# Patient Record
Sex: MALE | Race: WHITE | NOT HISPANIC OR LATINO | Employment: OTHER | URBAN - METROPOLITAN AREA
[De-identification: names, ages, dates, MRNs, and addresses within clinical notes are randomized per-mention and may not be internally consistent; named-entity substitution may affect disease eponyms.]

---

## 2017-02-03 ENCOUNTER — TELEPHONE (OUTPATIENT)
Dept: PULMONOLOGY | Facility: CLINIC | Age: 52
End: 2017-02-03

## 2017-02-03 NOTE — TELEPHONE ENCOUNTER
F/U with patient since graduating [employer] in 2015. Informed patient of our maintenance program and times if he would like to exercise.

## 2017-02-17 RX ORDER — BUDESONIDE AND FORMOTEROL FUMARATE DIHYDRATE 160; 4.5 UG/1; UG/1
2 AEROSOL RESPIRATORY (INHALATION) 2 TIMES DAILY
Qty: 1 INHALER | Refills: 11 | Status: SHIPPED | OUTPATIENT
Start: 2017-02-17 | End: 2018-01-24 | Stop reason: SDUPTHER

## 2017-07-24 ENCOUNTER — TELEPHONE (OUTPATIENT)
Dept: PULMONOLOGY | Facility: CLINIC | Age: 52
End: 2017-07-24

## 2017-07-24 RX ORDER — AZITHROMYCIN 250 MG/1
TABLET, FILM COATED ORAL
Qty: 6 TABLET | Refills: 0 | Status: SHIPPED | OUTPATIENT
Start: 2017-07-24 | End: 2017-09-18

## 2017-07-24 NOTE — TELEPHONE ENCOUNTER
Mr Willams reports a worsening productive cough with greenish gray sputum. He is also having increasing shortness of breath due to coughing. He denies fever or chills. He denies any recent antibiotic use. Azithromycin prescribed.

## 2017-09-18 ENCOUNTER — OFFICE VISIT (OUTPATIENT)
Dept: PULMONOLOGY | Facility: CLINIC | Age: 52
End: 2017-09-18

## 2017-09-18 VITALS
BODY MASS INDEX: 22.28 KG/M2 | DIASTOLIC BLOOD PRESSURE: 70 MMHG | TEMPERATURE: 98.2 F | RESPIRATION RATE: 16 BRPM | HEIGHT: 68 IN | SYSTOLIC BLOOD PRESSURE: 110 MMHG | HEART RATE: 87 BPM | OXYGEN SATURATION: 94 % | WEIGHT: 147 LBS

## 2017-09-18 DIAGNOSIS — E88.01 ALPHA-1-ANTITRYPSIN DEFICIENCY (HCC): Primary | ICD-10-CM

## 2017-09-18 DIAGNOSIS — J43.2 CENTRILOBULAR EMPHYSEMA (HCC): ICD-10-CM

## 2017-09-18 PROCEDURE — 94726 PLETHYSMOGRAPHY LUNG VOLUMES: CPT | Performed by: INTERNAL MEDICINE

## 2017-09-18 PROCEDURE — 71020 CHG CHEST X-RAY 2 VW: CPT | Performed by: INTERNAL MEDICINE

## 2017-09-18 PROCEDURE — 94729 DIFFUSING CAPACITY: CPT | Performed by: INTERNAL MEDICINE

## 2017-09-18 PROCEDURE — 99213 OFFICE O/P EST LOW 20 MIN: CPT | Performed by: INTERNAL MEDICINE

## 2017-09-18 PROCEDURE — 94060 EVALUATION OF WHEEZING: CPT | Performed by: INTERNAL MEDICINE

## 2017-09-18 RX ORDER — ALBUTEROL SULFATE 90 UG/1
2 AEROSOL, METERED RESPIRATORY (INHALATION) EVERY 4 HOURS PRN
COMMUNITY
End: 2017-09-18

## 2017-09-18 RX ORDER — IBUPROFEN 800 MG/1
800 TABLET ORAL EVERY 6 HOURS PRN
Status: ON HOLD | COMMUNITY
End: 2021-03-22

## 2017-09-18 RX ORDER — IPRATROPIUM BROMIDE AND ALBUTEROL SULFATE 2.5; .5 MG/3ML; MG/3ML
SOLUTION RESPIRATORY (INHALATION)
COMMUNITY
Start: 2015-01-07 | End: 2019-11-22 | Stop reason: SDUPTHER

## 2017-09-18 NOTE — PROGRESS NOTES
Pulmonary Medicine Follow-up    Chief Complaint     Hypoxemia secondary to severe emphysema secondary to alpha 1 antitrypsin deficiency.    History of Present Illness/History Review     Mr. Willams returns to the clinic today. He states that in general he is been doing about the same as usual. He did have one course of antibiotics in the past 6 months due to increased congestion and difficulty clearing his secretions however this has cleared up somewhat. He does state however over the past year and a half or so he has been noticing a gradual decline in his activity level as well as an increase in his shortness of breath. This does get better with rest. He states he has not had any weight loss and in fact has had some weight gain. He denies lower extremity edema, fevers, chills, night sweats, chest pain, headaches, or dizziness. He does state that about 2 months ago he had a severe coughing fit and felt like he was going to black out however did not lose consciousness. He does not feel like his inhaler regimen, which amounts to Combivent as well as Spiriva and Symbicort, helps very much. He also has used upwards of 4 L of oxygen with activity but states he only uses his supplemental oxygen at nighttime and does not feel the need to use it during activity.      Review of Systems   Constitutional: Negative for activity change, appetite change, chills, diaphoresis, fatigue, fever and unexpected weight change.   HENT: Negative for congestion, dental problem, drooling, facial swelling, nosebleeds, postnasal drip, rhinorrhea, sneezing, sore throat, trouble swallowing and voice change.    Respiratory: Positive for cough and shortness of breath. Negative for apnea, choking, chest tightness, wheezing and stridor.    Cardiovascular: Negative for chest pain and palpitations.   Genitourinary: Negative for dysuria, hematuria and urgency.   Musculoskeletal: Negative for arthralgias, back pain, joint swelling and myalgias.  "  Hematological: Negative for adenopathy.   All other systems reviewed and are negative.      Allergies   Allergen Reactions   • Other      Opioid Analgesics       Current Outpatient Prescriptions:   •  alpha1-proteinase inhibitor (ZEMAIRA) 1000 MG injection, Infuse  into a venous catheter., Disp: , Rfl:   •  budesonide-formoterol (SYMBICORT) 160-4.5 MCG/ACT inhaler, Inhale 2 puffs 2 (Two) Times a Day., Disp: 1 inhaler, Rfl: 11  •  ibuprofen (ADVIL,MOTRIN) 600 MG tablet, Take 200 mg by mouth Every 6 (Six) Hours As Needed for Mild Pain ., Disp: , Rfl:   •  ipratropium-albuterol (COMBIVENT RESPIMAT)  MCG/ACT inhaler, Inhale 1 puff 4 (Four) Times a Day As Needed for wheezing., Disp: 4 g, Rfl: 11  •  ipratropium-albuterol (DUO-NEB) 0.5-2.5 mg/mL nebulizer, Ipratropium-Albuterol 0.5-2.5 (3) MG/3ML Inhalation Solution; Patient Sig: Ipratropium-Albuterol 0.5-2.5 (3) MG/3ML Inhalation Solution 1 vial via nebulizer qid prn sob; 6; 3; 07-Jan-2015; Active, Disp: , Rfl:   •  O2 (OXYGEN), Inhale Every Night., Disp: , Rfl:   •  tiotropium (SPIRIVA) 18 MCG per inhalation capsule, Place 2 puffs into inhaler and inhale Daily., Disp: 30 capsule, Rfl: 11    Past Medical History:   Diagnosis Date   • Alpha-1-antitrypsin deficiency    • Cough      Family History   Problem Relation Age of Onset   • Heart disease Mother    • Diabetes Mother    • Alpha-1 antitrypsin deficiency Brother    • Emphysema Brother    • Lung cancer Other    • Emphysema Other      Social History   Substance Use Topics   • Smoking status: Former Smoker     Packs/day: 1.50     Years: 25.00     Types: Cigarettes     Quit date: 2006   • Smokeless tobacco: Not on file   • Alcohol use No       /70  Pulse 87  Temp 98.2 °F (36.8 °C)  Resp 16  Ht 68\" (172.7 cm)  Wt 147 lb (66.7 kg)  SpO2 94%  BMI 22.35 kg/m2  Physical Exam   Constitutional: He is oriented to person, place, and time. He appears well-developed and well-nourished. No distress.   HENT: "   Head: Normocephalic and atraumatic.   Nose: Nose normal.   Mouth/Throat: Oropharynx is clear and moist. No oropharyngeal exudate.   Eyes: Conjunctivae and EOM are normal. Pupils are equal, round, and reactive to light. Right eye exhibits no discharge. Left eye exhibits no discharge.   Neck: Normal range of motion. Neck supple. No JVD present. No tracheal deviation present. No thyromegaly present.   Cardiovascular: Normal rate, regular rhythm and normal heart sounds.  Exam reveals no gallop and no friction rub.    No murmur heard.  Pulmonary/Chest: Effort normal. No stridor. No respiratory distress. He has no wheezes. He has no rales. He exhibits no tenderness.   Abdominal: Soft. He exhibits no distension. There is no tenderness. There is no guarding.   Musculoskeletal: Normal range of motion. He exhibits no edema or deformity.   Lymphadenopathy:     He has no cervical adenopathy.   Neurological: He is alert and oriented to person, place, and time.   Skin: Skin is warm. No rash noted. He is not diaphoretic. No erythema. No pallor.   Psychiatric: He has a normal mood and affect. His behavior is normal. Thought content normal.   Vitals reviewed.      Results     Pulmonary function tests were reviewed. These demonstrate severe obstruction with an FEV1 of 1.09 L which is 31% of predicted. Lung volumes show borderline hyperinflation as well as air trapping. His diffusing capacity is reduced.    I was also able to review a chest x-ray performed today and compare this to the previous in 2015. Lungs demonstrate hyperinflation. There are small calcified nodules primarily on the right side consistent with prior granulomatous infection. Lungs are otherwise clear. There has been no change compared to 2015.    Problem List       ICD-10-CM ICD-9-CM   1. Alpha-1-antitrypsin deficiency E88.01 273.4   2. Centrilobular emphysema J43.2 492.8       Impression and Plan     I have encouraged the patient to continue on with his exercise  regimen.  Also encouraged to use supple oxygen as prescribed. I feel that this could help him increase his exercise endurance though he is somewhat reluctant to use this with activity.  We will continue the Spiriva respimat however I have given him samples of the rest been at formulation to see if this helps him a bit more. He will let us know if he needs a new prescription.   He will continue on with Symbicort.  He will also continue on with his supplementation of alpha-1 antitrypsin.  He is not interested in getting a flu vaccine at this time.  We will plan to follow-up with me in 3-6 months or sooner if he has any problems in the meantime.    Phan Castellano MD, [facility]    CC: Devaughn Kohli MD    Please note that portions of this note were completed with a voice recognition program. Efforts were made to edit the dictation, but occasionally words are mistranscribed.

## 2017-10-19 RX ORDER — IPRATROPIUM/ALBUTEROL SULFATE 20-100 MCG
MIST INHALER (GRAM) INHALATION
Qty: 4 G | Refills: 11 | Status: SHIPPED | OUTPATIENT
Start: 2017-10-19 | End: 2018-09-14 | Stop reason: SDUPTHER

## 2017-10-19 RX ORDER — TIOTROPIUM BROMIDE 18 UG/1
CAPSULE ORAL; RESPIRATORY (INHALATION)
Qty: 30 CAPSULE | Refills: 11 | Status: SHIPPED | OUTPATIENT
Start: 2017-10-19 | End: 2018-05-07 | Stop reason: SDUPTHER

## 2017-11-07 ENCOUNTER — TELEPHONE (OUTPATIENT)
Dept: PULMONOLOGY | Facility: CLINIC | Age: 52
End: 2017-11-07

## 2017-11-07 RX ORDER — GUAIFENESIN 600 MG/1
1200 TABLET, EXTENDED RELEASE ORAL 2 TIMES DAILY
Qty: 30 TABLET | Refills: 1 | Status: SHIPPED | OUTPATIENT
Start: 2017-11-07 | End: 2019-10-23

## 2017-11-07 RX ORDER — AZITHROMYCIN 250 MG/1
TABLET, FILM COATED ORAL
Qty: 6 TABLET | Refills: 0 | Status: SHIPPED | OUTPATIENT
Start: 2017-11-07 | End: 2018-03-15

## 2017-11-07 NOTE — TELEPHONE ENCOUNTER
Mr Willams complains of increased cough and congestion, although he is having trouble expectorating. He denies fever or chills but does report generalized body aches. No antibiotic use since July.    Azithromycin and Mucinex prescribed.

## 2018-01-24 RX ORDER — BUDESONIDE AND FORMOTEROL FUMARATE DIHYDRATE 160; 4.5 UG/1; UG/1
2 AEROSOL RESPIRATORY (INHALATION)
Qty: 1 INHALER | Refills: 1 | Status: SHIPPED | OUTPATIENT
Start: 2018-01-24 | End: 2018-04-11 | Stop reason: SDUPTHER

## 2018-02-01 ENCOUNTER — DOCUMENTATION (OUTPATIENT)
Dept: PULMONOLOGY | Facility: CLINIC | Age: 53
End: 2018-02-01

## 2018-02-01 NOTE — PROGRESS NOTES
Darleen from [facility] (Prolastin) called to get OK to add +20% to pt's order instead of the traditional +10% due to pt's weight being over the dose they had. [phone] ext [phone].

## 2018-03-15 ENCOUNTER — OFFICE VISIT (OUTPATIENT)
Dept: PULMONOLOGY | Facility: CLINIC | Age: 53
End: 2018-03-15

## 2018-03-15 VITALS
DIASTOLIC BLOOD PRESSURE: 74 MMHG | WEIGHT: 147.38 LBS | BODY MASS INDEX: 22.34 KG/M2 | SYSTOLIC BLOOD PRESSURE: 106 MMHG | OXYGEN SATURATION: 91 % | HEART RATE: 76 BPM | TEMPERATURE: 97.8 F | HEIGHT: 68 IN | RESPIRATION RATE: 18 BRPM

## 2018-03-15 DIAGNOSIS — J43.2 CENTRILOBULAR EMPHYSEMA (HCC): ICD-10-CM

## 2018-03-15 DIAGNOSIS — Z99.81 DEPENDENCE ON SUPPLEMENTAL OXYGEN: ICD-10-CM

## 2018-03-15 DIAGNOSIS — E88.01 ALPHA-1-ANTITRYPSIN DEFICIENCY (HCC): Primary | ICD-10-CM

## 2018-03-15 PROCEDURE — 99213 OFFICE O/P EST LOW 20 MIN: CPT | Performed by: INTERNAL MEDICINE

## 2018-03-15 RX ORDER — DOXYCYCLINE HYCLATE 100 MG
100 TABLET ORAL 2 TIMES DAILY
Qty: 28 TABLET | Refills: 0 | Status: SHIPPED | OUTPATIENT
Start: 2018-03-15 | End: 2018-03-29

## 2018-03-15 RX ORDER — SODIUM CHLORIDE FOR INHALATION 3 %
4 VIAL, NEBULIZER (ML) INHALATION AS NEEDED
Qty: 120 ML | Refills: 12 | Status: SHIPPED | OUTPATIENT
Start: 2018-03-15 | End: 2019-10-23

## 2018-03-15 RX ORDER — PREDNISONE 10 MG/1
TABLET ORAL
Qty: 31 TABLET | Refills: 0 | Status: SHIPPED | OUTPATIENT
Start: 2018-03-15 | End: 2018-05-07

## 2018-03-15 NOTE — PROGRESS NOTES
Pulmonary Medicine Follow-up    Chief Complaint     Alpha-1 antitrypsin deficiency and severe emphysema    History of Present Illness/History Review     Mr. Willams returns the clinic today. I last saw him in September. He states that over the past year or so he has been feeling a bit worse in terms of his energy levels and respiratory capacity. He states that he chronically feels like he has mucus and congestion in his chest but is unable to clear this with any success. He does state in the past that when he is had steroids or antibiotics this clears up remarkably and he feels much better. His weight has been stable and he is been taking his medications without any difficulties. He is not sure if the Spiriva Symbicort help him though he has not been off these. He is wondering about switching out the Advair as an experiment. He is also concerned about the possibility of potential food or environment allergies he could be contributing to his mucus retention. He continues to tolerate his alpha1-proteinase inhibitor supplementation.      Review of Systems   Constitutional: Positive for activity change and fatigue. Negative for appetite change, chills, diaphoresis, fever and unexpected weight change.   HENT: Positive for congestion. Negative for facial swelling, hearing loss, mouth sores, postnasal drip, rhinorrhea, sinus pain, sinus pressure, sneezing, sore throat, trouble swallowing and voice change.    Eyes: Negative.    Respiratory: Positive for cough, chest tightness and shortness of breath. Negative for apnea, choking, wheezing and stridor.    Cardiovascular: Negative for chest pain, palpitations and leg swelling.   Gastrointestinal: Negative for abdominal distention, abdominal pain, constipation, diarrhea, nausea and vomiting.   Musculoskeletal: Negative.    Skin: Negative.    Allergic/Immunologic: Negative.    Neurological: Negative.    Hematological: Negative.    Psychiatric/Behavioral: Negative.   "      Allergies   Allergen Reactions   • Other      Opioid Analgesics   • Roflumilast Nausea Only       Current Outpatient Prescriptions:   •  alpha1-proteinase inhibitor (ZEMAIRA) 1000 MG injection, Infuse  into a venous catheter., Disp: , Rfl:   •  budesonide-formoterol (SYMBICORT) 160-4.5 MCG/ACT inhaler, Inhale 2 puffs 2 (Two) Times a Day., Disp: 1 inhaler, Rfl: 1  •  COMBIVENT RESPIMAT  MCG/ACT inhaler, inhale 1 puff by mouth four times a day if needed for wheezing, Disp: 4 g, Rfl: 11  •  guaiFENesin (MUCINEX) 600 MG 12 hr tablet, Take 2 tablets by mouth 2 (Two) Times a Day., Disp: 30 tablet, Rfl: 1  •  ibuprofen (ADVIL,MOTRIN) 600 MG tablet, Take 200 mg by mouth Every 6 (Six) Hours As Needed for Mild Pain ., Disp: , Rfl:   •  ipratropium-albuterol (DUO-NEB) 0.5-2.5 mg/mL nebulizer, Ipratropium-Albuterol 0.5-2.5 (3) MG/3ML Inhalation Solution; Patient Sig: Ipratropium-Albuterol 0.5-2.5 (3) MG/3ML Inhalation Solution 1 vial via nebulizer qid prn sob; 6; 3; 07-Jan-2015; Active, Disp: , Rfl:   •  O2 (OXYGEN), Inhale Every Night., Disp: , Rfl:   •  SPIRIVA HANDIHALER 18 MCG per inhalation capsule, PLACE 2 PUFFS INTO INHALER AND INHALE DAILY, Disp: 30 capsule, Rfl: 11    Past Medical History:   Diagnosis Date   • Alpha-1-antitrypsin deficiency    • Cough      Family History   Problem Relation Age of Onset   • Heart disease Mother    • Diabetes Mother    • Alpha-1 antitrypsin deficiency Brother    • Emphysema Brother    • Lung cancer Other    • Emphysema Other      Social History   Substance Use Topics   • Smoking status: Former Smoker     Packs/day: 1.50     Years: 25.00     Types: Cigarettes     Quit date: 2006   • Smokeless tobacco: Not on file   • Alcohol use No       /74 (BP Location: Right arm, Patient Position: Sitting, Cuff Size: Adult)   Pulse 76   Temp 97.8 °F (36.6 °C)   Resp 18   Ht 172.7 cm (67.99\")   Wt 66.8 kg (147 lb 6 oz)   SpO2 91%   BMI 22.41 kg/m²   Physical Exam "   Constitutional: He is oriented to person, place, and time. He appears well-developed and well-nourished. No distress.   HENT:   Head: Normocephalic and atraumatic.   Right Ear: External ear normal.   Left Ear: External ear normal.   Nose: Nose normal.   Mouth/Throat: Oropharynx is clear and moist. No oropharyngeal exudate.   Eyes: Conjunctivae and EOM are normal. Pupils are equal, round, and reactive to light. Right eye exhibits no discharge. Left eye exhibits no discharge. No scleral icterus.   Neck: Normal range of motion. Neck supple. No JVD present. No tracheal deviation present. No thyromegaly present.   Cardiovascular: Normal rate, regular rhythm and normal heart sounds.  Exam reveals no friction rub.    No murmur heard.  Pulmonary/Chest: Effort normal and breath sounds normal. No stridor. No respiratory distress. He has no wheezes. He has no rales. He exhibits no tenderness.   Abdominal: Soft. Bowel sounds are normal. He exhibits no distension and no mass. There is no tenderness. There is no rebound and no guarding.   Musculoskeletal: Normal range of motion. He exhibits no edema, tenderness or deformity.   Lymphadenopathy:     He has no cervical adenopathy.   Neurological: He is alert and oriented to person, place, and time.   Skin: Skin is warm. He is not diaphoretic. No erythema. No pallor.   Psychiatric: He has a normal mood and affect. His behavior is normal. Judgment and thought content normal.   Vitals reviewed.          Problem List       ICD-10-CM ICD-9-CM   1. Alpha-1-antitrypsin deficiency E88.01 273.4   2. Centrilobular emphysema J43.2 492.8   3. Dependence on supplemental oxygen Z99.81 V46.2       Impression and Plan     Mr. Willams and I had a long conversation regarding his mucus retention and an overall strategy of both trying to limit the amount of sputum produced as well as managing the sputum that does exist with good clearance techniques. I have given him information on postural drainage  and explained the purpose behind this.  I feel that he would benefit greatly from a flutter or Acapella valve and I will order him one of these though unfortunately I do not have one to give him in the office.  I will also order 3% nebulized saline to be used as needed and his nebulizer.  I will arrange for a visit with an allergist at his convenience.  I will also like to start him off with a short course of prednisone taper as well as doxycycline.  He will continue on with his current medications however he will swap out his Symbicort with Advair to see if this makes any difference.    I will plan to see him back in 4-6 weeks and we will further modify his regimen as necessary depending upon the efficacy of these changes.    Phan Castellano MD, [facility]    CC: Devaughn Kohli MD

## 2018-04-11 RX ORDER — BUDESONIDE AND FORMOTEROL FUMARATE DIHYDRATE 160; 4.5 UG/1; UG/1
2 AEROSOL RESPIRATORY (INHALATION)
Qty: 1 INHALER | Refills: 1 | Status: SHIPPED | OUTPATIENT
Start: 2018-04-11 | End: 2018-05-07 | Stop reason: SDUPTHER

## 2018-04-11 RX ORDER — BUDESONIDE AND FORMOTEROL FUMARATE DIHYDRATE 160; 4.5 UG/1; UG/1
AEROSOL RESPIRATORY (INHALATION)
Qty: 10.2 G | Refills: 1 | OUTPATIENT
Start: 2018-04-11

## 2018-05-07 ENCOUNTER — OFFICE VISIT (OUTPATIENT)
Dept: PULMONOLOGY | Facility: CLINIC | Age: 53
End: 2018-05-07

## 2018-05-07 VITALS
BODY MASS INDEX: 21.56 KG/M2 | HEIGHT: 68 IN | WEIGHT: 142.25 LBS | SYSTOLIC BLOOD PRESSURE: 130 MMHG | HEART RATE: 74 BPM | RESPIRATION RATE: 18 BRPM | OXYGEN SATURATION: 94 % | DIASTOLIC BLOOD PRESSURE: 88 MMHG | TEMPERATURE: 97.4 F

## 2018-05-07 DIAGNOSIS — J43.2 CENTRILOBULAR EMPHYSEMA (HCC): ICD-10-CM

## 2018-05-07 DIAGNOSIS — R05.3 CHRONIC COUGH: ICD-10-CM

## 2018-05-07 DIAGNOSIS — E88.01 ALPHA-1-ANTITRYPSIN DEFICIENCY (HCC): Primary | ICD-10-CM

## 2018-05-07 PROCEDURE — 99212 OFFICE O/P EST SF 10 MIN: CPT | Performed by: INTERNAL MEDICINE

## 2018-05-07 RX ORDER — BUDESONIDE AND FORMOTEROL FUMARATE DIHYDRATE 160; 4.5 UG/1; UG/1
2 AEROSOL RESPIRATORY (INHALATION)
Qty: 1 INHALER | Refills: 1 | Status: SHIPPED | OUTPATIENT
Start: 2018-05-07 | End: 2018-06-15 | Stop reason: SDUPTHER

## 2018-05-07 NOTE — PROGRESS NOTES
Pulmonary Medicine Follow-up    Chief Complaint     Follow-up of alpha-1 antitrypsin deficiency and emphysema    History of Present Illness/History Review     Mr. Willams returns to the clinic today. I last saw him in March during which time he was having some problems clearing secretions. He states that these difficulties continue to come and go mainly depending upon the weather. He is also still interested in seeing an allergist which I believe I had ordered during his last visit but the referral was never made. He states that he is been intermittently using nebulized saline does not seem to have much effect from it. He has not had any fevers or chills. He still occasionally is short of breath through the day.      Review of Systems  Full review of systems was completed and it is negative except as mentioned above in the HPI and his scanned review sheet.    Allergies   Allergen Reactions   • Other      Opioid Analgesics   • Roflumilast Nausea Only       Current Outpatient Prescriptions:   •  alpha1-proteinase inhibitor (ZEMAIRA) 1000 MG injection, Infuse  into a venous catheter., Disp: , Rfl:   •  budesonide-formoterol (SYMBICORT) 160-4.5 MCG/ACT inhaler, Inhale 2 puffs 2 (Two) Times a Day., Disp: 1 inhaler, Rfl: 1  •  COMBIVENT RESPIMAT  MCG/ACT inhaler, inhale 1 puff by mouth four times a day if needed for wheezing, Disp: 4 g, Rfl: 11  •  guaiFENesin (MUCINEX) 600 MG 12 hr tablet, Take 2 tablets by mouth 2 (Two) Times a Day., Disp: 30 tablet, Rfl: 1  •  ibuprofen (ADVIL,MOTRIN) 600 MG tablet, Take 200 mg by mouth Every 6 (Six) Hours As Needed for Mild Pain ., Disp: , Rfl:   •  ipratropium-albuterol (DUO-NEB) 0.5-2.5 mg/mL nebulizer, Ipratropium-Albuterol 0.5-2.5 (3) MG/3ML Inhalation Solution; Patient Sig: Ipratropium-Albuterol 0.5-2.5 (3) MG/3ML Inhalation Solution 1 vial via nebulizer qid prn sob; 6; 3; 07-Jan-2015; Active, Disp: , Rfl:   •  O2 (OXYGEN), Inhale Every Night., Disp: , Rfl:   •  sodium  "chloride 3 % nebulizer solution, Take 4 mL by nebulization As Needed for Cough., Disp: 120 mL, Rfl: 12  •  SPIRIVA HANDIHALER 18 MCG per inhalation capsule, PLACE 2 PUFFS INTO INHALER AND INHALE DAILY, Disp: 30 capsule, Rfl: 11    Past Medical History:   Diagnosis Date   • Alpha-1-antitrypsin deficiency    • Cough      Family History   Problem Relation Age of Onset   • Heart disease Mother    • Diabetes Mother    • Alpha-1 antitrypsin deficiency Brother    • Emphysema Brother    • Lung cancer Other    • Emphysema Other      Social History   Substance Use Topics   • Smoking status: Former Smoker     Packs/day: 1.50     Years: 25.00     Types: Cigarettes     Quit date: 2006   • Smokeless tobacco: Not on file   • Alcohol use No       /88 (BP Location: Left arm, Patient Position: Sitting, Cuff Size: Adult)   Pulse 74   Temp 97.4 °F (36.3 °C)   Resp 18   Ht 172.7 cm (67.99\")   Wt 64.5 kg (142 lb 4 oz)   SpO2 94%   BMI 21.63 kg/m²   Physical Exam   Constitutional: He is oriented to person, place, and time. He appears well-developed and well-nourished. No distress.   HENT:   Head: Normocephalic and atraumatic.   Right Ear: External ear normal.   Left Ear: External ear normal.   Nose: Nose normal.   Mouth/Throat: Oropharynx is clear and moist. No oropharyngeal exudate.   Eyes: Conjunctivae and EOM are normal. Pupils are equal, round, and reactive to light. Right eye exhibits no discharge. Left eye exhibits no discharge. No scleral icterus.   Neck: Normal range of motion. Neck supple. No JVD present. No tracheal deviation present. No thyromegaly present.   Cardiovascular: Normal rate, regular rhythm and normal heart sounds.  Exam reveals no friction rub.    No murmur heard.  Pulmonary/Chest: Effort normal and breath sounds normal. No stridor. No respiratory distress. He has no wheezes. He has no rales. He exhibits no tenderness.   Abdominal: Soft. Bowel sounds are normal. He exhibits no distension and no mass. " There is no tenderness. There is no rebound and no guarding.   Musculoskeletal: Normal range of motion. He exhibits no edema, tenderness or deformity.   Lymphadenopathy:     He has no cervical adenopathy.   Neurological: He is alert and oriented to person, place, and time.   Skin: Skin is warm. Capillary refill takes less than 2 seconds. He is not diaphoretic. No erythema. No pallor.   Psychiatric: He has a normal mood and affect. His behavior is normal. Judgment and thought content normal.   Vitals reviewed.          Problem List       ICD-10-CM ICD-9-CM   1. Alpha-1-antitrypsin deficiency E88.01 273.4   2. Centrilobular emphysema J43.2 492.8   3. Chronic cough R05 786.2       Impression and Plan     I will make sure that the patient is referred to an allergist as this seems to been missed during his last visit.  Continue on with current inhaler therapy.  I have encouraged him to continue on with his mucus clearance strategies.  I will plan to renew his prescriptions today.  I will see him in about 6 months for his regular follow-up. I have asked him to call me if he has questions or concerns in the meantime.    Phan Castellano MD, [facility]    CC: Devaughn Kohli MD

## 2018-06-06 ENCOUNTER — TRANSCRIBE ORDERS (OUTPATIENT)
Dept: PULMONOLOGY | Facility: CLINIC | Age: 53
End: 2018-06-06

## 2018-06-06 DIAGNOSIS — Z91.09 ENVIRONMENTAL ALLERGIES: Primary | ICD-10-CM

## 2018-06-15 RX ORDER — BUDESONIDE AND FORMOTEROL FUMARATE DIHYDRATE 160; 4.5 UG/1; UG/1
AEROSOL RESPIRATORY (INHALATION)
Qty: 10.2 G | Refills: 1 | Status: SHIPPED | OUTPATIENT
Start: 2018-06-15 | End: 2018-08-16 | Stop reason: SDUPTHER

## 2018-06-21 ENCOUNTER — TELEPHONE (OUTPATIENT)
Dept: PULMONOLOGY | Facility: CLINIC | Age: 53
End: 2018-06-21

## 2018-06-21 NOTE — TELEPHONE ENCOUNTER
Coughing up pasty white sputum,started out greenish gray about a week and a half ago  This has been a re-occurring issue    Per Jeanine patient notified to use Mucinex to see if it helps, if  It doesn't he is to call us back, patient voiced understanding

## 2018-08-16 RX ORDER — BUDESONIDE AND FORMOTEROL FUMARATE DIHYDRATE 160; 4.5 UG/1; UG/1
AEROSOL RESPIRATORY (INHALATION)
Qty: 10.2 G | Refills: 1 | Status: SHIPPED | OUTPATIENT
Start: 2018-08-16 | End: 2018-10-09 | Stop reason: SDUPTHER

## 2018-09-14 RX ORDER — IPRATROPIUM/ALBUTEROL SULFATE 20-100 MCG
MIST INHALER (GRAM) INHALATION
Qty: 4 G | Refills: 6 | Status: SHIPPED | OUTPATIENT
Start: 2018-09-14 | End: 2018-12-17 | Stop reason: SDUPTHER

## 2018-10-09 RX ORDER — BUDESONIDE AND FORMOTEROL FUMARATE DIHYDRATE 160; 4.5 UG/1; UG/1
AEROSOL RESPIRATORY (INHALATION)
Qty: 10.2 G | Refills: 1 | Status: SHIPPED | OUTPATIENT
Start: 2018-10-09 | End: 2018-12-02 | Stop reason: SDUPTHER

## 2018-12-03 RX ORDER — BUDESONIDE AND FORMOTEROL FUMARATE DIHYDRATE 160; 4.5 UG/1; UG/1
AEROSOL RESPIRATORY (INHALATION)
Qty: 10.2 G | Refills: 1 | Status: SHIPPED | OUTPATIENT
Start: 2018-12-03 | End: 2018-12-17 | Stop reason: SDUPTHER

## 2018-12-17 ENCOUNTER — OFFICE VISIT (OUTPATIENT)
Dept: PULMONOLOGY | Facility: CLINIC | Age: 53
End: 2018-12-17

## 2018-12-17 VITALS
TEMPERATURE: 98.2 F | WEIGHT: 146.13 LBS | BODY MASS INDEX: 22.15 KG/M2 | HEIGHT: 68 IN | SYSTOLIC BLOOD PRESSURE: 134 MMHG | HEART RATE: 83 BPM | DIASTOLIC BLOOD PRESSURE: 90 MMHG | OXYGEN SATURATION: 95 % | RESPIRATION RATE: 18 BRPM

## 2018-12-17 DIAGNOSIS — Z99.81 DEPENDENCE ON SUPPLEMENTAL OXYGEN: ICD-10-CM

## 2018-12-17 DIAGNOSIS — E88.01 ALPHA-1-ANTITRYPSIN DEFICIENCY (HCC): Primary | ICD-10-CM

## 2018-12-17 DIAGNOSIS — J44.1 COPD EXACERBATION (HCC): ICD-10-CM

## 2018-12-17 DIAGNOSIS — J43.2 CENTRILOBULAR EMPHYSEMA (HCC): ICD-10-CM

## 2018-12-17 DIAGNOSIS — R05.3 CHRONIC COUGH: ICD-10-CM

## 2018-12-17 PROCEDURE — 99214 OFFICE O/P EST MOD 30 MIN: CPT | Performed by: NURSE PRACTITIONER

## 2018-12-17 RX ORDER — DOXYCYCLINE HYCLATE 100 MG
100 TABLET ORAL 2 TIMES DAILY
Qty: 20 TABLET | Refills: 0 | Status: SHIPPED | OUTPATIENT
Start: 2018-12-17 | End: 2019-10-23

## 2018-12-17 RX ORDER — BUDESONIDE AND FORMOTEROL FUMARATE DIHYDRATE 160; 4.5 UG/1; UG/1
2 AEROSOL RESPIRATORY (INHALATION) 2 TIMES DAILY
Qty: 10.2 G | Refills: 1 | Status: SHIPPED | OUTPATIENT
Start: 2018-12-17 | End: 2019-02-14 | Stop reason: SDUPTHER

## 2018-12-17 RX ORDER — PREDNISONE 10 MG/1
TABLET ORAL
Qty: 31 TABLET | Refills: 0 | Status: SHIPPED | OUTPATIENT
Start: 2018-12-17 | End: 2019-10-23

## 2018-12-17 NOTE — PROGRESS NOTES
[facility] Pulmonary Follow up    CHIEF COMPLAINT    Alpha 1 antitrypsin deficiency and emphysema    HISTORY OF PRESENT ILLNESS    Balwinder Willams is a 53 y.o.male here today for follow-up of his out for 1 and trypsin deficiency and emphysema.  He was last seen in May 2018 by Dr. Castellano.  He states his breathing continues to decline, he does not notice any improvement in his breathing since starting on Prolastin.      He states he coughs up white sputum daily.  He continues to use Symbicort and Combivent for his emphysema.  He states he sometimes wears oxygen at night from 2-4 L.  He does not wear this every night, and he does not use this with activity either.  He will occasionally uses duo nebs when he is sick.    He states that for the last 3-4 days he has felt more congested and having increased sputum production.  He denies fevers and chills.  He denies hemoptysis, chest pain or palpitations.  He states his shortness of breath has worsened over the last 3-4 days as well.  He feels like he may be getting sick.  Patient Active Problem List   Diagnosis   • Alpha-1-antitrypsin deficiency (CMS/HCC)   • Chronic cough   • Dependence on supplemental oxygen   • Emphysema/COPD (CMS/HCC)       Allergies   Allergen Reactions   • Other      Opioid Analgesics   • Roflumilast Nausea Only       Current Outpatient Medications:   •  alpha1-proteinase inhibitor (ZEMAIRA) 1000 MG injection, Infuse  into a venous catheter., Disp: , Rfl:   •  budesonide-formoterol (SYMBICORT) 160-4.5 MCG/ACT inhaler, Inhale 2 puffs 2 (Two) Times a Day., Disp: 10.2 g, Rfl: 1  •  guaiFENesin (MUCINEX) 600 MG 12 hr tablet, Take 2 tablets by mouth 2 (Two) Times a Day., Disp: 30 tablet, Rfl: 1  •  ibuprofen (ADVIL,MOTRIN) 600 MG tablet, Take 200 mg by mouth Every 6 (Six) Hours As Needed for Mild Pain ., Disp: , Rfl:   •  ipratropium-albuterol (COMBIVENT RESPIMAT)  MCG/ACT inhaler, Inhale 1 puff 4 (Four) Times a Day., Disp: 4 g, Rfl: 6  •   ipratropium-albuterol (DUO-NEB) 0.5-2.5 mg/mL nebulizer, Ipratropium-Albuterol 0.5-2.5 (3) MG/3ML Inhalation Solution; Patient Sig: Ipratropium-Albuterol 0.5-2.5 (3) MG/3ML Inhalation Solution 1 vial via nebulizer qid prn sob; 6; 3; -2015; Active, Disp: , Rfl:   •  O2 (OXYGEN), Inhale Every Night., Disp: , Rfl:   •  sodium chloride 3 % nebulizer solution, Take 4 mL by nebulization As Needed for Cough., Disp: 120 mL, Rfl: 12  •  tiotropium (SPIRIVA HANDIHALER) 18 MCG per inhalation capsule, Place 1 capsule into inhaler and inhale Daily., Disp: 30 capsule, Rfl: 11  •  doxycycline (VIBRAMYICN) 100 MG tablet, Take 1 tablet by mouth 2 (Two) Times a Day., Disp: 20 tablet, Rfl: 0  •  predniSONE (DELTASONE) 10 MG tablet, Take 4 tabs daily x 3 days, then take 3 tabs daily x 3 days, then take 2 tabs daily x 3 days, then take 1 tab daily x 3 days, Disp: 31 tablet, Rfl: 0  MEDICATION LIST AND ALLERGIES REVIEWED.    Social History     Tobacco Use   • Smoking status: Former Smoker     Packs/day: 1.50     Years: 25.00     Pack years: 37.50     Types: Cigarettes     Last attempt to quit: 2006     Years since quittin.9   Substance Use Topics   • Alcohol use: No   • Drug use: No       FAMILY AND SOCIAL HISTORY REVIEWED.    Review of Systems   Constitutional: Negative for activity change, appetite change, fatigue, fever and unexpected weight change.   HENT: Positive for congestion and sinus pressure. Negative for postnasal drip, rhinorrhea, sore throat and voice change.    Eyes: Negative for visual disturbance.   Respiratory: Positive for shortness of breath. Negative for cough, chest tightness and wheezing.    Cardiovascular: Negative for chest pain, palpitations and leg swelling.   Gastrointestinal: Negative for abdominal distention, abdominal pain, nausea and vomiting.   Endocrine: Negative for cold intolerance and heat intolerance.   Genitourinary: Negative for difficulty urinating and urgency.   Musculoskeletal:  "Negative for arthralgias, back pain and neck pain.   Skin: Negative for color change and pallor.   Allergic/Immunologic: Negative for environmental allergies and food allergies.   Neurological: Negative for dizziness, syncope, weakness and light-headedness.   Hematological: Negative for adenopathy. Does not bruise/bleed easily.   Psychiatric/Behavioral: Negative for agitation and behavioral problems.   .    /90 (BP Location: Left arm, Patient Position: Sitting, Cuff Size: Adult)   Pulse 83   Temp 98.2 °F (36.8 °C)   Resp 18   Ht 172.7 cm (67.99\")   Wt 66.3 kg (146 lb 2 oz)   SpO2 95% Comment: room air at rest  BMI 22.22 kg/m²       There is no immunization history on file for this patient.    Physical Exam   Constitutional: He is oriented to person, place, and time. He appears well-developed and well-nourished.   HENT:   Head: Normocephalic and atraumatic.   Eyes: Pupils are equal, round, and reactive to light.   Neck: Normal range of motion. Neck supple. No thyromegaly present.   Cardiovascular: Normal rate, regular rhythm, normal heart sounds and intact distal pulses. Exam reveals no gallop and no friction rub.   No murmur heard.  Pulmonary/Chest: Effort normal and breath sounds normal. No respiratory distress. He has no wheezes. He has no rales. He exhibits no tenderness.   Abdominal: Soft. Bowel sounds are normal. There is no tenderness.   Musculoskeletal: Normal range of motion.   Lymphadenopathy:     He has no cervical adenopathy.   Neurological: He is alert and oriented to person, place, and time.   Skin: Skin is warm and dry. Capillary refill takes less than 2 seconds. He is not diaphoretic.   Psychiatric: He has a normal mood and affect. His behavior is normal.   Nursing note and vitals reviewed.        RESULTS    PROBLEM LIST    Problem List Items Addressed This Visit        Respiratory    Alpha-1-antitrypsin deficiency (CMS/HCC) - Primary    Overview     A.  Labs of 11/20/2014 reports an " alpha 1 antitrypsin deficiency of 4.7 with a phenotype      with Z bands detected and genotyping revealing the presence of most common      deficiency allele type Z and an inferred non-expressing null allele.         Relevant Medications    ipratropium-albuterol (COMBIVENT RESPIMAT)  MCG/ACT inhaler    budesonide-formoterol (SYMBICORT) 160-4.5 MCG/ACT inhaler    Chronic cough    Dependence on supplemental oxygen    Emphysema/COPD (CMS/HCC)    Relevant Medications    predniSONE (DELTASONE) 10 MG tablet    ipratropium-albuterol (COMBIVENT RESPIMAT)  MCG/ACT inhaler    budesonide-formoterol (SYMBICORT) 160-4.5 MCG/ACT inhaler      Other Visit Diagnoses     COPD exacerbation (CMS/[facility])        Relevant Medications    predniSONE (DELTASONE) 10 MG tablet    doxycycline (VIBRAMYICN) 100 MG tablet    ipratropium-albuterol (COMBIVENT RESPIMAT)  MCG/ACT inhaler    budesonide-formoterol (SYMBICORT) 160-4.5 MCG/ACT inhaler            DISCUSSION    Mr. Willams was here for follow-up today of his alpha-1 antitrypsin deficiency and emphysema.  He will continue to use Symbicort and Combivent for his emphysema.  We discussed using his oxygen every night and taking his inhalers as prescribed would help prolong his lung function.  He states he is stubborn and does not take them all the time.    He will continue to wear his oxygen at night.    He appears to be having a COPD exacerbation so I will call and steroids and antibiotic for him today.  I instructed him on how to use these medications.    He will follow-up in 6 months or sooner if his symptoms worsen.  I spent 25 minutes with the patient. I spent > 50% percent of this time counseling and discussing diagnosis, prognosis, diagnostic testing, evaluation, current status, treatment options and management.    Neris Cárdenas, JESSICA  12/17/20182:55 PM  Electronically signed     Please note that portions of this note were completed with a voice recognition program.  Efforts were made to edit the dictations, but occasionally words are mistranscribed.      CC: Devaughn Kohli MD

## 2019-01-31 ENCOUNTER — DOCUMENTATION (OUTPATIENT)
Dept: PULMONOLOGY | Facility: CLINIC | Age: 54
End: 2019-01-31

## 2019-01-31 NOTE — PROGRESS NOTES
Spoke with Olive from [facility]  [phone]   Approved Prolastin C  Auth # [id number]  Good thru Dec 2019     Notified patient and faxed new RX to [phone]

## 2019-02-14 DIAGNOSIS — J43.2 CENTRILOBULAR EMPHYSEMA (HCC): ICD-10-CM

## 2019-02-14 RX ORDER — BUDESONIDE AND FORMOTEROL FUMARATE DIHYDRATE 160; 4.5 UG/1; UG/1
AEROSOL RESPIRATORY (INHALATION)
Qty: 10.2 G | Refills: 1 | Status: SHIPPED | OUTPATIENT
Start: 2019-02-14 | End: 2019-04-07 | Stop reason: SDUPTHER

## 2019-02-14 NOTE — TELEPHONE ENCOUNTER
Patient requesting refill on Symbicort 160/ 4.5  2 puff 2x luz will be due followup May 2019. [facility] Patient will need to schedule follow up appointment

## 2019-04-07 DIAGNOSIS — J43.2 CENTRILOBULAR EMPHYSEMA (HCC): ICD-10-CM

## 2019-04-08 RX ORDER — BUDESONIDE AND FORMOTEROL FUMARATE DIHYDRATE 160; 4.5 UG/1; UG/1
AEROSOL RESPIRATORY (INHALATION)
Qty: 10.2 G | Refills: 3 | Status: SHIPPED | OUTPATIENT
Start: 2019-04-08 | End: 2019-07-16 | Stop reason: SDUPTHER

## 2019-04-14 RX ORDER — PREDNISONE 20 MG/1
40 TABLET ORAL DAILY
Qty: 10 TABLET | Refills: 0 | Status: SHIPPED | OUTPATIENT
Start: 2019-04-14 | End: 2019-04-18 | Stop reason: SDUPTHER

## 2019-04-14 RX ORDER — LEVOFLOXACIN 500 MG/1
500 TABLET, FILM COATED ORAL DAILY
Qty: 5 TABLET | Refills: 0 | Status: SHIPPED | OUTPATIENT
Start: 2019-04-14 | End: 2019-04-19

## 2019-04-17 ENCOUNTER — TELEPHONE (OUTPATIENT)
Dept: PULMONOLOGY | Facility: CLINIC | Age: 54
End: 2019-04-17

## 2019-04-17 NOTE — TELEPHONE ENCOUNTER
Pt called today c/o SOB/Cough/Sore throat. Pt denies any fever/chest pain. Pt states he was prescribed Levaquin and Prednisone and will be finished tomorrow. Pt states there has been no improvements. Please advise.

## 2019-04-18 RX ORDER — PREDNISONE 20 MG/1
40 TABLET ORAL DAILY
Qty: 10 TABLET | Refills: 0 | Status: SHIPPED | OUTPATIENT
Start: 2019-04-18 | End: 2019-04-23

## 2019-04-18 NOTE — TELEPHONE ENCOUNTER
Patient called back stating he was under the impression that prednisone was going to be extended after conversation with you yesterday. Requesting that be called in.

## 2019-04-18 NOTE — TELEPHONE ENCOUNTER
Rx Prednisone 20 mg reordered for an additional 10 days as requested. Pt notified and verbalized understanding.

## 2019-05-24 RX ORDER — TIOTROPIUM BROMIDE 18 UG/1
CAPSULE ORAL; RESPIRATORY (INHALATION)
Qty: 30 CAPSULE | Refills: 2 | Status: SHIPPED | OUTPATIENT
Start: 2019-05-24 | End: 2019-08-13 | Stop reason: SDUPTHER

## 2019-06-17 ENCOUNTER — OFFICE VISIT (OUTPATIENT)
Dept: PULMONOLOGY | Facility: CLINIC | Age: 54
End: 2019-06-17

## 2019-06-17 VITALS
DIASTOLIC BLOOD PRESSURE: 90 MMHG | WEIGHT: 143.6 LBS | SYSTOLIC BLOOD PRESSURE: 140 MMHG | TEMPERATURE: 98 F | HEIGHT: 68 IN | HEART RATE: 72 BPM | BODY MASS INDEX: 21.76 KG/M2 | OXYGEN SATURATION: 90 %

## 2019-06-17 DIAGNOSIS — R06.02 SHORTNESS OF BREATH: ICD-10-CM

## 2019-06-17 DIAGNOSIS — Z99.81 DEPENDENCE ON SUPPLEMENTAL OXYGEN: ICD-10-CM

## 2019-06-17 DIAGNOSIS — Z91.09 ENVIRONMENTAL ALLERGIES: ICD-10-CM

## 2019-06-17 DIAGNOSIS — R05.3 CHRONIC COUGH: ICD-10-CM

## 2019-06-17 DIAGNOSIS — J43.2 CENTRILOBULAR EMPHYSEMA (HCC): Primary | ICD-10-CM

## 2019-06-17 DIAGNOSIS — E88.01 ALPHA-1-ANTITRYPSIN DEFICIENCY (HCC): ICD-10-CM

## 2019-06-17 DIAGNOSIS — R53.83 FATIGUE, UNSPECIFIED TYPE: ICD-10-CM

## 2019-06-17 DIAGNOSIS — I42.9 CARDIOMYOPATHY, UNSPECIFIED TYPE (HCC): ICD-10-CM

## 2019-06-17 DIAGNOSIS — J44.1 COPD EXACERBATION (HCC): ICD-10-CM

## 2019-06-17 PROCEDURE — 80053 COMPREHEN METABOLIC PANEL: CPT | Performed by: NURSE PRACTITIONER

## 2019-06-17 PROCEDURE — 99214 OFFICE O/P EST MOD 30 MIN: CPT | Performed by: NURSE PRACTITIONER

## 2019-06-17 PROCEDURE — 36415 COLL VENOUS BLD VENIPUNCTURE: CPT | Performed by: NURSE PRACTITIONER

## 2019-06-17 PROCEDURE — 94618 PULMONARY STRESS TESTING: CPT | Performed by: NURSE PRACTITIONER

## 2019-06-17 PROCEDURE — 84443 ASSAY THYROID STIM HORMONE: CPT | Performed by: NURSE PRACTITIONER

## 2019-06-17 PROCEDURE — 85025 COMPLETE CBC W/AUTO DIFF WBC: CPT | Performed by: NURSE PRACTITIONER

## 2019-06-17 PROCEDURE — 82785 ASSAY OF IGE: CPT | Performed by: NURSE PRACTITIONER

## 2019-06-17 PROCEDURE — 83880 ASSAY OF NATRIURETIC PEPTIDE: CPT | Performed by: NURSE PRACTITIONER

## 2019-06-17 RX ORDER — ALPHA1-PROTEINASE INHIBITOR (HUMAN) 1000 MG/20ML
60 INJECTION, SOLUTION INTRAVENOUS WEEKLY
COMMUNITY
Start: 2019-05-30 | End: 2021-07-16 | Stop reason: SDUPTHER

## 2019-06-18 LAB
ALBUMIN SERPL-MCNC: 4.9 G/DL (ref 3.5–5.2)
ALBUMIN/GLOB SERPL: 1.7 G/DL
ALP SERPL-CCNC: 66 U/L (ref 39–117)
ALT SERPL W P-5'-P-CCNC: 19 U/L (ref 1–41)
ANION GAP SERPL CALCULATED.3IONS-SCNC: 13.9 MMOL/L
AST SERPL-CCNC: 17 U/L (ref 1–40)
BASOPHILS # BLD AUTO: 0.06 10*3/MM3 (ref 0–0.2)
BASOPHILS NFR BLD AUTO: 0.8 % (ref 0–1.5)
BILIRUB SERPL-MCNC: 0.4 MG/DL (ref 0.2–1.2)
BUN BLD-MCNC: 10 MG/DL (ref 6–20)
BUN/CREAT SERPL: 10.1 (ref 7–25)
CALCIUM SPEC-SCNC: 9.8 MG/DL (ref 8.6–10.5)
CHLORIDE SERPL-SCNC: 100 MMOL/L (ref 98–107)
CO2 SERPL-SCNC: 25.1 MMOL/L (ref 22–29)
CREAT BLD-MCNC: 0.99 MG/DL (ref 0.76–1.27)
DEPRECATED RDW RBC AUTO: 46.5 FL (ref 37–54)
EOSINOPHIL # BLD AUTO: 0.16 10*3/MM3 (ref 0–0.4)
EOSINOPHIL NFR BLD AUTO: 2.2 % (ref 0.3–6.2)
ERYTHROCYTE [DISTWIDTH] IN BLOOD BY AUTOMATED COUNT: 13.3 % (ref 12.3–15.4)
GFR SERPL CREATININE-BSD FRML MDRD: 79 ML/MIN/1.73
GLOBULIN UR ELPH-MCNC: 2.9 GM/DL
GLUCOSE BLD-MCNC: 86 MG/DL (ref 65–99)
HCT VFR BLD AUTO: 51.9 % (ref 37.5–51)
HGB BLD-MCNC: 16.9 G/DL (ref 13–17.7)
IMM GRANULOCYTES # BLD AUTO: 0.03 10*3/MM3 (ref 0–0.05)
IMM GRANULOCYTES NFR BLD AUTO: 0.4 % (ref 0–0.5)
LYMPHOCYTES # BLD AUTO: 2.08 10*3/MM3 (ref 0.7–3.1)
LYMPHOCYTES NFR BLD AUTO: 28 % (ref 19.6–45.3)
MCH RBC QN AUTO: 30.6 PG (ref 26.6–33)
MCHC RBC AUTO-ENTMCNC: 32.6 G/DL (ref 31.5–35.7)
MCV RBC AUTO: 94 FL (ref 79–97)
MONOCYTES # BLD AUTO: 1.03 10*3/MM3 (ref 0.1–0.9)
MONOCYTES NFR BLD AUTO: 13.9 % (ref 5–12)
NEUTROPHILS # BLD AUTO: 4.07 10*3/MM3 (ref 1.7–7)
NEUTROPHILS NFR BLD AUTO: 54.7 % (ref 42.7–76)
NRBC BLD AUTO-RTO: 0 /100 WBC (ref 0–0.2)
NT-PROBNP SERPL-MCNC: 54.8 PG/ML (ref 5–900)
PLATELET # BLD AUTO: 285 10*3/MM3 (ref 140–450)
PMV BLD AUTO: 10.2 FL (ref 6–12)
POTASSIUM BLD-SCNC: 3.8 MMOL/L (ref 3.5–5.2)
PROT SERPL-MCNC: 7.8 G/DL (ref 6–8.5)
RBC # BLD AUTO: 5.52 10*6/MM3 (ref 4.14–5.8)
SODIUM BLD-SCNC: 139 MMOL/L (ref 136–145)
TSH SERPL DL<=0.05 MIU/L-ACNC: 1.33 MIU/ML (ref 0.27–4.2)
WBC NRBC COR # BLD: 7.43 10*3/MM3 (ref 3.4–10.8)

## 2019-06-18 NOTE — PROGRESS NOTES
[facility] Pulmonary Follow up    CHIEF COMPLAINT    Shortness of breath/alpha-1 antitrypsin deficiency    HISTORY OF PRESENT ILLNESS    Balwinder Willams is a 53 y.o.male here today for follow-up of his alpha-1 antitrypsin deficiency.  He was last seen in DecemberBy me.  He states that he has been doing about the same.  He has been receiving Prolastin infusions weekly for a couple of years for his alpha-1 antitrypsin deficiency.  He has not noticed much improvement since starting this.    He remains on Symbicort and Combivent Respimat daily for his COPD.  He does not use the Combivent that often unless he needs it.  He has been wearing 5 L nasal cannula at nighttime because he felt like he needed it.  He has been prescribed 2 to 3 L at night.  He had a POC in the past for exercise-induced hypoxemia however he could not tolerate the pulse dose because his breathing was not regular.  He turned the machine back again.  He would be able to use a POC if it was a continuous flow that he could use.  He uses continuous oxygen at home on occasion.    He states that he coughs so hard sometimes that he almost passes out.  He rarely produces any sputum.  When it does come out it is tan in color.  He states that it is thick in consistency as well.  He has tried Mucinex in the past but has not noticed 6 significant difference with that either.    He denies fever, chills, sputum production, hemoptysis, night sweats, weight loss, chest pain or palpitations.  He has had some chest tightness from time to time.  He is always felt that is related to his breathing however.  He has not had a full cardiac work-up.  He denies any sinus or allergy symptoms.  He has had one episode of reflux in the last 6 months.    He quit smoking in 2006 and has a 37.5 pack history.  He does not recall having any CT scans of his chest performed in the past.    Patient Active Problem List   Diagnosis   • Alpha-1-antitrypsin deficiency (CMS/HCC)   • Chronic  cough   • Dependence on supplemental oxygen   • Emphysema/COPD (CMS/HCC)       Allergies   Allergen Reactions   • Other      Opioid Analgesics   • Roflumilast Nausea Only       Current Outpatient Medications:   •  alpha1-proteinase inhibitor (ZEMAIRA) 1000 MG injection, Infuse  into a venous catheter., Disp: , Rfl:   •  doxycycline (VIBRAMYICN) 100 MG tablet, Take 1 tablet by mouth 2 (Two) Times a Day., Disp: 20 tablet, Rfl: 0  •  guaiFENesin (MUCINEX) 600 MG 12 hr tablet, Take 2 tablets by mouth 2 (Two) Times a Day., Disp: 30 tablet, Rfl: 1  •  ibuprofen (ADVIL,MOTRIN) 600 MG tablet, Take 200 mg by mouth Every 6 (Six) Hours As Needed for Mild Pain ., Disp: , Rfl:   •  ipratropium-albuterol (COMBIVENT RESPIMAT)  MCG/ACT inhaler, Inhale 1 puff 4 (Four) Times a Day., Disp: 4 g, Rfl: 6  •  ipratropium-albuterol (DUO-NEB) 0.5-2.5 mg/mL nebulizer, Ipratropium-Albuterol 0.5-2.5 (3) MG/3ML Inhalation Solution; Patient Sig: Ipratropium-Albuterol 0.5-2.5 (3) MG/3ML Inhalation Solution 1 vial via nebulizer qid prn sob; 6; 3; 07-Jan-2015; Active, Disp: , Rfl:   •  O2 (OXYGEN), Inhale Every Night., Disp: , Rfl:   •  predniSONE (DELTASONE) 10 MG tablet, Take 4 tabs daily x 3 days, then take 3 tabs daily x 3 days, then take 2 tabs daily x 3 days, then take 1 tab daily x 3 days, Disp: 31 tablet, Rfl: 0  •  PROLASTIN-C 1000 MG/20ML solution, , Disp: , Rfl:   •  sodium chloride 3 % nebulizer solution, Take 4 mL by nebulization As Needed for Cough., Disp: 120 mL, Rfl: 12  •  SPIRIVA HANDIHALER 18 MCG per inhalation capsule, inhale the contents of one capsule in the handihaler once daily, Disp: 30 capsule, Rfl: 2  •  SYMBICORT 160-4.5 MCG/ACT inhaler, inhale 2 puffs by mouth twice a day, Disp: 10.2 g, Rfl: 3  MEDICATION LIST AND ALLERGIES REVIEWED.    Social History     Tobacco Use   • Smoking status: Former Smoker     Packs/day: 1.50     Years: 25.00     Pack years: 37.50     Types: Cigarettes     Last attempt to quit: 2006      "Years since quittin.4   Substance Use Topics   • Alcohol use: No   • Drug use: No       FAMILY AND SOCIAL HISTORY REVIEWED.    Review of Systems   Constitutional: Positive for activity change and fatigue. Negative for appetite change, fever and unexpected weight change.   HENT: Positive for congestion. Negative for postnasal drip, rhinorrhea, sinus pressure, sore throat and voice change.    Eyes: Negative for visual disturbance.   Respiratory: Positive for cough, chest tightness and shortness of breath. Negative for wheezing.    Cardiovascular: Negative for chest pain, palpitations and leg swelling.   Gastrointestinal: Negative for abdominal distention, abdominal pain, nausea and vomiting.   Endocrine: Negative for cold intolerance and heat intolerance.   Genitourinary: Negative for difficulty urinating and urgency.   Musculoskeletal: Negative for arthralgias, back pain and neck pain.   Skin: Negative for color change and pallor.   Allergic/Immunologic: Negative for environmental allergies and food allergies.   Neurological: Positive for dizziness and light-headedness. Negative for syncope and weakness.   Hematological: Negative for adenopathy. Does not bruise/bleed easily.   Psychiatric/Behavioral: Negative for agitation and behavioral problems.   .    /90 (BP Location: Left arm, Patient Position: Sitting)   Pulse 72   Temp 98 °F (36.7 °C)   Ht 172.5 cm (67.9\")   Wt 65.1 kg (143 lb 9.6 oz)   SpO2 90% Comment: RESTING AT ROOM AIR  BMI 21.90 kg/m²       There is no immunization history on file for this patient.    Physical Exam   Constitutional: He is oriented to person, place, and time. He appears well-developed and well-nourished.   HENT:   Head: Normocephalic and atraumatic.   Eyes: Pupils are equal, round, and reactive to light.   Neck: Normal range of motion. Neck supple. No thyromegaly present.   Cardiovascular: Normal rate, regular rhythm, normal heart sounds and intact distal pulses. Exam " reveals no gallop and no friction rub.   No murmur heard.  Pulmonary/Chest: Effort normal and breath sounds normal. No respiratory distress. He has no wheezes. He has no rales. He exhibits no tenderness.   Abdominal: Soft. Bowel sounds are normal. There is no tenderness.   Musculoskeletal: Normal range of motion.   Lymphadenopathy:     He has no cervical adenopathy.   Neurological: He is alert and oriented to person, place, and time.   Skin: Skin is warm and dry. Capillary refill takes less than 2 seconds. He is not diaphoretic.   Psychiatric: He has a normal mood and affect. His behavior is normal.   Nursing note and vitals reviewed.        RESULTS  6-minute walk test: Performed in the office today, patient ambulated 800 feet patient desaturated to 87% at minute 2 he was placed on 2 L pulse dose, he recovered to 92% however at minute 4 he desaturated to 87% was placed on 3 L pulse dose he desaturated to 86% and was placed on 2 L continuous and oxygen remained above 98%.  He does qualify for home O2 at 2 L continuous.    PROBLEM LIST    Problem List Items Addressed This Visit        Respiratory    Alpha-1-antitrypsin deficiency (CMS/HCC)    Overview     A.  Labs of 11/20/2014 reports an alpha 1 antitrypsin deficiency of 4.7 with a phenotype      with Z bands detected and genotyping revealing the presence of most common      deficiency allele type Z and an inferred non-expressing null allele.         Relevant Medications    PROLASTIN-C 1000 MG/20ML solution    Other Relevant Orders    Comprehensive Metabolic Panel (Completed)    BNP (Completed)    Walking Oximetry (Completed)    Chronic cough    Dependence on supplemental oxygen    Emphysema/COPD (CMS/[facility]) - Primary    Relevant Medications    PROLASTIN-C 1000 MG/20ML solution    Other Relevant Orders    CBC & Differential (Completed)    CBC Auto Differential (Completed)    Walking Oximetry (Completed)      Other Visit Diagnoses     Fatigue, unspecified type         Relevant Orders    TSH (Completed)    Walking Oximetry (Completed)    Environmental allergies        Relevant Orders    IgE Level    Walking Oximetry (Completed)    COPD exacerbation (CMS/HCC)        Relevant Medications    PROLASTIN-C 1000 MG/20ML solution    Other Relevant Orders    Walking Oximetry (Completed)    Cardiomyopathy, unspecified type (CMS/HCC)         Relevant Orders    BNP (Completed)    Walking Oximetry (Completed)    Shortness of breath        Relevant Orders    CT Chest Hi Resolution    Adult Transthoracic Echo Complete W/ Cont if Necessary Per Protocol            DISCUSSION    Mr. Willams was here for follow-up of his alpha-1 antitrypsin deficiency and dyspnea.  He seems to not be doing well today in the office.  He does not appear to be eel but states that his shortness of breath is severe and nothing helps.  He will continue Prolastin for his alpha-1 antitrypsin deficiency.    We reviewed his 6-minute walk test that he does qualify for 2 L continuous with activity.  I would like to get him set up with a POC because he is fairly active outside of the home.  I will send his order to a [employer] company that can provide him with a 2 L continuous POC.    I will obtain some labs today to rule out any other causes of shortness of breath.  He has not had any lab work completed in quite some time.  I will call him when I receive the results of these testings.    I would like to order a high-resolution CT scan to rule out any interstitial lung disease.    I will also order an echo to be performed to rule out any cardiac abnormalities for his shortness of breath.     I would like to perform an overnight oximetry on 2 L nasal cannula.  He states that he will turn his oxygen back down to 2 L nasal cannula.  He will continue wearing his oxygen at night for his nocturnal hypoxemia.    I will call him after receive the results of his lab work and CT scan.  If he needs to be sooner than 6 months I will call him and  let him know.  He will return in 6 months or sooner if his symptoms worsen.  I spent 25 minutes with the patient. I spent > 50% percent of this time counseling and discussing diagnosis, prognosis, diagnostic testing, evaluation, current status, treatment options and management.    Neris Cárdenas, JESSICA  06/17/20198:17 AM  Electronically signed     Please note that portions of this note were completed with a voice recognition program. Efforts were made to edit the dictations, but occasionally words are mistranscribed.      CC: Devaughn Kohli MD

## 2019-06-23 LAB — TOTAL IGE SMQN RAST: 13 IU/ML (ref 6–495)

## 2019-07-12 ENCOUNTER — TELEPHONE (OUTPATIENT)
Dept: PULMONOLOGY | Facility: CLINIC | Age: 54
End: 2019-07-12

## 2019-07-12 NOTE — TELEPHONE ENCOUNTER
The nurse that does his Prolastin infusion will be out for surgery for the next couple of weeks.  Damari would like to have orders to double dose the prolastin , she will be seeing him over the weekend please call her @  [phone] if any questions  and write the order if this is ok

## 2019-07-16 DIAGNOSIS — J43.2 CENTRILOBULAR EMPHYSEMA (HCC): ICD-10-CM

## 2019-07-16 RX ORDER — BUDESONIDE AND FORMOTEROL FUMARATE DIHYDRATE 160; 4.5 UG/1; UG/1
AEROSOL RESPIRATORY (INHALATION)
Qty: 40.8 G | Refills: 0 | Status: ON HOLD | OUTPATIENT
Start: 2019-07-16 | End: 2019-11-04 | Stop reason: SDUPTHER

## 2019-07-17 ENCOUNTER — HOSPITAL ENCOUNTER (OUTPATIENT)
Dept: CARDIOLOGY | Facility: HOSPITAL | Age: 54
Discharge: HOME | End: 2019-07-17
Admitting: NURSE PRACTITIONER

## 2019-07-17 ENCOUNTER — HOSPITAL ENCOUNTER (OUTPATIENT)
Dept: CT IMAGING | Facility: HOSPITAL | Age: 54
Discharge: HOME | End: 2019-07-17

## 2019-07-17 VITALS — BODY MASS INDEX: 21.98 KG/M2 | WEIGHT: 145 LBS | HEIGHT: 68 IN

## 2019-07-17 DIAGNOSIS — R06.02 SHORTNESS OF BREATH: ICD-10-CM

## 2019-07-17 LAB
BH CV ECHO MEAS - AO ROOT AREA (BSA CORRECTED): 1.5
BH CV ECHO MEAS - AO ROOT AREA: 5.3 CM^2
BH CV ECHO MEAS - AO ROOT DIAM: 2.6 CM
BH CV ECHO MEAS - BSA(HAYCOCK): 1.8 M^2
BH CV ECHO MEAS - BSA: 1.8 M^2
BH CV ECHO MEAS - BZI_BMI: 22 KILOGRAMS/M^2
BH CV ECHO MEAS - BZI_METRIC_HEIGHT: 172.7 CM
BH CV ECHO MEAS - BZI_METRIC_WEIGHT: 65.8 KG
BH CV ECHO MEAS - EDV(CUBED): 66.4 ML
BH CV ECHO MEAS - EDV(MOD-SP2): 49 ML
BH CV ECHO MEAS - EDV(MOD-SP4): 28 ML
BH CV ECHO MEAS - EDV(TEICH): 72.1 ML
BH CV ECHO MEAS - EF(CUBED): 67.3 %
BH CV ECHO MEAS - EF(MOD-BP): 58 %
BH CV ECHO MEAS - EF(MOD-SP2): 59.2 %
BH CV ECHO MEAS - EF(MOD-SP4): 53.6 %
BH CV ECHO MEAS - EF(TEICH): 59.4 %
BH CV ECHO MEAS - ESV(CUBED): 21.7 ML
BH CV ECHO MEAS - ESV(MOD-SP2): 20 ML
BH CV ECHO MEAS - ESV(MOD-SP4): 13 ML
BH CV ECHO MEAS - ESV(TEICH): 29.3 ML
BH CV ECHO MEAS - FS: 31.1 %
BH CV ECHO MEAS - IVS/LVPW: 1.2
BH CV ECHO MEAS - IVSD: 1 CM
BH CV ECHO MEAS - LA DIMENSION: 2.4 CM
BH CV ECHO MEAS - LA/AO: 0.92
BH CV ECHO MEAS - LAD MAJOR: 4 CM
BH CV ECHO MEAS - LAT PEAK E' VEL: 12.2 CM/SEC
BH CV ECHO MEAS - LATERAL E/E' RATIO: 5.1
BH CV ECHO MEAS - LV DIASTOLIC VOL/BSA (35-75): 15.7 ML/M^2
BH CV ECHO MEAS - LV MASS(C)D: 115.9 GRAMS
BH CV ECHO MEAS - LV MASS(C)DI: 65 GRAMS/M^2
BH CV ECHO MEAS - LV SYSTOLIC VOL/BSA (12-30): 7.3 ML/M^2
BH CV ECHO MEAS - LVIDD: 4.1 CM
BH CV ECHO MEAS - LVIDS: 2.8 CM
BH CV ECHO MEAS - LVLD AP2: 7.6 CM
BH CV ECHO MEAS - LVLD AP4: 7.2 CM
BH CV ECHO MEAS - LVLS AP2: 7.2 CM
BH CV ECHO MEAS - LVLS AP4: 7.3 CM
BH CV ECHO MEAS - LVPWD: 0.85 CM
BH CV ECHO MEAS - MED PEAK E' VEL: 10.4 CM/SEC
BH CV ECHO MEAS - MEDIAL E/E' RATIO: 6
BH CV ECHO MEAS - MV A MAX VEL: 47.4 CM/SEC
BH CV ECHO MEAS - MV DEC SLOPE: 445 CM/SEC^2
BH CV ECHO MEAS - MV DEC TIME: 0.17 SEC
BH CV ECHO MEAS - MV E MAX VEL: 62.7 CM/SEC
BH CV ECHO MEAS - MV E/A: 1.3
BH CV ECHO MEAS - PA ACC SLOPE: 711 CM/SEC^2
BH CV ECHO MEAS - PA ACC TIME: 0.1 SEC
BH CV ECHO MEAS - PA PR(ACCEL): 33.1 MMHG
BH CV ECHO MEAS - RVDD: 2.7 CM
BH CV ECHO MEAS - SI(CUBED): 25.1 ML/M^2
BH CV ECHO MEAS - SI(MOD-SP2): 16.3 ML/M^2
BH CV ECHO MEAS - SI(MOD-SP4): 8.4 ML/M^2
BH CV ECHO MEAS - SI(TEICH): 24 ML/M^2
BH CV ECHO MEAS - SV(CUBED): 44.7 ML
BH CV ECHO MEAS - SV(MOD-SP2): 29 ML
BH CV ECHO MEAS - SV(MOD-SP4): 15 ML
BH CV ECHO MEAS - SV(TEICH): 42.8 ML
BH CV ECHO MEAS - TAPSE (>1.6): 2.1 CM2
BH CV ECHO MEASUREMENTS AVERAGE E/E' RATIO: 5.55
BH CV VAS BP RIGHT ARM: NORMAL MMHG
BH CV XLRA - RV BASE: 2.2 CM
BH CV XLRA - RV LENGTH: 5.5 CM
BH CV XLRA - RV MID: 1.7 CM
BH CV XLRA - TDI S': 10.7 CM/SEC
LEFT ATRIUM VOLUME INDEX: 19.1 ML/M^2
LEFT ATRIUM VOLUME: 34 ML
LV EF 2D ECHO EST: 65 %
MAXIMAL PREDICTED HEART RATE: 167 BPM
STRESS TARGET HR: 142 BPM

## 2019-07-17 PROCEDURE — 93306 TTE W/DOPPLER COMPLETE: CPT | Performed by: INTERNAL MEDICINE

## 2019-07-17 PROCEDURE — 71250 CT THORAX DX C-: CPT

## 2019-07-17 PROCEDURE — 93306 TTE W/DOPPLER COMPLETE: CPT

## 2019-08-13 RX ORDER — TIOTROPIUM BROMIDE 18 UG/1
CAPSULE ORAL; RESPIRATORY (INHALATION)
Qty: 30 CAPSULE | Refills: 2 | Status: SHIPPED | OUTPATIENT
Start: 2019-08-13 | End: 2019-10-23

## 2019-08-26 ENCOUNTER — DOCUMENTATION (OUTPATIENT)
Dept: PULMONOLOGY | Facility: CLINIC | Age: 54
End: 2019-08-26

## 2019-08-26 ENCOUNTER — OFFICE VISIT (OUTPATIENT)
Dept: PULMONOLOGY | Facility: CLINIC | Age: 54
End: 2019-08-26

## 2019-08-26 VITALS
BODY MASS INDEX: 21.22 KG/M2 | SYSTOLIC BLOOD PRESSURE: 108 MMHG | OXYGEN SATURATION: 91 % | HEART RATE: 87 BPM | HEIGHT: 68 IN | DIASTOLIC BLOOD PRESSURE: 70 MMHG | WEIGHT: 140 LBS | TEMPERATURE: 98.7 F

## 2019-08-26 DIAGNOSIS — Z99.81 DEPENDENCE ON SUPPLEMENTAL OXYGEN: ICD-10-CM

## 2019-08-26 DIAGNOSIS — J43.2 CENTRILOBULAR EMPHYSEMA (HCC): ICD-10-CM

## 2019-08-26 DIAGNOSIS — E88.01 ALPHA-1-ANTITRYPSIN DEFICIENCY (HCC): Primary | ICD-10-CM

## 2019-08-26 PROCEDURE — 99213 OFFICE O/P EST LOW 20 MIN: CPT | Performed by: NURSE PRACTITIONER

## 2019-08-26 NOTE — PROGRESS NOTES
[facility] Follow up    CHIEF COMPLAINT    Alpha 1 antitrypsin deficiency    HISTORY OF PRESENT ILLNESS    Balwinder Willams is a 53 y.o.male here today for follow-up of his alpha-1 antitrypsin deficiency.  He was last seen in the office in June by me.  At that time I had ordered a high-resolution CT scan and echo to rule out any other causes of his severe dyspnea.  He has had both of these completed and is here today for the results.    He remains on Symbicort 2 puffs twice a day and Combivent every 6-8 hours.  He states that he has severe shortness of breath with any exertion and when carrying any items.  He states that it takes about 10 minutes for him to recover.    At his last appointment we did a 6-minute walk test he did qualify for oxygen at 2 L continuous with activity.  He had turned in his pulse dose POC at his last appointment because he was getting a continuous flow POC.  He was unable to carry the continuous flow POC and wants to switch back to pulse dose today.    He denies fever, chills, sputum production, hemoptysis, night sweats, weight loss, chest pain or palpitations.  He denies any lower extremity edema.  He denies any sinus or allergy symptoms.  He denies reflux symptoms.    Patient Active Problem List   Diagnosis   • Alpha-1-antitrypsin deficiency (CMS/HCC)   • Chronic cough   • Dependence on supplemental oxygen   • Emphysema/COPD (CMS/HCC)       Allergies   Allergen Reactions   • Other      Opioid Analgesics   • Roflumilast Nausea Only       Current Outpatient Medications:   •  alpha1-proteinase inhibitor (ZEMAIRA) 1000 MG injection, Infuse  into a venous catheter., Disp: , Rfl:   •  doxycycline (VIBRAMYICN) 100 MG tablet, Take 1 tablet by mouth 2 (Two) Times a Day., Disp: 20 tablet, Rfl: 0  •  guaiFENesin (MUCINEX) 600 MG 12 hr tablet, Take 2 tablets by mouth 2 (Two) Times a Day., Disp: 30 tablet, Rfl: 1  •  ibuprofen (ADVIL,MOTRIN) 600 MG tablet, Take 200 mg by mouth Every 6 (Six)  Hours As Needed for Mild Pain ., Disp: , Rfl:   •  ipratropium-albuterol (COMBIVENT RESPIMAT)  MCG/ACT inhaler, Inhale 1 puff 4 (Four) Times a Day., Disp: 4 g, Rfl: 6  •  ipratropium-albuterol (DUO-NEB) 0.5-2.5 mg/mL nebulizer, Ipratropium-Albuterol 0.5-2.5 (3) MG/3ML Inhalation Solution; Patient Sig: Ipratropium-Albuterol 0.5-2.5 (3) MG/3ML Inhalation Solution 1 vial via nebulizer qid prn sob; 6; 3; -2015; Active, Disp: , Rfl:   •  O2 (OXYGEN), Inhale Every Night., Disp: , Rfl:   •  predniSONE (DELTASONE) 10 MG tablet, Take 4 tabs daily x 3 days, then take 3 tabs daily x 3 days, then take 2 tabs daily x 3 days, then take 1 tab daily x 3 days, Disp: 31 tablet, Rfl: 0  •  PROLASTIN-C 1000 MG/20ML solution, , Disp: , Rfl:   •  sodium chloride 3 % nebulizer solution, Take 4 mL by nebulization As Needed for Cough., Disp: 120 mL, Rfl: 12  •  SPIRIVA HANDIHALER 18 MCG per inhalation capsule, inhale the contents of one capsule in the handihaler once daily, Disp: 30 capsule, Rfl: 2  •  SYMBICORT 160-4.5 MCG/ACT inhaler, inhale 2 puffs by mouth twice a day, Disp: 40.8 g, Rfl: 0  MEDICATION LIST AND ALLERGIES REVIEWED.    Social History     Tobacco Use   • Smoking status: Former Smoker     Packs/day: 1.50     Years: 25.00     Pack years: 37.50     Types: Cigarettes     Last attempt to quit: 2006     Years since quittin.6   • Smokeless tobacco: Never Used   Substance Use Topics   • Alcohol use: No   • Drug use: No       FAMILY AND SOCIAL HISTORY REVIEWED.    Review of Systems   Constitutional: Negative for activity change, appetite change, fatigue, fever and unexpected weight change.   HENT: Negative for congestion, postnasal drip, rhinorrhea, sinus pressure, sore throat and voice change.    Eyes: Negative for visual disturbance.   Respiratory: Positive for cough and shortness of breath. Negative for chest tightness and wheezing.    Cardiovascular: Negative for chest pain, palpitations and leg swelling.  "  Gastrointestinal: Negative for abdominal distention, abdominal pain, nausea and vomiting.   Endocrine: Negative for cold intolerance and heat intolerance.   Genitourinary: Negative for difficulty urinating and urgency.   Musculoskeletal: Negative for arthralgias, back pain and neck pain.   Skin: Negative for color change and pallor.   Allergic/Immunologic: Negative for environmental allergies and food allergies.   Neurological: Negative for dizziness, syncope, weakness and light-headedness.   Hematological: Negative for adenopathy. Does not bruise/bleed easily.   Psychiatric/Behavioral: Negative for agitation and behavioral problems.   .    /70 (BP Location: Left arm, Patient Position: Sitting)   Pulse 87   Temp 98.7 °F (37.1 °C)   Ht 172 cm (67.72\")   Wt 63.5 kg (140 lb)   SpO2 91% Comment: resting at room air  BMI 21.46 kg/m²       There is no immunization history on file for this patient.    Physical Exam   Constitutional: He is oriented to person, place, and time. He appears well-developed and well-nourished.   HENT:   Head: Normocephalic and atraumatic.   Eyes: Pupils are equal, round, and reactive to light.   Neck: Normal range of motion. Neck supple. No thyromegaly present.   Cardiovascular: Normal rate, regular rhythm, normal heart sounds and intact distal pulses. Exam reveals no gallop and no friction rub.   No murmur heard.  Pulmonary/Chest: Effort normal and breath sounds normal. No respiratory distress. He has no wheezes. He has no rales. He exhibits no tenderness.   Abdominal: Soft. Bowel sounds are normal. There is no tenderness.   Musculoskeletal: Normal range of motion.   Lymphadenopathy:     He has no cervical adenopathy.   Neurological: He is alert and oriented to person, place, and time.   Skin: Skin is warm and dry. Capillary refill takes less than 2 seconds. He is not diaphoretic.   Psychiatric: He has a normal mood and affect. His behavior is normal.   Nursing note and vitals " reviewed.        RESULTS    Ct Chest Hi Resolution    Result Date: 7/19/2019  Severe bullous cystic change throughout all portions of both lungs, consistent with the patient's history of COPD and alpha-1 antitrypsin deficiency.  D:  07/17/2019 E:  07/17/2019    This report was finalized on 7/19/2019 11:19 PM by DR. Manoj Ring MD.      ECHO 8/26/19:  Interpretation Summary     · Left ventricular systolic function is normal. Estimated EF = 65%.  · No significant valvular abnormalities.       PROBLEM LIST    Problem List Items Addressed This Visit        Respiratory    Alpha-1-antitrypsin deficiency (CMS/HCC) - Primary    Overview     A.  [employer] of 11/20/2014 reports an alpha 1 antitrypsin deficiency of 4.7 with a phenotype      with Z bands detected and genotyping revealing the presence of most common      deficiency allele type Z and an inferred non-expressing null allele.         Dependence on supplemental oxygen    Emphysema/COPD (CMS/HCC)    Relevant Orders    CT Chest Without Contrast            DISCUSSION    Mr. Willams was here for follow-up of his alpha-1 antitrypsin deficiency.  We reviewed his high-resolution CT scan which showed severe bullous cystic change throughout all portions of both lungs.  His echo did not reveal any other cardiac causes of his shortness of breath.  I do suspect that his alpha-1 deficiency and severe COPD are the main cause of his severe shortness of breath with activity.    I will order a POC that goes up to 5 L pulse dose that he could use with activity.  I did encourage him to wear continuous oxygen at home.    We discussed his severe COPD in the office today and he could possibly be a candidate for an endobronchial valve.  He will need a CT scan of the chest without contrast with I logic protocol before we can proceed with this possible procedure.  He is agreeable to have a CT scan of the chest without contrast in mid October and then follow-up with Dr. Castellano shortly after his  CT scan is performed.    He will follow-up after he has a CT scan in mid October.  I will call him when I have a date for his CT scan to be performed and he will follow-up with Dr. Castellano shortly after that time.  He will call with any worsening symptoms prior to his next appointment.  I spent 15 minutes with the patient. I spent > 50% percent of this time counseling and discussing diagnosis, prognosis, diagnostic testing, evaluation, current status, treatment options and management.    Neris Cárdenas, JESSICA  08/26/20193:56 PM  Electronically signed     Please note that portions of this note were completed with a voice recognition program. Efforts were made to edit the dictations, but occasionally words are mistranscribed.      CC: Obinna Arellano MD

## 2019-08-26 NOTE — PROGRESS NOTES
Order sent for patient to have 5 l/pd POC.  [facility] does not carry those and will not get one for him. Patient will cap out in December and is unable to change companies for 2 years.

## 2019-09-05 ENCOUNTER — TELEPHONE (OUTPATIENT)
Dept: PULMONOLOGY | Facility: CLINIC | Age: 54
End: 2019-09-05

## 2019-09-05 NOTE — TELEPHONE ENCOUNTER
Patient is unable to get a POC through his DME b/c they only have one that goes up to 4LPD. He wants us to change the order to 4LPD [id number]. Can we ? [phone] is his number.

## 2019-10-02 ENCOUNTER — HOSPITAL ENCOUNTER (OUTPATIENT)
Dept: CT IMAGING | Facility: HOSPITAL | Age: 54
Discharge: HOME | End: 2019-10-02
Admitting: NURSE PRACTITIONER

## 2019-10-02 DIAGNOSIS — J43.2 CENTRILOBULAR EMPHYSEMA (HCC): ICD-10-CM

## 2019-10-02 PROCEDURE — 71250 CT THORAX DX C-: CPT

## 2019-10-08 DIAGNOSIS — J43.2 CENTRILOBULAR EMPHYSEMA (HCC): ICD-10-CM

## 2019-10-08 RX ORDER — IPRATROPIUM/ALBUTEROL SULFATE 20-100 MCG
MIST INHALER (GRAM) INHALATION
Qty: 4 G | Refills: 6 | Status: SHIPPED | OUTPATIENT
Start: 2019-10-08 | End: 2020-01-31

## 2019-10-11 DIAGNOSIS — J43.8 OTHER EMPHYSEMA (HCC): Primary | ICD-10-CM

## 2019-10-11 NOTE — PROGRESS NOTES
I spoke with Mr. Willams today on the phone about possibility of placing an endobronchial valve.  He does need to have an ABG performed prior to being a candidate for this procedure.  He is going to go to [facility] on Monday or Tuesday to have this performed.  I will place the order today.  He is going to follow-up with Dr. Pressley in the next couple of weeks with a full PFT and 6-minute walk test.  He has to walk 120 m for his 6-minute walk test to qualify for this procedure.  He is agreeable to proceed.  I advised him that someone from our office would call to get him an appointment scheduled.

## 2019-10-15 ENCOUNTER — HOSPITAL ENCOUNTER (OUTPATIENT)
Dept: PULMONOLOGY | Facility: HOSPITAL | Age: 54
Discharge: HOME | End: 2019-10-15
Admitting: NURSE PRACTITIONER

## 2019-10-15 PROCEDURE — 36600 WITHDRAWAL OF ARTERIAL BLOOD: CPT

## 2019-10-15 PROCEDURE — 82805 BLOOD GASES W/O2 SATURATION: CPT

## 2019-10-16 ENCOUNTER — RESULTS ENCOUNTER (OUTPATIENT)
Dept: PULMONOLOGY | Facility: CLINIC | Age: 54
End: 2019-10-16

## 2019-10-16 DIAGNOSIS — J43.8 OTHER EMPHYSEMA (HCC): ICD-10-CM

## 2019-10-17 ENCOUNTER — TELEPHONE (OUTPATIENT)
Dept: PULMONOLOGY | Facility: CLINIC | Age: 54
End: 2019-10-17

## 2019-10-18 ENCOUNTER — OFFICE VISIT (OUTPATIENT)
Dept: PULMONOLOGY | Facility: CLINIC | Age: 54
End: 2019-10-18

## 2019-10-18 VITALS
SYSTOLIC BLOOD PRESSURE: 120 MMHG | HEART RATE: 68 BPM | DIASTOLIC BLOOD PRESSURE: 80 MMHG | WEIGHT: 141 LBS | TEMPERATURE: 97.7 F | OXYGEN SATURATION: 94 % | HEIGHT: 68 IN | BODY MASS INDEX: 21.37 KG/M2

## 2019-10-18 DIAGNOSIS — Z87.891 FORMER SMOKER: ICD-10-CM

## 2019-10-18 DIAGNOSIS — J43.2 CENTRILOBULAR EMPHYSEMA (HCC): Primary | ICD-10-CM

## 2019-10-18 DIAGNOSIS — E88.01 ALPHA-1-ANTITRYPSIN DEFICIENCY (HCC): ICD-10-CM

## 2019-10-18 DIAGNOSIS — Z99.81 DEPENDENCE ON SUPPLEMENTAL OXYGEN: ICD-10-CM

## 2019-10-18 PROBLEM — J44.9 COPD MIXED TYPE (HCC): Status: ACTIVE | Noted: 2019-10-18

## 2019-10-18 PROCEDURE — 94618 PULMONARY STRESS TESTING: CPT | Performed by: INTERNAL MEDICINE

## 2019-10-18 PROCEDURE — 94060 EVALUATION OF WHEEZING: CPT | Performed by: INTERNAL MEDICINE

## 2019-10-18 PROCEDURE — 94726 PLETHYSMOGRAPHY LUNG VOLUMES: CPT | Performed by: INTERNAL MEDICINE

## 2019-10-18 PROCEDURE — 94729 DIFFUSING CAPACITY: CPT | Performed by: INTERNAL MEDICINE

## 2019-10-18 PROCEDURE — 99215 OFFICE O/P EST HI 40 MIN: CPT | Performed by: INTERNAL MEDICINE

## 2019-10-18 RX ORDER — ALBUTEROL SULFATE 90 UG/1
2 AEROSOL, METERED RESPIRATORY (INHALATION) ONCE
Status: COMPLETED | OUTPATIENT
Start: 2019-10-18 | End: 2019-10-18

## 2019-10-18 RX ADMIN — ALBUTEROL SULFATE 4 PUFF: 90 AEROSOL, METERED RESPIRATORY (INHALATION) at 14:00

## 2019-10-18 NOTE — PROGRESS NOTES
PULMONARY  NOTE    Chief Complaint     Stage IV's emphysema, chronic respiratory failure, alpha-1 antitrypsin deficiency, former smoker    History of Present Illness     53-year-old white male returns today for follow-up.  He was last seen in the office by JESSICA Boateng on 8/26/2019.    He has alpha-1 antitrypsin deficiency.  He is on alpha-1 replacement with Zemaira.    He has a past history of tobacco abuse but stopped smoking in 2006.    He has stage IV, very severe emphysema.  He has marked dyspnea on exertion.  He indicates that his exercise capacity and shortness of breath is gotten significantly and slowly worse over the last year.  This has adversely affected his ability to do any kind of regular activity.    He remains on Symbicort with Combivent.  He feels that these medications help some but do not alleviate his symptoms.    He has exercise-induced hypoxic respiratory failure.  He has had trouble carrying oxygen tanks due to their extra weight and has like to be on POC even though pulse dose has not kept up with his oxygen requirements.    Patient Active Problem List   Diagnosis   • Alpha-1-antitrypsin deficiency (on replacement)   • Chronic cough   • Dependence on supplemental oxygen   • Stage IV, very severe, emphysema   • Former smoker (Resolved 2006)     Allergies   Allergen Reactions   • Other      Opioid Analgesics   • Roflumilast Nausea Only       Current Outpatient Medications:   •  alpha1-proteinase inhibitor (ZEMAIRA) 1000 MG injection, Infuse  into a venous catheter., Disp: , Rfl:   •  COMBIVENT RESPIMAT  MCG/ACT inhaler, inhale 1 puff four times a day, Disp: 4 g, Rfl: 6  •  doxycycline (VIBRAMYICN) 100 MG tablet, Take 1 tablet by mouth 2 (Two) Times a Day., Disp: 20 tablet, Rfl: 0  •  guaiFENesin (MUCINEX) 600 MG 12 hr tablet, Take 2 tablets by mouth 2 (Two) Times a Day., Disp: 30 tablet, Rfl: 1  •  ibuprofen (ADVIL,MOTRIN) 600 MG tablet, Take 200 mg by mouth Every 6 (Six) Hours  "As Needed for Mild Pain ., Disp: , Rfl:   •  ipratropium-albuterol (DUO-NEB) 0.5-2.5 mg/mL nebulizer, Ipratropium-Albuterol 0.5-2.5 (3) MG/3ML Inhalation Solution; Patient Sig: Ipratropium-Albuterol 0.5-2.5 (3) MG/3ML Inhalation Solution 1 vial via nebulizer qid prn sob; 6; 3; -2015; Active, Disp: , Rfl:   •  O2 (OXYGEN), Inhale Every Night., Disp: , Rfl:   •  predniSONE (DELTASONE) 10 MG tablet, Take 4 tabs daily x 3 days, then take 3 tabs daily x 3 days, then take 2 tabs daily x 3 days, then take 1 tab daily x 3 days, Disp: 31 tablet, Rfl: 0  •  PROLASTIN-C 1000 MG/20ML solution, , Disp: , Rfl:   •  sodium chloride 3 % nebulizer solution, Take 4 mL by nebulization As Needed for Cough., Disp: 120 mL, Rfl: 12  •  SPIRIVA HANDIHALER 18 MCG per inhalation capsule, inhale the contents of one capsule in the handihaler once daily, Disp: 30 capsule, Rfl: 2  •  SYMBICORT 160-4.5 MCG/ACT inhaler, inhale 2 puffs by mouth twice a day, Disp: 40.8 g, Rfl: 0  No current facility-administered medications for this visit.   MEDICATION LIST AND ALLERGIES REVIEWED.    Family History   Problem Relation Age of Onset   • Heart disease Mother    • Diabetes Mother    • Alpha-1 antitrypsin deficiency Brother    • Emphysema Brother    • Lung cancer Other    • Emphysema Other      Social History     Tobacco Use   • Smoking status: Former Smoker     Packs/day: 1.50     Years: 25.00     Pack years: 37.50     Types: Cigarettes     Last attempt to quit:      Years since quittin.8   • Smokeless tobacco: Never Used   Substance Use Topics   • Alcohol use: No   • Drug use: No     Social History     Social History Narrative   • Not on file     FAMILY AND SOCIAL HISTORY REVIEWED.    Review of Systems  ALSO REFER TO SCANNED ROS SHEET FROM SAME DATE.    /80 (BP Location: Left arm, Patient Position: Sitting)   Pulse 68   Temp 97.7 °F (36.5 °C)   Ht 172 cm (67.72\")   Wt 64 kg (141 lb)   SpO2 94% Comment: resting at room air  BMI " 21.62 kg/m²   Physical Exam   Constitutional: He is oriented to person, place, and time. He appears well-developed. No distress.   HENT:   Head: Normocephalic and atraumatic.   Neck: No thyromegaly present.   Cardiovascular: Normal rate, regular rhythm and normal heart sounds.   No murmur heard.  Pulmonary/Chest: Effort normal. No stridor.   Marked decreased breath sounds bilaterally without wheezes   Abdominal: Soft. Bowel sounds are normal.   Musculoskeletal: Normal range of motion. He exhibits no edema.   Lymphadenopathy:     He has no cervical adenopathy.        Right: No supraclavicular and no epitrochlear adenopathy present.        Left: No supraclavicular and no epitrochlear adenopathy present.   Neurological: He is alert and oriented to person, place, and time.   Skin: Skin is warm and dry. He is not diaphoretic.   Psychiatric: He has a normal mood and affect. His behavior is normal.   Nursing note and vitals reviewed.      Results     PFTs reveal very severe airway obstruction and no significant change in FEV1 after bronchodilator.  No restriction but marked air trapping and reduced diffusion capacity.    CT scan of the chest reviewed on PACS and reveals no suspicious parenchymal or mediastinal lesions.    Exercise pulse oximetry revealed exercise-induced desaturation that responded well to 2 L/min continuous nasal cannula.  He was able to ambulate 304 m    Problem List       ICD-10-CM ICD-9-CM   1. Stage IV, very severe, emphysema J43.2 492.8   2. Alpha-1-antitrypsin deficiency (on replacement) E88.01 273.4   3. Dependence on supplemental oxygen Z99.81 V46.2   4. Former smoker (Resolved 2006) Z87.891 V15.82       Discussion     We had an extended office visit today.  We discussed his test results.  He has stage IV, very severe, emphysema.  This is due to his alpha-1 antitrypsin deficiency and past history of tobacco abuse.    We discussed nonmedical therapy for advanced age emphysema.  We discussed lung  transplantation.  His FEV1 is nearing the point where lung transplantation would be a consideration.  I offered to make a referral for lung transplant evaluation.    We discussed endobronchial valve therapy.  We discussed the risks and benefits of endobronchial valve therapy including the significant risk of pneumothorax.  We discussed the potential risk of death, as well.    At this point he would like to pursue consideration of endobronchial valve therapy using the Spiration valve.  I still need to get his arterial blood gas results as they are not in the system but in the past he does not appear to have retain CO2.  Not withstanding the arterial blood gas, he appears to meet criteria for EBV therapy.    We will submit the appropriate request and I gave him a patient information folder.    Total time face to face with patient 40min with greater than 50% counseling on management of emphysema, oxygen supplementation, EBV therapy    Devaughn Pressley MD  Note electronically signed    CC: Obinna Arellano MD

## 2019-10-21 LAB
ARTERIAL PATENCY WRIST A: ABNORMAL
ATMOSPHERIC PRESS: ABNORMAL MM[HG]
BASE EXCESS BLDA CALC-SCNC: -0.2 MMOL/L (ref 0–2)
BDY SITE: ABNORMAL
BODY TEMPERATURE: 37 C
COHGB MFR BLD: 1 % (ref 0–2)
HCO3 BLDA-SCNC: 22.7 MMOL/L (ref 20–26)
HCT VFR BLD CALC: 52.1 %
HGB BLDA-MCNC: 17 G/DL (ref 13.5–17.5)
HOROWITZ INDEX BLD+IHG-RTO: 21 %
METHGB BLD QL: 0.9 % (ref 0–1.5)
MODALITY: ABNORMAL
NOTE: ABNORMAL
OXYHGB MFR BLDV: 90.6 % (ref 94–99)
PCO2 BLDA: 32.2 MM HG
PCO2 TEMP ADJ BLD: 32.2 MM HG (ref 35–48)
PH BLDA: 7.46 PH UNITS (ref 7.35–7.45)
PH, TEMP CORRECTED: 7.46 PH UNITS
PO2 BLDA: 59.4 MM HG (ref 83–108)
PO2 TEMP ADJ BLD: 59.4 MM HG (ref 83–108)
VENTILATOR MODE: ABNORMAL

## 2019-10-22 ENCOUNTER — TRANSCRIBE ORDERS (OUTPATIENT)
Dept: PULMONOLOGY | Facility: CLINIC | Age: 54
End: 2019-10-22

## 2019-10-23 ENCOUNTER — APPOINTMENT (OUTPATIENT)
Dept: PREADMISSION TESTING | Facility: HOSPITAL | Age: 54
End: 2019-10-23

## 2019-10-23 VITALS — HEIGHT: 68 IN | WEIGHT: 138.8 LBS | BODY MASS INDEX: 21.04 KG/M2

## 2019-10-23 LAB
ALBUMIN SERPL-MCNC: 4.9 G/DL (ref 3.5–5.2)
ALBUMIN/GLOB SERPL: 2 G/DL
ALP SERPL-CCNC: 75 U/L (ref 39–117)
ALT SERPL W P-5'-P-CCNC: 17 U/L (ref 1–41)
ANION GAP SERPL CALCULATED.3IONS-SCNC: 13 MMOL/L (ref 5–15)
APTT PPP: 29.1 SECONDS (ref 24–37)
AST SERPL-CCNC: 16 U/L (ref 1–40)
BILIRUB SERPL-MCNC: 0.4 MG/DL (ref 0.2–1.2)
BUN BLD-MCNC: 11 MG/DL (ref 6–20)
BUN/CREAT SERPL: 10.6 (ref 7–25)
CALCIUM SPEC-SCNC: 9.3 MG/DL (ref 8.6–10.5)
CHLORIDE SERPL-SCNC: 104 MMOL/L (ref 98–107)
CO2 SERPL-SCNC: 26 MMOL/L (ref 22–29)
CREAT BLD-MCNC: 1.04 MG/DL (ref 0.76–1.27)
DEPRECATED RDW RBC AUTO: 44.5 FL (ref 37–54)
ERYTHROCYTE [DISTWIDTH] IN BLOOD BY AUTOMATED COUNT: 13.2 % (ref 12.3–15.4)
GFR SERPL CREATININE-BSD FRML MDRD: 75 ML/MIN/1.73
GLOBULIN UR ELPH-MCNC: 2.4 GM/DL
GLUCOSE BLD-MCNC: 104 MG/DL (ref 65–99)
HCT VFR BLD AUTO: 49.6 % (ref 37.5–51)
HGB BLD-MCNC: 16.1 G/DL (ref 13–17.7)
INR PPP: 1.01 (ref 0.85–1.16)
MCH RBC QN AUTO: 29.9 PG (ref 26.6–33)
MCHC RBC AUTO-ENTMCNC: 32.5 G/DL (ref 31.5–35.7)
MCV RBC AUTO: 92.2 FL (ref 79–97)
PLATELET # BLD AUTO: 284 10*3/MM3 (ref 140–450)
PMV BLD AUTO: 9 FL (ref 6–12)
POTASSIUM BLD-SCNC: 3.8 MMOL/L (ref 3.5–5.2)
PROT SERPL-MCNC: 7.3 G/DL (ref 6–8.5)
PROTHROMBIN TIME: 12.8 SECONDS (ref 11.2–14.3)
RBC # BLD AUTO: 5.38 10*6/MM3 (ref 4.14–5.8)
SODIUM BLD-SCNC: 143 MMOL/L (ref 136–145)
WBC NRBC COR # BLD: 8.88 10*3/MM3 (ref 3.4–10.8)

## 2019-10-23 PROCEDURE — 93010 ELECTROCARDIOGRAM REPORT: CPT | Performed by: INTERNAL MEDICINE

## 2019-10-23 PROCEDURE — 80053 COMPREHEN METABOLIC PANEL: CPT | Performed by: INTERNAL MEDICINE

## 2019-10-23 PROCEDURE — 36415 COLL VENOUS BLD VENIPUNCTURE: CPT

## 2019-10-23 PROCEDURE — 85027 COMPLETE CBC AUTOMATED: CPT | Performed by: INTERNAL MEDICINE

## 2019-10-23 PROCEDURE — 93005 ELECTROCARDIOGRAM TRACING: CPT

## 2019-10-23 PROCEDURE — 85730 THROMBOPLASTIN TIME PARTIAL: CPT | Performed by: INTERNAL MEDICINE

## 2019-10-23 PROCEDURE — 85610 PROTHROMBIN TIME: CPT | Performed by: INTERNAL MEDICINE

## 2019-10-23 RX ORDER — GUAIFENESIN 1200 MG/1
1200 TABLET, EXTENDED RELEASE ORAL DAILY PRN
COMMUNITY
End: 2021-04-15 | Stop reason: SDUPTHER

## 2019-10-24 ENCOUNTER — APPOINTMENT (OUTPATIENT)
Dept: GENERAL RADIOLOGY | Facility: HOSPITAL | Age: 54
End: 2019-10-24

## 2019-10-24 ENCOUNTER — ANESTHESIA EVENT (OUTPATIENT)
Dept: GASTROENTEROLOGY | Facility: HOSPITAL | Age: 54
End: 2019-10-24

## 2019-10-24 ENCOUNTER — HOSPITAL ENCOUNTER (INPATIENT)
Facility: HOSPITAL | Age: 54
LOS: 12 days | Discharge: HOME | End: 2019-11-06
Attending: INTERNAL MEDICINE | Admitting: INTERNAL MEDICINE

## 2019-10-24 ENCOUNTER — ANESTHESIA (OUTPATIENT)
Dept: GASTROENTEROLOGY | Facility: HOSPITAL | Age: 54
End: 2019-10-24

## 2019-10-24 DIAGNOSIS — R05.9 COUGH: ICD-10-CM

## 2019-10-24 DIAGNOSIS — J43.9 EMPHYSEMA LUNG (HCC): ICD-10-CM

## 2019-10-24 DIAGNOSIS — J93.9 PNEUMOTHORAX ON LEFT: Primary | ICD-10-CM

## 2019-10-24 PROCEDURE — C1726 CATH, BAL DIL, NON-VASCULAR: HCPCS | Performed by: INTERNAL MEDICINE

## 2019-10-24 PROCEDURE — 25010000002 ONDANSETRON PER 1 MG: Performed by: NURSE ANESTHETIST, CERTIFIED REGISTERED

## 2019-10-24 PROCEDURE — 87102 FUNGUS ISOLATION CULTURE: CPT | Performed by: INTERNAL MEDICINE

## 2019-10-24 PROCEDURE — 25010000002 LIDOCAINE PER 10 MG: Performed by: NURSE ANESTHETIST, CERTIFIED REGISTERED

## 2019-10-24 PROCEDURE — 87070 CULTURE OTHR SPECIMN AEROBIC: CPT | Performed by: INTERNAL MEDICINE

## 2019-10-24 PROCEDURE — 87206 SMEAR FLUORESCENT/ACID STAI: CPT | Performed by: INTERNAL MEDICINE

## 2019-10-24 PROCEDURE — 71045 X-RAY EXAM CHEST 1 VIEW: CPT

## 2019-10-24 PROCEDURE — 25010000002 DEXAMETHASONE PER 1 MG: Performed by: NURSE ANESTHETIST, CERTIFIED REGISTERED

## 2019-10-24 PROCEDURE — 25010000002 PHENYLEPHRINE PER 1 ML: Performed by: NURSE ANESTHETIST, CERTIFIED REGISTERED

## 2019-10-24 PROCEDURE — 25010000002 SUCCINYLCHOLINE PER 20 MG: Performed by: NURSE ANESTHETIST, CERTIFIED REGISTERED

## 2019-10-24 PROCEDURE — 25010000002 PROPOFOL 10 MG/ML EMULSION: Performed by: NURSE ANESTHETIST, CERTIFIED REGISTERED

## 2019-10-24 PROCEDURE — 0BHB8GZ INSERTION OF ENDOBRONCHIAL VALVE INTO LEFT LOWER LOBE BRONCHUS, VIA NATURAL OR ARTIFICIAL OPENING ENDOSCOPIC: ICD-10-PCS | Performed by: INTERNAL MEDICINE

## 2019-10-24 PROCEDURE — 31647 BRONCHIAL VALVE INIT INSERT: CPT | Performed by: INTERNAL MEDICINE

## 2019-10-24 PROCEDURE — 94640 AIRWAY INHALATION TREATMENT: CPT

## 2019-10-24 PROCEDURE — 99221 1ST HOSP IP/OBS SF/LOW 40: CPT | Performed by: INTERNAL MEDICINE

## 2019-10-24 PROCEDURE — G0378 HOSPITAL OBSERVATION PER HR: HCPCS

## 2019-10-24 PROCEDURE — 87116 MYCOBACTERIA CULTURE: CPT | Performed by: INTERNAL MEDICINE

## 2019-10-24 PROCEDURE — 87205 SMEAR GRAM STAIN: CPT | Performed by: INTERNAL MEDICINE

## 2019-10-24 PROCEDURE — 87147 CULTURE TYPE IMMUNOLOGIC: CPT | Performed by: INTERNAL MEDICINE

## 2019-10-24 PROCEDURE — 0BPL8YZ REMOVAL OF OTHER DEVICE FROM LEFT LUNG, VIA NATURAL OR ARTIFICIAL OPENING ENDOSCOPIC: ICD-10-PCS | Performed by: INTERNAL MEDICINE

## 2019-10-24 PROCEDURE — 94799 UNLISTED PULMONARY SVC/PX: CPT

## 2019-10-24 DEVICE — VLV ENDOBRONC SPIRATION IN CARTRDG 7MM: Type: IMPLANTABLE DEVICE | Site: LUNG | Status: FUNCTIONAL

## 2019-10-24 DEVICE — VLV ENDOBRONC SPIRATION IN CARTRDG 9MM: Type: IMPLANTABLE DEVICE | Site: LUNG | Status: FUNCTIONAL

## 2019-10-24 DEVICE — VLV ENDOBRONC SPIRATION IN CARTRDG 5MM: Type: IMPLANTABLE DEVICE | Site: LUNG | Status: FUNCTIONAL

## 2019-10-24 RX ORDER — BUDESONIDE AND FORMOTEROL FUMARATE DIHYDRATE 160; 4.5 UG/1; UG/1
2 AEROSOL RESPIRATORY (INHALATION) 2 TIMES DAILY
Status: DISCONTINUED | OUTPATIENT
Start: 2019-10-24 | End: 2019-11-06 | Stop reason: HOSPADM

## 2019-10-24 RX ORDER — LIDOCAINE HYDROCHLORIDE ANHYDROUS AND DEXTROSE MONOHYDRATE 5; 400 G/100ML; MG/100ML
INJECTION, SOLUTION INTRAVENOUS CONTINUOUS PRN
Status: DISCONTINUED | OUTPATIENT
Start: 2019-10-24 | End: 2019-10-24 | Stop reason: SURG

## 2019-10-24 RX ORDER — SODIUM CHLORIDE 0.9 % (FLUSH) 0.9 %
3-10 SYRINGE (ML) INJECTION AS NEEDED
Status: DISCONTINUED | OUTPATIENT
Start: 2019-10-24 | End: 2019-10-24 | Stop reason: HOSPADM

## 2019-10-24 RX ORDER — HYDRALAZINE HYDROCHLORIDE 20 MG/ML
5 INJECTION INTRAMUSCULAR; INTRAVENOUS
Status: DISCONTINUED | OUTPATIENT
Start: 2019-10-24 | End: 2019-10-24 | Stop reason: HOSPADM

## 2019-10-24 RX ORDER — GUAIFENESIN 600 MG/1
1200 TABLET, EXTENDED RELEASE ORAL AS NEEDED
Status: DISCONTINUED | OUTPATIENT
Start: 2019-10-24 | End: 2019-11-06 | Stop reason: HOSPADM

## 2019-10-24 RX ORDER — PROMETHAZINE HYDROCHLORIDE 25 MG/1
25 SUPPOSITORY RECTAL ONCE AS NEEDED
Status: DISCONTINUED | OUTPATIENT
Start: 2019-10-24 | End: 2019-10-24 | Stop reason: HOSPADM

## 2019-10-24 RX ORDER — FAMOTIDINE 20 MG/1
20 TABLET, FILM COATED ORAL ONCE
Status: DISCONTINUED | OUTPATIENT
Start: 2019-10-24 | End: 2019-10-24 | Stop reason: HOSPADM

## 2019-10-24 RX ORDER — LABETALOL HYDROCHLORIDE 5 MG/ML
5 INJECTION, SOLUTION INTRAVENOUS
Status: DISCONTINUED | OUTPATIENT
Start: 2019-10-24 | End: 2019-10-24 | Stop reason: HOSPADM

## 2019-10-24 RX ORDER — ONDANSETRON 2 MG/ML
4 INJECTION INTRAMUSCULAR; INTRAVENOUS ONCE AS NEEDED
Status: COMPLETED | OUTPATIENT
Start: 2019-10-24 | End: 2019-10-24

## 2019-10-24 RX ORDER — IPRATROPIUM BROMIDE AND ALBUTEROL SULFATE 2.5; .5 MG/3ML; MG/3ML
3 SOLUTION RESPIRATORY (INHALATION) ONCE AS NEEDED
Status: DISCONTINUED | OUTPATIENT
Start: 2019-10-24 | End: 2019-10-24 | Stop reason: HOSPADM

## 2019-10-24 RX ORDER — SODIUM CHLORIDE 0.9 % (FLUSH) 0.9 %
3 SYRINGE (ML) INJECTION EVERY 12 HOURS SCHEDULED
Status: DISCONTINUED | OUTPATIENT
Start: 2019-10-24 | End: 2019-10-24 | Stop reason: HOSPADM

## 2019-10-24 RX ORDER — LIDOCAINE HYDROCHLORIDE 10 MG/ML
INJECTION, SOLUTION EPIDURAL; INFILTRATION; INTRACAUDAL; PERINEURAL AS NEEDED
Status: DISCONTINUED | OUTPATIENT
Start: 2019-10-24 | End: 2019-10-24 | Stop reason: SURG

## 2019-10-24 RX ORDER — FENTANYL CITRATE 50 UG/ML
50 INJECTION, SOLUTION INTRAMUSCULAR; INTRAVENOUS
Status: DISCONTINUED | OUTPATIENT
Start: 2019-10-24 | End: 2019-10-24

## 2019-10-24 RX ORDER — PROMETHAZINE HYDROCHLORIDE 25 MG/ML
6.25 INJECTION, SOLUTION INTRAMUSCULAR; INTRAVENOUS ONCE AS NEEDED
Status: DISCONTINUED | OUTPATIENT
Start: 2019-10-24 | End: 2019-10-24 | Stop reason: HOSPADM

## 2019-10-24 RX ORDER — PROMETHAZINE HYDROCHLORIDE 25 MG/1
25 TABLET ORAL ONCE AS NEEDED
Status: DISCONTINUED | OUTPATIENT
Start: 2019-10-24 | End: 2019-10-24 | Stop reason: HOSPADM

## 2019-10-24 RX ORDER — IBUPROFEN 800 MG/1
800 TABLET ORAL EVERY 6 HOURS PRN
Status: DISCONTINUED | OUTPATIENT
Start: 2019-10-24 | End: 2019-11-06 | Stop reason: HOSPADM

## 2019-10-24 RX ORDER — IPRATROPIUM BROMIDE AND ALBUTEROL SULFATE 2.5; .5 MG/3ML; MG/3ML
3 SOLUTION RESPIRATORY (INHALATION)
Status: DISCONTINUED | OUTPATIENT
Start: 2019-10-24 | End: 2019-11-06 | Stop reason: HOSPADM

## 2019-10-24 RX ORDER — FAMOTIDINE 10 MG/ML
20 INJECTION, SOLUTION INTRAVENOUS ONCE
Status: COMPLETED | OUTPATIENT
Start: 2019-10-24 | End: 2019-10-24

## 2019-10-24 RX ORDER — ACETAMINOPHEN 325 MG/1
650 TABLET ORAL EVERY 6 HOURS PRN
Status: DISCONTINUED | OUTPATIENT
Start: 2019-10-24 | End: 2019-11-06 | Stop reason: HOSPADM

## 2019-10-24 RX ORDER — ROCURONIUM BROMIDE 10 MG/ML
INJECTION, SOLUTION INTRAVENOUS AS NEEDED
Status: DISCONTINUED | OUTPATIENT
Start: 2019-10-24 | End: 2019-10-24 | Stop reason: SURG

## 2019-10-24 RX ORDER — NALOXONE HCL 0.4 MG/ML
0.4 VIAL (ML) INJECTION AS NEEDED
Status: DISCONTINUED | OUTPATIENT
Start: 2019-10-24 | End: 2019-10-24

## 2019-10-24 RX ORDER — PROPOFOL 10 MG/ML
VIAL (ML) INTRAVENOUS AS NEEDED
Status: DISCONTINUED | OUTPATIENT
Start: 2019-10-24 | End: 2019-10-24 | Stop reason: SURG

## 2019-10-24 RX ORDER — LIDOCAINE HYDROCHLORIDE 40 MG/ML
SOLUTION TOPICAL AS NEEDED
Status: DISCONTINUED | OUTPATIENT
Start: 2019-10-24 | End: 2019-10-24 | Stop reason: SURG

## 2019-10-24 RX ORDER — HYDROCODONE BITARTRATE AND ACETAMINOPHEN 5; 325 MG/1; MG/1
1 TABLET ORAL ONCE AS NEEDED
Status: DISCONTINUED | OUTPATIENT
Start: 2019-10-24 | End: 2019-10-24

## 2019-10-24 RX ORDER — MEPERIDINE HYDROCHLORIDE 25 MG/ML
12.5 INJECTION INTRAMUSCULAR; INTRAVENOUS; SUBCUTANEOUS
Status: DISCONTINUED | OUTPATIENT
Start: 2019-10-24 | End: 2019-10-24

## 2019-10-24 RX ORDER — LIDOCAINE HYDROCHLORIDE 10 MG/ML
0.5 INJECTION, SOLUTION EPIDURAL; INFILTRATION; INTRACAUDAL; PERINEURAL ONCE AS NEEDED
Status: DISCONTINUED | OUTPATIENT
Start: 2019-10-24 | End: 2019-10-24 | Stop reason: HOSPADM

## 2019-10-24 RX ORDER — GLYCOPYRROLATE 0.2 MG/ML
INJECTION INTRAMUSCULAR; INTRAVENOUS AS NEEDED
Status: DISCONTINUED | OUTPATIENT
Start: 2019-10-24 | End: 2019-10-24 | Stop reason: SURG

## 2019-10-24 RX ORDER — SUCCINYLCHOLINE CHLORIDE 20 MG/ML
INJECTION INTRAMUSCULAR; INTRAVENOUS AS NEEDED
Status: DISCONTINUED | OUTPATIENT
Start: 2019-10-24 | End: 2019-10-24 | Stop reason: SURG

## 2019-10-24 RX ORDER — SODIUM CHLORIDE, SODIUM LACTATE, POTASSIUM CHLORIDE, CALCIUM CHLORIDE 600; 310; 30; 20 MG/100ML; MG/100ML; MG/100ML; MG/100ML
9 INJECTION, SOLUTION INTRAVENOUS CONTINUOUS
Status: DISCONTINUED | OUTPATIENT
Start: 2019-10-24 | End: 2019-10-31

## 2019-10-24 RX ORDER — DEXAMETHASONE SODIUM PHOSPHATE 4 MG/ML
INJECTION, SOLUTION INTRA-ARTICULAR; INTRALESIONAL; INTRAMUSCULAR; INTRAVENOUS; SOFT TISSUE AS NEEDED
Status: DISCONTINUED | OUTPATIENT
Start: 2019-10-24 | End: 2019-10-24 | Stop reason: SURG

## 2019-10-24 RX ADMIN — PHENYLEPHRINE HYDROCHLORIDE 100 MCG: 10 INJECTION INTRAVENOUS at 08:53

## 2019-10-24 RX ADMIN — ONDANSETRON 4 MG: 2 INJECTION INTRAMUSCULAR; INTRAVENOUS at 08:05

## 2019-10-24 RX ADMIN — IPRATROPIUM BROMIDE AND ALBUTEROL SULFATE 3 ML: 2.5; .5 SOLUTION RESPIRATORY (INHALATION) at 19:28

## 2019-10-24 RX ADMIN — DEXAMETHASONE SODIUM PHOSPHATE 8 MG: 4 INJECTION, SOLUTION INTRAMUSCULAR; INTRAVENOUS at 08:05

## 2019-10-24 RX ADMIN — SODIUM CHLORIDE, POTASSIUM CHLORIDE, SODIUM LACTATE AND CALCIUM CHLORIDE 9 ML/HR: 600; 310; 30; 20 INJECTION, SOLUTION INTRAVENOUS at 07:33

## 2019-10-24 RX ADMIN — IPRATROPIUM BROMIDE AND ALBUTEROL SULFATE 3 ML: 2.5; .5 SOLUTION RESPIRATORY (INHALATION) at 16:52

## 2019-10-24 RX ADMIN — PHENYLEPHRINE HYDROCHLORIDE 100 MCG: 10 INJECTION INTRAVENOUS at 08:38

## 2019-10-24 RX ADMIN — GLYCOPYRROLATE 0.2 MG: 0.2 INJECTION, SOLUTION INTRAMUSCULAR; INTRAVENOUS at 08:59

## 2019-10-24 RX ADMIN — PROPOFOL 25 MCG/KG/MIN: 10 INJECTION, EMULSION INTRAVENOUS at 07:57

## 2019-10-24 RX ADMIN — LIDOCAINE HYDROCHLORIDE 50 MG: 10 INJECTION, SOLUTION EPIDURAL; INFILTRATION; INTRACAUDAL; PERINEURAL at 07:53

## 2019-10-24 RX ADMIN — IPRATROPIUM BROMIDE AND ALBUTEROL SULFATE 3 ML: 2.5; .5 SOLUTION RESPIRATORY (INHALATION) at 23:42

## 2019-10-24 RX ADMIN — SUCCINYLCHOLINE CHLORIDE 120 MG: 20 INJECTION, SOLUTION INTRAMUSCULAR; INTRAVENOUS; PARENTERAL at 07:53

## 2019-10-24 RX ADMIN — LIDOCAINE HYDROCHLORIDE 2 ML: 20 JELLY TOPICAL at 07:54

## 2019-10-24 RX ADMIN — LIDOCAINE HYDROCHLORIDE 1 EACH: 40 SOLUTION TOPICAL at 07:55

## 2019-10-24 RX ADMIN — PROPOFOL 200 MG: 10 INJECTION, EMULSION INTRAVENOUS at 07:53

## 2019-10-24 RX ADMIN — ROCURONIUM BROMIDE 5 MG: 10 INJECTION INTRAVENOUS at 07:53

## 2019-10-24 RX ADMIN — FAMOTIDINE 20 MG: 10 INJECTION, SOLUTION INTRAVENOUS at 07:33

## 2019-10-24 RX ADMIN — BUDESONIDE AND FORMOTEROL FUMARATE DIHYDRATE 2 PUFF: 160; 4.5 AEROSOL RESPIRATORY (INHALATION) at 19:28

## 2019-10-24 RX ADMIN — LIDOCAINE HYDROCHLORIDE ANHYDROUS AND DEXTROSE MONOHYDRATE 1.5 MG/KG/HR: .4; 5 INJECTION, SOLUTION INTRAVENOUS at 08:11

## 2019-10-24 NOTE — ANESTHESIA POSTPROCEDURE EVALUATION
Patient: Balwinder Willams    Procedure Summary     Date:  10/24/19 Room / Location:   [facility]    Anesthesia Start:  0749 Anesthesia Stop:  0924    Procedure:  BRONCHOSCOPY WITH ENDOBRONCHIAL VALV PLACEMENT (N/A Bronchus) Diagnosis:      Surgeon:  Devaughn Pressley MD Provider:  Marito Worley MD    Anesthesia Type:  general ASA Status:  3          Anesthesia Type: general  Last vitals  /78   Temp 97   Pulse 97   Resp 14   SpO2 100     Post Anesthesia Care and Evaluation    Patient location during evaluation: PACU  Patient participation: complete - patient participated  Level of consciousness: awake and alert  Pain score: 0  Pain management: adequate  Airway patency: patent  Anesthetic complications: No anesthetic complications  PONV Status: none  Cardiovascular status: hemodynamically stable and acceptable  Respiratory status: nonlabored ventilation, acceptable and nasal cannula  Hydration status: acceptable

## 2019-10-24 NOTE — ANESTHESIA PROCEDURE NOTES
Airway  Urgency: elective    Date/Time: 10/24/2019 7:55 AM  Airway not difficult    General Information and Staff    Patient location during procedure: OR  CRNA: Andrew Daniels III, CRNA    Indications and Patient Condition  Indications for airway management: airway protection    Preoxygenated: yes  MILS not maintained throughout  Mask difficulty assessment: 1 - vent by mask    Final Airway Details  Final airway type: endotracheal airway      Successful airway: ETT  Cuffed: yes   Successful intubation technique: direct laryngoscopy  Endotracheal tube insertion site: oral  Blade: Cynthia  Blade size: 3  ETT size (mm): 8.5  Cormack-Lehane Classification: grade I - full view of glottis  Placement verified by: chest auscultation and capnometry   Measured from: lips  ETT/EBT  to lips (cm): 23  Number of attempts at approach: 1  Assessment: lips, teeth, and gum same as pre-op and atraumatic intubation    Additional Comments  Negative epigastric sounds, Breath sound equal bilaterally with symmetric chest rise and fall

## 2019-10-24 NOTE — ANESTHESIA PREPROCEDURE EVALUATION
Anesthesia Evaluation     NPO Solid Status: > 8 hours  NPO Liquid Status: > 8 hours           Airway   Mallampati: II  TM distance: >3 FB  Neck ROM: full  No difficulty expected  Dental      Pulmonary    (+) a smoker (quit 2008) Former, decreased breath sounds,   Cardiovascular     ECG reviewed  Rhythm: regular  Rate: normal    (-) pacemaker, hypertension, angina, murmur, cardiac stents, CABG      Neuro/Psych  (-) seizures, TIA, CVA  GI/Hepatic/Renal/Endo    (-) no renal disease, diabetes    Musculoskeletal     Abdominal    Substance History      OB/GYN          Other        ROS/Med Hx Other: COPD, emphysema  For lung reduction surgery                  Anesthesia Plan    ASA 3     general   (GA  No narcotics)  intravenous induction     Plan discussed with CRNA.

## 2019-10-25 ENCOUNTER — APPOINTMENT (OUTPATIENT)
Dept: GENERAL RADIOLOGY | Facility: HOSPITAL | Age: 54
End: 2019-10-25

## 2019-10-25 PROBLEM — J43.9 EMPHYSEMA LUNG (HCC): Status: ACTIVE | Noted: 2019-10-25

## 2019-10-25 LAB
ALBUMIN SERPL-MCNC: 3.7 G/DL (ref 3.5–5.2)
ALBUMIN/GLOB SERPL: 1.5 G/DL
ALP SERPL-CCNC: 55 U/L (ref 39–117)
ALT SERPL W P-5'-P-CCNC: 11 U/L (ref 1–41)
ANION GAP SERPL CALCULATED.3IONS-SCNC: 9 MMOL/L (ref 5–15)
AST SERPL-CCNC: 11 U/L (ref 1–40)
BILIRUB SERPL-MCNC: 0.3 MG/DL (ref 0.2–1.2)
BUN BLD-MCNC: 15 MG/DL (ref 6–20)
BUN/CREAT SERPL: 16 (ref 7–25)
CA-I SERPL ISE-MCNC: 1.3 MMOL/L (ref 1.12–1.32)
CALCIUM SPEC-SCNC: 8.4 MG/DL (ref 8.6–10.5)
CHLORIDE SERPL-SCNC: 106 MMOL/L (ref 98–107)
CO2 SERPL-SCNC: 27 MMOL/L (ref 22–29)
CREAT BLD-MCNC: 0.94 MG/DL (ref 0.76–1.27)
DEPRECATED RDW RBC AUTO: 44.3 FL (ref 37–54)
ERYTHROCYTE [DISTWIDTH] IN BLOOD BY AUTOMATED COUNT: 13.1 % (ref 12.3–15.4)
GFR SERPL CREATININE-BSD FRML MDRD: 84 ML/MIN/1.73
GLOBULIN UR ELPH-MCNC: 2.4 GM/DL
GLUCOSE BLD-MCNC: 148 MG/DL (ref 65–99)
HCT VFR BLD AUTO: 42.7 % (ref 37.5–51)
HGB BLD-MCNC: 13.9 G/DL (ref 13–17.7)
MAGNESIUM SERPL-MCNC: 2.1 MG/DL (ref 1.6–2.6)
MCH RBC QN AUTO: 29.8 PG (ref 26.6–33)
MCHC RBC AUTO-ENTMCNC: 32.6 G/DL (ref 31.5–35.7)
MCV RBC AUTO: 91.6 FL (ref 79–97)
PHOSPHATE SERPL-MCNC: 3.7 MG/DL (ref 2.5–4.5)
PLATELET # BLD AUTO: 239 10*3/MM3 (ref 140–450)
PMV BLD AUTO: 9.2 FL (ref 6–12)
POTASSIUM BLD-SCNC: 3.9 MMOL/L (ref 3.5–5.2)
PROT SERPL-MCNC: 6.1 G/DL (ref 6–8.5)
RBC # BLD AUTO: 4.66 10*6/MM3 (ref 4.14–5.8)
SODIUM BLD-SCNC: 142 MMOL/L (ref 136–145)
WBC NRBC COR # BLD: 12.64 10*3/MM3 (ref 3.4–10.8)

## 2019-10-25 PROCEDURE — 84100 ASSAY OF PHOSPHORUS: CPT | Performed by: INTERNAL MEDICINE

## 2019-10-25 PROCEDURE — 94799 UNLISTED PULMONARY SVC/PX: CPT

## 2019-10-25 PROCEDURE — 99232 SBSQ HOSP IP/OBS MODERATE 35: CPT | Performed by: INTERNAL MEDICINE

## 2019-10-25 PROCEDURE — 71045 X-RAY EXAM CHEST 1 VIEW: CPT

## 2019-10-25 PROCEDURE — 83735 ASSAY OF MAGNESIUM: CPT | Performed by: INTERNAL MEDICINE

## 2019-10-25 PROCEDURE — 80053 COMPREHEN METABOLIC PANEL: CPT | Performed by: INTERNAL MEDICINE

## 2019-10-25 PROCEDURE — 0W9B30Z DRAINAGE OF LEFT PLEURAL CAVITY WITH DRAINAGE DEVICE, PERCUTANEOUS APPROACH: ICD-10-PCS | Performed by: INTERNAL MEDICINE

## 2019-10-25 PROCEDURE — 32551 INSERTION OF CHEST TUBE: CPT | Performed by: INTERNAL MEDICINE

## 2019-10-25 PROCEDURE — 85027 COMPLETE CBC AUTOMATED: CPT | Performed by: INTERNAL MEDICINE

## 2019-10-25 PROCEDURE — 82330 ASSAY OF CALCIUM: CPT | Performed by: INTERNAL MEDICINE

## 2019-10-25 RX ORDER — LIDOCAINE HYDROCHLORIDE 20 MG/ML
10 INJECTION, SOLUTION INFILTRATION; PERINEURAL ONCE
Status: COMPLETED | OUTPATIENT
Start: 2019-10-25 | End: 2019-10-25

## 2019-10-25 RX ADMIN — IPRATROPIUM BROMIDE AND ALBUTEROL SULFATE 3 ML: 2.5; .5 SOLUTION RESPIRATORY (INHALATION) at 22:54

## 2019-10-25 RX ADMIN — IPRATROPIUM BROMIDE AND ALBUTEROL SULFATE 3 ML: 2.5; .5 SOLUTION RESPIRATORY (INHALATION) at 11:50

## 2019-10-25 RX ADMIN — BUDESONIDE AND FORMOTEROL FUMARATE DIHYDRATE 2 PUFF: 160; 4.5 AEROSOL RESPIRATORY (INHALATION) at 18:57

## 2019-10-25 RX ADMIN — IPRATROPIUM BROMIDE AND ALBUTEROL SULFATE 3 ML: 2.5; .5 SOLUTION RESPIRATORY (INHALATION) at 06:53

## 2019-10-25 RX ADMIN — LIDOCAINE HYDROCHLORIDE 10 ML: 20 INJECTION, SOLUTION INFILTRATION; PERINEURAL at 13:39

## 2019-10-25 RX ADMIN — IPRATROPIUM BROMIDE AND ALBUTEROL SULFATE 3 ML: 2.5; .5 SOLUTION RESPIRATORY (INHALATION) at 18:56

## 2019-10-25 RX ADMIN — IBUPROFEN 800 MG: 800 TABLET ORAL at 12:14

## 2019-10-25 RX ADMIN — IBUPROFEN 800 MG: 800 TABLET ORAL at 20:25

## 2019-10-25 RX ADMIN — IPRATROPIUM BROMIDE AND ALBUTEROL SULFATE 3 ML: 2.5; .5 SOLUTION RESPIRATORY (INHALATION) at 15:26

## 2019-10-25 RX ADMIN — BUDESONIDE AND FORMOTEROL FUMARATE DIHYDRATE 2 PUFF: 160; 4.5 AEROSOL RESPIRATORY (INHALATION) at 06:53

## 2019-10-25 RX ADMIN — ACETAMINOPHEN 650 MG: 325 TABLET ORAL at 17:20

## 2019-10-25 RX ADMIN — IPRATROPIUM BROMIDE AND ALBUTEROL SULFATE 3 ML: 2.5; .5 SOLUTION RESPIRATORY (INHALATION) at 03:00

## 2019-10-26 ENCOUNTER — APPOINTMENT (OUTPATIENT)
Dept: GENERAL RADIOLOGY | Facility: HOSPITAL | Age: 54
End: 2019-10-26

## 2019-10-26 PROBLEM — J95.811 POSTPROCEDURAL PNEUMOTHORAX: Status: ACTIVE | Noted: 2019-10-26

## 2019-10-26 LAB
BACTERIA SPEC RESP CULT: ABNORMAL
BACTERIA SPEC RESP CULT: ABNORMAL
GIE STN SPEC: NORMAL
GIE STN SPEC: NORMAL
GRAM STN SPEC: ABNORMAL
STREP GROUPING: ABNORMAL

## 2019-10-26 PROCEDURE — 94799 UNLISTED PULMONARY SVC/PX: CPT

## 2019-10-26 PROCEDURE — 71045 X-RAY EXAM CHEST 1 VIEW: CPT

## 2019-10-26 PROCEDURE — 25010000002 CEFTRIAXONE PER 250 MG: Performed by: INTERNAL MEDICINE

## 2019-10-26 PROCEDURE — 99232 SBSQ HOSP IP/OBS MODERATE 35: CPT | Performed by: INTERNAL MEDICINE

## 2019-10-26 RX ORDER — DOCUSATE SODIUM 100 MG/1
100 CAPSULE, LIQUID FILLED ORAL 2 TIMES DAILY
Status: DISCONTINUED | OUTPATIENT
Start: 2019-10-26 | End: 2019-10-28

## 2019-10-26 RX ADMIN — IPRATROPIUM BROMIDE AND ALBUTEROL SULFATE 3 ML: 2.5; .5 SOLUTION RESPIRATORY (INHALATION) at 11:46

## 2019-10-26 RX ADMIN — DOCUSATE SODIUM 100 MG: 100 CAPSULE, LIQUID FILLED ORAL at 10:12

## 2019-10-26 RX ADMIN — DOCUSATE SODIUM 100 MG: 100 CAPSULE, LIQUID FILLED ORAL at 20:05

## 2019-10-26 RX ADMIN — IPRATROPIUM BROMIDE AND ALBUTEROL SULFATE 3 ML: 2.5; .5 SOLUTION RESPIRATORY (INHALATION) at 07:39

## 2019-10-26 RX ADMIN — IBUPROFEN 800 MG: 800 TABLET ORAL at 03:35

## 2019-10-26 RX ADMIN — CEFTRIAXONE SODIUM 1 G: 1 INJECTION, POWDER, FOR SOLUTION INTRAMUSCULAR; INTRAVENOUS at 15:50

## 2019-10-26 RX ADMIN — IPRATROPIUM BROMIDE AND ALBUTEROL SULFATE 3 ML: 2.5; .5 SOLUTION RESPIRATORY (INHALATION) at 20:15

## 2019-10-26 RX ADMIN — IPRATROPIUM BROMIDE AND ALBUTEROL SULFATE 3 ML: 2.5; .5 SOLUTION RESPIRATORY (INHALATION) at 23:09

## 2019-10-26 RX ADMIN — IPRATROPIUM BROMIDE AND ALBUTEROL SULFATE 3 ML: 2.5; .5 SOLUTION RESPIRATORY (INHALATION) at 16:13

## 2019-10-26 RX ADMIN — IPRATROPIUM BROMIDE AND ALBUTEROL SULFATE 3 ML: 2.5; .5 SOLUTION RESPIRATORY (INHALATION) at 02:59

## 2019-10-26 RX ADMIN — ACETAMINOPHEN 650 MG: 325 TABLET ORAL at 18:08

## 2019-10-26 RX ADMIN — IBUPROFEN 800 MG: 800 TABLET ORAL at 12:16

## 2019-10-26 RX ADMIN — BUDESONIDE AND FORMOTEROL FUMARATE DIHYDRATE 2 PUFF: 160; 4.5 AEROSOL RESPIRATORY (INHALATION) at 20:14

## 2019-10-26 RX ADMIN — BUDESONIDE AND FORMOTEROL FUMARATE DIHYDRATE 2 PUFF: 160; 4.5 AEROSOL RESPIRATORY (INHALATION) at 07:39

## 2019-10-26 RX ADMIN — ACETAMINOPHEN 650 MG: 325 TABLET ORAL at 08:30

## 2019-10-27 ENCOUNTER — APPOINTMENT (OUTPATIENT)
Dept: GENERAL RADIOLOGY | Facility: HOSPITAL | Age: 54
End: 2019-10-27

## 2019-10-27 PROCEDURE — 94799 UNLISTED PULMONARY SVC/PX: CPT

## 2019-10-27 PROCEDURE — 99232 SBSQ HOSP IP/OBS MODERATE 35: CPT | Performed by: INTERNAL MEDICINE

## 2019-10-27 PROCEDURE — 25010000002 CEFTRIAXONE PER 250 MG: Performed by: INTERNAL MEDICINE

## 2019-10-27 PROCEDURE — 71046 X-RAY EXAM CHEST 2 VIEWS: CPT

## 2019-10-27 PROCEDURE — 71045 X-RAY EXAM CHEST 1 VIEW: CPT

## 2019-10-27 RX ADMIN — BUDESONIDE AND FORMOTEROL FUMARATE DIHYDRATE 2 PUFF: 160; 4.5 AEROSOL RESPIRATORY (INHALATION) at 07:16

## 2019-10-27 RX ADMIN — BUDESONIDE AND FORMOTEROL FUMARATE DIHYDRATE 2 PUFF: 160; 4.5 AEROSOL RESPIRATORY (INHALATION) at 18:49

## 2019-10-27 RX ADMIN — IPRATROPIUM BROMIDE AND ALBUTEROL SULFATE 3 ML: 2.5; .5 SOLUTION RESPIRATORY (INHALATION) at 02:51

## 2019-10-27 RX ADMIN — CEFTRIAXONE SODIUM 1 G: 1 INJECTION, POWDER, FOR SOLUTION INTRAMUSCULAR; INTRAVENOUS at 16:57

## 2019-10-27 RX ADMIN — ACETAMINOPHEN 650 MG: 325 TABLET ORAL at 12:42

## 2019-10-27 RX ADMIN — IPRATROPIUM BROMIDE AND ALBUTEROL SULFATE 3 ML: 2.5; .5 SOLUTION RESPIRATORY (INHALATION) at 07:16

## 2019-10-27 RX ADMIN — IBUPROFEN 800 MG: 800 TABLET ORAL at 09:15

## 2019-10-27 RX ADMIN — ACETAMINOPHEN 650 MG: 325 TABLET ORAL at 00:10

## 2019-10-27 RX ADMIN — IBUPROFEN 800 MG: 800 TABLET ORAL at 03:24

## 2019-10-27 RX ADMIN — IBUPROFEN 800 MG: 800 TABLET ORAL at 16:57

## 2019-10-27 RX ADMIN — IPRATROPIUM BROMIDE AND ALBUTEROL SULFATE 3 ML: 2.5; .5 SOLUTION RESPIRATORY (INHALATION) at 11:59

## 2019-10-27 RX ADMIN — IPRATROPIUM BROMIDE AND ALBUTEROL SULFATE 3 ML: 2.5; .5 SOLUTION RESPIRATORY (INHALATION) at 15:28

## 2019-10-27 RX ADMIN — DOCUSATE SODIUM 100 MG: 100 CAPSULE, LIQUID FILLED ORAL at 21:23

## 2019-10-27 RX ADMIN — ACETAMINOPHEN 650 MG: 325 TABLET ORAL at 06:13

## 2019-10-27 RX ADMIN — IPRATROPIUM BROMIDE AND ALBUTEROL SULFATE 3 ML: 2.5; .5 SOLUTION RESPIRATORY (INHALATION) at 23:18

## 2019-10-27 RX ADMIN — IPRATROPIUM BROMIDE AND ALBUTEROL SULFATE 3 ML: 2.5; .5 SOLUTION RESPIRATORY (INHALATION) at 18:49

## 2019-10-28 ENCOUNTER — APPOINTMENT (OUTPATIENT)
Dept: GENERAL RADIOLOGY | Facility: HOSPITAL | Age: 54
End: 2019-10-28

## 2019-10-28 PROCEDURE — 71045 X-RAY EXAM CHEST 1 VIEW: CPT

## 2019-10-28 PROCEDURE — 94799 UNLISTED PULMONARY SVC/PX: CPT

## 2019-10-28 PROCEDURE — 99233 SBSQ HOSP IP/OBS HIGH 50: CPT | Performed by: INTERNAL MEDICINE

## 2019-10-28 PROCEDURE — 25010000002 CEFTRIAXONE PER 250 MG: Performed by: INTERNAL MEDICINE

## 2019-10-28 PROCEDURE — 25010000002 ENOXAPARIN PER 10 MG: Performed by: INTERNAL MEDICINE

## 2019-10-28 RX ORDER — SENNA AND DOCUSATE SODIUM 50; 8.6 MG/1; MG/1
2 TABLET, FILM COATED ORAL 2 TIMES DAILY
Status: DISCONTINUED | OUTPATIENT
Start: 2019-10-28 | End: 2019-10-31

## 2019-10-28 RX ORDER — BISACODYL 5 MG/1
10 TABLET, DELAYED RELEASE ORAL DAILY PRN
Status: DISCONTINUED | OUTPATIENT
Start: 2019-10-28 | End: 2019-11-06 | Stop reason: HOSPADM

## 2019-10-28 RX ADMIN — IPRATROPIUM BROMIDE AND ALBUTEROL SULFATE 3 ML: 2.5; .5 SOLUTION RESPIRATORY (INHALATION) at 23:41

## 2019-10-28 RX ADMIN — IPRATROPIUM BROMIDE AND ALBUTEROL SULFATE 3 ML: 2.5; .5 SOLUTION RESPIRATORY (INHALATION) at 07:14

## 2019-10-28 RX ADMIN — BUDESONIDE AND FORMOTEROL FUMARATE DIHYDRATE 2 PUFF: 160; 4.5 AEROSOL RESPIRATORY (INHALATION) at 07:14

## 2019-10-28 RX ADMIN — IPRATROPIUM BROMIDE AND ALBUTEROL SULFATE 3 ML: 2.5; .5 SOLUTION RESPIRATORY (INHALATION) at 19:05

## 2019-10-28 RX ADMIN — SENNOSIDES AND DOCUSATE SODIUM 2 TABLET: 8.6; 5 TABLET ORAL at 09:30

## 2019-10-28 RX ADMIN — CEFTRIAXONE SODIUM 1 G: 1 INJECTION, POWDER, FOR SOLUTION INTRAMUSCULAR; INTRAVENOUS at 17:45

## 2019-10-28 RX ADMIN — BUDESONIDE AND FORMOTEROL FUMARATE DIHYDRATE 2 PUFF: 160; 4.5 AEROSOL RESPIRATORY (INHALATION) at 19:05

## 2019-10-28 RX ADMIN — ACETAMINOPHEN 650 MG: 325 TABLET ORAL at 17:45

## 2019-10-28 RX ADMIN — ENOXAPARIN SODIUM 30 MG: 30 INJECTION SUBCUTANEOUS at 09:30

## 2019-10-29 ENCOUNTER — APPOINTMENT (OUTPATIENT)
Dept: GENERAL RADIOLOGY | Facility: HOSPITAL | Age: 54
End: 2019-10-29

## 2019-10-29 PROCEDURE — 25010000002 ENOXAPARIN PER 10 MG: Performed by: INTERNAL MEDICINE

## 2019-10-29 PROCEDURE — 94799 UNLISTED PULMONARY SVC/PX: CPT

## 2019-10-29 PROCEDURE — 25010000002 CEFTRIAXONE PER 250 MG: Performed by: INTERNAL MEDICINE

## 2019-10-29 PROCEDURE — 71045 X-RAY EXAM CHEST 1 VIEW: CPT

## 2019-10-29 PROCEDURE — 99232 SBSQ HOSP IP/OBS MODERATE 35: CPT | Performed by: INTERNAL MEDICINE

## 2019-10-29 RX ADMIN — IPRATROPIUM BROMIDE AND ALBUTEROL SULFATE 3 ML: 2.5; .5 SOLUTION RESPIRATORY (INHALATION) at 07:37

## 2019-10-29 RX ADMIN — ACETAMINOPHEN 650 MG: 325 TABLET ORAL at 20:36

## 2019-10-29 RX ADMIN — IBUPROFEN 800 MG: 800 TABLET ORAL at 18:26

## 2019-10-29 RX ADMIN — IPRATROPIUM BROMIDE AND ALBUTEROL SULFATE 3 ML: 2.5; .5 SOLUTION RESPIRATORY (INHALATION) at 15:38

## 2019-10-29 RX ADMIN — SENNOSIDES AND DOCUSATE SODIUM 2 TABLET: 8.6; 5 TABLET ORAL at 08:06

## 2019-10-29 RX ADMIN — IPRATROPIUM BROMIDE AND ALBUTEROL SULFATE 3 ML: 2.5; .5 SOLUTION RESPIRATORY (INHALATION) at 03:06

## 2019-10-29 RX ADMIN — IPRATROPIUM BROMIDE AND ALBUTEROL SULFATE 3 ML: 2.5; .5 SOLUTION RESPIRATORY (INHALATION) at 23:05

## 2019-10-29 RX ADMIN — ENOXAPARIN SODIUM 30 MG: 30 INJECTION SUBCUTANEOUS at 10:20

## 2019-10-29 RX ADMIN — ACETAMINOPHEN 650 MG: 325 TABLET ORAL at 14:01

## 2019-10-29 RX ADMIN — BUDESONIDE AND FORMOTEROL FUMARATE DIHYDRATE 2 PUFF: 160; 4.5 AEROSOL RESPIRATORY (INHALATION) at 19:04

## 2019-10-29 RX ADMIN — IBUPROFEN 800 MG: 800 TABLET ORAL at 12:38

## 2019-10-29 RX ADMIN — CEFTRIAXONE SODIUM 1 G: 1 INJECTION, POWDER, FOR SOLUTION INTRAMUSCULAR; INTRAVENOUS at 14:01

## 2019-10-29 RX ADMIN — BUDESONIDE AND FORMOTEROL FUMARATE DIHYDRATE 2 PUFF: 160; 4.5 AEROSOL RESPIRATORY (INHALATION) at 07:38

## 2019-10-29 RX ADMIN — IPRATROPIUM BROMIDE AND ALBUTEROL SULFATE 3 ML: 2.5; .5 SOLUTION RESPIRATORY (INHALATION) at 11:43

## 2019-10-29 RX ADMIN — ACETAMINOPHEN 650 MG: 325 TABLET ORAL at 08:06

## 2019-10-29 RX ADMIN — IPRATROPIUM BROMIDE AND ALBUTEROL SULFATE 3 ML: 2.5; .5 SOLUTION RESPIRATORY (INHALATION) at 19:04

## 2019-10-30 ENCOUNTER — APPOINTMENT (OUTPATIENT)
Dept: GENERAL RADIOLOGY | Facility: HOSPITAL | Age: 54
End: 2019-10-30

## 2019-10-30 ENCOUNTER — ANESTHESIA (OUTPATIENT)
Dept: GASTROENTEROLOGY | Facility: HOSPITAL | Age: 54
End: 2019-10-30

## 2019-10-30 ENCOUNTER — ANESTHESIA EVENT (OUTPATIENT)
Dept: GASTROENTEROLOGY | Facility: HOSPITAL | Age: 54
End: 2019-10-30

## 2019-10-30 PROBLEM — IMO0002 PERSISTENT AIR LEAK: Status: ACTIVE | Noted: 2019-10-30

## 2019-10-30 PROCEDURE — 31647 BRONCHIAL VALVE INIT INSERT: CPT | Performed by: INTERNAL MEDICINE

## 2019-10-30 PROCEDURE — 25010000002 NEOSTIGMINE 10 MG/10ML SOLUTION: Performed by: NURSE ANESTHETIST, CERTIFIED REGISTERED

## 2019-10-30 PROCEDURE — 87205 SMEAR GRAM STAIN: CPT | Performed by: INTERNAL MEDICINE

## 2019-10-30 PROCEDURE — 87147 CULTURE TYPE IMMUNOLOGIC: CPT | Performed by: INTERNAL MEDICINE

## 2019-10-30 PROCEDURE — 87206 SMEAR FLUORESCENT/ACID STAI: CPT | Performed by: INTERNAL MEDICINE

## 2019-10-30 PROCEDURE — 71045 X-RAY EXAM CHEST 1 VIEW: CPT

## 2019-10-30 PROCEDURE — 87116 MYCOBACTERIA CULTURE: CPT | Performed by: INTERNAL MEDICINE

## 2019-10-30 PROCEDURE — 0BD38ZX EXTRACTION OF RIGHT MAIN BRONCHUS, VIA NATURAL OR ARTIFICIAL OPENING ENDOSCOPIC, DIAGNOSTIC: ICD-10-PCS | Performed by: INTERNAL MEDICINE

## 2019-10-30 PROCEDURE — 25010000002 ONDANSETRON PER 1 MG: Performed by: NURSE ANESTHETIST, CERTIFIED REGISTERED

## 2019-10-30 PROCEDURE — 87102 FUNGUS ISOLATION CULTURE: CPT | Performed by: INTERNAL MEDICINE

## 2019-10-30 PROCEDURE — 25010000002 PROPOFOL 10 MG/ML EMULSION: Performed by: NURSE ANESTHETIST, CERTIFIED REGISTERED

## 2019-10-30 PROCEDURE — 25010000002 KETOROLAC TROMETHAMINE PER 15 MG: Performed by: INTERNAL MEDICINE

## 2019-10-30 PROCEDURE — 99233 SBSQ HOSP IP/OBS HIGH 50: CPT | Performed by: INTERNAL MEDICINE

## 2019-10-30 PROCEDURE — 0BH98GZ INSERTION OF ENDOBRONCHIAL VALVE INTO LINGULA BRONCHUS, VIA NATURAL OR ARTIFICIAL OPENING ENDOSCOPIC: ICD-10-PCS | Performed by: INTERNAL MEDICINE

## 2019-10-30 PROCEDURE — 25010000002 CEFTRIAXONE PER 250 MG: Performed by: INTERNAL MEDICINE

## 2019-10-30 PROCEDURE — 87070 CULTURE OTHR SPECIMN AEROBIC: CPT | Performed by: INTERNAL MEDICINE

## 2019-10-30 PROCEDURE — 0BD78ZX EXTRACTION OF LEFT MAIN BRONCHUS, VIA NATURAL OR ARTIFICIAL OPENING ENDOSCOPIC, DIAGNOSTIC: ICD-10-PCS | Performed by: INTERNAL MEDICINE

## 2019-10-30 PROCEDURE — 94799 UNLISTED PULMONARY SVC/PX: CPT

## 2019-10-30 PROCEDURE — C1726 CATH, BAL DIL, NON-VASCULAR: HCPCS | Performed by: INTERNAL MEDICINE

## 2019-10-30 PROCEDURE — 25010000002 SUCCINYLCHOLINE PER 20 MG: Performed by: NURSE ANESTHETIST, CERTIFIED REGISTERED

## 2019-10-30 PROCEDURE — 87186 SC STD MICRODIL/AGAR DIL: CPT | Performed by: INTERNAL MEDICINE

## 2019-10-30 DEVICE — CATH DEPLOY SPIRATION W/LOADER 2.6MMPLS 1050MM: Type: IMPLANTABLE DEVICE | Site: BRONCHUS | Status: FUNCTIONAL

## 2019-10-30 RX ORDER — ONDANSETRON 2 MG/ML
INJECTION INTRAMUSCULAR; INTRAVENOUS AS NEEDED
Status: DISCONTINUED | OUTPATIENT
Start: 2019-10-30 | End: 2019-10-30 | Stop reason: SURG

## 2019-10-30 RX ORDER — EPHEDRINE SULFATE 50 MG/ML
5 INJECTION, SOLUTION INTRAVENOUS ONCE AS NEEDED
Status: DISCONTINUED | OUTPATIENT
Start: 2019-10-30 | End: 2019-10-30 | Stop reason: HOSPADM

## 2019-10-30 RX ORDER — LABETALOL HYDROCHLORIDE 5 MG/ML
5 INJECTION, SOLUTION INTRAVENOUS
Status: DISCONTINUED | OUTPATIENT
Start: 2019-10-30 | End: 2019-10-30 | Stop reason: HOSPADM

## 2019-10-30 RX ORDER — LIDOCAINE HYDROCHLORIDE 10 MG/ML
INJECTION, SOLUTION EPIDURAL; INFILTRATION; INTRACAUDAL; PERINEURAL AS NEEDED
Status: DISCONTINUED | OUTPATIENT
Start: 2019-10-30 | End: 2019-10-30 | Stop reason: SURG

## 2019-10-30 RX ORDER — FENTANYL CITRATE 50 UG/ML
50 INJECTION, SOLUTION INTRAMUSCULAR; INTRAVENOUS
Status: DISCONTINUED | OUTPATIENT
Start: 2019-10-30 | End: 2019-10-30 | Stop reason: HOSPADM

## 2019-10-30 RX ORDER — HYDRALAZINE HYDROCHLORIDE 20 MG/ML
5 INJECTION INTRAMUSCULAR; INTRAVENOUS
Status: DISCONTINUED | OUTPATIENT
Start: 2019-10-30 | End: 2019-10-30 | Stop reason: HOSPADM

## 2019-10-30 RX ORDER — GLYCOPYRROLATE 0.2 MG/ML
INJECTION INTRAMUSCULAR; INTRAVENOUS AS NEEDED
Status: DISCONTINUED | OUTPATIENT
Start: 2019-10-30 | End: 2019-10-30 | Stop reason: SURG

## 2019-10-30 RX ORDER — ONDANSETRON 2 MG/ML
4 INJECTION INTRAMUSCULAR; INTRAVENOUS ONCE AS NEEDED
Status: DISCONTINUED | OUTPATIENT
Start: 2019-10-30 | End: 2019-10-30 | Stop reason: HOSPADM

## 2019-10-30 RX ORDER — ALBUTEROL SULFATE 2.5 MG/3ML
2.5 SOLUTION RESPIRATORY (INHALATION) ONCE AS NEEDED
Status: DISCONTINUED | OUTPATIENT
Start: 2019-10-30 | End: 2019-10-30 | Stop reason: HOSPADM

## 2019-10-30 RX ORDER — PROPOFOL 10 MG/ML
VIAL (ML) INTRAVENOUS AS NEEDED
Status: DISCONTINUED | OUTPATIENT
Start: 2019-10-30 | End: 2019-10-30 | Stop reason: SURG

## 2019-10-30 RX ORDER — KETOROLAC TROMETHAMINE 30 MG/ML
15 INJECTION, SOLUTION INTRAMUSCULAR; INTRAVENOUS EVERY 6 HOURS PRN
Status: DISPENSED | OUTPATIENT
Start: 2019-10-30 | End: 2019-11-04

## 2019-10-30 RX ORDER — SUCCINYLCHOLINE CHLORIDE 20 MG/ML
INJECTION INTRAMUSCULAR; INTRAVENOUS AS NEEDED
Status: DISCONTINUED | OUTPATIENT
Start: 2019-10-30 | End: 2019-10-30 | Stop reason: SURG

## 2019-10-30 RX ORDER — ESMOLOL HYDROCHLORIDE 10 MG/ML
INJECTION INTRAVENOUS AS NEEDED
Status: DISCONTINUED | OUTPATIENT
Start: 2019-10-30 | End: 2019-10-30 | Stop reason: SURG

## 2019-10-30 RX ORDER — NEOSTIGMINE METHYLSULFATE 1 MG/ML
INJECTION, SOLUTION INTRAVENOUS AS NEEDED
Status: DISCONTINUED | OUTPATIENT
Start: 2019-10-30 | End: 2019-10-30 | Stop reason: SURG

## 2019-10-30 RX ORDER — ATRACURIUM BESYLATE 10 MG/ML
INJECTION, SOLUTION INTRAVENOUS AS NEEDED
Status: DISCONTINUED | OUTPATIENT
Start: 2019-10-30 | End: 2019-10-30 | Stop reason: SURG

## 2019-10-30 RX ADMIN — SODIUM CHLORIDE, POTASSIUM CHLORIDE, SODIUM LACTATE AND CALCIUM CHLORIDE: 600; 310; 30; 20 INJECTION, SOLUTION INTRAVENOUS at 15:06

## 2019-10-30 RX ADMIN — BUDESONIDE AND FORMOTEROL FUMARATE DIHYDRATE 2 PUFF: 160; 4.5 AEROSOL RESPIRATORY (INHALATION) at 19:27

## 2019-10-30 RX ADMIN — PROPOFOL 150 MG: 10 INJECTION, EMULSION INTRAVENOUS at 15:06

## 2019-10-30 RX ADMIN — IPRATROPIUM BROMIDE AND ALBUTEROL SULFATE 3 ML: 2.5; .5 SOLUTION RESPIRATORY (INHALATION) at 11:20

## 2019-10-30 RX ADMIN — SUCCINYLCHOLINE CHLORIDE 100 MG: 20 INJECTION, SOLUTION INTRAMUSCULAR; INTRAVENOUS; PARENTERAL at 15:06

## 2019-10-30 RX ADMIN — NEOSTIGMINE METHYLSULFATE 1 MG: 1 INJECTION, SOLUTION INTRAVENOUS at 15:54

## 2019-10-30 RX ADMIN — SODIUM CHLORIDE, POTASSIUM CHLORIDE, SODIUM LACTATE AND CALCIUM CHLORIDE: 600; 310; 30; 20 INJECTION, SOLUTION INTRAVENOUS at 15:35

## 2019-10-30 RX ADMIN — BUDESONIDE AND FORMOTEROL FUMARATE DIHYDRATE 2 PUFF: 160; 4.5 AEROSOL RESPIRATORY (INHALATION) at 07:09

## 2019-10-30 RX ADMIN — SENNOSIDES AND DOCUSATE SODIUM 2 TABLET: 8.6; 5 TABLET ORAL at 20:12

## 2019-10-30 RX ADMIN — KETOROLAC TROMETHAMINE 15 MG: 30 INJECTION, SOLUTION INTRAMUSCULAR at 16:44

## 2019-10-30 RX ADMIN — CEFTRIAXONE SODIUM 1 G: 1 INJECTION, POWDER, FOR SOLUTION INTRAMUSCULAR; INTRAVENOUS at 17:11

## 2019-10-30 RX ADMIN — GLYCOPYRROLATE 0.2 MG: 0.2 INJECTION, SOLUTION INTRAMUSCULAR; INTRAVENOUS at 15:54

## 2019-10-30 RX ADMIN — LIDOCAINE HYDROCHLORIDE 50 MG: 10 INJECTION, SOLUTION EPIDURAL; INFILTRATION; INTRACAUDAL; PERINEURAL at 15:06

## 2019-10-30 RX ADMIN — ATRACURIUM BESYLATE 10 MG: 10 INJECTION, SOLUTION INTRAVENOUS at 15:25

## 2019-10-30 RX ADMIN — IPRATROPIUM BROMIDE AND ALBUTEROL SULFATE 3 ML: 2.5; .5 SOLUTION RESPIRATORY (INHALATION) at 23:16

## 2019-10-30 RX ADMIN — IPRATROPIUM BROMIDE AND ALBUTEROL SULFATE 3 ML: 2.5; .5 SOLUTION RESPIRATORY (INHALATION) at 07:08

## 2019-10-30 RX ADMIN — ESMOLOL HYDROCHLORIDE 30 MG: 10 INJECTION, SOLUTION INTRAVENOUS at 15:06

## 2019-10-30 RX ADMIN — IPRATROPIUM BROMIDE AND ALBUTEROL SULFATE 3 ML: 2.5; .5 SOLUTION RESPIRATORY (INHALATION) at 19:27

## 2019-10-30 RX ADMIN — ONDANSETRON 4 MG: 2 INJECTION INTRAMUSCULAR; INTRAVENOUS at 15:33

## 2019-10-30 NOTE — ANESTHESIA POSTPROCEDURE EVALUATION
Patient: Balwinder Willams    Procedure Summary     Date:  10/30/19 Room / Location:   [facility]    Anesthesia Start:  1501 Anesthesia Stop:  1610    Procedure:  BRONCHOSCOPY with Endobronchial Valve Placement (N/A Bronchus) Diagnosis:      Surgeon:  Phan Castellano MD Provider:  Pola Tidwell MD    Anesthesia Type:  general ASA Status:  3          Anesthesia Type: general  Last vitals  BP   132/92 (10/30/19 1609)   Temp   97.5 °F (36.4 °C) (10/30/19 1609)   Pulse   86 (10/30/19 1609)   Resp   18 (10/30/19 1609)     SpO2   94 % (10/30/19 1609)     Post Anesthesia Care and Evaluation    Patient location during evaluation: PACU  Patient participation: complete - patient participated  Level of consciousness: awake and alert  Pain management: adequate  Airway patency: patent  Anesthetic complications: No anesthetic complications  PONV Status: none  Cardiovascular status: acceptable  Respiratory status: acceptable  Hydration status: acceptable

## 2019-10-30 NOTE — ANESTHESIA PREPROCEDURE EVALUATION
Anesthesia Evaluation     Patient summary reviewed and Nursing notes reviewed   NPO Solid Status: > 8 hours  NPO Liquid Status: > 8 hours           Airway   Mallampati: II  TM distance: >3 FB  Neck ROM: limited  Possible difficult intubation  Dental      Pulmonary    (+) a smoker (10 yrs ) Former, COPD (Q Day MDI ) moderate, home oxygen (nocte ),   (-) shortness of breath, recent URI    ROS comment: Alpha1 Antitrypsin   pulse Ox 90 on average during day   higher on rest   Cardiovascular     ECG reviewed    (-) hypertension, past MI, dysrhythmias, angina, hyperlipidemia    ROS comment: EKG NSR RAD Pulm Disease pattern     ECHO EF = 65%. No significant valvular abnormalities.    Neuro/Psych  (-) seizures, CVA  GI/Hepatic/Renal/Endo    (+)   liver disease (denies),   (-) hepatitis, no renal disease, diabetes    Musculoskeletal     Abdominal    Substance History      OB/GYN          Other            Phys Exam Other: Grade 1 view last week mac 3              Anesthesia Plan    ASA 3     general   (Glidescope standby )  intravenous induction   Anesthetic plan, all risks, benefits, and alternatives have been provided, discussed and informed consent has been obtained with: patient.    Plan discussed with CRNA.

## 2019-10-30 NOTE — ANESTHESIA PROCEDURE NOTES
Airway  Urgency: elective    Date/Time: 10/30/2019 3:06 PM  Airway not difficult    General Information and Staff    Patient location during procedure: OR  CRNA: Delmis Soriano CRNA    Indications and Patient Condition  Indications for airway management: airway protection    Preoxygenated: yes  MILS not maintained throughout  Mask difficulty assessment: 0 - not attempted    Final Airway Details  Final airway type: endotracheal airway      Successful airway: ETT  Cuffed: yes   Successful intubation technique: direct laryngoscopy  Facilitating devices/methods: intubating stylet  Endotracheal tube insertion site: oral  Blade: Cynthia  Blade size: 3  ETT size (mm): 8.5  Cormack-Lehane Classification: grade IIa - partial view of glottis  Placement verified by: chest auscultation and capnometry   Cuff volume (mL): 10  Measured from: lips  ETT/EBT  to lips (cm): 24  Number of attempts at approach: 1    Additional Comments  Negative epigastric sounds, Breath sound equal bilaterally with symmetric chest rise and fall

## 2019-10-31 ENCOUNTER — APPOINTMENT (OUTPATIENT)
Dept: GENERAL RADIOLOGY | Facility: HOSPITAL | Age: 54
End: 2019-10-31

## 2019-10-31 PROCEDURE — 94799 UNLISTED PULMONARY SVC/PX: CPT

## 2019-10-31 PROCEDURE — 71045 X-RAY EXAM CHEST 1 VIEW: CPT

## 2019-10-31 PROCEDURE — 25010000002 KETOROLAC TROMETHAMINE PER 15 MG: Performed by: INTERNAL MEDICINE

## 2019-10-31 PROCEDURE — 99232 SBSQ HOSP IP/OBS MODERATE 35: CPT | Performed by: INTERNAL MEDICINE

## 2019-10-31 PROCEDURE — 25010000002 ENOXAPARIN PER 10 MG: Performed by: INTERNAL MEDICINE

## 2019-10-31 RX ORDER — AMOXICILLIN 250 MG
2 CAPSULE ORAL 2 TIMES DAILY
Status: DISCONTINUED | OUTPATIENT
Start: 2019-10-31 | End: 2019-11-06 | Stop reason: HOSPADM

## 2019-10-31 RX ADMIN — IPRATROPIUM BROMIDE AND ALBUTEROL SULFATE 3 ML: 2.5; .5 SOLUTION RESPIRATORY (INHALATION) at 07:14

## 2019-10-31 RX ADMIN — SENNOSIDES AND DOCUSATE SODIUM 2 TABLET: 8.6; 5 TABLET ORAL at 20:09

## 2019-10-31 RX ADMIN — BUDESONIDE AND FORMOTEROL FUMARATE DIHYDRATE 2 PUFF: 160; 4.5 AEROSOL RESPIRATORY (INHALATION) at 19:31

## 2019-10-31 RX ADMIN — IPRATROPIUM BROMIDE AND ALBUTEROL SULFATE 3 ML: 2.5; .5 SOLUTION RESPIRATORY (INHALATION) at 19:31

## 2019-10-31 RX ADMIN — KETOROLAC TROMETHAMINE 15 MG: 30 INJECTION, SOLUTION INTRAMUSCULAR at 13:09

## 2019-10-31 RX ADMIN — IPRATROPIUM BROMIDE AND ALBUTEROL SULFATE 3 ML: 2.5; .5 SOLUTION RESPIRATORY (INHALATION) at 11:25

## 2019-10-31 RX ADMIN — KETOROLAC TROMETHAMINE 15 MG: 30 INJECTION, SOLUTION INTRAMUSCULAR at 07:00

## 2019-10-31 RX ADMIN — SENNOSIDES AND DOCUSATE SODIUM 2 TABLET: 8.6; 5 TABLET ORAL at 10:24

## 2019-10-31 RX ADMIN — KETOROLAC TROMETHAMINE 15 MG: 30 INJECTION, SOLUTION INTRAMUSCULAR at 20:09

## 2019-10-31 RX ADMIN — BUDESONIDE AND FORMOTEROL FUMARATE DIHYDRATE 2 PUFF: 160; 4.5 AEROSOL RESPIRATORY (INHALATION) at 07:15

## 2019-10-31 RX ADMIN — KETOROLAC TROMETHAMINE 15 MG: 30 INJECTION, SOLUTION INTRAMUSCULAR at 00:28

## 2019-10-31 RX ADMIN — IPRATROPIUM BROMIDE AND ALBUTEROL SULFATE 3 ML: 2.5; .5 SOLUTION RESPIRATORY (INHALATION) at 22:52

## 2019-10-31 RX ADMIN — ENOXAPARIN SODIUM 30 MG: 30 INJECTION SUBCUTANEOUS at 10:23

## 2019-10-31 RX ADMIN — IPRATROPIUM BROMIDE AND ALBUTEROL SULFATE 3 ML: 2.5; .5 SOLUTION RESPIRATORY (INHALATION) at 15:58

## 2019-11-01 ENCOUNTER — APPOINTMENT (OUTPATIENT)
Dept: GENERAL RADIOLOGY | Facility: HOSPITAL | Age: 54
End: 2019-11-01

## 2019-11-01 LAB
BACTERIA SPEC RESP CULT: ABNORMAL
BACTERIA SPEC RESP CULT: ABNORMAL
DEPRECATED RDW RBC AUTO: 43.9 FL (ref 37–54)
ERYTHROCYTE [DISTWIDTH] IN BLOOD BY AUTOMATED COUNT: 13.1 % (ref 12.3–15.4)
GRAM STN SPEC: ABNORMAL
HCT VFR BLD AUTO: 46.1 % (ref 37.5–51)
HGB BLD-MCNC: 15 G/DL (ref 13–17.7)
MCH RBC QN AUTO: 29.6 PG (ref 26.6–33)
MCHC RBC AUTO-ENTMCNC: 32.5 G/DL (ref 31.5–35.7)
MCV RBC AUTO: 90.9 FL (ref 79–97)
PLATELET # BLD AUTO: 314 10*3/MM3 (ref 140–450)
PMV BLD AUTO: 9.2 FL (ref 6–12)
RBC # BLD AUTO: 5.07 10*6/MM3 (ref 4.14–5.8)
WBC NRBC COR # BLD: 8.36 10*3/MM3 (ref 3.4–10.8)

## 2019-11-01 PROCEDURE — 71045 X-RAY EXAM CHEST 1 VIEW: CPT

## 2019-11-01 PROCEDURE — 25010000002 KETOROLAC TROMETHAMINE PER 15 MG: Performed by: INTERNAL MEDICINE

## 2019-11-01 PROCEDURE — 94799 UNLISTED PULMONARY SVC/PX: CPT

## 2019-11-01 PROCEDURE — 25010000002 ENOXAPARIN PER 10 MG: Performed by: INTERNAL MEDICINE

## 2019-11-01 PROCEDURE — 99232 SBSQ HOSP IP/OBS MODERATE 35: CPT | Performed by: INTERNAL MEDICINE

## 2019-11-01 PROCEDURE — 85027 COMPLETE CBC AUTOMATED: CPT

## 2019-11-01 RX ADMIN — KETOROLAC TROMETHAMINE 15 MG: 30 INJECTION, SOLUTION INTRAMUSCULAR at 09:18

## 2019-11-01 RX ADMIN — ENOXAPARIN SODIUM 30 MG: 30 INJECTION SUBCUTANEOUS at 09:10

## 2019-11-01 RX ADMIN — SENNOSIDES AND DOCUSATE SODIUM 2 TABLET: 8.6; 5 TABLET ORAL at 09:10

## 2019-11-01 RX ADMIN — ACETAMINOPHEN 650 MG: 325 TABLET ORAL at 04:10

## 2019-11-01 RX ADMIN — SENNOSIDES AND DOCUSATE SODIUM 2 TABLET: 8.6; 5 TABLET ORAL at 21:17

## 2019-11-01 RX ADMIN — BUDESONIDE AND FORMOTEROL FUMARATE DIHYDRATE 2 PUFF: 160; 4.5 AEROSOL RESPIRATORY (INHALATION) at 08:02

## 2019-11-01 RX ADMIN — IPRATROPIUM BROMIDE AND ALBUTEROL SULFATE 3 ML: 2.5; .5 SOLUTION RESPIRATORY (INHALATION) at 12:19

## 2019-11-01 RX ADMIN — IPRATROPIUM BROMIDE AND ALBUTEROL SULFATE 3 ML: 2.5; .5 SOLUTION RESPIRATORY (INHALATION) at 19:14

## 2019-11-01 RX ADMIN — ACETAMINOPHEN 650 MG: 325 TABLET ORAL at 12:18

## 2019-11-01 RX ADMIN — ACETAMINOPHEN 650 MG: 325 TABLET ORAL at 19:22

## 2019-11-01 RX ADMIN — KETOROLAC TROMETHAMINE 15 MG: 30 INJECTION, SOLUTION INTRAMUSCULAR at 02:31

## 2019-11-01 RX ADMIN — KETOROLAC TROMETHAMINE 15 MG: 30 INJECTION, SOLUTION INTRAMUSCULAR at 23:30

## 2019-11-01 RX ADMIN — IPRATROPIUM BROMIDE AND ALBUTEROL SULFATE 3 ML: 2.5; .5 SOLUTION RESPIRATORY (INHALATION) at 08:02

## 2019-11-01 RX ADMIN — BUDESONIDE AND FORMOTEROL FUMARATE DIHYDRATE 2 PUFF: 160; 4.5 AEROSOL RESPIRATORY (INHALATION) at 19:14

## 2019-11-01 RX ADMIN — IPRATROPIUM BROMIDE AND ALBUTEROL SULFATE 3 ML: 2.5; .5 SOLUTION RESPIRATORY (INHALATION) at 23:32

## 2019-11-01 RX ADMIN — KETOROLAC TROMETHAMINE 15 MG: 30 INJECTION, SOLUTION INTRAMUSCULAR at 16:55

## 2019-11-01 RX ADMIN — IPRATROPIUM BROMIDE AND ALBUTEROL SULFATE 3 ML: 2.5; .5 SOLUTION RESPIRATORY (INHALATION) at 16:33

## 2019-11-01 NOTE — PROGRESS NOTES
Continued Stay Note  [facility]     Patient Name: Balwinder Willams  MRN: [id number]  Today's Date: 11/1/2019    Admit Date: 10/24/2019    Discharge Plan     Row Name 11/01/19 1512       Plan    Plan  Home    Plan Comments  Pt continues with chest tube to suction. He currently has oxygen at 3L and wears at HS with [facility]. Pt plans to return home. Not medically ready at this time. CM will cont to follow for d/c needs. Casie Su RN,  ext [phone]        Discharge Codes    No documentation.       Expected Discharge Date and Time     Expected Discharge Date Expected Discharge Time    Nov 4, 2019             Casie Su RN

## 2019-11-01 NOTE — PLAN OF CARE
Problem: Patient Care Overview  Goal: Plan of Care Review  Outcome: Ongoing (interventions implemented as appropriate)   11/01/19 0612   Coping/Psychosocial   Plan of Care Reviewed With patient   Plan of Care Review   Progress no change   OTHER   Outcome Summary VSS. Patient on 3L NC. Patient complained of muscle pain in his chest and pain throughout the night. Treated with toradol and tylenol which seemed to help. Minimal output from chest tube. Otherwise no complaints. will continue to monitor.

## 2019-11-01 NOTE — PROGRESS NOTES
" PULMONARY NOTE     Hospital Day: 7    Mr. Balwinder Willams, 53 y.o. male is followed for:   Post procedure pneumothorax and emphysema        SUBJECTIVE     53-year-old male with a past medical history of smoking and alpha-1 antitrypsin deficiency with resulting very severe emphysema who underwent elective endobronchial valve placement on 10/24.  He did well the initial 24 hours but then developed a spontaneous pneumothorax requiring chest tube placement on the afternoon of 10/25/2019.  He had continued air leak and had an additional endobronchial valve placed in the lingula in an attempt to decrease the air leak on 10/30.    Interval history:    Decreased suction to 20 cm H2O yesterday.  Chest x-ray reveals a bit more pneumothorax.  Air leak may be more intermittent.  The patient says that at times it completely stops for period of time.    The patient's relevant past medical, surgical and social history were reviewed and updated in Epic as appropriate.        OBJECTIVE     Vital Sign Min/Max for last 24 hours  Temp  Min: 97.6 °F (36.4 °C)  Max: 98.4 °F (36.9 °C)   BP  Min: 106/74  Max: 119/73   Pulse  Min: 76  Max: 106   Resp  Min: 16  Max: 20   SpO2  Min: 88 %  Max: 96 %   No Data Recorded   Weight  Min: 61.3 kg (135 lb 1.6 oz)  Max: 61.3 kg (135 lb 1.6 oz)      Intake/Output Summary (Last 24 hours) at 11/1/2019 1027  Last data filed at 11/1/2019 0900  Gross per 24 hour   Intake 840 ml   Output 670 ml   Net 170 ml      Flowsheet Rows      First Filed Value   Admission Height  172.7 cm (68\") Documented at 10/24/2019 0658   Admission Weight  62.6 kg (138 lb) Documented at 10/24/2019 0658        Body mass index is 20.54 kg/m².     10/30/19  1423 10/31/19  0549 11/01/19  0601   Weight: 63.5 kg (140 lb) 60.5 kg (133 lb 4.8 oz) 61.3 kg (135 lb 1.6 oz)             Objective:  General Appearance:  In no acute distress.    Vital signs: (most recent): Blood pressure 119/73, pulse 92, temperature 98 °F (36.7 °C), " "temperature source Oral, resp. rate 16, height 172.7 cm (68\"), weight 61.3 kg (135 lb 1.6 oz), SpO2 90 %.    HEENT: Normal HEENT exam.    Lungs:  Normal effort and normal respiratory rate.  He is not in respiratory distress.  There are decreased breath sounds.  No rales, wheezes or rhonchi.    Heart: Normal rate.  Regular rhythm.  S1 normal and S2 normal.  No murmur, gallop or friction rub.   Chest: Symmetric chest wall expansion. (Left chest tube in place)  Abdomen: Abdomen is soft and non-distended.  Bowel sounds are normal.   There is no abdominal tenderness.   There is no mass. There is no splenomegaly. There is no hepatomegaly.   Extremities: There is no deformity or dependent edema.    Neurological: Patient is alert and oriented to person, place and time.    Pupils:  Pupils are equal, round, and reactive to light.    Skin:  Warm and dry.                      I reviewed the patient's results and images, including reviewing images and reports.    Medications reviewed.      Scheduled Meds:    budesonide-formoterol 2 puff Inhalation BID   enoxaparin 30 mg Subcutaneous Q24H   ipratropium-albuterol 3 mL Nebulization Q4H - RT   O2 3 L/min Inhalation Nightly   sennosides-docusate 2 tablet Oral BID         Assessment/Plan   ASSESSMENT      Active Hospital Problems    Diagnosis   • **Stage IV, very severe, emphysema   • Postprocedural pneumothorax   • Persistent air leak   • Former smoker (Resolved 2006)   • Alpha-1-antitrypsin deficiency (on replacement)     A.  Labs of 11/20/2014 reports an alpha 1 antitrypsin deficiency of 4.7 with a phenotype      with Z bands detected and genotyping revealing the presence of most common      deficiency allele type Z and an inferred non-expressing null allele.     • Chronic cough   • Dependence on supplemental oxygen         53-year-old male status post endobronchial valve placement on 10/24 with development of a pneumothorax on 10/25.      He underwent additional endobronchial " valve placement on 10/30 in an attempt to decrease the air leak.  This was placed in the lingula.    Ongoing  air leak this morning.  I decreased the suction pressure to 20 cm H2O yesterday morning.  The pneumothorax is a bit more prominent but the air leak I believe is more intermittent.  I would prefer less suction even if the pneumothorax is slightly larger.  He is having no problems with dyspnea.        RECOMMENDATIONS     1. Continue suction at 20 cm H2O.  2. I rechecked all of the connections and re-taped it.  Everything is secure.  The air leak is clearly intrathoracic.  3. Continue Symbicort and DuoNeb's  4. Supplemental oxygen  5. DVT prophylaxis  6. Pain control: Tylenol or NSAIDs.  He prefers to not take narcotics.  7. Daily chest x-ray         I discussed the patient's findings and my recommendations with patient and nursing staff     High level of risk due to:  illness with threat to life or bodily function.    Prosper Del Rosario MD  Pulmonary and Critical Care Medicine

## 2019-11-01 NOTE — PLAN OF CARE
Problem: Patient Care Overview  Goal: Plan of Care Review  Outcome: Ongoing (interventions implemented as appropriate)   11/01/19 4151   Coping/Psychosocial   Plan of Care Reviewed With patient   Plan of Care Review   Progress no change   OTHER   Outcome Summary VSS. 3L NC. Pt complained of non-cardiac pain in the chest relieved with Toradol. Pt with fluctuation in chest tube occasionally. 50ml output from chest tube. Continue to monitor.        Problem: Chest Tube Drainage Device (Adult)  Goal: Signs and Symptoms of Listed Potential Problems Will be Absent, Minimized or Managed (Chest Tube Drainage Device)  Outcome: Ongoing (interventions implemented as appropriate)   11/01/19 0550   Goal/Outcome Evaluation   Problems Assessed (Chest Tube Drainage Device) all   Problems Present (Chest Tube Drain Dev) persistent air leak/drainage

## 2019-11-02 ENCOUNTER — APPOINTMENT (OUTPATIENT)
Dept: GENERAL RADIOLOGY | Facility: HOSPITAL | Age: 54
End: 2019-11-02

## 2019-11-02 PROCEDURE — 94799 UNLISTED PULMONARY SVC/PX: CPT

## 2019-11-02 PROCEDURE — 25010000002 ENOXAPARIN PER 10 MG: Performed by: INTERNAL MEDICINE

## 2019-11-02 PROCEDURE — 71045 X-RAY EXAM CHEST 1 VIEW: CPT

## 2019-11-02 PROCEDURE — 25010000002 KETOROLAC TROMETHAMINE PER 15 MG: Performed by: INTERNAL MEDICINE

## 2019-11-02 PROCEDURE — 99232 SBSQ HOSP IP/OBS MODERATE 35: CPT | Performed by: INTERNAL MEDICINE

## 2019-11-02 RX ADMIN — IPRATROPIUM BROMIDE AND ALBUTEROL SULFATE 3 ML: 2.5; .5 SOLUTION RESPIRATORY (INHALATION) at 06:53

## 2019-11-02 RX ADMIN — IPRATROPIUM BROMIDE AND ALBUTEROL SULFATE 3 ML: 2.5; .5 SOLUTION RESPIRATORY (INHALATION) at 15:14

## 2019-11-02 RX ADMIN — ACETAMINOPHEN 650 MG: 325 TABLET ORAL at 08:38

## 2019-11-02 RX ADMIN — KETOROLAC TROMETHAMINE 15 MG: 30 INJECTION, SOLUTION INTRAMUSCULAR at 14:34

## 2019-11-02 RX ADMIN — IPRATROPIUM BROMIDE AND ALBUTEROL SULFATE 3 ML: 2.5; .5 SOLUTION RESPIRATORY (INHALATION) at 11:13

## 2019-11-02 RX ADMIN — BUDESONIDE AND FORMOTEROL FUMARATE DIHYDRATE 2 PUFF: 160; 4.5 AEROSOL RESPIRATORY (INHALATION) at 19:41

## 2019-11-02 RX ADMIN — KETOROLAC TROMETHAMINE 15 MG: 30 INJECTION, SOLUTION INTRAMUSCULAR at 20:55

## 2019-11-02 RX ADMIN — ACETAMINOPHEN 650 MG: 325 TABLET ORAL at 18:14

## 2019-11-02 RX ADMIN — SENNOSIDES AND DOCUSATE SODIUM 2 TABLET: 8.6; 5 TABLET ORAL at 08:32

## 2019-11-02 RX ADMIN — IPRATROPIUM BROMIDE AND ALBUTEROL SULFATE 3 ML: 2.5; .5 SOLUTION RESPIRATORY (INHALATION) at 03:06

## 2019-11-02 RX ADMIN — BUDESONIDE AND FORMOTEROL FUMARATE DIHYDRATE 2 PUFF: 160; 4.5 AEROSOL RESPIRATORY (INHALATION) at 06:54

## 2019-11-02 RX ADMIN — KETOROLAC TROMETHAMINE 15 MG: 30 INJECTION, SOLUTION INTRAMUSCULAR at 06:12

## 2019-11-02 RX ADMIN — ENOXAPARIN SODIUM 30 MG: 30 INJECTION SUBCUTANEOUS at 08:32

## 2019-11-02 RX ADMIN — IPRATROPIUM BROMIDE AND ALBUTEROL SULFATE 3 ML: 2.5; .5 SOLUTION RESPIRATORY (INHALATION) at 19:41

## 2019-11-02 RX ADMIN — IPRATROPIUM BROMIDE AND ALBUTEROL SULFATE 3 ML: 2.5; .5 SOLUTION RESPIRATORY (INHALATION) at 23:11

## 2019-11-02 RX ADMIN — SENNOSIDES AND DOCUSATE SODIUM 2 TABLET: 8.6; 5 TABLET ORAL at 20:55

## 2019-11-02 NOTE — PLAN OF CARE
Problem: Patient Care Overview  Goal: Plan of Care Review  Outcome: Ongoing (interventions implemented as appropriate)   11/02/19 0339   Coping/Psychosocial   Plan of Care Reviewed With patient   Plan of Care Review   Progress no change   OTHER   Outcome Summary VSS. NSR on the monitor. 3L NC. Pt has had some incisional pain at the chest tube site and received tylenol and toradol PRN. Chest tube still to wall suction at -20. Will continue to monitor.

## 2019-11-02 NOTE — PROGRESS NOTES
Pulmonary Consult Follow-up     Hospital:  LOS: 8 days   Mr. Balwinder Willams, 53 y.o. male is followed for:   COPD mixed type (CMS/HCC)            History of present illness:   53-year-old male with a past medical history of smoking and alpha-1 antitrypsin deficiency with resulting very severe emphysema who underwent elective endobronchial valve placement on 10/24.  He did well the initial 24 hours but then developed a spontaneous pneumothorax requiring chest tube placement on the afternoon of 10/25/2019.  He had continued air leak and had an additional endobronchial valve placed in the lingula in an attempt to decrease the air leak on 10/30.      Subjective   Interval History:  No complaints this morning, continues to have an air leak with coughing. Denies any chest pain, SOA, N/V, fever or chills. Overall feels his breathing is improved.            Review of Systems   Constitutional: Negative for chills and fever.   Respiratory: Negative for cough, chest tightness and wheezing.    Cardiovascular: Negative for chest pain and leg swelling.   Gastrointestinal: Negative for abdominal pain and vomiting.       The patient's past medical, surgical and social history were reviewed and updated in Epic as appropriate.       Objective     Infusions:     Medications:    budesonide-formoterol 2 puff Inhalation BID   enoxaparin 30 mg Subcutaneous Q24H   ipratropium-albuterol 3 mL Nebulization Q4H - RT   O2 3 L/min Inhalation Nightly   sennosides-docusate 2 tablet Oral BID     I reviewed the patient's medications.    Vital Sign Min/Max for last 24 hours  Temp  Min: 97 °F (36.1 °C)  Max: 98.6 °F (37 °C)   BP  Min: 107/72  Max: 117/77   Pulse  Min: 70  Max: 89   Resp  Min: 16  Max: 20   SpO2  Min: 90 %  Max: 96 %   Flow (L/min)  Min: 3  Max: 4       Input/Output for last 24 hour shift  11/01 0701 - 11/02 0700  In: 1080 [P.O.:1080]  Out: 725 [Urine:600]   Physical Exam   Constitutional: He is oriented to person, place, and time.  He appears well-developed and well-nourished.   HENT:   Head: Normocephalic and atraumatic.   Mouth/Throat: Oropharynx is clear and moist.   Eyes: Conjunctivae and EOM are normal.   Neck: Normal range of motion. Neck supple.   Cardiovascular: Normal rate, regular rhythm and normal heart sounds.   Pulmonary/Chest: Effort normal and breath sounds normal.   Abdominal: Soft. Bowel sounds are normal.   Musculoskeletal: Normal range of motion.   Neurological: He is alert and oriented to person, place, and time.   Skin: Skin is warm and dry. Capillary refill takes less than 2 seconds.   Psychiatric: He has a normal mood and affect.   Nursing note and vitals reviewed.      Results from last 7 days   Lab Units 11/01/19  0929   WBC 10*3/mm3 8.36   HEMOGLOBIN g/dL 15.0   PLATELETS 10*3/mm3 314           Invalid input(s): CHLORIDE  Estimated Creatinine Clearance: 79.1 mL/min (by C-G formula based on SCr of 0.94 mg/dL).          I reviewed the patient's new clinical results.  I reviewed the patient's new imaging results/reports including actual images and agree with reports.              Imaging Results (Last 24 Hours)     Procedure Component Value Units Date/Time    XR Chest 1 View [[id number]] Collected:  11/02/19 0826     Updated:  11/02/19 0827    Narrative:          EXAMINATION: XR CHEST 1 VW-      INDICATION: follow up pneumothorax; J93.9-Pneumothorax, unspecified;  J43.9-Emphysema, unspecified; R05-Cough      COMPARISON: 11/01/2019     FINDINGS: Left pigtail catheter remains in place. There is only trace  residual left pneumothorax. Lungs are hyperexpanded and grossly clear.  Heart is normal in size.           Impression:       Minimal remaining left pneumothorax.          XR Chest 1 View [[id number]] Collected:  10/29/19 0824     Updated:  11/01/19 1716    Narrative:       EXAMINATION: XR CHEST 1 VW- 10/29/2019     INDICATION: Respiratory Distress; J93.9-Pneumothorax, unspecified;  J43.9-Emphysema, unspecified       COMPARISON: 10/28/2018     FINDINGS: Left chest tube remains in place with an increasing small  volume left apical predominant pneumothorax. Midlung scarring and  atelectasis again noted. Probable trace bilateral effusions.       Impression:       Interval increase in now small volume left apical  pneumothorax.     D:  10/29/2019  E:  10/29/2019     This report was finalized on 11/1/2019 5:13 PM by Dr. William Rae.             Assessment/Plan   Impression        Stage IV, very severe, emphysema    Alpha-1-antitrypsin deficiency (on replacement)    Chronic cough    Dependence on supplemental oxygen    Former smoker (Resolved 2006)    Postprocedural pneumothorax    Persistent air leak       Plan        1. Continue suction at 20 cm H2O.  2. Continue Symbicort and DuoNeb's  3. Supplemental oxygen  4. DVT prophylaxis  5. Pain control: Tylenol or NSAIDs.  He prefers to not take narcotics.  6. Daily chest x-ray    Sanju Durham,   Pulmonary, Critical care and Sleep Medicine

## 2019-11-03 ENCOUNTER — APPOINTMENT (OUTPATIENT)
Dept: GENERAL RADIOLOGY | Facility: HOSPITAL | Age: 54
End: 2019-11-03

## 2019-11-03 PROCEDURE — 71045 X-RAY EXAM CHEST 1 VIEW: CPT

## 2019-11-03 PROCEDURE — 99232 SBSQ HOSP IP/OBS MODERATE 35: CPT | Performed by: INTERNAL MEDICINE

## 2019-11-03 PROCEDURE — 94799 UNLISTED PULMONARY SVC/PX: CPT

## 2019-11-03 PROCEDURE — 25010000002 KETOROLAC TROMETHAMINE PER 15 MG: Performed by: INTERNAL MEDICINE

## 2019-11-03 PROCEDURE — 25010000002 ENOXAPARIN PER 10 MG: Performed by: INTERNAL MEDICINE

## 2019-11-03 RX ADMIN — IPRATROPIUM BROMIDE AND ALBUTEROL SULFATE 3 ML: 2.5; .5 SOLUTION RESPIRATORY (INHALATION) at 07:03

## 2019-11-03 RX ADMIN — BUDESONIDE AND FORMOTEROL FUMARATE DIHYDRATE 2 PUFF: 160; 4.5 AEROSOL RESPIRATORY (INHALATION) at 19:12

## 2019-11-03 RX ADMIN — BUDESONIDE AND FORMOTEROL FUMARATE DIHYDRATE 2 PUFF: 160; 4.5 AEROSOL RESPIRATORY (INHALATION) at 07:09

## 2019-11-03 RX ADMIN — ACETAMINOPHEN 650 MG: 325 TABLET ORAL at 20:03

## 2019-11-03 RX ADMIN — IPRATROPIUM BROMIDE AND ALBUTEROL SULFATE 3 ML: 2.5; .5 SOLUTION RESPIRATORY (INHALATION) at 12:39

## 2019-11-03 RX ADMIN — KETOROLAC TROMETHAMINE 15 MG: 30 INJECTION, SOLUTION INTRAMUSCULAR at 16:42

## 2019-11-03 RX ADMIN — IPRATROPIUM BROMIDE AND ALBUTEROL SULFATE 3 ML: 2.5; .5 SOLUTION RESPIRATORY (INHALATION) at 23:00

## 2019-11-03 RX ADMIN — KETOROLAC TROMETHAMINE 15 MG: 30 INJECTION, SOLUTION INTRAMUSCULAR at 02:50

## 2019-11-03 RX ADMIN — ENOXAPARIN SODIUM 30 MG: 30 INJECTION SUBCUTANEOUS at 08:58

## 2019-11-03 RX ADMIN — ACETAMINOPHEN 650 MG: 325 TABLET ORAL at 13:56

## 2019-11-03 RX ADMIN — KETOROLAC TROMETHAMINE 15 MG: 30 INJECTION, SOLUTION INTRAMUSCULAR at 08:58

## 2019-11-03 RX ADMIN — IPRATROPIUM BROMIDE AND ALBUTEROL SULFATE 3 ML: 2.5; .5 SOLUTION RESPIRATORY (INHALATION) at 15:43

## 2019-11-03 RX ADMIN — IPRATROPIUM BROMIDE AND ALBUTEROL SULFATE 3 ML: 2.5; .5 SOLUTION RESPIRATORY (INHALATION) at 19:10

## 2019-11-03 RX ADMIN — KETOROLAC TROMETHAMINE 15 MG: 30 INJECTION, SOLUTION INTRAMUSCULAR at 22:33

## 2019-11-03 NOTE — PROGRESS NOTES
Pulmonary Consult Follow-up     Hospital:  LOS: 9 days   Mr. Balwinder Willams, 53 y.o. male is followed for:   COPD mixed type (CMS/HCC)        History of present illness:   53-year-old male with a past medical history of smoking and alpha-1 antitrypsin deficiency with resulting very severe emphysema who underwent elective endobronchial valve placement on 10/24.  He did well the initial 24 hours but then developed a spontaneous pneumothorax requiring chest tube placement on the afternoon of 10/25/2019.  He had continued air leak and had an additional endobronchial valve placed in the lingula in an attempt to decrease the air leak on 10/30.      Subjective   Interval History:  Doing well, air leak only with forced expiration. Pt continues to be mobiles. Afebrile, normotensive.            Review of Systems   Constitutional: Negative for chills and fever.   Respiratory: Negative for cough, chest tightness and wheezing.    Cardiovascular: Negative for chest pain and leg swelling.   Gastrointestinal: Negative for abdominal pain and vomiting.       The patient's past medical, surgical and social history were reviewed and updated in Epic as appropriate.       Objective     Infusions:     Medications:    budesonide-formoterol 2 puff Inhalation BID   enoxaparin 30 mg Subcutaneous Q24H   ipratropium-albuterol 3 mL Nebulization Q4H - RT   O2 3 L/min Inhalation Nightly   sennosides-docusate 2 tablet Oral BID     I reviewed the patient's medications.    Vital Sign Min/Max for last 24 hours  Temp  Min: 97.8 °F (36.6 °C)  Max: 98.4 °F (36.9 °C)   BP  Min: 99/66  Max: 120/76   Pulse  Min: 71  Max: 98   Resp  Min: 16  Max: 18   SpO2  Min: 92 %  Max: 96 %   Flow (L/min)  Min: 3  Max: 3       Input/Output for last 24 hour shift  11/02 0701 - 11/03 0700  In: 1080 [P.O.:1080]  Out: 1000 [Urine:900]   Physical Exam   Constitutional: He is oriented to person, place, and time. He appears well-developed and well-nourished.   HENT:   Head:  Normocephalic and atraumatic.   Mouth/Throat: Oropharynx is clear and moist.   Eyes: Conjunctivae and EOM are normal.   Neck: Normal range of motion. Neck supple.   Cardiovascular: Normal rate, regular rhythm and normal heart sounds.   Pulmonary/Chest: Effort normal and breath sounds normal.   Abdominal: Soft. Bowel sounds are normal.   Musculoskeletal: Normal range of motion.   Neurological: He is alert and oriented to person, place, and time.   Skin: Skin is warm and dry. Capillary refill takes less than 2 seconds.   Psychiatric: He has a normal mood and affect.   Nursing note and vitals reviewed.      Results from last 7 days   Lab Units 11/01/19  0929   WBC 10*3/mm3 8.36   HEMOGLOBIN g/dL 15.0   PLATELETS 10*3/mm3 314           Invalid input(s): CHLORIDE  Estimated Creatinine Clearance: 79.1 mL/min (by C-G formula based on SCr of 0.94 mg/dL).          I reviewed the patient's new clinical results.  I reviewed the patient's new imaging results/reports including actual images and agree with reports.              Imaging Results (Last 24 Hours)     Procedure Component Value Units Date/Time    XR Chest 1 View [[id number]] Collected:  11/03/19 0839     Updated:  11/03/19 0841    Narrative:          EXAMINATION: XR CHEST 1 VW-      INDICATION: follow up pneumothorax; J93.9-Pneumothorax, unspecified;  J43.9-Emphysema, unspecified; R05-Cough      COMPARISON: 11/02/2019     FINDINGS: Left thoracotomy tube remains in place. There is a very subtle  left apical pneumothorax, roughly 11 mm in width. There is a faint  suggestion of left-sided mediastinal air, although this may represent  volume averaging with medial pneumothorax instead. There is a small area  of discoid atelectasis in the medial right base. Lungs appear grossly  clear elsewhere. Heart and vasculature appear normal in size.       Impression:       1. Small left apical pneumothorax.  2. Medial left apical pneumothorax versus small left pneumomediastinum.  3  mild right basilar discoid atelectasis.          XR Chest 1 View [[id number]] Collected:  11/02/19 0826     Updated:  11/02/19 2323    Narrative:          EXAMINATION: XR CHEST 1 VW - 11/02/2019     INDICATION:  J93.9-Pneumothorax, unspecified; J43.9-Emphysema,  unspecified; R05-Cough. Follow-up pneumothorax.      COMPARISON: 11/01/2019     FINDINGS: Left pigtail catheter remains in place. There is only trace  residual left pneumothorax. Lungs are hyperexpanded and grossly clear.  Heart is normal in size.           Impression:       Minimal remaining left pneumothorax.     DICTATED:   11/02/2019  EDITED/ls :   11/02/2019      This report was finalized on 11/2/2019 11:20 PM by DR. Manoj Ring MD.             Assessment/Plan   Impression        Stage IV, very severe, emphysema    Alpha-1-antitrypsin deficiency (on replacement)    Chronic cough    Dependence on supplemental oxygen    Former smoker (Resolved 2006)    Postprocedural pneumothorax    Persistent air leak       Plan        1. Continue suction at 20 cm H2O.  2. Continue Symbicort and DuoNeb's  3. Supplemental oxygen  4. DVT prophylaxis  5. Pain control: Tylenol or NSAIDs.  He prefers to not take narcotics.  6. Daily chest x-ray    Thank You for the Consult    Sanju Durham DO  Pulmonary, Critical care and Sleep Medicine

## 2019-11-04 ENCOUNTER — APPOINTMENT (OUTPATIENT)
Dept: GENERAL RADIOLOGY | Facility: HOSPITAL | Age: 54
End: 2019-11-04

## 2019-11-04 DIAGNOSIS — J43.2 CENTRILOBULAR EMPHYSEMA (HCC): ICD-10-CM

## 2019-11-04 LAB
GIE STN SPEC: NORMAL
GIE STN SPEC: NORMAL

## 2019-11-04 PROCEDURE — 94799 UNLISTED PULMONARY SVC/PX: CPT

## 2019-11-04 PROCEDURE — 25010000002 KETOROLAC TROMETHAMINE PER 15 MG: Performed by: INTERNAL MEDICINE

## 2019-11-04 PROCEDURE — 99232 SBSQ HOSP IP/OBS MODERATE 35: CPT | Performed by: INTERNAL MEDICINE

## 2019-11-04 PROCEDURE — 71045 X-RAY EXAM CHEST 1 VIEW: CPT

## 2019-11-04 PROCEDURE — 25010000002 ENOXAPARIN PER 10 MG: Performed by: INTERNAL MEDICINE

## 2019-11-04 RX ORDER — KETOROLAC TROMETHAMINE 30 MG/ML
15 INJECTION, SOLUTION INTRAMUSCULAR; INTRAVENOUS EVERY 6 HOURS PRN
Status: DISCONTINUED | OUTPATIENT
Start: 2019-11-04 | End: 2019-11-06 | Stop reason: HOSPADM

## 2019-11-04 RX ORDER — BUDESONIDE AND FORMOTEROL FUMARATE DIHYDRATE 160; 4.5 UG/1; UG/1
2 AEROSOL RESPIRATORY (INHALATION) 2 TIMES DAILY
Qty: 40.8 G | Refills: 0 | Status: SHIPPED | OUTPATIENT
Start: 2019-11-04 | End: 2019-11-15 | Stop reason: SDUPTHER

## 2019-11-04 RX ADMIN — KETOROLAC TROMETHAMINE 15 MG: 30 INJECTION, SOLUTION INTRAMUSCULAR at 10:56

## 2019-11-04 RX ADMIN — KETOROLAC TROMETHAMINE 15 MG: 30 INJECTION, SOLUTION INTRAMUSCULAR; INTRAVENOUS at 17:24

## 2019-11-04 RX ADMIN — ACETAMINOPHEN 650 MG: 325 TABLET ORAL at 14:33

## 2019-11-04 RX ADMIN — IPRATROPIUM BROMIDE AND ALBUTEROL SULFATE 3 ML: 2.5; .5 SOLUTION RESPIRATORY (INHALATION) at 16:01

## 2019-11-04 RX ADMIN — BUDESONIDE AND FORMOTEROL FUMARATE DIHYDRATE 2 PUFF: 160; 4.5 AEROSOL RESPIRATORY (INHALATION) at 07:34

## 2019-11-04 RX ADMIN — ENOXAPARIN SODIUM 30 MG: 30 INJECTION SUBCUTANEOUS at 09:20

## 2019-11-04 RX ADMIN — KETOROLAC TROMETHAMINE 15 MG: 30 INJECTION, SOLUTION INTRAMUSCULAR; INTRAVENOUS at 23:46

## 2019-11-04 RX ADMIN — SENNOSIDES AND DOCUSATE SODIUM 2 TABLET: 8.6; 5 TABLET ORAL at 21:16

## 2019-11-04 RX ADMIN — KETOROLAC TROMETHAMINE 15 MG: 30 INJECTION, SOLUTION INTRAMUSCULAR at 05:11

## 2019-11-04 RX ADMIN — IPRATROPIUM BROMIDE AND ALBUTEROL SULFATE 3 ML: 2.5; .5 SOLUTION RESPIRATORY (INHALATION) at 07:34

## 2019-11-04 RX ADMIN — ACETAMINOPHEN 650 MG: 325 TABLET ORAL at 21:16

## 2019-11-04 RX ADMIN — IPRATROPIUM BROMIDE AND ALBUTEROL SULFATE 3 ML: 2.5; .5 SOLUTION RESPIRATORY (INHALATION) at 12:30

## 2019-11-04 RX ADMIN — ACETAMINOPHEN 650 MG: 325 TABLET ORAL at 05:15

## 2019-11-04 RX ADMIN — BUDESONIDE AND FORMOTEROL FUMARATE DIHYDRATE 2 PUFF: 160; 4.5 AEROSOL RESPIRATORY (INHALATION) at 18:47

## 2019-11-04 RX ADMIN — IPRATROPIUM BROMIDE AND ALBUTEROL SULFATE 3 ML: 2.5; .5 SOLUTION RESPIRATORY (INHALATION) at 22:54

## 2019-11-04 RX ADMIN — IPRATROPIUM BROMIDE AND ALBUTEROL SULFATE 3 ML: 2.5; .5 SOLUTION RESPIRATORY (INHALATION) at 18:47

## 2019-11-04 NOTE — TELEPHONE ENCOUNTER
Fax refill request approved for RX Symbicort. Faxed per chart to [facility] pharmacy.

## 2019-11-04 NOTE — PLAN OF CARE
Problem: Patient Care Overview  Goal: Plan of Care Review  Outcome: Ongoing (interventions implemented as appropriate)  VSS. NSR on tele. Pt chest tube taken off LWS, now to waterseal. Tylenol and Toradol rotated for pain relief. Continue to monitor.

## 2019-11-04 NOTE — PROGRESS NOTES
Pulmonary Consult Follow-up     Hospital:  LOS: 10 days   Mr. Balwinder Willams, 53 y.o. male is followed for:   COPD mixed type (CMS/HCC)        History of present illness:   53-year-old male with a past medical history of smoking and alpha-1 antitrypsin deficiency with resulting very severe emphysema who underwent elective endobronchial valve placement on 10/24.  He did well the initial 24 hours but then developed a spontaneous pneumothorax requiring chest tube placement on the afternoon of 10/25/2019.  He had continued air leak and had an additional endobronchial valve placed in the lingula in an attempt to decrease the air leak on 10/30. On 11/4 air leak has stopped, placed to water seal      Subjective   Interval History:  He denies any complaints this morning, no chest pain or SOA. He would like to be more mobile. He is doing well otherwise. Afebrile, normotensive.         Review of Systems   Constitutional: Negative for chills and fever.   Respiratory: Negative for cough, chest tightness and wheezing.    Cardiovascular: Negative for chest pain and leg swelling.   Gastrointestinal: Negative for abdominal pain and vomiting.       The patient's past medical, surgical and social history were reviewed and updated in Epic as appropriate.       Objective     Infusions:     Medications:    budesonide-formoterol 2 puff Inhalation BID   enoxaparin 30 mg Subcutaneous Q24H   ipratropium-albuterol 3 mL Nebulization Q4H - RT   O2 3 L/min Inhalation Nightly   sennosides-docusate 2 tablet Oral BID     I reviewed the patient's medications.    Vital Sign Min/Max for last 24 hours  Temp  Min: 97.6 °F (36.4 °C)  Max: 98.7 °F (37.1 °C)   BP  Min: 103/73  Max: 113/78   Pulse  Min: 75  Max: 101   Resp  Min: 18  Max: 20   SpO2  Min: 91 %  Max: 95 %   Flow (L/min)  Min: 3  Max: 3       Input/Output for last 24 hour shift  11/03 0701 - 11/04 0700  In: 1080 [P.O.:1080]  Out: 1355 [Urine:1150]   Physical Exam   Constitutional: He is  oriented to person, place, and time. He appears well-developed and well-nourished.   HENT:   Head: Normocephalic and atraumatic.   Mouth/Throat: Oropharynx is clear and moist.   Eyes: Conjunctivae and EOM are normal.   Neck: Normal range of motion. Neck supple.   Cardiovascular: Normal rate, regular rhythm and normal heart sounds.   Pulmonary/Chest: Effort normal and breath sounds normal. He has no wheezes. He has no rales.   Abdominal: Soft. Bowel sounds are normal.   Musculoskeletal: Normal range of motion.   Neurological: He is alert and oriented to person, place, and time.   Skin: Skin is warm and dry. Capillary refill takes less than 2 seconds.   Psychiatric: He has a normal mood and affect.   Nursing note and vitals reviewed.      Results from last 7 days   Lab Units 11/01/19  0929   WBC 10*3/mm3 8.36   HEMOGLOBIN g/dL 15.0   PLATELETS 10*3/mm3 314           Invalid input(s): CHLORIDE  Estimated Creatinine Clearance: 79.1 mL/min (by C-G formula based on SCr of 0.94 mg/dL).          I reviewed the patient's new clinical results.  I reviewed the patient's new imaging results/reports including actual images and agree with reports.              Imaging Results (Last 24 Hours)     Procedure Component Value Units Date/Time    XR Chest 1 View [[id number]] Collected:  11/04/19 0812     Updated:  11/04/19 1112    Narrative:       EXAMINATION: XR CHEST 1 VW- 11/04/2019     INDICATION: J93.9-Pneumothorax, unspecified; J43.9-Emphysema,  unspecified; R05-Cough      COMPARISON: 11/03/2019     FINDINGS: Portable chest reveals chest tube identified on the left.  There is tiny apical pneumothorax identified improving when compared to  the prior study. Severe emphysematous and chronic changes seen diffusely  throughout the lung fields. Degenerative changes seen within the spine.            Impression:       Tiny apical pneumothorax improving on the left. Left chest  tubes in place. Remainder lung fields are grossly clear.     D:   11/04/2019  E:  11/04/2019          XR Chest 1 View [[id number]] Collected:  11/03/19 0839     Updated:  11/03/19 2329    Narrative:          EXAMINATION: XR CHEST 1 VW - 11/03/2019     INDICATION: J93.9-Pneumothorax, unspecified; J43.9-Emphysema,  unspecified; R05-Cough.      COMPARISON: 11/02/2019     FINDINGS: Left thoracotomy tube remains in place. There is a very subtle  left apical pneumothorax, roughly 11 mm in width. There is a faint  suggestion of left-sided mediastinal air, although this may represent  volume averaging with medial pneumothorax instead. There is a small area  of discoid atelectasis in the medial right base. Lungs appear grossly  clear elsewhere. Heart and vasculature appear normal in size.       Impression:       1. Small left apical pneumothorax.  2. Medial left apical pneumothorax versus mild left pneumomediastinum.   3. Mild right basilar discoid atelectasis.     DICTATED:   11/03/2019  EDITED/ls :   11/03/2019      This report was finalized on 11/3/2019 11:26 PM by DR. Manoj Ring MD.             Assessment/Plan   Impression        Stage IV, very severe, emphysema    Alpha-1-antitrypsin deficiency (on replacement)    Chronic cough    Dependence on supplemental oxygen    Former smoker (Resolved 2006)    Postprocedural pneumothorax    Persistent air leak       Plan        1. Placed to water seal  2. Repeat CXR this afternoon, if no enlarging pneumothorax then will leave on water seal for 24 hours.  3. Continue Symbicort and DuoNeb's  4. Supplemental oxygen  5. DVT prophylaxis  6. Pain control: Tylenol or NSAIDs.  He prefers to not take narcotics.  7. Daily chest x-ray    Thank You for the Consult    Sanju Durham DO  Pulmonary, Critical care and Sleep Medicine

## 2019-11-05 ENCOUNTER — APPOINTMENT (OUTPATIENT)
Dept: GENERAL RADIOLOGY | Facility: HOSPITAL | Age: 54
End: 2019-11-05

## 2019-11-05 PROCEDURE — 71045 X-RAY EXAM CHEST 1 VIEW: CPT

## 2019-11-05 PROCEDURE — 25010000002 KETOROLAC TROMETHAMINE PER 15 MG: Performed by: INTERNAL MEDICINE

## 2019-11-05 PROCEDURE — 99232 SBSQ HOSP IP/OBS MODERATE 35: CPT | Performed by: INTERNAL MEDICINE

## 2019-11-05 PROCEDURE — 94799 UNLISTED PULMONARY SVC/PX: CPT

## 2019-11-05 PROCEDURE — 25010000002 ENOXAPARIN PER 10 MG: Performed by: INTERNAL MEDICINE

## 2019-11-05 RX ADMIN — KETOROLAC TROMETHAMINE 15 MG: 30 INJECTION, SOLUTION INTRAMUSCULAR; INTRAVENOUS at 20:26

## 2019-11-05 RX ADMIN — KETOROLAC TROMETHAMINE 15 MG: 30 INJECTION, SOLUTION INTRAMUSCULAR; INTRAVENOUS at 06:17

## 2019-11-05 RX ADMIN — IPRATROPIUM BROMIDE AND ALBUTEROL SULFATE 3 ML: 2.5; .5 SOLUTION RESPIRATORY (INHALATION) at 23:14

## 2019-11-05 RX ADMIN — BUDESONIDE AND FORMOTEROL FUMARATE DIHYDRATE 2 PUFF: 160; 4.5 AEROSOL RESPIRATORY (INHALATION) at 08:21

## 2019-11-05 RX ADMIN — IPRATROPIUM BROMIDE AND ALBUTEROL SULFATE 3 ML: 2.5; .5 SOLUTION RESPIRATORY (INHALATION) at 18:43

## 2019-11-05 RX ADMIN — IPRATROPIUM BROMIDE AND ALBUTEROL SULFATE 3 ML: 2.5; .5 SOLUTION RESPIRATORY (INHALATION) at 12:47

## 2019-11-05 RX ADMIN — ACETAMINOPHEN 650 MG: 325 TABLET ORAL at 10:55

## 2019-11-05 RX ADMIN — ENOXAPARIN SODIUM 30 MG: 30 INJECTION SUBCUTANEOUS at 10:55

## 2019-11-05 RX ADMIN — IPRATROPIUM BROMIDE AND ALBUTEROL SULFATE 3 ML: 2.5; .5 SOLUTION RESPIRATORY (INHALATION) at 08:13

## 2019-11-05 RX ADMIN — KETOROLAC TROMETHAMINE 15 MG: 30 INJECTION, SOLUTION INTRAMUSCULAR; INTRAVENOUS at 13:11

## 2019-11-05 RX ADMIN — BUDESONIDE AND FORMOTEROL FUMARATE DIHYDRATE 2 PUFF: 160; 4.5 AEROSOL RESPIRATORY (INHALATION) at 18:43

## 2019-11-05 RX ADMIN — IPRATROPIUM BROMIDE AND ALBUTEROL SULFATE 3 ML: 2.5; .5 SOLUTION RESPIRATORY (INHALATION) at 16:19

## 2019-11-05 NOTE — PLAN OF CARE
Problem: Patient Care Overview  Goal: Plan of Care Review  Outcome: Ongoing (interventions implemented as appropriate)   11/05/19 1628   Goal/Outcome Evaluation   Problems Assessed (Chest Tube Drainage Device) all   Problems Present (Chest Tube Drain Dev) none       Problem: Chest Tube Drainage Device (Adult)  Goal: Signs and Symptoms of Listed Potential Problems Will be Absent, Minimized or Managed (Chest Tube Drainage Device)  Outcome: Ongoing (interventions implemented as appropriate)   11/05/19 1628   Goal/Outcome Evaluation   Problems Assessed (Chest Tube Drainage Device) all   Problems Present (Chest Tube Drain Dev) none

## 2019-11-05 NOTE — PLAN OF CARE
Problem: Patient Care Overview  Goal: Plan of Care Review  Outcome: Ongoing (interventions implemented as appropriate)   11/05/19 0436   Coping/Psychosocial   Plan of Care Reviewed With patient   Plan of Care Review   Progress no change   OTHER   Outcome Summary VSS. NSR on monitor, 3L via NC. pt having discomfort around chest tube site. chest tube on water seal. will continue to monitor.        Problem: Breathing Pattern Ineffective (Adult)  Goal: Identify Related Risk Factors and Signs and Symptoms  Outcome: Ongoing (interventions implemented as appropriate)   11/05/19 0436   Breathing Pattern Ineffective (Adult)   Related Risk Factors (Breathing Pattern Ineffective) immobility;surgery   Signs and Symptoms (Breathing Pattern Ineffective) breathing pattern altered

## 2019-11-05 NOTE — PROGRESS NOTES
Pulmonary Consult Follow-up     Hospital:  LOS: 11 days   Mr. Balwinder Willams, 53 y.o. male is followed for:   COPD mixed type (CMS/HCC)            History of present illness:   53-year-old male with a past medical history of smoking and alpha-1 antitrypsin deficiency with resulting very severe emphysema who underwent elective endobronchial valve placement on 10/24.  He did well the initial 24 hours but then developed a spontaneous pneumothorax requiring chest tube placement on the afternoon of 10/25/2019.  He had continued air leak and had an additional endobronchial valve placed in the lingula in an attempt to decrease the air leak on 10/30. On 11/4 air leak has stopped, placed to water seal. 11/5 CT clamped.       Subjective   Interval History:  Pt with no new complaints today, conitnues on 3L NC despite having the CT placed to water seal, no leak noted during my evaluation, and CXR remained stable.           Review of Systems   Constitutional: Negative for chills and fever.   Respiratory: Negative for cough, chest tightness and wheezing.    Cardiovascular: Negative for chest pain and leg swelling.   Gastrointestinal: Negative for abdominal pain and vomiting.       The patient's past medical, surgical and social history were reviewed and updated in Epic as appropriate.       Objective     Infusions:     Medications:    budesonide-formoterol 2 puff Inhalation BID   enoxaparin 30 mg Subcutaneous Q24H   ipratropium-albuterol 3 mL Nebulization Q4H - RT   O2 3 L/min Inhalation Nightly   sennosides-docusate 2 tablet Oral BID     I reviewed the patient's medications.    Vital Sign Min/Max for last 24 hours  Temp  Min: 97.4 °F (36.3 °C)  Max: 98.8 °F (37.1 °C)   BP  Min: 96/61  Max: 116/71   Pulse  Min: 71  Max: 110   Resp  Min: 16  Max: 20   SpO2  Min: 90 %  Max: 95 %   Flow (L/min)  Min: 2.5  Max: 3       Input/Output for last 24 hour shift  11/04 0701 - 11/05 0700  In: 440 [P.O.:440]  Out: 100 [Urine:100]   Physical  Exam   Constitutional: He is oriented to person, place, and time. He appears well-developed and well-nourished.   HENT:   Head: Normocephalic and atraumatic.   Mouth/Throat: Oropharynx is clear and moist.   Eyes: Conjunctivae and EOM are normal.   Neck: Normal range of motion. Neck supple.   Cardiovascular: Normal rate, regular rhythm and normal heart sounds.   Pulmonary/Chest: Effort normal and breath sounds normal.   Abdominal: Soft. Bowel sounds are normal.   Musculoskeletal: Normal range of motion.   Neurological: He is alert and oriented to person, place, and time.   Skin: Skin is warm and dry. Capillary refill takes less than 2 seconds.   Psychiatric: He has a normal mood and affect.   Nursing note and vitals reviewed.      Results from last 7 days   Lab Units 11/01/19  0929   WBC 10*3/mm3 8.36   HEMOGLOBIN g/dL 15.0   PLATELETS 10*3/mm3 314           Invalid input(s): CHLORIDE  Estimated Creatinine Clearance: 79.1 mL/min (by C-G formula based on SCr of 0.94 mg/dL).          I reviewed the patient's new clinical results.  I reviewed the patient's new imaging results/reports including actual images and agree with reports.              Imaging Results (Last 24 Hours)     Procedure Component Value Units Date/Time    XR Chest 1 View [[id number]] Collected:  11/05/19 0820     Updated:  11/05/19 1136    Narrative:       EXAMINATION: XR CHEST 1 VW-      INDICATION: Followup pneumothorax; J93.9-Pneumothorax, unspecified;  J43.9-Emphysema, unspecified; R05-Cough.      COMPARISON: 11/04/2019.     FINDINGS: Trace left apical pneumothorax with left chest tube in place  similar to prior. Background chronic changes similar to prior without  new parenchymal process. No significant pleural effusion. Cardiac  silhouette unchanged.           Impression:       Stable appearance of a trace left apical pneumothorax with  left chest tube in place unchanged from prior.     D:  11/05/2019  E:  11/05/2019          XR Chest 1 View  [[id number]] Collected:  11/04/19 2242     Updated:  11/04/19 2244    Narrative:       CR Chest 1 Vw    INDICATION:   Follow-up left pneumothorax     COMPARISON:    11/4/2019 at 5:46 AM    FINDINGS:  Single portable AP view(s) of the chest.    Left basilar chest tube remains in place, very tiny barely perceptible left apical pneumothorax remains.    Redemonstrated emphysema, no other new abnormality.     Signer Name: Pola Jaffe MD   Signed: 11/4/2019 10:42 PM   Workstation Name: [email or web address]    XR Chest 1 View [[id number]] Collected:  11/04/19 0812     Updated:  11/04/19 1455    Narrative:       EXAMINATION: XR CHEST 1 VW- 11/04/2019     INDICATION: J93.9-Pneumothorax, unspecified; J43.9-Emphysema,  unspecified; R05-Cough      COMPARISON: 11/03/2019     FINDINGS: Portable chest reveals chest tube identified on the left.  There is tiny apical pneumothorax identified improving when compared to  the prior study. Severe emphysematous and chronic changes seen diffusely  throughout the lung fields. Degenerative changes seen within the spine.            Impression:       Tiny apical pneumothorax improving on the left. Left chest  tubes in place. Remainder lung fields are grossly clear.     D:  11/04/2019  E:  11/04/2019     This report was finalized on 11/4/2019 2:52 PM by Dr. Amrita Eden MD.             Assessment/Plan   Impression        Stage IV, very severe, emphysema    Alpha-1-antitrypsin deficiency (on replacement)    Chronic cough    Dependence on supplemental oxygen    Former smoker (Resolved 2006)    Postprocedural pneumothorax    Persistent air leak       Plan        1. Chest tube clamped today. If CXR is stable in the morning, pt will likely be D/C'd in a day or so.  2. Continue Symbicort and DuoNeb's  3. Supplemental oxygen  4. DVT prophylaxis  5. Pain control: Tylenol or NSAIDs.  He prefers to not take narcotics.  6. Daily chest x-ray  7. Pt counseled that if  he has increasing SOA above baseline or chest pain to please alert his nurse so the chest tube can be unclamped.    Thank You for the Consult    Sanju Durham,   Pulmonary, Critical care and Sleep Medicine

## 2019-11-05 NOTE — PLAN OF CARE
Problem: Patient Care Overview  Goal: Plan of Care Review  Outcome: Ongoing (interventions implemented as appropriate)   11/05/19 3839   Coping/Psychosocial   Plan of Care Reviewed With patient   Plan of Care Review   Progress no change   OTHER   Outcome Summary VSS. NSR on tele. Chest tube clamped today per MD. Continue to monitor.

## 2019-11-06 ENCOUNTER — APPOINTMENT (OUTPATIENT)
Dept: GENERAL RADIOLOGY | Facility: HOSPITAL | Age: 54
End: 2019-11-06

## 2019-11-06 VITALS
SYSTOLIC BLOOD PRESSURE: 98 MMHG | WEIGHT: 135.6 LBS | RESPIRATION RATE: 16 BRPM | OXYGEN SATURATION: 91 % | TEMPERATURE: 97.9 F | BODY MASS INDEX: 20.55 KG/M2 | DIASTOLIC BLOOD PRESSURE: 68 MMHG | HEART RATE: 104 BPM | HEIGHT: 68 IN

## 2019-11-06 PROCEDURE — 94799 UNLISTED PULMONARY SVC/PX: CPT

## 2019-11-06 PROCEDURE — 71045 X-RAY EXAM CHEST 1 VIEW: CPT

## 2019-11-06 PROCEDURE — 99239 HOSP IP/OBS DSCHRG MGMT >30: CPT | Performed by: INTERNAL MEDICINE

## 2019-11-06 PROCEDURE — 25010000002 ENOXAPARIN PER 10 MG: Performed by: INTERNAL MEDICINE

## 2019-11-06 PROCEDURE — 25010000002 KETOROLAC TROMETHAMINE PER 15 MG: Performed by: INTERNAL MEDICINE

## 2019-11-06 RX ADMIN — ACETAMINOPHEN 650 MG: 325 TABLET ORAL at 00:19

## 2019-11-06 RX ADMIN — BUDESONIDE AND FORMOTEROL FUMARATE DIHYDRATE 2 PUFF: 160; 4.5 AEROSOL RESPIRATORY (INHALATION) at 07:35

## 2019-11-06 RX ADMIN — IPRATROPIUM BROMIDE AND ALBUTEROL SULFATE 3 ML: 2.5; .5 SOLUTION RESPIRATORY (INHALATION) at 07:35

## 2019-11-06 RX ADMIN — KETOROLAC TROMETHAMINE 15 MG: 30 INJECTION, SOLUTION INTRAMUSCULAR; INTRAVENOUS at 05:33

## 2019-11-06 RX ADMIN — ACETAMINOPHEN 650 MG: 325 TABLET ORAL at 09:39

## 2019-11-06 RX ADMIN — IPRATROPIUM BROMIDE AND ALBUTEROL SULFATE 3 ML: 2.5; .5 SOLUTION RESPIRATORY (INHALATION) at 12:26

## 2019-11-06 RX ADMIN — ENOXAPARIN SODIUM 30 MG: 30 INJECTION SUBCUTANEOUS at 09:39

## 2019-11-06 RX ADMIN — KETOROLAC TROMETHAMINE 15 MG: 30 INJECTION, SOLUTION INTRAMUSCULAR; INTRAVENOUS at 14:49

## 2019-11-06 NOTE — PROGRESS NOTES
Continued Stay Note  [facility]     Patient Name: Balwinder Willams  MRN: [id number]  Today's Date: 11/6/2019    Admit Date: 10/24/2019    Discharge Plan     Row Name 11/06/19 1456       Plan    Plan  Home with family    Patient/Family in Agreement with Plan  yes    Plan Comments  Pt may possibly d/c today. On RA pt is 90-91%. Chest tube is out. Pt has no needs at this time. CM will cont to follow. Casie Su RN,  ext. [phone]        Discharge Codes    No documentation.       Expected Discharge Date and Time     Expected Discharge Date Expected Discharge Time    Nov 7, 2019             Casie Su RN

## 2019-11-06 NOTE — PROGRESS NOTES
Pulmonary/Critical Care Follow-up     LOS: 12 days   Patient Care Team:  Obinna Arellano MD as PCP - General (Adolescent Medicine)  Neris Cárdenas APRN as PCP - Claims Attributed    Chief Complaint: Emphysema/pneumothorax      Subjective    53 y.o. male with prior history of smoking, alpha-1 antitrypsin deficiency, severe emphysema who underwent elective endobronchial valve placement on 10/24/2019.  He did well for 24 hours however subsequently developed a spontaneous pneumothorax on the left requiring chest tube placement on 10/25/2019.  He had ongoing air leak and underwent additional endobronchial valve placement in the lingula on 10/30/2019.  The air leak stopped on 11/4/2019 and he was placed on waterseal.  Chest tube was clamped on 11/5/2019.      Interval History:   Patient denies dyspnea.  No fevers or chills.  No nausea or vomiting.  Some pleuritic chest pain.      History taken from:   Patient    PMH/FH/Social History were reviewed and updated appropriately in the electronic medical record.     Review of Systems:    Review of 14 systems was completed with positives and pertinent negatives noted in the subjective section.  All other systems reviewed and are negative.       Objective     Vital Signs  Temp:  [97.6 °F (36.4 °C)-97.9 °F (36.6 °C)] 97.9 °F (36.6 °C)  Heart Rate:  [70-92] 87  Resp:  [16-20] 16  BP: ()/(66-68) 98/68  11/05 0701 - 11/06 0700  In: 840 [P.O.:840]  Out: -   Body mass index is 20.62 kg/m².     Physical Exam:     Constitutional:   Alert, cooperative, in no acute distress   Head:   Normocephalic, without obvious abnormality, atraumatic   Eyes:           Lids and lashes normal, conjunctivae and sclerae normal.  No icterus, no pallor, corneas clear, PER   ENMT:  Ears appear intact with no abnormalities noted     No oral lesions, no thrush, oral mucosa moist   Neck:  No adenopathy, supple, trachea midline, no thyromegaly, no JVD   Lungs/Resp:    Normal effort, symmetric chest  rise, no crepitus, diminished breath sounds bilaterally, left sided chest tube clamped with some clear yellow fluid in tube, no chest wall tenderness                Heart/CV:   Regular rhythm and normal rate, normal S1 and S2, no            murmur   Abdomen/GI:    Normal bowel sounds, no masses, no organomegaly, soft,        non-tender, non-distended   :    Deferred   Extremities/MSK:  No clubbing or cyanosis.  No edema.  Normal tone.    No deformities.    Pulses:  Pulses palpable and equal bilaterally   Skin:  No bleeding, bruising or rash   Heme/Lymph:  No cervical or supraclavicular adenopathy.   Neurologic:    Psychiatric:    Moves all extremities with no obvious focal motor deficit.  Cranial nerves 2 - 12 grossly intact  Oriented x 3, normal affect.      Results Review:     I reviewed the patient's new clinical results.         Invalid input(s): LABALBU, PROT  Results from last 7 days   Lab Units 11/01/19  0929   WBC 10*3/mm3 8.36   HEMOGLOBIN g/dL 15.0   HEMATOCRIT % 46.1   PLATELETS 10*3/mm3 314               I reviewed the patient's new imaging including images and reports.  CXR this am right chest tube in place and endobronchial valves in place with no evidence of pneumothorax.    CXR this PM.  Chest tube removed with no pneumothorax.        Medication Review:     budesonide-formoterol 2 puff Inhalation BID   enoxaparin 30 mg Subcutaneous Q24H   ipratropium-albuterol 3 mL Nebulization Q4H - RT   O2 3 L/min Inhalation Nightly   sennosides-docusate 2 tablet Oral BID          Assessment/Plan       Stage IV, very severe, emphysema    Alpha-1-antitrypsin deficiency (on replacement)    Chronic cough    Dependence on supplemental oxygen    Former smoker (Resolved 2006)    Postprocedural pneumothorax    Persistent air leak    53 y.o. with severe emphysema, alpha-1 deficiency admitted after elective endobronchial valve placement by Dr. Pressley complicated by delayed post-procedure pneumothorax and persistent air  leak with placement of additional endobronchial valves by Dr. Castellano.    The patient's chest tube has been clamped for 24 hours and was on waterseal for 24 hours prior to that with no evidence of air leak.  I discontinued his chest tube today.  Follow-up chest x-ray shows no evidence of pneumothorax.    I will plan to discharge him today.  He will follow-up with JESSICA Levine in 1 to 2 weeks with a chest x-ray.    Patient is to resume infusions for alpha-1 deficiency on discharge on normal schedule.    Continue Symbicort and Combivent 4 times a day on discharge.    Jose Garcia MD  11/06/19  1:46 PM    Time: Discharge 35 min spent by me personally.    Jose Garcia MD      *. Please note that portions of this note were completed with a voice recognition program. Efforts were made to edit the dictations, but occasionally words are mistranscribed.

## 2019-11-06 NOTE — DISCHARGE SUMMARY
Discharge Summary    Patient name: Balwinder Willams  CSN: [id number]  MRN: [id number]  : 1965  Today's date: 2019     Date of Admission: 10/24/2019  Date of Discharge:  2019    Admitting Physician:  Devaughn Pressley MD  Primary Care Provider: Obinna Arellano MD  Consultations: None     Admission Diagnosis: Stage IV emphysema      Discharge Diagnoses:   Active Hospital Problems    Diagnosis   • **Stage IV, very severe, emphysema   • Persistent air leak   • Postprocedural pneumothorax   • Former smoker (Resolved )   • Alpha-1-antitrypsin deficiency (on replacement)     A.  Labs of 2014 reports an alpha 1 antitrypsin deficiency of 4.7 with a phenotype      with Z bands detected and genotyping revealing the presence of most common      deficiency allele type Z and an inferred non-expressing null allele.     • Chronic cough   • Dependence on supplemental oxygen     Allergies:  Other and Roflumilast    Code Status and Medical Interventions:   Ordered at: 10/25/19 0843     Code Status:    CPR     Medical Interventions (Level of Support Prior to Arrest):    Full       Procedures/Testing:  10/24 Bronchoscopy with endobronchial valve placement (x8)   10/25 Chest tube insertion   10/30 Bronchoscopy with endobronchial valve placement (X1)       History of Present Illness:  53-year-old white male with stage IV chronic obstructive pulmonary disease admitted on 10/24 for monitoring follow endobronchial valve placement.     He has a history of alpha-1 antitrypsin deficiency currently on replacement therapy, tobacco abuse that resolved in , and stage IV (very severe) emphysema with chronic respiratory failure.     He met criteria for endobronchial valve placement and underwent the procedure on 10/24 with a total of 8 valves placed in the left lower lobe per Dr. Castellano. There were no immediate intraoperative complications and postoperative chest x-ray reveals no pneumothorax. He was  "admitted to telemetry for further monitoring.      Hospital Course:  The patient was admitted for reasons mentioned above in the HPI and monitored on telemetry. He did well initially over the first 24 hours but then developed spontaneous pneumothorax requiring chest tube placement. There was a persistent air leak and did require additional endobronchial valve placed within the lingula with eventual resolution of the air leak. The chest tube was then placed to waterseal, later clamped, and eventually discontinued on 11/6. Serial imaging negative for pneumothorax with adequate oxygenation and lack of respiratory symptomatology, therefore it was felt he was appropriate for discharge today. He is back to baseline oxygenation requirements which is 3L NC. The patient is independently ambulatory, with adequate intake, stable vital signs, and normalized lab values therefore will be discharged home today.     Vitals:  BP 98/68 (BP Location: Left arm, Patient Position: Lying)   Pulse 104   Temp 97.9 °F (36.6 °C) (Oral)   Resp 16   Ht 172.7 cm (68\")   Wt 61.5 kg (135 lb 9.6 oz)   SpO2 91%   BMI 20.62 kg/m²     Physical Exam:              Constitutional:    Alert, cooperative, in no acute distress   Head:    Normocephalic, without obvious abnormality, atraumatic   Eyes:            Lids and lashes normal, conjunctivae and sclerae normal.  No icterus, no pallor, corneas clear, PER   ENMT:   Ears appear intact with no abnormalities noted       No oral lesions, no thrush, oral mucosa moist   Neck:   No adenopathy, supple, trachea midline, no thyromegaly, no JVD   Lungs/Resp:     Normal effort, symmetric chest rise, no crepitus, clear to      auscultation bilaterally, no chest wall tenderness                Heart/CV:    Regular rhythm and normal rate, normal S1 and S2, no            murmur   Abdomen/GI:     Normal bowel sounds, no masses, no organomegaly, soft        non-tender, non-distended   :     Deferred "   Extremities/MSK:   No clubbing or cyanosis.  No edema.  Normal tone.    No deformities.    Pulses:   Pulses palpable and equal bilaterally   Skin:   No bleeding, bruising or rash   Heme/Lymph:   No cervical or supraclavicular adenopathy.   Neurologic:     Psychiatric:      Moves all extremities with no obvious focal motor deficit. Cranial nerves 2 - 12 grossly intact  Oriented x 3, normal affect.    Labs:  Results from last 7 days   Lab Units 11/01/19  0929   WBC 10*3/mm3 8.36   HEMOGLOBIN g/dL 15.0   HEMATOCRIT % 46.1   PLATELETS 10*3/mm3 314           Invalid input(s): LABALBU, PROT      No results found for: MG, PHOS                 Discharge Medications      New Medications      Instructions Start Date   Influenza Vac Subunit Quad 0.5 ML suspension prefilled syringe injection  Commonly known as:  FLUCELVAX   0.5 mL, Intramuscular, During Hospitalization         Continue These Medications      Instructions Start Date   budesonide-formoterol 160-4.5 MCG/ACT inhaler  Commonly known as:  SYMBICORT   2 puffs, Inhalation, 2 Times Daily      guaiFENesin 600 MG 12 hr tablet  Commonly known as:  MUCINEX   1,200 mg, Oral, As Needed      ibuprofen 800 MG tablet  Commonly known as:  ADVIL,MOTRIN   800 mg, Oral, Every 6 Hours PRN      ipratropium-albuterol 0.5-2.5 mg/3 ml nebulizer  Commonly known as:  DUO-NEB   Ipratropium-Albuterol 0.5-2.5 (3) MG/3ML Inhalation Solution; Patient Sig: Ipratropium-Albuterol 0.5-2.5 (3) MG/3ML Inhalation Solution 1 vial via nebulizer qid prn sob; 6; 3; 07-Jan-2015; Active      COMBIVENT RESPIMAT  MCG/ACT inhaler  Generic drug:  ipratropium-albuterol   inhale 1 puff four times a day      O2  Commonly known as:  OXYGEN   3 L/min, Inhalation, Nightly      PROLASTIN-C 1000 MG/20ML IV solution  Generic drug:  Alpha1-Proteinase Inhibitor   Weekly             Diet Instructions     Diet: Regular; Thin      Discharge Diet:  Regular    Fluid Consistency:  Thin          Activity Instructions      Activity as Tolerated            Follow-up Appointments  Future Appointments   Date Time Provider Department Center   11/18/2019 10:45 AM RAD TECH [facility] None   11/18/2019 11:00 AM Neris Cárdenas APRN [facility] None   12/16/2019  9:00 AM Phan Castellano MD [facility] None     Additional Instructions for the Follow-ups that You Need to Schedule     Discharge Follow-up with Specified Provider: Neris LOPEZ with Pulmonary   As directed      To:  Neris LOPEZ with Pulmonary    Follow Up Details:  Appoitment made for 11/18/19 at 1045AM; Get CXR prior to appointment         XR Chest 2 View   Nov 18, 2019      Do on 11/18 prior to 1045AM appointment    Exam reason:  Post-endobronchial valve             Discharge Instructions:  1. Discharge home today   2. Medications as above  3. Follow-ups as above  4. If symptoms worsen or recur, seek medical attention or call 911     JESSICA Neville, AGACNP-BC, FNP-BC   Pulmonary and Critical Care     Time: Discharge 35 min spent by me personally.    See my discharge day progress note for additional details and my physical exam findings.     Electronically signed by:  Jose Garcia MD      CC: Obinna Arellano MD

## 2019-11-06 NOTE — PLAN OF CARE
Problem: Patient Care Overview  Goal: Plan of Care Review  Outcome: Ongoing (interventions implemented as appropriate)   11/06/19 0402   Coping/Psychosocial   Plan of Care Reviewed With patient   Plan of Care Review   Progress improving   OTHER   Outcome Summary VSS. NSR on monitor. chest tube clamped. pt still having minor discomfort in left back. no signs of crepitus. chest tube bandage C/D/I. will continue to monitor.        Problem: Breathing Pattern Ineffective (Adult)  Goal: Identify Related Risk Factors and Signs and Symptoms  Outcome: Ongoing (interventions implemented as appropriate)   11/06/19 0402   Breathing Pattern Ineffective (Adult)   Related Risk Factors (Breathing Pattern Ineffective) immobility;pain;surgery   Signs and Symptoms (Breathing Pattern Ineffective) breathing pattern altered

## 2019-11-07 ENCOUNTER — READMISSION MANAGEMENT (OUTPATIENT)
Dept: CALL CENTER | Facility: HOSPITAL | Age: 54
End: 2019-11-07

## 2019-11-07 NOTE — OUTREACH NOTE
Prep Survey      Responses   Facility patient discharged from?  [facility]   Is patient eligible?  Yes   Discharge diagnosis  **Stage IV, very severe, emphysema   Does the patient have one of the following disease processes/diagnoses(primary or secondary)?  COPD/Pneumonia   Does the patient have Home health ordered?  No   Is there a DME ordered?  No   Comments regarding appointments  XR Chest 2 View by NOV 18   Prep survey completed?  Yes          Lydia Lozano RN

## 2019-11-08 ENCOUNTER — READMISSION MANAGEMENT (OUTPATIENT)
Dept: CALL CENTER | Facility: HOSPITAL | Age: 54
End: 2019-11-08

## 2019-11-08 NOTE — OUTREACH NOTE
COPD/PN Week 1 Survey      Responses   Facility patient discharged from?  [facility]   Does the patient have one of the following disease processes/diagnoses(primary or secondary)?  COPD/Pneumonia   Is there a successful TCM telephone encounter documented?  No   Was the primary reason for admission:  COPD exacerbation   Week 1 attempt successful?  Yes   Call start time  1615   Call end time  1622   Discharge diagnosis  **Stage IV, very severe, emphysema   Is patient permission given to speak with other caregiver?  No   Meds reviewed with patient/caregiver?  Yes   Is the patient having any side effects they believe may be caused by any medication additions or changes?  No   Does the patient have all medications ordered at discharge?  Yes   Is the patient taking all medications as directed (includes completed medication regime)?  Yes   Does the patient have a primary care provider?   Yes   Does the patient have an appointment with their PCP or pulmonologist within 7 days of discharge?  Yes   Comments regarding PCP  Obinna Arellano MD, Thursday Nov 14, 2019, at 2:00pm   Has the patient kept scheduled appointments due by today?  N/A   Comments  Pulmonary f/u 11/18/19   Has home health visited the patient within 72 hours of discharge?  N/A   Psychosocial issues?  No   Did the patient receive a copy of their discharge instructions?  Yes   Nursing interventions  Reviewed instructions with patient   What is the patient's perception of their health status since discharge?  Improving   Is the patient/caregiver able to teach back the hierarchy of who to call/visit for symptoms/problems? PCP, Specialist, Home health nurse, Urgent Care, ED, 911  Yes   Is the patient able to teach back COPD zones?  No   Nursing interventions  Education provided on various zones   Patient reports what zone on this call?  Yellow Zone   Yellow Zone  Increased shortness of air, Using quick relief inhaler/nebulizer more often   Yellow interventions   Continue to use daily medications, Get plenty of rest, Call provider immediatly if symptoms do not improve   Week 1 call completed?  Yes          Carol Wang RN

## 2019-11-12 ENCOUNTER — DOCUMENTATION (OUTPATIENT)
Dept: PULMONOLOGY | Facility: CLINIC | Age: 54
End: 2019-11-12

## 2019-11-15 ENCOUNTER — READMISSION MANAGEMENT (OUTPATIENT)
Dept: CALL CENTER | Facility: HOSPITAL | Age: 54
End: 2019-11-15

## 2019-11-15 DIAGNOSIS — J43.2 CENTRILOBULAR EMPHYSEMA (HCC): ICD-10-CM

## 2019-11-15 NOTE — OUTREACH NOTE
COPD/PN Week 2 Survey      Responses   Facility patient discharged from?  [place]   Does the patient have one of the following disease processes/diagnoses(primary or secondary)?  COPD/Pneumonia   Was the primary reason for admission:  COPD exacerbation   Week 2 attempt successful?  Yes   Call start time  1109   Call end time  1114   Discharge diagnosis  **Stage IV, very severe, emphysema   Is the patient taking all medications as directed (includes completed medication regime)?  Yes   Medication comments  Did not receive the flu vaccine but he will ask his pulmonologist   Has the patient kept scheduled appointments due by today?  Yes   Comments  Has another f/u  appt next week   Psychosocial issues?  No   What is the patient's perception of their health status since discharge?  Improving   Are the patient's immunizations up to date?   No   Nursing interventions  Educated on importance of maintaining up to date immunizations as advised by provider   If the patient is a current smoker, are they able to teach back resources for cessation?  -- [Non smoker]   Is the patient/caregiver able to teach back the hierarchy of who to call/visit for symptoms/problems? PCP, Specialist, Home health nurse, Urgent Care, ED, 911  Yes   Additional teach back comments  Enc return to MD for any issues. Enc surveillance of CT site and watch for s/s of infection.   Is the patient able to teach back COPD zones?  Yes   Nursing interventions  Education provided on various zones   Patient reports what zone on this call?  Green Zone   Green Zone  Reports doing well, Breathing without shortness of breath, Usual activity and exercise level   Green Zone interventions:  Take daily medications, Do not smoke, Avoid indoor/outdoor triggers   Week 2 call completed?  Yes          Charleen Curiel RN

## 2019-11-18 ENCOUNTER — OFFICE VISIT (OUTPATIENT)
Dept: PULMONOLOGY | Facility: CLINIC | Age: 54
End: 2019-11-18

## 2019-11-18 VITALS
DIASTOLIC BLOOD PRESSURE: 68 MMHG | HEIGHT: 68 IN | OXYGEN SATURATION: 97 % | SYSTOLIC BLOOD PRESSURE: 110 MMHG | WEIGHT: 137 LBS | HEART RATE: 68 BPM | BODY MASS INDEX: 20.76 KG/M2 | TEMPERATURE: 97.9 F

## 2019-11-18 DIAGNOSIS — Z23 IMMUNIZATION DUE: ICD-10-CM

## 2019-11-18 DIAGNOSIS — J44.9 COPD MIXED TYPE (HCC): Primary | ICD-10-CM

## 2019-11-18 PROCEDURE — 99213 OFFICE O/P EST LOW 20 MIN: CPT | Performed by: NURSE PRACTITIONER

## 2019-11-18 PROCEDURE — 90674 CCIIV4 VAC NO PRSV 0.5 ML IM: CPT | Performed by: NURSE PRACTITIONER

## 2019-11-18 PROCEDURE — 94618 PULMONARY STRESS TESTING: CPT | Performed by: NURSE PRACTITIONER

## 2019-11-18 PROCEDURE — 90670 PCV13 VACCINE IM: CPT | Performed by: NURSE PRACTITIONER

## 2019-11-18 PROCEDURE — G0009 ADMIN PNEUMOCOCCAL VACCINE: HCPCS | Performed by: NURSE PRACTITIONER

## 2019-11-18 PROCEDURE — G0008 ADMIN INFLUENZA VIRUS VAC: HCPCS | Performed by: NURSE PRACTITIONER

## 2019-11-18 RX ORDER — BUDESONIDE AND FORMOTEROL FUMARATE DIHYDRATE 160; 4.5 UG/1; UG/1
AEROSOL RESPIRATORY (INHALATION)
Qty: 40.8 G | Refills: 0 | Status: SHIPPED | OUTPATIENT
Start: 2019-11-18 | End: 2020-04-17 | Stop reason: SDUPTHER

## 2019-11-18 NOTE — PROGRESS NOTES
[facility] Pulmonary Follow up    CHIEF COMPLAINT    Endobronchial valve placement follow-up    HISTORY OF PRESENT ILLNESS    Balwinder Willams is a 53 y.o.male here today for follow-up after having an endobronchial valve placement on 10/24 per Dr. Pressley.  The following day after his procedure he developed a spontaneous pneumothorax and had a chest tube placed.  His pneumothorax resolved and the chest tube was removed.  He was discharged from a [facility] on 11/11.    He states that his breathing has improved since the placement of the endobronchial valves.  He does continue to have shortness of breath with activity and wears his oxygen at 2 to 3 L pulse dose with activity.  He does recover fairly quickly with rest.  He has noticed an improvement in his shortness of breath since having the valves placed.  He continues to wear oxygen at nighttime at 2 to 3 L continuously at night.    He continues to use Symbicort 2 puffs twice a day and Combivent as needed for shortness of breath.  He has used his Combivent less since the valves were placed.    He denies fever, chills, sputum production, hemoptysis, night sweats, weight loss, chest pain or palpitations.  He denies any lower extremity edema.  He denies any sinus or allergy symptoms.  He denies reflux symptoms.    He is in the process of getting a mobility chair for travel while he is on vacation.  He is working with patient aids and has picked out a chair that will work well for him with traveling.    He would like to receive his influenza vaccination today if possible.    Patient Active Problem List   Diagnosis   • Alpha-1-antitrypsin deficiency (on replacement)   • Chronic cough   • Dependence on supplemental oxygen   • Stage IV, very severe, emphysema   • Former smoker (Resolved 2006)   • Postprocedural pneumothorax   • Persistent air leak       Allergies   Allergen Reactions   • Other      Opioid Analgesics   • Roflumilast Nausea Only       Current  Outpatient Medications:   •  COMBIVENT RESPIMAT  MCG/ACT inhaler, inhale 1 puff four times a day, Disp: 4 g, Rfl: 6  •  guaiFENesin (MUCINEX) 600 MG 12 hr tablet, Take 1,200 mg by mouth As Needed for Cough., Disp: , Rfl:   •  ibuprofen (ADVIL,MOTRIN) 800 MG tablet, Take 800 mg by mouth Every 6 (Six) Hours As Needed for Mild Pain ., Disp: , Rfl:   •  ipratropium-albuterol (DUO-NEB) 0.5-2.5 mg/mL nebulizer, Ipratropium-Albuterol 0.5-2.5 (3) MG/3ML Inhalation Solution; Patient Sig: Ipratropium-Albuterol 0.5-2.5 (3) MG/3ML Inhalation Solution 1 vial via nebulizer qid prn sob; 6; 3; -2015; Active, Disp: , Rfl:   •  O2 (OXYGEN), Inhale 3 L/min Every Night., Disp: , Rfl:   •  PROLASTIN-C 1000 MG/20ML solution, 1 (One) Time Per Week., Disp: , Rfl:   •  SYMBICORT 160-4.5 MCG/ACT inhaler, INHALE 2 PUFFS BY MOUTH TWICE A DAY, Disp: 40.8 g, Rfl: 0  •  Influenza Vac Subunit Quad (FLUCELVAX) 0.5 ML suspension prefilled syringe injection, Inject 0.5 mL into the appropriate muscle as directed by prescriber During Hospitalization (Influenza prophylaxis) for up to 1 dose., Disp: 0.5 mL, Rfl: 0  No current facility-administered medications for this visit.   MEDICATION LIST AND ALLERGIES REVIEWED.    Social History     Tobacco Use   • Smoking status: Former Smoker     Packs/day: 1.50     Years: 25.00     Pack years: 37.50     Types: Cigarettes     Start date: 1981     Last attempt to quit: 2006     Years since quittin.8   • Smokeless tobacco: Never Used   Substance Use Topics   • Alcohol use: No     Comment: In recovery since 1994   • Drug use: Yes     Comment: In recovery since 1994       FAMILY AND SOCIAL HISTORY REVIEWED.    Review of Systems   Constitutional: Negative for activity change, appetite change, fatigue, fever and unexpected weight change.   HENT: Negative for congestion, postnasal drip, rhinorrhea, sinus pressure, sore throat and voice change.    Eyes: Negative for visual disturbance.  "  Respiratory: Positive for shortness of breath. Negative for cough, chest tightness and wheezing.    Cardiovascular: Negative for chest pain, palpitations and leg swelling.   Gastrointestinal: Negative for abdominal distention, abdominal pain, nausea and vomiting.   Endocrine: Negative for cold intolerance and heat intolerance.   Genitourinary: Negative for difficulty urinating and urgency.   Musculoskeletal: Negative for arthralgias, back pain and neck pain.   Skin: Negative for color change and pallor.   Allergic/Immunologic: Negative for environmental allergies and food allergies.   Neurological: Negative for dizziness, syncope, weakness and light-headedness.   Hematological: Negative for adenopathy. Does not bruise/bleed easily.   Psychiatric/Behavioral: Negative for agitation and behavioral problems.   .    /68 (BP Location: Right arm, Patient Position: Sitting, Cuff Size: Adult)   Pulse 68   Temp 97.9 °F (36.6 °C)   Ht 172.7 cm (68\")   Wt 62.1 kg (137 lb)   SpO2 97% Comment: sitting at room air  BMI 20.83 kg/m²     Immunization History   Administered Date(s) Administered   • Pneumococcal Conjugate 13-Valent (PCV13) 11/18/2019   • flucelvax quad pfs =>4 YRS 11/18/2019       Physical Exam   Constitutional: He is oriented to person, place, and time. He appears well-developed and well-nourished.   HENT:   Head: Normocephalic and atraumatic.   Eyes: Pupils are equal, round, and reactive to light.   Neck: Normal range of motion. Neck supple. No thyromegaly present.   Cardiovascular: Normal rate, regular rhythm, normal heart sounds and intact distal pulses. Exam reveals no gallop and no friction rub.   No murmur heard.  Pulmonary/Chest: Effort normal and breath sounds normal. No respiratory distress. He has no wheezes. He has no rales. He exhibits no tenderness.   Abdominal: Soft. Bowel sounds are normal. There is no tenderness.   Musculoskeletal: Normal range of motion.   Lymphadenopathy:     He has no " cervical adenopathy.   Neurological: He is alert and oriented to person, place, and time.   Skin: Skin is warm and dry. Capillary refill takes less than 2 seconds. He is not diaphoretic.   Psychiatric: He has a normal mood and affect. His behavior is normal.   Nursing note and vitals reviewed.        RESULTS    Xr Chest Pa & Lateral    Result Date: 11/18/2019  Persistent hyperinflated and hyperlucent lungs of obstructive pulmonary disease without acute cardiopulmonary process.  D:  11/18/2019 E:  11/18/2019        6-minute walk test: Patient ambulated 950 feet and desaturated to 87% towards the end of testing.  He was placed on 2 L pulse dose and recovered to 97%.  He does qualify for oxygen with activity.  PROBLEM LIST    Problem List Items Addressed This Visit        Respiratory    Stage IV, very severe, emphysema - Primary    Relevant Orders    XR Chest PA & Lateral (Completed)    Walking Oximetry (Completed)      Other Visit Diagnoses     Immunization due        Relevant Medications    pneumococcal conj. 13-valent (PREVNAR-13) vaccine 0.5 mL (Completed)    Influenza Vac Subunit Quad (FLUCELVAX) injection 0.5 mL (Completed)            DISCUSSION    Mr. Willams was here for follow-up of his endobronchial valve placement while hospitalized at [facility] on 10/24 to 11/11.  He seems to be tolerating the valve placement well.  He has noticed an improvement in his shortness of breath since the valves were placed.  We reviewed his chest x-ray in the office today and he does not have a pneumothorax and no acute pulmonary process.  We reviewed his 6-minute walk test in the office today as well.  He was not placed on oxygen until the end of testing and was placed on 2 L pulse dose and recovered to 97% with oxygen.  At his last 6-minute walk test he was placed on oxygen at the beginning of testing.  He has shown improvement in his 6-minute walk test since the valves were placed.    Mr. Willams was here  today for a face-to-face examination for a power mobility device.  They have been evaluated by therapist to determine their overall functional status.  The therapist concluded the patient would benefit from a power mobility device.  At the evaluation they determined that the patient would be best benefit from a power wheelchair/scooter.  The power wheelchair would enable them to remain in her home and allow them to participate in their daily ADLs.  They would be able to prepare a light meal for themselves are simply get them an item for the refrigerator when the caregiver is not present.  I want to emphasize that the patient is unable to use a manual wheelchair as he does not have a full-time caregiver to push a manual wheelchair.  He is not a surgical candidate due to severe COPD.  Treatments that have been tried but have not improved mobility physical therapy, and medications for his severe COPD.  The patient was diagnosed with severe stage IV COPD 2014.    He will follow-up in 3 months with a 6-minute walk test, full PFTs and a chest x-ray to monitor his endobronchial valve placement.  He may return sooner if he has worsening symptoms.  I spent 15 minutes with the patient. I spent > 50% percent of this time counseling and discussing diagnosis, prognosis, diagnostic testing, evaluation, current status, treatment options and management.    Neris JASMIN Cárdenas, APRN  11/18/20193:54 PM  Electronically signed     Please note that portions of this note were completed with a voice recognition program. Efforts were made to edit the dictations, but occasionally words are mistranscribed.      CC: Obinna Arellano MD

## 2019-11-19 ENCOUNTER — DOCUMENTATION (OUTPATIENT)
Dept: PULMONOLOGY | Facility: CLINIC | Age: 54
End: 2019-11-19

## 2019-11-19 NOTE — PROGRESS NOTES
Notified Home Health Nurse per ariana that it's ok to double his prolastin dose for the holidays so he doesn't miss a dose

## 2019-11-19 NOTE — PROGRESS NOTES
The patient incidentally grew an MAC from his bronchoscopy cultures that were obtained during the bronchoscopy for his EBV placement.  CT scan of the chest prior to EBV placement revealed no evidence of an active pulmonary process.  We will just follow-up on this in the future with chest imaging after the first of the year or earlier if he develops persistent symptoms of an indolent/persistent respiratory tract infection.

## 2019-11-22 ENCOUNTER — TELEPHONE (OUTPATIENT)
Dept: PULMONOLOGY | Facility: CLINIC | Age: 54
End: 2019-11-22

## 2019-11-22 ENCOUNTER — READMISSION MANAGEMENT (OUTPATIENT)
Dept: CALL CENTER | Facility: HOSPITAL | Age: 54
End: 2019-11-22

## 2019-11-22 DIAGNOSIS — J44.1 COPD WITH ACUTE EXACERBATION (HCC): Primary | ICD-10-CM

## 2019-11-22 RX ORDER — IPRATROPIUM BROMIDE AND ALBUTEROL SULFATE 2.5; .5 MG/3ML; MG/3ML
3 SOLUTION RESPIRATORY (INHALATION)
Qty: 360 ML | Refills: 1 | Status: SHIPPED | OUTPATIENT
Start: 2019-11-22 | End: 2019-11-29 | Stop reason: SDUPTHER

## 2019-11-22 RX ORDER — DOXYCYCLINE HYCLATE 100 MG
100 TABLET ORAL 2 TIMES DAILY
Qty: 20 TABLET | Refills: 0 | Status: SHIPPED | OUTPATIENT
Start: 2019-11-22 | End: 2020-02-19

## 2019-11-22 NOTE — OUTREACH NOTE
COPD/PN Week 3 Survey      Responses   Facility patient discharged from?  [place]   Does the patient have one of the following disease processes/diagnoses(primary or secondary)?  COPD/Pneumonia   Was the primary reason for admission:  COPD exacerbation   Week 3 attempt successful?  Yes   Call start time  1213   Call end time  1215   Discharge diagnosis  **Stage IV, very severe, emphysema   Has the patient kept scheduled appointments due by today?  Yes   What is the patient's perception of their health status since discharge?  Same   Nursing Interventions  Nurse provided patient education   Is the patient/caregiver able to teach back the hierarchy of who to call/visit for symptoms/problems? PCP, Specialist, Home health nurse, Urgent Care, ED, 911  Yes   Additional teach back comments  Pt says he is has been coughing and has some increase in phlegm, he has a call into his MD, no questions or concerns at this time.   Week 3 call completed?  Yes          Gloria Loaiza RN

## 2019-11-22 NOTE — TELEPHONE ENCOUNTER
Patient called complaining of general malaise and productive cough with sinus drainage. Sputum yellowish green with unpleasant taste in mouth, wants to know if he could have antibiotics.

## 2019-11-30 ENCOUNTER — READMISSION MANAGEMENT (OUTPATIENT)
Dept: CALL CENTER | Facility: HOSPITAL | Age: 54
End: 2019-11-30

## 2019-11-30 NOTE — OUTREACH NOTE
COPD/PN Week 4 Survey      Responses   Facility patient discharged from?  [place]   Does the patient have one of the following disease processes/diagnoses(primary or secondary)?  COPD/Pneumonia   Was the primary reason for admission:  COPD exacerbation   Week 4 attempt successful?  No          Anamika Cameron RN

## 2019-12-02 ENCOUNTER — TELEPHONE (OUTPATIENT)
Dept: PULMONOLOGY | Facility: CLINIC | Age: 54
End: 2019-12-02

## 2019-12-02 DIAGNOSIS — J44.9 COPD MIXED TYPE (HCC): Primary | ICD-10-CM

## 2019-12-02 LAB
MYCOBACTERIUM SPEC CULT: ABNORMAL
NIGHT BLUE STAIN TISS: ABNORMAL

## 2019-12-02 RX ORDER — IPRATROPIUM BROMIDE AND ALBUTEROL SULFATE 2.5; .5 MG/3ML; MG/3ML
3 SOLUTION RESPIRATORY (INHALATION)
Qty: 3240 ML | Refills: 1 | Status: SHIPPED | OUTPATIENT
Start: 2019-12-02 | End: 2020-09-12 | Stop reason: SDUPTHER

## 2019-12-02 RX ORDER — IPRATROPIUM BROMIDE AND ALBUTEROL SULFATE 2.5; .5 MG/3ML; MG/3ML
SOLUTION RESPIRATORY (INHALATION)
Qty: 3240 ML | Refills: 1 | Status: SHIPPED | OUTPATIENT
Start: 2019-12-02 | End: 2019-12-02 | Stop reason: SDUPTHER

## 2019-12-05 LAB — FUNGUS WND CULT: NORMAL

## 2019-12-11 LAB
FUNGUS WND CULT: NORMAL
MYCOBACTERIUM SPEC CULT: NORMAL
NIGHT BLUE STAIN TISS: NORMAL

## 2020-01-31 DIAGNOSIS — J43.2 CENTRILOBULAR EMPHYSEMA (HCC): ICD-10-CM

## 2020-01-31 RX ORDER — IPRATROPIUM/ALBUTEROL SULFATE 20-100 MCG
MIST INHALER (GRAM) INHALATION
Qty: 4 G | Refills: 6 | Status: SHIPPED | OUTPATIENT
Start: 2020-01-31 | End: 2020-06-22

## 2020-02-17 ENCOUNTER — HOSPITAL ENCOUNTER (EMERGENCY)
Facility: HOSPITAL | Age: 55
Discharge: HOME | End: 2020-02-17
Attending: EMERGENCY MEDICINE | Admitting: EMERGENCY MEDICINE

## 2020-02-17 ENCOUNTER — APPOINTMENT (OUTPATIENT)
Dept: GENERAL RADIOLOGY | Facility: HOSPITAL | Age: 55
End: 2020-02-17

## 2020-02-17 VITALS
WEIGHT: 140 LBS | OXYGEN SATURATION: 94 % | BODY MASS INDEX: 21.22 KG/M2 | RESPIRATION RATE: 19 BRPM | HEIGHT: 68 IN | DIASTOLIC BLOOD PRESSURE: 64 MMHG | TEMPERATURE: 98.7 F | HEART RATE: 97 BPM | SYSTOLIC BLOOD PRESSURE: 101 MMHG

## 2020-02-17 DIAGNOSIS — R07.89 ACUTE CHEST WALL PAIN: Primary | ICD-10-CM

## 2020-02-17 DIAGNOSIS — J44.1 COPD WITH ACUTE EXACERBATION (HCC): ICD-10-CM

## 2020-02-17 LAB
ALBUMIN SERPL-MCNC: 4.4 G/DL (ref 3.5–5.2)
ALBUMIN/GLOB SERPL: 1.1 G/DL
ALP SERPL-CCNC: 83 U/L (ref 39–117)
ALT SERPL W P-5'-P-CCNC: 14 U/L (ref 1–41)
ANION GAP SERPL CALCULATED.3IONS-SCNC: 12 MMOL/L (ref 5–15)
AST SERPL-CCNC: 14 U/L (ref 1–40)
BASOPHILS # BLD AUTO: 0.05 10*3/MM3 (ref 0–0.2)
BASOPHILS NFR BLD AUTO: 0.3 % (ref 0–1.5)
BILIRUB SERPL-MCNC: 1.1 MG/DL (ref 0.2–1.2)
BUN BLD-MCNC: 14 MG/DL (ref 6–20)
BUN/CREAT SERPL: 14 (ref 7–25)
CALCIUM SPEC-SCNC: 9.1 MG/DL (ref 8.6–10.5)
CHLORIDE SERPL-SCNC: 100 MMOL/L (ref 98–107)
CO2 SERPL-SCNC: 25 MMOL/L (ref 22–29)
CREAT BLD-MCNC: 1 MG/DL (ref 0.76–1.27)
DEPRECATED RDW RBC AUTO: 44.3 FL (ref 37–54)
EOSINOPHIL # BLD AUTO: 0.03 10*3/MM3 (ref 0–0.4)
EOSINOPHIL NFR BLD AUTO: 0.2 % (ref 0.3–6.2)
ERYTHROCYTE [DISTWIDTH] IN BLOOD BY AUTOMATED COUNT: 13.1 % (ref 12.3–15.4)
GFR SERPL CREATININE-BSD FRML MDRD: 78 ML/MIN/1.73
GLOBULIN UR ELPH-MCNC: 4 GM/DL
GLUCOSE BLD-MCNC: 120 MG/DL (ref 65–99)
HCT VFR BLD AUTO: 51.1 % (ref 37.5–51)
HGB BLD-MCNC: 16.9 G/DL (ref 13–17.7)
HOLD SPECIMEN: NORMAL
HOLD SPECIMEN: NORMAL
IMM GRANULOCYTES # BLD AUTO: 0.11 10*3/MM3 (ref 0–0.05)
IMM GRANULOCYTES NFR BLD AUTO: 0.6 % (ref 0–0.5)
LYMPHOCYTES # BLD AUTO: 1.02 10*3/MM3 (ref 0.7–3.1)
LYMPHOCYTES NFR BLD AUTO: 5.5 % (ref 19.6–45.3)
MCH RBC QN AUTO: 30.5 PG (ref 26.6–33)
MCHC RBC AUTO-ENTMCNC: 33.1 G/DL (ref 31.5–35.7)
MCV RBC AUTO: 92.1 FL (ref 79–97)
MONOCYTES # BLD AUTO: 2.4 10*3/MM3 (ref 0.1–0.9)
MONOCYTES NFR BLD AUTO: 13 % (ref 5–12)
NEUTROPHILS # BLD AUTO: 14.9 10*3/MM3 (ref 1.7–7)
NEUTROPHILS NFR BLD AUTO: 80.4 % (ref 42.7–76)
NRBC BLD AUTO-RTO: 0 /100 WBC (ref 0–0.2)
NT-PROBNP SERPL-MCNC: 111.3 PG/ML (ref 5–900)
PLATELET # BLD AUTO: 256 10*3/MM3 (ref 140–450)
PMV BLD AUTO: 8.9 FL (ref 6–12)
POTASSIUM BLD-SCNC: 4.1 MMOL/L (ref 3.5–5.2)
PROT SERPL-MCNC: 8.4 G/DL (ref 6–8.5)
RBC # BLD AUTO: 5.55 10*6/MM3 (ref 4.14–5.8)
SODIUM BLD-SCNC: 137 MMOL/L (ref 136–145)
TROPONIN T SERPL-MCNC: <0.01 NG/ML (ref 0–0.03)
WBC NRBC COR # BLD: 18.51 10*3/MM3 (ref 3.4–10.8)
WHOLE BLOOD HOLD SPECIMEN: NORMAL
WHOLE BLOOD HOLD SPECIMEN: NORMAL

## 2020-02-17 PROCEDURE — 80053 COMPREHEN METABOLIC PANEL: CPT | Performed by: EMERGENCY MEDICINE

## 2020-02-17 PROCEDURE — 71045 X-RAY EXAM CHEST 1 VIEW: CPT

## 2020-02-17 PROCEDURE — 94640 AIRWAY INHALATION TREATMENT: CPT

## 2020-02-17 PROCEDURE — 25010000002 METHYLPREDNISOLONE PER 125 MG: Performed by: EMERGENCY MEDICINE

## 2020-02-17 PROCEDURE — 96374 THER/PROPH/DIAG INJ IV PUSH: CPT

## 2020-02-17 PROCEDURE — 93005 ELECTROCARDIOGRAM TRACING: CPT | Performed by: EMERGENCY MEDICINE

## 2020-02-17 PROCEDURE — 25010000002 KETOROLAC TROMETHAMINE PER 15 MG: Performed by: EMERGENCY MEDICINE

## 2020-02-17 PROCEDURE — 85025 COMPLETE CBC W/AUTO DIFF WBC: CPT | Performed by: EMERGENCY MEDICINE

## 2020-02-17 PROCEDURE — 99285 EMERGENCY DEPT VISIT HI MDM: CPT

## 2020-02-17 PROCEDURE — 94799 UNLISTED PULMONARY SVC/PX: CPT

## 2020-02-17 PROCEDURE — 96375 TX/PRO/DX INJ NEW DRUG ADDON: CPT

## 2020-02-17 PROCEDURE — 83880 ASSAY OF NATRIURETIC PEPTIDE: CPT | Performed by: EMERGENCY MEDICINE

## 2020-02-17 PROCEDURE — 84484 ASSAY OF TROPONIN QUANT: CPT | Performed by: EMERGENCY MEDICINE

## 2020-02-17 RX ORDER — ALBUTEROL SULFATE 2.5 MG/3ML
10 SOLUTION RESPIRATORY (INHALATION)
Status: COMPLETED | OUTPATIENT
Start: 2020-02-17 | End: 2020-02-17

## 2020-02-17 RX ORDER — IPRATROPIUM BROMIDE AND ALBUTEROL SULFATE 2.5; .5 MG/3ML; MG/3ML
3 SOLUTION RESPIRATORY (INHALATION) ONCE
Status: COMPLETED | OUTPATIENT
Start: 2020-02-17 | End: 2020-02-17

## 2020-02-17 RX ORDER — SODIUM CHLORIDE 0.9 % (FLUSH) 0.9 %
10 SYRINGE (ML) INJECTION AS NEEDED
Status: DISCONTINUED | OUTPATIENT
Start: 2020-02-17 | End: 2020-02-17 | Stop reason: HOSPADM

## 2020-02-17 RX ORDER — METHYLPREDNISOLONE SODIUM SUCCINATE 125 MG/2ML
125 INJECTION, POWDER, LYOPHILIZED, FOR SOLUTION INTRAMUSCULAR; INTRAVENOUS ONCE
Status: COMPLETED | OUTPATIENT
Start: 2020-02-17 | End: 2020-02-17

## 2020-02-17 RX ORDER — KETOROLAC TROMETHAMINE 15 MG/ML
15 INJECTION, SOLUTION INTRAMUSCULAR; INTRAVENOUS ONCE
Status: COMPLETED | OUTPATIENT
Start: 2020-02-17 | End: 2020-02-17

## 2020-02-17 RX ORDER — KETOROLAC TROMETHAMINE 10 MG/1
10 TABLET, FILM COATED ORAL EVERY 6 HOURS PRN
Qty: 15 TABLET | Refills: 0 | Status: SHIPPED | OUTPATIENT
Start: 2020-02-17 | End: 2020-02-22

## 2020-02-17 RX ADMIN — METHYLPREDNISOLONE SODIUM SUCCINATE 125 MG: 125 INJECTION, POWDER, FOR SOLUTION INTRAMUSCULAR; INTRAVENOUS at 08:59

## 2020-02-17 RX ADMIN — KETOROLAC TROMETHAMINE 15 MG: 15 INJECTION, SOLUTION INTRAMUSCULAR; INTRAVENOUS at 08:57

## 2020-02-17 RX ADMIN — ALBUTEROL SULFATE 10 MG: 2.5 SOLUTION RESPIRATORY (INHALATION) at 09:12

## 2020-02-17 RX ADMIN — IPRATROPIUM BROMIDE AND ALBUTEROL SULFATE 3 ML: 2.5; .5 SOLUTION RESPIRATORY (INHALATION) at 09:12

## 2020-02-17 NOTE — DISCHARGE INSTRUCTIONS
By heat over the chest wall and perform regular stretching and deep breathing.    Take Toradol as needed to help with pain.    Follow-up with primary care physician for recheck within the next week.    Use neb treatments as prescribed for treatment of COPD.

## 2020-02-19 ENCOUNTER — OFFICE VISIT (OUTPATIENT)
Dept: PULMONOLOGY | Facility: CLINIC | Age: 55
End: 2020-02-19

## 2020-02-19 VITALS
WEIGHT: 141 LBS | HEIGHT: 68 IN | TEMPERATURE: 97.8 F | HEART RATE: 93 BPM | OXYGEN SATURATION: 97 % | SYSTOLIC BLOOD PRESSURE: 120 MMHG | BODY MASS INDEX: 21.37 KG/M2 | DIASTOLIC BLOOD PRESSURE: 80 MMHG

## 2020-02-19 DIAGNOSIS — J44.9 COPD MIXED TYPE (HCC): Primary | ICD-10-CM

## 2020-02-19 DIAGNOSIS — J44.1 COPD WITH ACUTE EXACERBATION (HCC): ICD-10-CM

## 2020-02-19 DIAGNOSIS — E88.01 ALPHA-1-ANTITRYPSIN DEFICIENCY (HCC): ICD-10-CM

## 2020-02-19 DIAGNOSIS — Z99.81 DEPENDENCE ON SUPPLEMENTAL OXYGEN: ICD-10-CM

## 2020-02-19 PROCEDURE — 94618 PULMONARY STRESS TESTING: CPT | Performed by: NURSE PRACTITIONER

## 2020-02-19 PROCEDURE — 99214 OFFICE O/P EST MOD 30 MIN: CPT | Performed by: NURSE PRACTITIONER

## 2020-02-19 RX ORDER — PREDNISONE 10 MG/1
TABLET ORAL
Qty: 31 TABLET | Refills: 0 | Status: SHIPPED | OUTPATIENT
Start: 2020-02-19 | End: 2020-03-17

## 2020-02-19 RX ORDER — DOXYCYCLINE HYCLATE 100 MG
100 TABLET ORAL 2 TIMES DAILY
Qty: 20 TABLET | Refills: 0 | Status: SHIPPED | OUTPATIENT
Start: 2020-02-19 | End: 2020-03-17

## 2020-02-19 NOTE — PROGRESS NOTES
[facility] Pulmonary Follow up    CHIEF COMPLAINT    EBV follow-up    HISTORY OF PRESENT ILLNESS    Balwinder Willams is a 54 y.o.male here today for a 3-month follow-up after he had the endobronchial valves placed.  He was last seen in the office in November by me.  He states for the most part his breathing has improved since the valves were placed.  He denies being on any antibiotics or steroids since his last appointment.  He did present to the ED 2 days ago for a pulled muscle in his back.  He was treated with a Toradol injection, steroid injection and given a DuoNeb.  He states that his muscle pain has improved but he continues to have difficulty with his breathing.    He states he has had difficulty with shortness of breath over the last 2 to 3 weeks.  He was able to produce sputum approximately 2 weeks ago and currently he is unable to produce any sputum.  He states he does not feel ill.    He continues to use Symbicort 2 puffs twice a day.  He also uses Combivent every 4-6 hours.  Prior to the procedure he had been on 5 L pulse dose with activity.  He is currently wearing oxygen at night only at 3 L.  He denies any daytime somnolence or fatigue.  He feels well rested in the mornings.  He states that normal activities of daily living do not cause severe shortness of breath as he did prior to his procedure.  He has noticed a tremendous amount of improvement in his breathing since the procedure was completed.  He states that he is anxious to have the valves placed in the right lung in the future.    He denies fever, chills, sputum production, hemoptysis, night sweats, weight loss, chest pain or palpitations.  He denies any lower extremity edema or calf tenderness.  He denies any sinus or allergy symptoms.  He denies any reflux symptoms.    He is up-to-date on his current vaccinations.    Patient Active Problem List   Diagnosis   • Alpha-1-antitrypsin deficiency (on replacement)   • Chronic cough   • Dependence on  supplemental oxygen   • Stage IV, very severe, emphysema   • Former smoker (Resolved 2006)   • Postprocedural pneumothorax   • Persistent air leak       Allergies   Allergen Reactions   • Other      Opioid Analgesics   • Roflumilast Nausea Only       Current Outpatient Medications:   •  COMBIVENT RESPIMAT  MCG/ACT inhaler, INHALE 1 PUFF BY MOUTH AND INTO THE LUNGS FOUR TIMES A DAY, Disp: 4 g, Rfl: 6  •  guaiFENesin (MUCINEX) 600 MG 12 hr tablet, Take 1,200 mg by mouth As Needed for Cough., Disp: , Rfl:   •  ibuprofen (ADVIL,MOTRIN) 800 MG tablet, Take 800 mg by mouth Every 6 (Six) Hours As Needed for Mild Pain ., Disp: , Rfl:   •  Influenza Vac Subunit Quad (FLUCELVAX) 0.5 ML suspension prefilled syringe injection, Inject 0.5 mL into the appropriate muscle as directed by prescriber During Hospitalization (Influenza prophylaxis) for up to 1 dose., Disp: 0.5 mL, Rfl: 0  •  ipratropium-albuterol (DUO-NEB) 0.5-2.5 mg/3 ml nebulizer, Take 3 mL by nebulization 4 (Four) Times a Day., Disp: 3240 mL, Rfl: 1  •  ketorolac (TORADOL) 10 MG tablet, Take 1 tablet by mouth Every 6 (Six) Hours As Needed for Moderate Pain  for up to 5 days., Disp: 15 tablet, Rfl: 0  •  O2 (OXYGEN), Inhale 3 L/min Every Night., Disp: , Rfl:   •  PROLASTIN-C 1000 MG/20ML solution, 1 (One) Time Per Week., Disp: , Rfl:   •  SYMBICORT 160-4.5 MCG/ACT inhaler, INHALE 2 PUFFS BY MOUTH TWICE A DAY, Disp: 40.8 g, Rfl: 0  •  doxycycline (VIBRAMYICN) 100 MG tablet, Take 1 tablet by mouth 2 (Two) Times a Day., Disp: 20 tablet, Rfl: 0  •  predniSONE (DELTASONE) 10 MG tablet, Take 4 tabs daily x 3 days, then take 3 tabs daily x 3 days, then take 2 tabs daily x 3 days, then take 1 tab daily x 3 days, Disp: 31 tablet, Rfl: 0  MEDICATION LIST AND ALLERGIES REVIEWED.    Social History     Tobacco Use   • Smoking status: Former Smoker     Packs/day: 1.50     Years: 25.00     Pack years: 37.50     Types: Cigarettes     Start date: 1/1/1981     Last attempt to  "quit: 2006     Years since quittin.1   • Smokeless tobacco: Never Used   Substance Use Topics   • Alcohol use: No     Comment: In recovery since 1994   • Drug use: Yes     Comment: In recovery since 1994       FAMILY AND SOCIAL HISTORY REVIEWED.    Review of Systems   Constitutional: Negative for activity change, appetite change, fatigue, fever and unexpected weight change.   HENT: Positive for congestion and postnasal drip. Negative for rhinorrhea, sinus pressure, sore throat and voice change.    Eyes: Negative for visual disturbance.   Respiratory: Positive for cough, shortness of breath and wheezing. Negative for chest tightness.    Cardiovascular: Negative for chest pain, palpitations and leg swelling.   Gastrointestinal: Negative for abdominal distention, abdominal pain, nausea and vomiting.   Endocrine: Negative for cold intolerance and heat intolerance.   Genitourinary: Negative for difficulty urinating and urgency.   Musculoskeletal: Negative for arthralgias, back pain and neck pain.   Skin: Negative for color change and pallor.   Allergic/Immunologic: Negative for environmental allergies and food allergies.   Neurological: Negative for dizziness, syncope, weakness and light-headedness.   Hematological: Negative for adenopathy. Does not bruise/bleed easily.   Psychiatric/Behavioral: Negative for agitation and behavioral problems.   .    /80   Pulse 93   Temp 97.8 °F (36.6 °C)   Ht 172.7 cm (68\")   Wt 64 kg (141 lb)   SpO2 97% Comment: resting, room air  BMI 21.44 kg/m²     Immunization History   Administered Date(s) Administered   • Flu Vaccine Quad PF >36MO 11/15/2019   • Influenza, Unspecified 2019   • Pneumococcal Conjugate 13-Valent (PCV13) 12/10/2015, 2019   • flucelvax quad pfs =>4 YRS 2019       Physical Exam   Constitutional: He is oriented to person, place, and time. He appears well-developed and well-nourished.   HENT:   Head: Normocephalic and " atraumatic.   Eyes: Pupils are equal, round, and reactive to light.   Neck: Normal range of motion. Neck supple. No thyromegaly present.   Cardiovascular: Normal rate, regular rhythm, normal heart sounds and intact distal pulses. Exam reveals no gallop and no friction rub.   No murmur heard.  Pulmonary/Chest: Effort normal and breath sounds normal. No respiratory distress. He has no wheezes. He has no rales. He exhibits no tenderness.   Abdominal: Soft. Bowel sounds are normal. There is no tenderness.   Musculoskeletal: Normal range of motion.   Lymphadenopathy:     He has no cervical adenopathy.   Neurological: He is alert and oriented to person, place, and time.   Skin: Skin is warm and dry. Capillary refill takes less than 2 seconds. He is not diaphoretic.   Psychiatric: He has a normal mood and affect. His behavior is normal.   Nursing note and vitals reviewed.        RESULTS    6-minute walk test: Patient ambulated 750 feet and desaturated to 86% and was placed on 2 L pulse dose.  He was increased to 3 L pulse dose and recovered to 91% throughout the remainder of testing.  He does qualify for oxygen with activity.      Xr Chest 1 View    Result Date: 2/17/2020  Stable chronic obstructive and postinflammatory pulmonary change.  D:  02/17/2020 E:  02/17/2020  This report was finalized on 2/17/2020 9:58 AM by Dr. Pola Mike MD.      PROBLEM LIST    Problem List Items Addressed This Visit        Respiratory    Alpha-1-antitrypsin deficiency (on replacement)    Overview     A.  [employer] of 11/20/2014 reports an alpha 1 antitrypsin deficiency of 4.7 with a phenotype      with Z bands detected and genotyping revealing the presence of most common      deficiency allele type Z and an inferred non-expressing null allele.         Dependence on supplemental oxygen    Stage IV, very severe, emphysema - Primary    Relevant Medications    predniSONE (DELTASONE) 10 MG tablet    Other Relevant Orders    XR Chest PA & Lateral     Walking Oximetry (Completed)      Other Visit Diagnoses     COPD with acute exacerbation (CMS/[facility])        Relevant Medications    predniSONE (DELTASONE) 10 MG tablet    doxycycline (VIBRAMYICN) 100 MG tablet            DISCUSSION    Mr. Willams was here for a 3-month follow-up after he had the endobronchial valves placed in October.  He seems to be doing well from the valve placement and has had no major complications since the procedure was completed.  He states that he has noticed significant improvement in his breathing since the procedure was done.  He states that today is a bad day for his breathing and he feels that he is very tight and congested.  I do suspect that he has a viral illness and I am going to treat him with antibiotics and prednisone.    We reviewed his chest x-ray in the office today and it did not show any acute pulmonary process.  We also reviewed his 6-minute walk test in the office today and he does continue to qualify for oxygen with activity at 3 L pulse dose.  I did encourage him to wear his oxygen with activity.    I would like him to return in the next month or so for full PFTs to compare to his baseline prior to the procedure.  I did advise him to call on a good breathing day for him and I will see him in the office.  He is going to call when he feels better.  I did advise him that if his breathing were to worsen over the next couple of days he needs to present to the ED for further evaluation.    He will follow-up in 2 to 4 weeks or sooner if his symptoms worsen.  He will call with any additional concerns or questions.  I spent 25 minutes with the patient. I spent > 50% percent of this time counseling and discussing diagnosis, prognosis, diagnostic testing, evaluation, current status, treatment options and management.    JESSICA Hyatt  02/19/20202:57 PM  Electronically signed     Please note that portions of this note were completed with a voice recognition program. Efforts  were made to edit the dictations, but occasionally words are mistranscribed.      CC: Obinna Arellano MD

## 2020-03-17 ENCOUNTER — OFFICE VISIT (OUTPATIENT)
Dept: PULMONOLOGY | Facility: CLINIC | Age: 55
End: 2020-03-17

## 2020-03-17 VITALS
OXYGEN SATURATION: 94 % | HEART RATE: 76 BPM | HEIGHT: 68 IN | WEIGHT: 143 LBS | BODY MASS INDEX: 21.67 KG/M2 | SYSTOLIC BLOOD PRESSURE: 120 MMHG | TEMPERATURE: 97.6 F | DIASTOLIC BLOOD PRESSURE: 70 MMHG

## 2020-03-17 DIAGNOSIS — J44.1 COPD WITH ACUTE EXACERBATION (HCC): ICD-10-CM

## 2020-03-17 DIAGNOSIS — J44.9 COPD MIXED TYPE (HCC): ICD-10-CM

## 2020-03-17 DIAGNOSIS — E88.01 ALPHA-1-ANTITRYPSIN DEFICIENCY (HCC): Primary | ICD-10-CM

## 2020-03-17 PROCEDURE — 94726 PLETHYSMOGRAPHY LUNG VOLUMES: CPT | Performed by: NURSE PRACTITIONER

## 2020-03-17 PROCEDURE — 99213 OFFICE O/P EST LOW 20 MIN: CPT | Performed by: NURSE PRACTITIONER

## 2020-03-17 PROCEDURE — 94375 RESPIRATORY FLOW VOLUME LOOP: CPT | Performed by: NURSE PRACTITIONER

## 2020-03-17 PROCEDURE — 94729 DIFFUSING CAPACITY: CPT | Performed by: NURSE PRACTITIONER

## 2020-03-17 RX ORDER — DOXYCYCLINE HYCLATE 100 MG
100 TABLET ORAL 2 TIMES DAILY
Qty: 20 TABLET | Refills: 0 | Status: SHIPPED | OUTPATIENT
Start: 2020-03-17 | End: 2020-07-25 | Stop reason: SDUPTHER

## 2020-03-17 RX ORDER — PREDNISONE 10 MG/1
TABLET ORAL
Qty: 31 TABLET | Refills: 0 | Status: SHIPPED | OUTPATIENT
Start: 2020-03-17 | End: 2020-07-25 | Stop reason: SDUPTHER

## 2020-03-17 NOTE — PROGRESS NOTES
[facility] Pulmonary Follow up    CHIEF COMPLAINT    Stage IV COPD    HISTORY OF PRESENT ILLNESS    Balwinder Willams is a 54 y.o.male here today for follow-up of his stage IV COPD.  He was last seen in the office by me in February.  At that time he had been seen for a 3-month follow-up after his endobronchial valves were placed.  He was acutely ill that day and was unable to do full PFTs and wanted to return today for repeat testing.  He states that he completed the antibiotics and steroids that he had at his last visit and after they were completed his infection cleared up completely.    He continues to use Symbicort 2 puffs twice a day.  He also uses Combivent every 4-6 hours.  He remains on 3 L nasal cannula at nighttime.  He has not been wearing any oxygen with activity.  He has some mild shortness of breath with activity but does recover quickly at rest.    He denies fever, chills, sputum production, hemoptysis, night sweats, weight loss, chest pain or palpitations.  He denies any lower extremity edema or calf tenderness.  He denies any sinus or allergy symptoms.  He denies any reflux symptoms.     He is up-to-date on his current vaccinations.    Patient Active Problem List   Diagnosis   • Alpha-1-antitrypsin deficiency (on replacement)   • Chronic cough   • Dependence on supplemental oxygen   • Stage IV, very severe, emphysema   • Former smoker (Resolved 2006)   • Postprocedural pneumothorax   • Persistent air leak       Allergies   Allergen Reactions   • Other      Opioid Analgesics   • Roflumilast Nausea Only       Current Outpatient Medications:   •  COMBIVENT RESPIMAT  MCG/ACT inhaler, INHALE 1 PUFF BY MOUTH AND INTO THE LUNGS FOUR TIMES A DAY, Disp: 4 g, Rfl: 6  •  guaiFENesin (MUCINEX) 600 MG 12 hr tablet, Take 1,200 mg by mouth As Needed for Cough., Disp: , Rfl:   •  ibuprofen (ADVIL,MOTRIN) 800 MG tablet, Take 800 mg by mouth Every 6 (Six) Hours As Needed for Mild Pain ., Disp: , Rfl:   •  Influenza  Vac Subunit Quad (FLUCELVAX) 0.5 ML suspension prefilled syringe injection, Inject 0.5 mL into the appropriate muscle as directed by prescriber During Hospitalization (Influenza prophylaxis) for up to 1 dose., Disp: 0.5 mL, Rfl: 0  •  ipratropium-albuterol (DUO-NEB) 0.5-2.5 mg/3 ml nebulizer, Take 3 mL by nebulization 4 (Four) Times a Day., Disp: 3240 mL, Rfl: 1  •  O2 (OXYGEN), Inhale 3 L/min Every Night., Disp: , Rfl:   •  predniSONE (DELTASONE) 10 MG tablet, Take 4 tabs daily x 3 days, then take 3 tabs daily x 3 days, then take 2 tabs daily x 3 days, then take 1 tab daily x 3 days, Disp: 31 tablet, Rfl: 0  •  PROLASTIN-C 1000 MG/20ML solution, 1 (One) Time Per Week., Disp: , Rfl:   •  SYMBICORT 160-4.5 MCG/ACT inhaler, INHALE 2 PUFFS BY MOUTH TWICE A DAY, Disp: 40.8 g, Rfl: 0  •  doxycycline (VIBRAMYICN) 100 MG tablet, Take 1 tablet by mouth 2 (Two) Times a Day., Disp: 20 tablet, Rfl: 0  MEDICATION LIST AND ALLERGIES REVIEWED.    Social History     Tobacco Use   • Smoking status: Former Smoker     Packs/day: 1.50     Years: 25.00     Pack years: 37.50     Types: Cigarettes     Start date: 1981     Last attempt to quit: 2006     Years since quittin.2   • Smokeless tobacco: Never Used   Substance Use Topics   • Alcohol use: No     Comment: In recovery since 1994   • Drug use: Yes     Comment: In recovery since 1994       FAMILY AND SOCIAL HISTORY REVIEWED.    Review of Systems   Constitutional: Negative for activity change, appetite change, fatigue, fever and unexpected weight change.   HENT: Positive for congestion. Negative for postnasal drip, rhinorrhea, sinus pressure, sore throat and voice change.    Eyes: Negative for visual disturbance.   Respiratory: Positive for cough and shortness of breath. Negative for chest tightness and wheezing.    Cardiovascular: Negative for chest pain, palpitations and leg swelling.   Gastrointestinal: Negative for abdominal distention, abdominal pain, nausea  "and vomiting.   Endocrine: Negative for cold intolerance and heat intolerance.   Genitourinary: Negative for difficulty urinating and urgency.   Musculoskeletal: Negative for arthralgias, back pain and neck pain.   Skin: Negative for color change and pallor.   Allergic/Immunologic: Negative for environmental allergies and food allergies.   Neurological: Negative for dizziness, syncope, weakness and light-headedness.   Hematological: Negative for adenopathy. Does not bruise/bleed easily.   Psychiatric/Behavioral: Negative for agitation and behavioral problems.   .    /70   Pulse 76   Temp 97.6 °F (36.4 °C)   Ht 172.7 cm (68\")   Wt 64.9 kg (143 lb)   SpO2 94% Comment: resting, room air  BMI 21.74 kg/m²     Immunization History   Administered Date(s) Administered   • Flu Vaccine Quad PF >36MO 11/15/2019   • Influenza, Unspecified 11/18/2019   • Pneumococcal Conjugate 13-Valent (PCV13) 12/10/2015, 11/18/2019   • flucelvax quad pfs =>4 YRS 11/18/2019       Physical Exam   Constitutional: He is oriented to person, place, and time. He appears well-developed and well-nourished.   HENT:   Head: Normocephalic and atraumatic.   Eyes: Pupils are equal, round, and reactive to light.   Neck: Normal range of motion. Neck supple. No thyromegaly present.   Cardiovascular: Normal rate, regular rhythm, normal heart sounds and intact distal pulses. Exam reveals no gallop and no friction rub.   No murmur heard.  Pulmonary/Chest: Effort normal and breath sounds normal. No respiratory distress. He has no wheezes. He has no rales. He exhibits no tenderness.   Abdominal: Soft. Bowel sounds are normal. There is no tenderness.   Musculoskeletal: Normal range of motion. He exhibits no edema.   Lymphadenopathy:     He has no cervical adenopathy.   Neurological: He is alert and oriented to person, place, and time.   Skin: Skin is warm and dry. Capillary refill takes less than 2 seconds. He is not diaphoretic.   Psychiatric: He has a " normal mood and affect. His behavior is normal.   Nursing note and vitals reviewed.        RESULTS    PFTS in the office today, read by me.  FVC 4.58 103% predicted, FEV1 1.11 32% predicted, FEV1/FVC 24% predicted, TLC 8.85 147% predicted, DLCO 35% predicted, severe obstruction, no restriction and severe diffusion.    PROBLEM LIST    Problem List Items Addressed This Visit        Respiratory    Alpha-1-antitrypsin deficiency (on replacement) - Primary    Overview     A.  Labs of 11/20/2014 reports an alpha 1 antitrypsin deficiency of 4.7 with a phenotype      with Z bands detected and genotyping revealing the presence of most common      deficiency allele type Z and an inferred non-expressing null allele.         Stage IV, very severe, emphysema    Relevant Medications    predniSONE (DELTASONE) 10 MG tablet      Other Visit Diagnoses     COPD with acute exacerbation (CMS/[facility])        Relevant Medications    doxycycline (VIBRAMYICN) 100 MG tablet    predniSONE (DELTASONE) 10 MG tablet    Other Relevant Orders    Pulmonary Function Test (Completed)            DISCUSSION    Mr. Willams was here for follow-up of his severe COPD.  We reviewed his PFTs in detail today and he continues to have severe obstruction.  His FEV1 has improved from 0.88-1.11 since his endobronchial valves were placed.  He has noticed a significant improvement in his breathing since the valves were placed.  He is anxious to have the valves placed on the right lung in the near future.    I am going to call in an antibiotic and prednisone for him to have on hand in case he has a flareup in the near future.  Due to the recent scare with the coronavirus he is trying to avoid large crowds and minimizing exposure.    He will continue Symbicort and Combivent for his COPD.    He will follow-up in 6 months or sooner if his symptoms worsen.  He will call with any additional concerns or questions.  I spent 15 minutes with the patient. I spent > 50% percent of  this time counseling and discussing diagnosis, prognosis, diagnostic testing, evaluation, current status, treatment options and management.    Neris JASMIN Cárdenas, APRN  03/17/20201:51 PM  Electronically signed     Please note that portions of this note were completed with a voice recognition program. Efforts were made to edit the dictations, but occasionally words are mistranscribed.      CC: Obinna Arellano MD

## 2020-03-29 ENCOUNTER — TELEMEDICINE (OUTPATIENT)
Dept: FAMILY MEDICINE CLINIC | Facility: TELEHEALTH | Age: 55
End: 2020-03-29

## 2020-03-29 DIAGNOSIS — J30.2 SEASONAL ALLERGIES: ICD-10-CM

## 2020-03-29 DIAGNOSIS — R05.9 COUGH: Primary | ICD-10-CM

## 2020-03-29 PROCEDURE — 99213 OFFICE O/P EST LOW 20 MIN: CPT | Performed by: NURSE PRACTITIONER

## 2020-03-29 RX ORDER — TRIAMCINOLONE ACETONIDE 55 UG/1
2 SPRAY, METERED NASAL DAILY
Qty: 1 BOTTLE | Refills: 0 | Status: SHIPPED | OUTPATIENT
Start: 2020-03-29 | End: 2020-04-28

## 2020-03-29 NOTE — PROGRESS NOTES
Subjective     Balwinder Willams is a 54 y.o. male who presents to the clinic with:      URI    This is a new problem. Episode onset: over the past 2 days. The problem has been unchanged. There has been no fever. Associated symptoms include congestion (sinus) and coughing (clear sputum). Associated symptoms comments: Slight shortness of breath, does not seem more than usual. He has tried antihistamine (Zyrtec) for the symptoms. The treatment provided no relief.          The following portions of the patient's history were reviewed and updated as appropriate: allergies, current medications, past family history, past medical history, past social history, past surgical history and problem list.      Review of Systems   Constitutional: Negative for chills.   HENT: Positive for congestion (sinus).    Respiratory: Positive for cough (clear sputum). Shortness of breath: yes, but not more than usual.    All other systems reviewed and are negative.        Objective   Physical Exam   Constitutional: He is oriented to person, place, and time. He appears well-developed and well-nourished.   HENT:   Head: Normocephalic.   Right Ear: Hearing normal.   Left Ear: Hearing normal.   Nose: Right sinus exhibits no maxillary sinus tenderness and no frontal sinus tenderness. Left sinus exhibits no maxillary sinus tenderness and no frontal sinus tenderness.   Pulmonary/Chest: Effort normal. No respiratory distress.   Neurological: He is alert and oriented to person, place, and time.   Skin: Skin is warm and dry.   Psychiatric: He has a normal mood and affect. His behavior is normal. Thought content normal.         Assessment/Plan   Balwinder was seen today for uri.    Diagnoses and all orders for this visit:    Cough    Seasonal allergies    Other orders  -     Triamcinolone Acetonide (NASACORT) 55 MCG/ACT nasal inhaler; 2 sprays into the nostril(s) as directed by provider Daily for 30 days.        Patient Instructions   Patient states he has  "a Medrol dose pack and antibiotic (Doxycycline) to take if needed that he received before the \"healthy at home\" initiatives started. With his history of emphysema and valve replacement, I would recommend him to be tested for COVID-19. He has concerns that he would be exposed if he goes there. So, he may try medication he has on hand and see how he feels over the next couple of days. If any increase in symptoms, recommend he be evaluated. He does verbalize understanding. I have added Nasacort to use as prescribed. I would also like to add Sudafed for him to use decongestants as needed over the next few days per 's directions. Follow-up with primary provider if needed over the next week or sooner for new/increasing symptoms.                "

## 2020-03-29 NOTE — PATIENT INSTRUCTIONS
"Patient states he has a Medrol dose pack and antibiotic (Doxycycline) to take if needed that he received before the \"healthy at home\" initiatives started. With his history of emphysema and valve replacement, I would recommend him to be tested for COVID-19. He has concerns that he would be exposed if he goes there. So, he may try medication he has on hand and see how he feels over the next couple of days. If any increase in symptoms, recommend he be evaluated. He does verbalize understanding. I have added Nasacort to use as prescribed. I would also like to add Sudafed for him to use decongestants as needed over the next few days per 's directions. Follow-up with primary provider if needed over the next week or sooner for new/increasing symptoms.   "

## 2020-03-30 ENCOUNTER — TELEPHONE (OUTPATIENT)
Dept: PULMONOLOGY | Facility: CLINIC | Age: 55
End: 2020-03-30

## 2020-03-30 NOTE — TELEPHONE ENCOUNTER
I contacted Mr. Willams because he had reached out to me through my chart to talk about his current symptoms.  He states that he is having increased sinus drainage and a cough.  He is also having a sore throat.  He had did a ED visit with his PCP yesterday and they recommended adding a nasal spray and Sudafed over-the-counter.  He was concerned that he could be exposed to the coronavirus.  I did advise him that he could be tested for the coronavirus and recommended him go through a drive-through testing site instead of [facility] due to exposure to other illnesses.  He is at high risk for developing respiratory complications.  He states that his breathing is a little bit more short of breath than normal but does have increased sinus drainage and postnasal drip.  I did advise him to monitor his symptoms and if his shortness of breath were to worsen over the next day or so he needs to present to the ED for further evaluation.  He is agreeable with this plan and will monitor his symptoms and call with any additional concerns or questions.

## 2020-04-17 DIAGNOSIS — J43.2 CENTRILOBULAR EMPHYSEMA (HCC): ICD-10-CM

## 2020-04-20 RX ORDER — BUDESONIDE AND FORMOTEROL FUMARATE DIHYDRATE 160; 4.5 UG/1; UG/1
2 AEROSOL RESPIRATORY (INHALATION) 2 TIMES DAILY
Qty: 40.8 G | Refills: 0 | Status: SHIPPED | OUTPATIENT
Start: 2020-04-20 | End: 2020-08-11 | Stop reason: SDUPTHER

## 2020-04-20 RX ORDER — BUDESONIDE AND FORMOTEROL FUMARATE DIHYDRATE 160; 4.5 UG/1; UG/1
2 AEROSOL RESPIRATORY (INHALATION) 2 TIMES DAILY
Qty: 40.8 G | Refills: 0 | Status: SHIPPED | OUTPATIENT
Start: 2020-04-20 | End: 2020-07-01 | Stop reason: SDUPTHER

## 2020-06-20 DIAGNOSIS — J43.2 CENTRILOBULAR EMPHYSEMA (HCC): ICD-10-CM

## 2020-06-22 RX ORDER — IPRATROPIUM/ALBUTEROL SULFATE 20-100 MCG
MIST INHALER (GRAM) INHALATION
Qty: 4 G | Refills: 6 | Status: SHIPPED | OUTPATIENT
Start: 2020-06-22 | End: 2020-12-03 | Stop reason: HOSPADM

## 2020-07-01 DIAGNOSIS — J43.2 CENTRILOBULAR EMPHYSEMA (HCC): ICD-10-CM

## 2020-07-01 RX ORDER — BUDESONIDE AND FORMOTEROL FUMARATE DIHYDRATE 160; 4.5 UG/1; UG/1
2 AEROSOL RESPIRATORY (INHALATION) 2 TIMES DAILY
Qty: 40.8 G | Refills: 0 | Status: SHIPPED | OUTPATIENT
Start: 2020-07-01 | End: 2020-09-12 | Stop reason: SDUPTHER

## 2020-07-25 DIAGNOSIS — J44.1 COPD WITH ACUTE EXACERBATION (HCC): ICD-10-CM

## 2020-07-27 RX ORDER — DOXYCYCLINE HYCLATE 100 MG
100 TABLET ORAL 2 TIMES DAILY
Qty: 20 TABLET | Refills: 0 | Status: SHIPPED | OUTPATIENT
Start: 2020-07-27 | End: 2020-08-11

## 2020-07-27 RX ORDER — PREDNISONE 10 MG/1
TABLET ORAL
Qty: 31 TABLET | Refills: 0 | Status: SHIPPED | OUTPATIENT
Start: 2020-07-27 | End: 2020-08-11

## 2020-08-11 ENCOUNTER — OFFICE VISIT (OUTPATIENT)
Dept: PULMONOLOGY | Facility: CLINIC | Age: 55
End: 2020-08-11

## 2020-08-11 VITALS
RESPIRATION RATE: 18 BRPM | OXYGEN SATURATION: 95 % | HEART RATE: 96 BPM | WEIGHT: 136.5 LBS | BODY MASS INDEX: 20.69 KG/M2 | DIASTOLIC BLOOD PRESSURE: 90 MMHG | TEMPERATURE: 97.3 F | SYSTOLIC BLOOD PRESSURE: 126 MMHG | HEIGHT: 68 IN

## 2020-08-11 DIAGNOSIS — J44.1 COPD EXACERBATION (HCC): ICD-10-CM

## 2020-08-11 DIAGNOSIS — Z99.81 DEPENDENCE ON SUPPLEMENTAL OXYGEN: ICD-10-CM

## 2020-08-11 DIAGNOSIS — R06.02 SHORTNESS OF BREATH: ICD-10-CM

## 2020-08-11 DIAGNOSIS — J44.9 COPD MIXED TYPE (HCC): Primary | ICD-10-CM

## 2020-08-11 DIAGNOSIS — E88.01 ALPHA-1-ANTITRYPSIN DEFICIENCY (HCC): ICD-10-CM

## 2020-08-11 DIAGNOSIS — R53.82 CHRONIC FATIGUE, UNSPECIFIED: ICD-10-CM

## 2020-08-11 PROCEDURE — 99214 OFFICE O/P EST MOD 30 MIN: CPT | Performed by: NURSE PRACTITIONER

## 2020-08-11 PROCEDURE — 80053 COMPREHEN METABOLIC PANEL: CPT | Performed by: NURSE PRACTITIONER

## 2020-08-11 PROCEDURE — 83880 ASSAY OF NATRIURETIC PEPTIDE: CPT | Performed by: NURSE PRACTITIONER

## 2020-08-11 PROCEDURE — 85025 COMPLETE CBC W/AUTO DIFF WBC: CPT | Performed by: NURSE PRACTITIONER

## 2020-08-11 PROCEDURE — 82103 ALPHA-1-ANTITRYPSIN TOTAL: CPT | Performed by: NURSE PRACTITIONER

## 2020-08-11 PROCEDURE — 84443 ASSAY THYROID STIM HORMONE: CPT | Performed by: NURSE PRACTITIONER

## 2020-08-11 RX ORDER — LEVOFLOXACIN 500 MG/1
500 TABLET, FILM COATED ORAL DAILY
Qty: 7 TABLET | Refills: 0 | Status: SHIPPED | OUTPATIENT
Start: 2020-08-11 | End: 2020-08-27

## 2020-08-11 RX ORDER — PREDNISONE 10 MG/1
TABLET ORAL
Qty: 30 TABLET | Refills: 0 | Status: SHIPPED | OUTPATIENT
Start: 2020-08-11 | End: 2020-10-28

## 2020-08-11 NOTE — PROGRESS NOTES
[facility] Pulmonary Follow up    CHIEF COMPLAINT    Cough/congestion    HISTORY OF PRESENT ILLNESS    Balwinder Willams is a 54 y.o.male here today for a sick visit.  He was last seen in the office by me in March.  He had called 2 weeks ago and stated that he had worsening congestion I placed him on doxycycline and prednisone taper.  He completed these last week.  He states that he continues to feel poorly and feels that his infection has not cleared up.    He has worsening shortness of breath over the last couple of weeks.  He has been wearing his oxygen at 2 L nasal cannula at nighttime and will occasionally wear it during the day.  For the most part he does not wear his oxygen during the day.    He has been using Symbicort 2 puffs twice a day and his Combivent inhaler 4 times a day.  He also has DuoNeb's that he will alternate with the Combivent occasionally.  He usually does 1 DuoNeb a day.  He does notice the DuoNeb does help his breathing.    He denies fever, chills, hemoptysis, night sweats, weight loss, chest pain or palpitations.  He has noticed some thick brown-tannish secretions over the last few days.  He denies any lower extremity edema or calf tenderness.  He has noticed worsening sinus and allergy symptoms over the last couple of weeks.  He denies reflux symptoms.    He continues to receive Prolastin infusions weekly for his alpha-1 antitrypsin deficiency.    He is up-to-date on his current vaccinations.    He quit smoking in 2006 and has a 37-pack-year history.    Patient Active Problem List   Diagnosis   • Alpha-1-antitrypsin deficiency (on replacement)   • Chronic cough   • Dependence on supplemental oxygen   • Stage IV, very severe, emphysema   • Former smoker (Resolved 2006)   • Postprocedural pneumothorax   • Persistent air leak       Allergies   Allergen Reactions   • Other      Opioid Analgesics   • Roflumilast Nausea Only       Current Outpatient Medications:   •  budesonide-formoterol  (Symbicort) 160-4.5 MCG/ACT inhaler, Inhale 2 puffs 2 (Two) Times a Day., Disp: 40.8 g, Rfl: 0  •  COMBIVENT RESPIMAT  MCG/ACT inhaler, INHALE 1 PUFF BY MOUTH AND INTO THE LUNGS FOUR TIMES A DAY, Disp: 4 g, Rfl: 6  •  guaiFENesin (MUCINEX) 600 MG 12 hr tablet, Take 1,200 mg by mouth As Needed for Cough., Disp: , Rfl:   •  ibuprofen (ADVIL,MOTRIN) 800 MG tablet, Take 800 mg by mouth Every 6 (Six) Hours As Needed for Mild Pain ., Disp: , Rfl:   •  ipratropium-albuterol (DUO-NEB) 0.5-2.5 mg/3 ml nebulizer, Take 3 mL by nebulization 4 (Four) Times a Day., Disp: 3240 mL, Rfl: 1  •  O2 (OXYGEN), Inhale 3 L/min Every Night., Disp: , Rfl:   •  PROLASTIN-C 1000 MG/20ML solution, 1 (One) Time Per Week., Disp: , Rfl:   •  levoFLOXacin (Levaquin) 500 MG tablet, Take 1 tablet by mouth Daily., Disp: 7 tablet, Rfl: 0  •  predniSONE (DELTASONE) 10 MG tablet, Take 4 tabs daily x 3 days, then take 3 tabs daily x 3 days, then take 2 tabs daily x 3 days, then take 1 tab daily x 3 days, Disp: 30 tablet, Rfl: 0  MEDICATION LIST AND ALLERGIES REVIEWED.    Social History     Tobacco Use   • Smoking status: Former Smoker     Packs/day: 1.50     Years: 25.00     Pack years: 37.50     Types: Cigarettes     Start date: 1981     Last attempt to quit: 2006     Years since quittin.6   • Smokeless tobacco: Never Used   Substance Use Topics   • Alcohol use: No     Comment: In recovery since 1994   • Drug use: Yes     Comment: In recovery since 1994       FAMILY AND SOCIAL HISTORY REVIEWED.    Review of Systems   Constitutional: Positive for activity change and fatigue. Negative for appetite change, fever and unexpected weight change.   HENT: Positive for congestion, postnasal drip and rhinorrhea. Negative for sinus pressure, sore throat and voice change.    Eyes: Negative for visual disturbance.   Respiratory: Positive for shortness of breath. Negative for cough, chest tightness and wheezing.    Cardiovascular: Negative for  "chest pain, palpitations and leg swelling.   Gastrointestinal: Negative for abdominal distention, abdominal pain, nausea and vomiting.   Endocrine: Negative for cold intolerance and heat intolerance.   Genitourinary: Negative for difficulty urinating and urgency.   Musculoskeletal: Negative for arthralgias, back pain and neck pain.   Skin: Negative for color change and pallor.   Allergic/Immunologic: Negative for environmental allergies and food allergies.   Neurological: Negative for dizziness, syncope, weakness and light-headedness.   Hematological: Negative for adenopathy. Does not bruise/bleed easily.   Psychiatric/Behavioral: Negative for agitation and behavioral problems.   .    /90 (BP Location: Right arm, Patient Position: Sitting, Cuff Size: Adult)   Pulse 96   Temp 97.3 °F (36.3 °C)   Resp 18   Ht 172.7 cm (68\")   Wt 61.9 kg (136 lb 8 oz)   SpO2 95%   PF (!) 2 L/min Comment: Continious at resting  BMI 20.75 kg/m²     Immunization History   Administered Date(s) Administered   • Flu Vaccine Quad PF >36MO 11/15/2019   • Influenza, Unspecified 11/18/2019   • Pneumococcal Conjugate 13-Valent (PCV13) 12/10/2015, 11/18/2019   • flucelvax quad pfs =>4 YRS 11/18/2019       Physical Exam   Constitutional: He is oriented to person, place, and time. He appears well-developed and well-nourished.   HENT:   Head: Normocephalic and atraumatic.   Eyes: Pupils are equal, round, and reactive to light.   Neck: Normal range of motion. Neck supple. No thyromegaly present.   Cardiovascular: Normal rate, regular rhythm, normal heart sounds and intact distal pulses. Exam reveals no gallop and no friction rub.   No murmur heard.  Pulmonary/Chest: Effort normal and breath sounds normal. No respiratory distress. He has no wheezes. He has no rales. He exhibits no tenderness.   diminished breath sounds in all lobes   Abdominal: Soft. Bowel sounds are normal. There is no tenderness.   Musculoskeletal: Normal range of " motion. He exhibits no edema.   Lymphadenopathy:     He has no cervical adenopathy.   Neurological: He is alert and oriented to person, place, and time.   Skin: Skin is warm and dry. Capillary refill takes less than 2 seconds. He is not diaphoretic.   Psychiatric: He has a normal mood and affect. His behavior is normal.   Nursing note and vitals reviewed.        RESULTS    Chest PA/Lateral:  official report pending    PROBLEM LIST    Problem List Items Addressed This Visit        Respiratory    Alpha-1-antitrypsin deficiency (on replacement)    Overview     A.  Labs of 11/20/2014 reports an alpha 1 antitrypsin deficiency of 4.7 with a phenotype      with Z bands detected and genotyping revealing the presence of most common      deficiency allele type Z and an inferred non-expressing null allele.         Dependence on supplemental oxygen    Stage IV, very severe, emphysema - Primary    Relevant Medications    predniSONE (DELTASONE) 10 MG tablet    Other Relevant Orders    XR Chest PA & Lateral (Completed)    CBC & Differential    Comprehensive Metabolic Panel    BNP    Alpha - 1 - Antitrypsin    TSH    CBC Auto Differential      Other Visit Diagnoses     Shortness of breath        Relevant Medications    predniSONE (DELTASONE) 10 MG tablet    Other Relevant Orders    BNP    CT Chest Without Contrast    COPD exacerbation (CMS/HCC)        Relevant Medications    predniSONE (DELTASONE) 10 MG tablet    levoFLOXacin (Levaquin) 500 MG tablet    Shortness of breath         Relevant Medications    predniSONE (DELTASONE) 10 MG tablet    Other Relevant Orders    BNP    CT Chest Without Contrast    Chronic fatigue, unspecified         Relevant Orders    TSH            DISCUSSION    Mr. Willams was here today for follow-up for a sick visit.  We did review his chest x-ray in the office today and it appears to have no acute pulmonary process however the official report is pending.  I will notify him of any abnormalities.    I am  going to treat him with a round of Levaquin and prednisone taper.  I did advise him that if he were to worsen he needs to present to the ED for further evaluation.    He will continue Symbicort twice a day and I encouraged him to do his DuoNeb's 4 times a day for the next couple of days.  He will continue to use Combivent as needed during the day as well.    He will continue to wear his oxygen at 2 L nasal cannula at nighttime.  I did encourage her to wear his oxygen during the day for the next few days to see if this improved his symptoms.    I am going to order a CT of the chest without contrast to rule out any other causes of his shortness of breath.  I will call him once I receive the results.    I have also drawn some labs today to see if there is any other causes of his shortness of breath as well.    He will follow-up in November or sooner if his symptoms worsen.  He will need full PFTs, 6-minute walk test at his next visit.  I did advise him to call me if he had any other questions or concerns.  I spent 25 minutes with the patient. I spent > 50% percent of this time counseling and discussing diagnosis, prognosis, diagnostic testing, evaluation, current status, treatment options and clinical trials.    Nerissirisha Cárdenas, APRN  08/11/202011:33 AM  Electronically signed     Please note that portions of this note were completed with a voice recognition program. Efforts were made to edit the dictations, but occasionally words are mistranscribed.      CC: Obinna Arellano MD

## 2020-08-12 LAB
ALBUMIN SERPL-MCNC: 4.4 G/DL (ref 3.5–5.2)
ALBUMIN/GLOB SERPL: 1.4 G/DL
ALP SERPL-CCNC: 61 U/L (ref 39–117)
ALPHA1 GLOB MFR UR ELPH: 71 MG/DL (ref 90–200)
ALT SERPL W P-5'-P-CCNC: 20 U/L (ref 1–41)
ANION GAP SERPL CALCULATED.3IONS-SCNC: 11.2 MMOL/L (ref 5–15)
AST SERPL-CCNC: 16 U/L (ref 1–40)
BASOPHILS # BLD AUTO: 0.06 10*3/MM3 (ref 0–0.2)
BASOPHILS NFR BLD AUTO: 0.5 % (ref 0–1.5)
BILIRUB SERPL-MCNC: 0.5 MG/DL (ref 0–1.2)
BUN SERPL-MCNC: 15 MG/DL (ref 6–20)
BUN/CREAT SERPL: 16.9 (ref 7–25)
CALCIUM SPEC-SCNC: 9.1 MG/DL (ref 8.6–10.5)
CHLORIDE SERPL-SCNC: 103 MMOL/L (ref 98–107)
CO2 SERPL-SCNC: 23.8 MMOL/L (ref 22–29)
CREAT SERPL-MCNC: 0.89 MG/DL (ref 0.76–1.27)
DEPRECATED RDW RBC AUTO: 39 FL (ref 37–54)
EOSINOPHIL # BLD AUTO: 0.4 10*3/MM3 (ref 0–0.4)
EOSINOPHIL NFR BLD AUTO: 3.2 % (ref 0.3–6.2)
ERYTHROCYTE [DISTWIDTH] IN BLOOD BY AUTOMATED COUNT: 12.3 % (ref 12.3–15.4)
GFR SERPL CREATININE-BSD FRML MDRD: 89 ML/MIN/1.73
GLOBULIN UR ELPH-MCNC: 3.1 GM/DL
GLUCOSE SERPL-MCNC: 88 MG/DL (ref 65–99)
HCT VFR BLD AUTO: 48.7 % (ref 37.5–51)
HGB BLD-MCNC: 17.2 G/DL (ref 13–17.7)
IMM GRANULOCYTES # BLD AUTO: 0.08 10*3/MM3 (ref 0–0.05)
IMM GRANULOCYTES NFR BLD AUTO: 0.6 % (ref 0–0.5)
LYMPHOCYTES # BLD AUTO: 1.48 10*3/MM3 (ref 0.7–3.1)
LYMPHOCYTES NFR BLD AUTO: 12 % (ref 19.6–45.3)
MCH RBC QN AUTO: 30.8 PG (ref 26.6–33)
MCHC RBC AUTO-ENTMCNC: 35.3 G/DL (ref 31.5–35.7)
MCV RBC AUTO: 87.3 FL (ref 79–97)
MONOCYTES # BLD AUTO: 1.47 10*3/MM3 (ref 0.1–0.9)
MONOCYTES NFR BLD AUTO: 11.9 % (ref 5–12)
NEUTROPHILS NFR BLD AUTO: 71.8 % (ref 42.7–76)
NEUTROPHILS NFR BLD AUTO: 8.84 10*3/MM3 (ref 1.7–7)
NRBC BLD AUTO-RTO: 0 /100 WBC (ref 0–0.2)
NT-PROBNP SERPL-MCNC: 40.5 PG/ML (ref 0–900)
PLATELET # BLD AUTO: 308 10*3/MM3 (ref 140–450)
PMV BLD AUTO: 10.1 FL (ref 6–12)
POTASSIUM SERPL-SCNC: 5.1 MMOL/L (ref 3.5–5.2)
PROT SERPL-MCNC: 7.5 G/DL (ref 6–8.5)
RBC # BLD AUTO: 5.58 10*6/MM3 (ref 4.14–5.8)
SODIUM SERPL-SCNC: 138 MMOL/L (ref 136–145)
TSH SERPL DL<=0.05 MIU/L-ACNC: 1.37 UIU/ML (ref 0.27–4.2)
WBC # BLD AUTO: 12.33 10*3/MM3 (ref 3.4–10.8)

## 2020-08-14 ENCOUNTER — HOSPITAL ENCOUNTER (OUTPATIENT)
Dept: CT IMAGING | Facility: HOSPITAL | Age: 55
Discharge: HOME | End: 2020-08-14
Admitting: NURSE PRACTITIONER

## 2020-08-14 DIAGNOSIS — R06.02 SHORTNESS OF BREATH: ICD-10-CM

## 2020-08-14 PROCEDURE — 71250 CT THORAX DX C-: CPT

## 2020-08-21 DIAGNOSIS — J18.9 PNEUMONIA OF LEFT LUNG DUE TO INFECTIOUS ORGANISM, UNSPECIFIED PART OF LUNG: Primary | ICD-10-CM

## 2020-08-24 ENCOUNTER — TELEPHONE (OUTPATIENT)
Dept: PULMONOLOGY | Facility: CLINIC | Age: 55
End: 2020-08-24

## 2020-08-24 NOTE — TELEPHONE ENCOUNTER
Mr. Willams had several questions regarding his CT scan.  He had sent messages via my chart status called to answer whatever questions I could.  He will continue to follow-up with Eneida as well as his follow-up CT scan.

## 2020-08-26 ENCOUNTER — TELEPHONE (OUTPATIENT)
Dept: PULMONOLOGY | Facility: CLINIC | Age: 55
End: 2020-08-26

## 2020-08-26 NOTE — TELEPHONE ENCOUNTER
Infusion nurse(Chloe) called today informing me that pt came in today and was having severe SOB on exertion and advises to have pt f/u into the office sooner than 11/19. Spoke with pt and he states he agrees that for the last couple of weeks he has had severe SOB on Exertion. Pt has been on 2 rounds of abx/steroids and still not doing any better. Last abx/steroid finished were last week. Pt had a recent CT on 8/14. Pt is aware Neris is out of the office today and she will return his call tomorrow. Pt can be reached @ [phone]. Please advise.

## 2020-08-27 DIAGNOSIS — J18.9 PNEUMONIA OF LEFT LUNG DUE TO INFECTIOUS ORGANISM, UNSPECIFIED PART OF LUNG: Primary | ICD-10-CM

## 2020-08-27 RX ORDER — PREDNISONE 10 MG/1
TABLET ORAL
Qty: 30 TABLET | Refills: 0 | Status: SHIPPED | OUTPATIENT
Start: 2020-08-27 | End: 2020-10-28

## 2020-08-27 RX ORDER — LEVOFLOXACIN 750 MG/1
750 TABLET ORAL DAILY
Qty: 7 TABLET | Refills: 0 | Status: SHIPPED | OUTPATIENT
Start: 2020-08-27 | End: 2020-10-28

## 2020-09-01 ENCOUNTER — TELEPHONE (OUTPATIENT)
Dept: PULMONOLOGY | Facility: CLINIC | Age: 55
End: 2020-09-01

## 2020-09-01 NOTE — TELEPHONE ENCOUNTER
I called and talked with Mr. Willams about how he was feeling with the Levaquin and additional prednisone.  He states that he is feeling slightly better.  I did talk with Dr. Marie and we are going to hold off on a bronchoscopy at this point.  If he were to worsen in the near future he may need a bronchoscopy at that point.  He is agreeable with this plan and will follow up with Dr. Marie in November.  I also advised him to call me if he were to have worsening symptoms in the near future.

## 2020-09-09 ENCOUNTER — TRANSCRIBE ORDERS (OUTPATIENT)
Dept: PULMONOLOGY | Facility: CLINIC | Age: 55
End: 2020-09-09

## 2020-09-09 ENCOUNTER — APPOINTMENT (OUTPATIENT)
Dept: PREADMISSION TESTING | Facility: HOSPITAL | Age: 55
End: 2020-09-09

## 2020-09-09 ENCOUNTER — PREP FOR SURGERY (OUTPATIENT)
Dept: OTHER | Facility: HOSPITAL | Age: 55
End: 2020-09-09

## 2020-09-09 DIAGNOSIS — J18.9 RECURRENT PNEUMONIA: Primary | ICD-10-CM

## 2020-09-09 PROCEDURE — U0004 COV-19 TEST NON-CDC HGH THRU: HCPCS

## 2020-09-09 PROCEDURE — C9803 HOPD COVID-19 SPEC COLLECT: HCPCS

## 2020-09-09 RX ORDER — SODIUM CHLORIDE 9 MG/ML
125 INJECTION, SOLUTION INTRAVENOUS CONTINUOUS
Status: CANCELLED | OUTPATIENT
Start: 2020-09-09

## 2020-09-09 RX ORDER — SODIUM CHLORIDE 0.9 % (FLUSH) 0.9 %
10 SYRINGE (ML) INJECTION AS NEEDED
Status: CANCELLED | OUTPATIENT
Start: 2020-09-09

## 2020-09-09 RX ORDER — LIDOCAINE HYDROCHLORIDE 40 MG/ML
4 INJECTION, SOLUTION RETROBULBAR; TOPICAL ONCE
Status: CANCELLED | OUTPATIENT
Start: 2020-09-09 | End: 2020-09-09

## 2020-09-09 RX ORDER — SODIUM CHLORIDE 0.9 % (FLUSH) 0.9 %
3 SYRINGE (ML) INJECTION EVERY 12 HOURS SCHEDULED
Status: CANCELLED | OUTPATIENT
Start: 2020-09-09

## 2020-09-10 ENCOUNTER — ANESTHESIA EVENT (OUTPATIENT)
Dept: GASTROENTEROLOGY | Facility: HOSPITAL | Age: 55
End: 2020-09-10

## 2020-09-10 LAB — SARS-COV-2 RNA NOSE QL NAA+PROBE: NOT DETECTED

## 2020-09-11 ENCOUNTER — HOSPITAL ENCOUNTER (OUTPATIENT)
Facility: HOSPITAL | Age: 55
Setting detail: HOSPITAL OUTPATIENT SURGERY
Discharge: HOME | End: 2020-09-11
Attending: INTERNAL MEDICINE | Admitting: INTERNAL MEDICINE

## 2020-09-11 ENCOUNTER — ANESTHESIA (OUTPATIENT)
Dept: GASTROENTEROLOGY | Facility: HOSPITAL | Age: 55
End: 2020-09-11

## 2020-09-11 VITALS
RESPIRATION RATE: 24 BRPM | HEART RATE: 82 BPM | SYSTOLIC BLOOD PRESSURE: 106 MMHG | DIASTOLIC BLOOD PRESSURE: 81 MMHG | OXYGEN SATURATION: 94 % | TEMPERATURE: 96.7 F

## 2020-09-11 DIAGNOSIS — J18.9 RECURRENT PNEUMONIA: ICD-10-CM

## 2020-09-11 PROCEDURE — 87116 MYCOBACTERIA CULTURE: CPT | Performed by: INTERNAL MEDICINE

## 2020-09-11 PROCEDURE — 88305 TISSUE EXAM BY PATHOLOGIST: CPT | Performed by: INTERNAL MEDICINE

## 2020-09-11 PROCEDURE — 87102 FUNGUS ISOLATION CULTURE: CPT | Performed by: INTERNAL MEDICINE

## 2020-09-11 PROCEDURE — 31645 BRNCHSC W/THER ASPIR 1ST: CPT | Performed by: INTERNAL MEDICINE

## 2020-09-11 PROCEDURE — 87206 SMEAR FLUORESCENT/ACID STAI: CPT | Performed by: INTERNAL MEDICINE

## 2020-09-11 PROCEDURE — 87077 CULTURE AEROBIC IDENTIFY: CPT | Performed by: INTERNAL MEDICINE

## 2020-09-11 PROCEDURE — 94640 AIRWAY INHALATION TREATMENT: CPT

## 2020-09-11 PROCEDURE — 87205 SMEAR GRAM STAIN: CPT | Performed by: INTERNAL MEDICINE

## 2020-09-11 PROCEDURE — 88112 CYTOPATH CELL ENHANCE TECH: CPT | Performed by: INTERNAL MEDICINE

## 2020-09-11 PROCEDURE — 25010000002 PROPOFOL 10 MG/ML EMULSION: Performed by: NURSE ANESTHETIST, CERTIFIED REGISTERED

## 2020-09-11 PROCEDURE — 87186 SC STD MICRODIL/AGAR DIL: CPT | Performed by: INTERNAL MEDICINE

## 2020-09-11 PROCEDURE — 87070 CULTURE OTHR SPECIMN AEROBIC: CPT | Performed by: INTERNAL MEDICINE

## 2020-09-11 PROCEDURE — 87252 VIRUS INOCULATION TISSUE: CPT | Performed by: INTERNAL MEDICINE

## 2020-09-11 RX ORDER — FAMOTIDINE 10 MG/ML
20 INJECTION, SOLUTION INTRAVENOUS ONCE
Status: COMPLETED | OUTPATIENT
Start: 2020-09-11 | End: 2020-09-11

## 2020-09-11 RX ORDER — SODIUM CHLORIDE 0.9 % (FLUSH) 0.9 %
10 SYRINGE (ML) INJECTION AS NEEDED
Status: DISCONTINUED | OUTPATIENT
Start: 2020-09-11 | End: 2020-09-11 | Stop reason: HOSPADM

## 2020-09-11 RX ORDER — SODIUM CHLORIDE 0.9 % (FLUSH) 0.9 %
10 SYRINGE (ML) INJECTION EVERY 12 HOURS SCHEDULED
Status: DISCONTINUED | OUTPATIENT
Start: 2020-09-11 | End: 2020-09-11 | Stop reason: HOSPADM

## 2020-09-11 RX ORDER — SODIUM CHLORIDE 0.9 % (FLUSH) 0.9 %
3 SYRINGE (ML) INJECTION EVERY 12 HOURS SCHEDULED
Status: DISCONTINUED | OUTPATIENT
Start: 2020-09-11 | End: 2020-09-11 | Stop reason: HOSPADM

## 2020-09-11 RX ORDER — PROPOFOL 10 MG/ML
VIAL (ML) INTRAVENOUS AS NEEDED
Status: DISCONTINUED | OUTPATIENT
Start: 2020-09-11 | End: 2020-09-11 | Stop reason: SURG

## 2020-09-11 RX ORDER — FAMOTIDINE 20 MG/1
20 TABLET, FILM COATED ORAL ONCE
Status: DISCONTINUED | OUTPATIENT
Start: 2020-09-11 | End: 2020-09-11 | Stop reason: HOSPADM

## 2020-09-11 RX ORDER — FAMOTIDINE 10 MG/ML
20 INJECTION, SOLUTION INTRAVENOUS ONCE
Status: DISCONTINUED | OUTPATIENT
Start: 2020-09-11 | End: 2020-09-11 | Stop reason: HOSPADM

## 2020-09-11 RX ORDER — IPRATROPIUM BROMIDE AND ALBUTEROL SULFATE 2.5; .5 MG/3ML; MG/3ML
3 SOLUTION RESPIRATORY (INHALATION)
Status: DISCONTINUED | OUTPATIENT
Start: 2020-09-11 | End: 2020-09-11 | Stop reason: HOSPADM

## 2020-09-11 RX ORDER — ONDANSETRON 2 MG/ML
4 INJECTION INTRAMUSCULAR; INTRAVENOUS ONCE AS NEEDED
Status: DISCONTINUED | OUTPATIENT
Start: 2020-09-11 | End: 2020-09-11 | Stop reason: HOSPADM

## 2020-09-11 RX ORDER — IPRATROPIUM BROMIDE AND ALBUTEROL SULFATE 2.5; .5 MG/3ML; MG/3ML
3 SOLUTION RESPIRATORY (INHALATION) ONCE AS NEEDED
Status: DISCONTINUED | OUTPATIENT
Start: 2020-09-11 | End: 2020-09-11 | Stop reason: HOSPADM

## 2020-09-11 RX ORDER — SODIUM CHLORIDE 9 MG/ML
125 INJECTION, SOLUTION INTRAVENOUS CONTINUOUS
Status: DISCONTINUED | OUTPATIENT
Start: 2020-09-11 | End: 2020-09-11 | Stop reason: HOSPADM

## 2020-09-11 RX ORDER — SODIUM CHLORIDE 9 MG/ML
50 INJECTION, SOLUTION INTRAVENOUS ONCE
Status: COMPLETED | OUTPATIENT
Start: 2020-09-11 | End: 2020-09-11

## 2020-09-11 RX ORDER — HYDRALAZINE HYDROCHLORIDE 20 MG/ML
5 INJECTION INTRAMUSCULAR; INTRAVENOUS
Status: DISCONTINUED | OUTPATIENT
Start: 2020-09-11 | End: 2020-09-11 | Stop reason: HOSPADM

## 2020-09-11 RX ORDER — SODIUM CHLORIDE, SODIUM LACTATE, POTASSIUM CHLORIDE, CALCIUM CHLORIDE 600; 310; 30; 20 MG/100ML; MG/100ML; MG/100ML; MG/100ML
9 INJECTION, SOLUTION INTRAVENOUS CONTINUOUS
Status: DISCONTINUED | OUTPATIENT
Start: 2020-09-11 | End: 2020-09-11 | Stop reason: HOSPADM

## 2020-09-11 RX ORDER — LIDOCAINE HYDROCHLORIDE 10 MG/ML
0.5 INJECTION, SOLUTION EPIDURAL; INFILTRATION; INTRACAUDAL; PERINEURAL ONCE AS NEEDED
Status: DISCONTINUED | OUTPATIENT
Start: 2020-09-11 | End: 2020-09-11 | Stop reason: HOSPADM

## 2020-09-11 RX ORDER — LABETALOL HYDROCHLORIDE 5 MG/ML
5 INJECTION, SOLUTION INTRAVENOUS
Status: DISCONTINUED | OUTPATIENT
Start: 2020-09-11 | End: 2020-09-11 | Stop reason: HOSPADM

## 2020-09-11 RX ORDER — LIDOCAINE HYDROCHLORIDE 40 MG/ML
4 INJECTION, SOLUTION RETROBULBAR; TOPICAL ONCE
Status: COMPLETED | OUTPATIENT
Start: 2020-09-11 | End: 2020-09-11

## 2020-09-11 RX ADMIN — PROPOFOL 100 MCG/KG/MIN: 10 INJECTION, EMULSION INTRAVENOUS at 08:00

## 2020-09-11 RX ADMIN — SODIUM CHLORIDE 50 ML/HR: 9 INJECTION, SOLUTION INTRAVENOUS at 07:21

## 2020-09-11 RX ADMIN — FAMOTIDINE 20 MG: 10 INJECTION INTRAVENOUS at 07:21

## 2020-09-11 RX ADMIN — PROPOFOL 50 MG: 10 INJECTION, EMULSION INTRAVENOUS at 08:00

## 2020-09-11 RX ADMIN — IPRATROPIUM BROMIDE AND ALBUTEROL SULFATE 3 ML: 2.5; .5 SOLUTION RESPIRATORY (INHALATION) at 07:09

## 2020-09-11 RX ADMIN — LIDOCAINE HYDROCHLORIDE 4 ML: 40 INJECTION, SOLUTION RETROBULBAR; TOPICAL at 07:27

## 2020-09-11 RX ADMIN — SODIUM CHLORIDE, POTASSIUM CHLORIDE, SODIUM LACTATE AND CALCIUM CHLORIDE: 600; 310; 30; 20 INJECTION, SOLUTION INTRAVENOUS at 07:36

## 2020-09-11 NOTE — ANESTHESIA PREPROCEDURE EVALUATION
Anesthesia Evaluation     Patient summary reviewed and Nursing notes reviewed   no history of anesthetic complications:  NPO Solid Status: > 8 hours  NPO Liquid Status: > 2 hours           Airway   Mallampati: II  TM distance: >3 FB  Neck ROM: full  No difficulty expected  Dental - normal exam     Pulmonary - normal exam    breath sounds clear to auscultation  (+) pneumonia (Recurrent pneumonia\) , COPD (Alpha-1 antitrypsin deficiency) severe, asthma,    ROS comment: Recurrent bronchopleural fistula  Cardiovascular - normal exam    ECG reviewed  Rhythm: regular  Rate: normal      ROS comment: 7/19 Echo:   · Left ventricular systolic function is normal. Estimated EF = 65%.  · No significant valvular abnormalities.    Neuro/Psych- negative ROS  GI/Hepatic/Renal/Endo      Musculoskeletal (-) negative ROS    Abdominal    Substance History      OB/GYN          Other - negative ROS                       Anesthesia Plan    ASA 3     general     intravenous induction     Anesthetic plan, all risks, benefits, and alternatives have been provided, discussed and informed consent has been obtained with: patient.    Plan discussed with CRNA.

## 2020-09-11 NOTE — ANESTHESIA POSTPROCEDURE EVALUATION
Patient: Balwinder Willams    Procedure Summary     Date:  09/11/20 Room / [facility]    Anesthesia Start:  0754 Anesthesia Stop:      Procedure:  BRONCHOSCOPY (N/A Bronchus) Diagnosis:       Recurrent pneumonia      (Recurrent pneumonia [J18.9])    Surgeon:  Phan Castellano MD Provider:  Zander Fraser MD    Anesthesia Type:  general ASA Status:  3          Anesthesia Type: general    Vitals  No vitals data found for the desired time range.    RR 20  BP 93% NC 3L   ST  BP 88/70  T 97F      Post Anesthesia Care and Evaluation    Patient location during evaluation: PACU  Patient participation: complete - patient participated  Level of consciousness: awake and alert  Pain score: 0  Pain management: adequate  Airway patency: patent  Anesthetic complications: No anesthetic complications  PONV Status: none  Cardiovascular status: hemodynamically stable and acceptable  Respiratory status: nonlabored ventilation, acceptable and nasal cannula  Hydration status: acceptable

## 2020-09-11 NOTE — H&P
Pulmonary Medicine Procedural History     Patient Care Team:  Obinna Arellano MD as PCP - General (Adolescent Medicine)  Neris Cárdenas APRN as PCP - Claims Attributed    Reason for Consultation: Recurrent pneumonias    Subjective   History of Present Illness:  Mr. Balwinder Willams is a 54 y.o. male whom I know very well from previous visits.  He has a history of alpha-1 antitrypsin deficiency and severe emphysema.  He is a recipient of multiple endobronchial valves for nonsurgical lung volume reduction.  He actually has done quite well with those despite an early postprocedural pneumothorax.  However, for the past 2 months he is been having problems with recurrent sputum production and probable pneumonia.  He has received several courses of antibiotics which initially make him feel much better however after discontinuation of the antibiotics his symptoms recur.  He is here today for bronchoscopy for further diagnostic and possibly therapeutic intervention.    Review of Systems:  Pertinent items are noted in HPI    Past Medical History:  Past Medical History:   Diagnosis Date   • Alpha-1-antitrypsin deficiency (CMS/HCC)    • Asthma, extrinsic smoke, pollen   • Chronic bronchitis (CMS/HCC)    • COPD (chronic obstructive pulmonary disease) (CMS/HCC)    • Cough    • Emphysema of lung (CMS/HCC) COPD diagnosed approximately 14 years ago   • Lung nodule I dont know    I dont know what this is   • Recurrent pneumonia 9/9/2020   • Wears glasses      Past Surgical History:   Procedure Laterality Date   • BRONCHOSCOPY N/A 10/30/2019    Procedure: BRONCHOSCOPY WITH ENDOBRONHCIAL VALVE PLACEMENT;  Surgeon: Phan Castellano MD;  Location:  [facility];  Service: Pulmonary   • BRONCHOSCOPY N/A 10/24/2019    Procedure: BRONCHOSCOPY WITH ENDOBRONCHIAL VALVE PLACEMENT;  Surgeon: Devaughn Pressley MD;  Location:  [facility];  Service: Pulmonary   • BRONCHOSCOPY  10/24/19 & 10/29/19?        Allergies:  Allergies   Allergen Reactions   • Other      Opioid Analgesics   • Roflumilast Nausea Only       Medications:  No current facility-administered medications on file prior to encounter.      Current Outpatient Medications on File Prior to Encounter   Medication Sig Dispense Refill   • budesonide-formoterol (Symbicort) 160-4.5 MCG/ACT inhaler Inhale 2 puffs 2 (Two) Times a Day. 40.8 g 0   • COMBIVENT RESPIMAT  MCG/ACT inhaler INHALE 1 PUFF BY MOUTH AND INTO THE LUNGS FOUR TIMES A DAY 4 g 6   • guaiFENesin (MUCINEX) 600 MG 12 hr tablet Take 1,200 mg by mouth As Needed for Cough.     • ibuprofen (ADVIL,MOTRIN) 800 MG tablet Take 800 mg by mouth Every 6 (Six) Hours As Needed for Mild Pain .     • ipratropium-albuterol (DUO-NEB) 0.5-2.5 mg/3 ml nebulizer Take 3 mL by nebulization 4 (Four) Times a Day. 3240 mL 1   • levoFLOXacin (Levaquin) 750 MG tablet Take 1 tablet by mouth Daily. 7 tablet 0   • O2 (OXYGEN) Inhale 3 L/min Every Night.     • predniSONE (DELTASONE) 10 MG tablet Take 4 tabs daily x 3 days, then take 3 tabs daily x 3 days, then take 2 tabs daily x 3 days, then take 1 tab daily x 3 days 30 tablet 0   • predniSONE (DELTASONE) 10 MG tablet Take 4 tabs daily x 3 days, then take 3 tabs daily x 3 days, then take 2 tabs daily x 3 days, then take 1 tab daily x 3 days 30 tablet 0   • PROLASTIN-C 1000 MG/20ML solution 1 (One) Time Per Week.         lactated ringers 9 mL/hr   sodium chloride 125 mL/hr       famotidine 20 mg Intravenous Once   famotidine 20 mg Oral Once   ipratropium-albuterol 3 mL Nebulization 4x Daily - RT   sodium chloride 10 mL Intravenous Q12H   sodium chloride 3 mL Intravenous Q12H       Social History:  Social History     Socioeconomic History   • Marital status: Single     Spouse name: Not on file   • Number of children: Not on file   • Years of education: Not on file   • Highest education level: Not on file   Tobacco Use   • Smoking status: Former Smoker     Packs/day:  "1.50     Years: 25.00     Pack years: 37.50     Types: Cigarettes     Start date: 1981     Last attempt to quit: 2006     Years since quittin.7   • Smokeless tobacco: Never Used   Substance and Sexual Activity   • Alcohol use: No     Comment: In recovery since 1994   • Drug use: Yes     Comment: In recovery since 1994   • Sexual activity: Yes     Partners: Female     Birth control/protection: IUD, Rhythm       Family History:  Family History   Problem Relation Age of Onset   • Heart disease Mother    • Diabetes Mother    • Alpha-1 antitrypsin deficiency Brother    • Emphysema Brother         also diagnosed with Alpha 1 antitripsan deficiency   • Lung cancer Other    • Emphysema Other    • Emphysema Paternal Grandmother         prognosis in  was \"lung Cancer\" , however, it is suspect that she suffered from the degeneration of her lungs due to Alpha 1 Antitripsan     Family history is reviewed and is not contributory to the case.    Objective   Objective     Physical Exam:  Vitals:    20 0709   BP:    Pulse: 92   Resp:    Temp:    SpO2: 95%     Physical Exam   Constitutional: He is oriented to person, place, and time. He appears well-developed and well-nourished. No distress.   HENT:   Head: Atraumatic.   Mouth/Throat: Oropharynx is clear and moist. No oropharyngeal exudate.   Eyes: Pupils are equal, round, and reactive to light. EOM are normal.   Neck: Normal range of motion. Neck supple. No JVD present. No tracheal deviation present. No thyromegaly present.   Cardiovascular: Normal rate, regular rhythm and normal heart sounds. Exam reveals no friction rub.   No murmur heard.  Pulmonary/Chest: Effort normal and breath sounds normal. No respiratory distress.   Air movement is good and equal bilaterally.  There are no rhonchi heard.  Of note, when he does cough he does have very coarse sounds in his throat and upper airways.   Abdominal: Soft. Bowel sounds are normal. He exhibits no " distension. There is no tenderness.   Musculoskeletal: Normal range of motion. He exhibits no edema or deformity.   Neurological: He is alert and oriented to person, place, and time.   Skin: Skin is warm. Capillary refill takes less than 2 seconds. He is not diaphoretic. No erythema.   Psychiatric: He has a normal mood and affect. His behavior is normal. Thought content normal.                 Assessment/Plan   Impression & Plan     Impression:    Recurrent pneumonia    Alpha-1-antitrypsin deficiency (on replacement)    Chronic cough    Dependence on supplemental oxygen      Plan:    We will go ahead and plan for bronchoscopy to obtain secretions for microbiological studies.  In addition, after review the CT scan of the chest there does appear to be consolidation beyond the superior segment occlusive valve on the left lower lobe.  If this indeed does not appear to be draining during the procedure, I will go ahead and remove this.  I explained this to Mr. Willams and he understands this plan.  He understands the risks, alternatives, and benefits of the procedure.  I did let him know that as he has had a good result from his valves, in the resolution of this even if we do have to take out 1 or more of the valves today we can likely replace them at some point in the future when he convalesces.        I discussed these findings and my recommendations with patient, nursing staff and consulting provider       Phan Castellano MD, [facility]  Pulmonary and Critical Care Medicine  09/11/20 07:52

## 2020-09-11 NOTE — SIGNIFICANT NOTE
"Patient's girlfriend stated that she was at work and was going to come pick him up \"when I get the call\". I told her to expect to come back within the next 45 minutes. She stated she would \"be here by 9\".   " SUBJECTIVE:                                                      Randal Zurita is a 6 year old male, here for a routine health maintenance visit.    Patient was roomed by: Vanessa Ho    Well Child     Social History  Forms to complete? No  Child lives with::  Mother, father and brothers  Who takes care of your child?:  Home with family member and school  Languages spoken in the home:  English  Recent family changes/ special stressors?:  None noted    Safety / Health Risk  Is your child around anyone who smokes?  No    TB Exposure:     No TB exposure    Car seat or booster in back seat?  Yes  Helmet worn for bicycle/roller blades/skateboard?  Yes    Home Safety Survey:      Firearms in the home?: No       Child ever home alone?  No    Daily Activities    Dental     Dental provider: patient has a dental home    No dental risks    Water source:  City water    Diet and Exercise     Child gets at least 4 servings fruit or vegetables daily: Yes    Consumes beverages other than lowfat white milk or water: No    Dairy/calcium sources: skim milk    Calcium servings per day: >3    Child gets at least 60 minutes per day of active play: Yes    TV in child's room: No    Sleep       Sleep concerns: no concerns- sleeps well through night     Bedtime: 08:00     Sleep duration (hours): 11    Elimination  Normal urination    Media     Types of media used: iPad and video/dvd/tv    Daily use of media (hours): 2    Activities    Activities: age appropriate activities    Organized/ Team sports: hockey, soccer and tennis    School    Name of school: [employer]    Grade level: 1st    School performance: at grade level    Grades: pass    Schooling concerns? no    Days missed current/ last year: 1 1/2    Academic problems: no problems in reading, no problems in mathematics, no problems in writing and no learning disabilities     Behavior concerns: hyperactivity / impulsivity        Cardiac risk assessment:      Family history (males <55, females <65) of angina (chest pain), heart attack, heart surgery for clogged arteries, or stroke: no    Biological parent(s) with a total cholesterol over 240:  no    VISION:  Testing not done; patient has seen eye doctor in the past 12 months.    HEARING  Right Ear:      1000 Hz RESPONSE- on Level: 40 db (Conditioning sound)   1000 Hz: RESPONSE- on Level:   20 db    2000 Hz: RESPONSE- on Level:   20 db    4000 Hz: RESPONSE- on Level:   20 db     Left Ear:      4000 Hz: RESPONSE- on Level:   20 db    2000 Hz: RESPONSE- on Level:   20 db    1000 Hz: RESPONSE- on Level:   20 db     500 Hz: RESPONSE- on Level: 25 db    Right Ear:    500 Hz: RESPONSE- on Level: 25 db    Hearing Acuity: Pass    Hearing Assessment: normal    ================================    MENTAL HEALTH  Social-Emotional screening:    Electronic PSC-17   PSC SCORES 8/3/2018   Inattentive / Hyperactive Symptoms Subtotal 4   Externalizing Symptoms Subtotal 5   Internalizing Symptoms Subtotal 0   PSC - 17 Total Score 9      no followup necessary      PROBLEM LIST  Patient Active Problem List   Diagnosis     Eczema     Recurrent otitis media     S/P tympanostomy tube placement     S/P adenoidectomy     Attention deficit hyperactivity disorder (ADHD), predominantly inattentive type     MEDICATIONS  Current Outpatient Prescriptions   Medication Sig Dispense Refill     pediatric multivitamin with fluoride (POLY-VI-SOL WITH FLUORIDE) 0.25 MG/ML SOLN solution         ALLERGY  Allergies   Allergen Reactions     Augmentin Nausea and Vomiting and Diarrhea       IMMUNIZATIONS  Immunization History   Administered Date(s) Administered     DTAP (<7y) 09/27/2013     DTAP-IPV, <7Y 07/18/2017     DTAP-IPV/HIB (PENTACEL) 2012, 2012, 2012     HEPA 06/28/2013, 01/03/2014     HepB 2012, 2012, 2012     Hib (PRP-T) 09/27/2013     Influenza (IIV3) PF 2012, 01/18/2013, 11/02/2015     Influenza Vaccine IM  "Ages 6-35 Months 4 Valent (PF) 09/27/2013, 11/21/2014     MMR 06/28/2013, 07/18/2017     Pneumo Conj 13-V (2010&after) 2012, 2012, 2012, 09/27/2013     Rotavirus, monovalent, 2-dose 2012, 2012     Varicella 06/28/2013, 07/18/2017       HEALTH HISTORY SINCE LAST VISIT  No surgery, major illness or injury since last physical exam    Evaluated by neuropsych last February. Felt he has ADHD, primarily inattentive.  They have set up some assistance through school and did well for the second half of school last year. Discussed medications are an option for the future if needed.     ROS  GENERAL:  NEGATIVE for fever, poor appetite, and sleep disruption.  SKIN:  NEGATIVE for rash, hives, and eczema.  EYE:  NEGATIVE for pain, discharge, redness, itching and vision problems.  ENT:  NEGATIVE for ear pain, runny nose, congestion and sore throat.  RESP:  NEGATIVE for cough, wheezing, and difficulty breathing.  CARDIAC:  NEGATIVE for chest pain and cyanosis.   GI:  NEGATIVE for vomiting, diarrhea, abdominal pain and constipation.  :  NEGATIVE for urinary problems.  NEURO:  NEGATIVE for headache and weakness.  ALLERGY:  As in Allergy History  MSK:  NEGATIVE for muscle problems and joint problems.    OBJECTIVE:   EXAM  BP 90/58 (BP Location: Right arm, Patient Position: Chair, Cuff Size: Child)  Pulse 99  Temp 98.4  F (36.9  C) (Tympanic)  Ht 3' 7.5\" (1.105 m)  Wt 47 lb 6 oz (21.5 kg)  SpO2 100%  BMI 17.6 kg/m2  12 %ile based on CDC 2-20 Years stature-for-age data using vitals from 8/3/2018.  56 %ile based on CDC 2-20 Years weight-for-age data using vitals from 8/3/2018.  90 %ile based on CDC 2-20 Years BMI-for-age data using vitals from 8/3/2018.  Blood pressure percentiles are 39.4 % systolic and 62.6 % diastolic based on the August 2017 AAP Clinical Practice Guideline.  GENERAL: Active, alert, in no acute distress.  SKIN: Clear. No significant rash, abnormal pigmentation or lesions  HEAD: " Normocephalic.  EYES:  Symmetric light reflex and no eye movement on cover/uncover test. Normal conjunctivae.  EARS: Normal canals. Tympanic membranes are normal; gray and translucent.  NOSE: Normal without discharge.  MOUTH/THROAT: Clear. No oral lesions. Teeth without obvious abnormalities.  NECK: Supple, no masses.  No thyromegaly.  LYMPH NODES: No adenopathy  LUNGS: Clear. No rales, rhonchi, wheezing or retractions  HEART: Regular rhythm. Normal S1/S2. No murmurs. Normal pulses.  ABDOMEN: Soft, non-tender, not distended, no masses or hepatosplenomegaly. Bowel sounds normal.   EXTREMITIES: Full range of motion, no deformities  NEUROLOGIC: No focal findings. Cranial nerves grossly intact: DTR's normal. Normal gait, strength and tone    ASSESSMENT/PLAN:       ICD-10-CM    1. Encounter for routine child health examination w/o abnormal findings Z00.129    2. Attention deficit hyperactivity disorder (ADHD), predominantly inattentive type F90.0        Anticipatory Guidance  Reviewed Anticipatory Guidance in patient instructions    Preventive Care Plan  Immunizations    Reviewed, up to date  Referrals/Ongoing Specialty care: No   See other orders in EpicCare.  BMI at 90 %ile based on CDC 2-20 Years BMI-for-age data using vitals from 8/3/2018.    OBESITY ACTION PLAN    Exercise and nutrition counseling performed    Dyslipidemia risk:    None  Dental visit recommended: Dental home established, continue care every 6 months    FOLLOW-UP:    in 1-2 years for a Preventive Care visit    Resources  Goal Tracker: Be More Active  Goal Tracker: Less Screen Time  Goal Tracker: Drink More Water  Goal Tracker: Eat More Fruits and Veggies  [place] Child and Teen Checkups (C&TC) Schedule of Age-Related Screening Standards    Rizwan Streeter MD  [facility]

## 2020-09-11 NOTE — OP NOTE
Bronchoscopy Procedural Note       Date of Operation: 9/11/2020    Indication: This is a 54 y.o. gentleman with a history of alpha-1 antitrypsin deficiency and severe emphysema who underwent endobronchial lung volume reduction with valves for exclusion of the left lower lobe.  He had done well with this however has developed recurrent pneumonias over the past 2 months.  He is here today for bronchoscopy for diagnosis and possible therapy.    Pre-op Diagnosis: Recurrent pneumonias    Post-op Diagnosis: Retained secretion, left lower lobe.  Valves appear to be intact and functional with good drainage    Surgeon: Phan Castellano MD    Procedures Performed:  Flexible fiberoptic bronchoscopy  Bronchial washings    Anesthesia: Monitored anesthesia care.  A total of 6 mL of 1% lidocaine were utilized in the airway during the procedure.  Prior to the procedure, a 5 mL 4% lidocaine nebulizer was utilized.    Estimated Blood Loss: None    Description of Procedure:  After obtaining informed consent and completing a procedural pause, the flexible fiberoptic bronchoscope was passed through the oropharynx without difficulty.  The vocal cords were visualized and are seen to be structurally and functionally normal.  I was able to pass into the trachea and surveyed this down to the level of the jorje.  The jorje is sharp.  Visualized portions of the trachea appear normal.  At first proceeded to the right side and surveyed the right upper, the right middle, and the right lower lobes.  These are structurally normal with only minimal bronchitis.  No retained secretions are seen.  No endobronchial lesions are noted.    I then proceeded to the left side and surveyed the left upper lobe.  There are minimal secretions in the anterior portion.  These were suctioned without difficulty.  There is a bit more irritation and edema of the mucosa around the left lower lobe.  I was able to suction retained secretions primarily from the left  lower lobe bronchus and the superior segment.  All valves appear to be intact and well placed.  They are seen to be operating effectively with scalloping around the edges indicating that there is patency from a drainage standpoint.  No endobronchial lesions are seen.  Washings were taken of this area.    The scope was removed.  The patient was turned over to anesthesia for postoperative care.  I was able to communicate the results to his significant other.    There were no immediate complications.    Findings and Recommendations:  Retained secretions, left lower lobe, primarily the superior segment.    We will await microbiological studies and adjust antibiotics if necessary, in particular we will be looking for any resistant bacteria.    I will inform my office that he will need a follow-up in the coming weeks.  And      Phan Castellano MD, [facility]  Pulmonary and Critical Care Medicine   09/11/20 08:29

## 2020-09-12 DIAGNOSIS — J43.2 CENTRILOBULAR EMPHYSEMA (HCC): ICD-10-CM

## 2020-09-12 DIAGNOSIS — J44.9 COPD MIXED TYPE (HCC): ICD-10-CM

## 2020-09-14 LAB
BACTERIA SPEC RESP CULT: ABNORMAL
BACTERIA SPEC RESP CULT: ABNORMAL
GRAM STN SPEC: ABNORMAL
GRAM STN SPEC: ABNORMAL

## 2020-09-14 RX ORDER — IPRATROPIUM BROMIDE AND ALBUTEROL SULFATE 2.5; .5 MG/3ML; MG/3ML
3 SOLUTION RESPIRATORY (INHALATION)
Qty: 3240 ML | Refills: 1 | Status: SHIPPED | OUTPATIENT
Start: 2020-09-14 | End: 2020-12-03 | Stop reason: HOSPADM

## 2020-09-14 RX ORDER — BUDESONIDE AND FORMOTEROL FUMARATE DIHYDRATE 160; 4.5 UG/1; UG/1
2 AEROSOL RESPIRATORY (INHALATION) 2 TIMES DAILY
Qty: 40.8 G | Refills: 0 | Status: SHIPPED | OUTPATIENT
Start: 2020-09-14 | End: 2020-12-03 | Stop reason: HOSPADM

## 2020-09-15 LAB
LAB AP CASE REPORT: NORMAL
PATH REPORT.FINAL DX SPEC: NORMAL

## 2020-09-16 ENCOUNTER — TELEPHONE (OUTPATIENT)
Dept: PULMONOLOGY | Facility: CLINIC | Age: 55
End: 2020-09-16

## 2020-09-16 ENCOUNTER — DOCUMENTATION (OUTPATIENT)
Dept: PULMONOLOGY | Facility: CLINIC | Age: 55
End: 2020-09-16

## 2020-09-16 LAB
GIE STN SPEC: NORMAL
GIE STN SPEC: NORMAL

## 2020-09-16 NOTE — PROGRESS NOTES
Spoke with Sanjiv @ [facility] and faxed orders to [phone] for Tobramycin neb solution to use for 14 days . Notified patient and he voiced understanding

## 2020-09-16 NOTE — TELEPHONE ENCOUNTER
I called Mr. Willams and notified him of his sputum culture that was growing Pseudomonas.  I am going to start him on tobramycin nebulizers for 2 weeks.  He was agreeable with this plan and will call with any additional concerns or questions.

## 2020-09-21 LAB — VIRUS SPEC CULT: NORMAL

## 2020-09-24 ENCOUNTER — HOSPITAL ENCOUNTER (OUTPATIENT)
Dept: CT IMAGING | Facility: HOSPITAL | Age: 55
Discharge: HOME | End: 2020-09-24
Admitting: NURSE PRACTITIONER

## 2020-09-24 DIAGNOSIS — J18.9 PNEUMONIA OF LEFT LUNG DUE TO INFECTIOUS ORGANISM, UNSPECIFIED PART OF LUNG: ICD-10-CM

## 2020-09-24 PROCEDURE — 71250 CT THORAX DX C-: CPT

## 2020-10-12 LAB
FUNGUS WND CULT: ABNORMAL
FUNGUS WND CULT: ABNORMAL

## 2020-10-26 LAB
MYCOBACTERIUM SPEC CULT: ABNORMAL
NIGHT BLUE STAIN TISS: ABNORMAL

## 2020-10-27 ENCOUNTER — PREP FOR SURGERY (OUTPATIENT)
Dept: OTHER | Facility: HOSPITAL | Age: 55
End: 2020-10-27

## 2020-10-27 ENCOUNTER — TRANSCRIBE ORDERS (OUTPATIENT)
Dept: PULMONOLOGY | Facility: CLINIC | Age: 55
End: 2020-10-27

## 2020-10-27 DIAGNOSIS — J18.9 UNRESOLVED PNEUMONIA: Primary | ICD-10-CM

## 2020-10-27 RX ORDER — SODIUM CHLORIDE 0.9 % (FLUSH) 0.9 %
3 SYRINGE (ML) INJECTION EVERY 12 HOURS SCHEDULED
Status: CANCELLED | OUTPATIENT
Start: 2020-10-27

## 2020-10-27 RX ORDER — SODIUM CHLORIDE 9 MG/ML
125 INJECTION, SOLUTION INTRAVENOUS CONTINUOUS
Status: CANCELLED | OUTPATIENT
Start: 2020-10-27

## 2020-10-27 RX ORDER — SODIUM CHLORIDE 0.9 % (FLUSH) 0.9 %
10 SYRINGE (ML) INJECTION AS NEEDED
Status: CANCELLED | OUTPATIENT
Start: 2020-10-27

## 2020-10-27 RX ORDER — LIDOCAINE HYDROCHLORIDE 40 MG/ML
4 INJECTION, SOLUTION RETROBULBAR; TOPICAL ONCE
Status: CANCELLED | OUTPATIENT
Start: 2020-10-27 | End: 2020-10-27

## 2020-10-28 ENCOUNTER — APPOINTMENT (OUTPATIENT)
Dept: PREADMISSION TESTING | Facility: HOSPITAL | Age: 55
End: 2020-10-28

## 2020-10-28 VITALS — HEIGHT: 68 IN | WEIGHT: 135 LBS | BODY MASS INDEX: 20.46 KG/M2

## 2020-10-28 LAB
ALBUMIN SERPL-MCNC: 4.6 G/DL (ref 3.5–5.2)
ALBUMIN/GLOB SERPL: 1.6 G/DL
ALP SERPL-CCNC: 73 U/L (ref 39–117)
ALT SERPL W P-5'-P-CCNC: 16 U/L (ref 1–41)
ANION GAP SERPL CALCULATED.3IONS-SCNC: 11 MMOL/L (ref 5–15)
APTT PPP: 27.6 SECONDS (ref 24–37)
AST SERPL-CCNC: 19 U/L (ref 1–40)
BILIRUB SERPL-MCNC: 0.4 MG/DL (ref 0–1.2)
BUN SERPL-MCNC: 13 MG/DL (ref 6–20)
BUN/CREAT SERPL: 15.3 (ref 7–25)
CALCIUM SPEC-SCNC: 9.4 MG/DL (ref 8.6–10.5)
CHLORIDE SERPL-SCNC: 105 MMOL/L (ref 98–107)
CO2 SERPL-SCNC: 22 MMOL/L (ref 22–29)
CREAT SERPL-MCNC: 0.85 MG/DL (ref 0.76–1.27)
DEPRECATED RDW RBC AUTO: 45.6 FL (ref 37–54)
ERYTHROCYTE [DISTWIDTH] IN BLOOD BY AUTOMATED COUNT: 13.4 % (ref 12.3–15.4)
GFR SERPL CREATININE-BSD FRML MDRD: 94 ML/MIN/1.73
GLOBULIN UR ELPH-MCNC: 2.8 GM/DL
GLUCOSE SERPL-MCNC: 133 MG/DL (ref 65–99)
HCT VFR BLD AUTO: 49.2 % (ref 37.5–51)
HGB BLD-MCNC: 16.3 G/DL (ref 13–17.7)
INR PPP: 1.08 (ref 0.85–1.16)
MCH RBC QN AUTO: 30.6 PG (ref 26.6–33)
MCHC RBC AUTO-ENTMCNC: 33.1 G/DL (ref 31.5–35.7)
MCV RBC AUTO: 92.5 FL (ref 79–97)
PLATELET # BLD AUTO: 269 10*3/MM3 (ref 140–450)
PMV BLD AUTO: 8.6 FL (ref 6–12)
POTASSIUM SERPL-SCNC: 4.4 MMOL/L (ref 3.5–5.2)
PROT SERPL-MCNC: 7.4 G/DL (ref 6–8.5)
PROTHROMBIN TIME: 13.7 SECONDS (ref 11.5–14)
RBC # BLD AUTO: 5.32 10*6/MM3 (ref 4.14–5.8)
SODIUM SERPL-SCNC: 138 MMOL/L (ref 136–145)
WBC # BLD AUTO: 8.08 10*3/MM3 (ref 3.4–10.8)

## 2020-10-28 PROCEDURE — 80053 COMPREHEN METABOLIC PANEL: CPT | Performed by: INTERNAL MEDICINE

## 2020-10-28 PROCEDURE — 93010 ELECTROCARDIOGRAM REPORT: CPT | Performed by: INTERNAL MEDICINE

## 2020-10-28 PROCEDURE — 85730 THROMBOPLASTIN TIME PARTIAL: CPT | Performed by: INTERNAL MEDICINE

## 2020-10-28 PROCEDURE — 93005 ELECTROCARDIOGRAM TRACING: CPT

## 2020-10-28 PROCEDURE — 36415 COLL VENOUS BLD VENIPUNCTURE: CPT

## 2020-10-28 PROCEDURE — 85610 PROTHROMBIN TIME: CPT | Performed by: INTERNAL MEDICINE

## 2020-10-28 PROCEDURE — 85027 COMPLETE CBC AUTOMATED: CPT | Performed by: INTERNAL MEDICINE

## 2020-10-28 PROCEDURE — U0004 COV-19 TEST NON-CDC HGH THRU: HCPCS

## 2020-10-28 PROCEDURE — C9803 HOPD COVID-19 SPEC COLLECT: HCPCS

## 2020-10-29 LAB
QT INTERVAL: 370 MS
QTC INTERVAL: 432 MS
SARS-COV-2 RNA RESP QL NAA+PROBE: NOT DETECTED

## 2020-10-30 ENCOUNTER — APPOINTMENT (OUTPATIENT)
Dept: GENERAL RADIOLOGY | Facility: HOSPITAL | Age: 55
End: 2020-10-30

## 2020-10-30 ENCOUNTER — HOSPITAL ENCOUNTER (OUTPATIENT)
Facility: HOSPITAL | Age: 55
Setting detail: HOSPITAL OUTPATIENT SURGERY
Discharge: HOME | End: 2020-10-30
Attending: INTERNAL MEDICINE | Admitting: INTERNAL MEDICINE

## 2020-10-30 ENCOUNTER — ANESTHESIA EVENT (OUTPATIENT)
Dept: GASTROENTEROLOGY | Facility: HOSPITAL | Age: 55
End: 2020-10-30

## 2020-10-30 ENCOUNTER — LAB (OUTPATIENT)
Dept: LAB | Facility: HOSPITAL | Age: 55
End: 2020-10-30

## 2020-10-30 ENCOUNTER — ANESTHESIA (OUTPATIENT)
Dept: GASTROENTEROLOGY | Facility: HOSPITAL | Age: 55
End: 2020-10-30

## 2020-10-30 VITALS
DIASTOLIC BLOOD PRESSURE: 74 MMHG | TEMPERATURE: 97.2 F | RESPIRATION RATE: 18 BRPM | HEART RATE: 84 BPM | SYSTOLIC BLOOD PRESSURE: 97 MMHG | OXYGEN SATURATION: 91 %

## 2020-10-30 DIAGNOSIS — R84.5 ABNORMAL MICROBIOLOGICAL FINDINGS IN SPECIMENS FROM RESPIRATORY ORGANS AND THORAX: ICD-10-CM

## 2020-10-30 DIAGNOSIS — A31.9 MYCOBACTERIAL INFECTION: Primary | ICD-10-CM

## 2020-10-30 DIAGNOSIS — J18.9 UNRESOLVED PNEUMONIA: ICD-10-CM

## 2020-10-30 PROCEDURE — 87070 CULTURE OTHR SPECIMN AEROBIC: CPT | Performed by: INTERNAL MEDICINE

## 2020-10-30 PROCEDURE — 87205 SMEAR GRAM STAIN: CPT | Performed by: INTERNAL MEDICINE

## 2020-10-30 PROCEDURE — 71045 X-RAY EXAM CHEST 1 VIEW: CPT

## 2020-10-30 PROCEDURE — 87186 SC STD MICRODIL/AGAR DIL: CPT

## 2020-10-30 PROCEDURE — 87186 SC STD MICRODIL/AGAR DIL: CPT | Performed by: INTERNAL MEDICINE

## 2020-10-30 PROCEDURE — 87181 SC STD AGAR DILUTION PER AGT: CPT

## 2020-10-30 PROCEDURE — 25010000002 PROPOFOL 10 MG/ML EMULSION: Performed by: NURSE ANESTHETIST, CERTIFIED REGISTERED

## 2020-10-30 PROCEDURE — 25010000002 NEOSTIGMINE 10 MG/10ML SOLUTION: Performed by: NURSE ANESTHETIST, CERTIFIED REGISTERED

## 2020-10-30 PROCEDURE — 31649 BRONCHIAL VALVE REMOV ADDL: CPT | Performed by: INTERNAL MEDICINE

## 2020-10-30 PROCEDURE — 87102 FUNGUS ISOLATION CULTURE: CPT | Performed by: INTERNAL MEDICINE

## 2020-10-30 PROCEDURE — 36415 COLL VENOUS BLD VENIPUNCTURE: CPT

## 2020-10-30 PROCEDURE — 31648 BRONCHIAL VALVE REMOV INIT: CPT | Performed by: INTERNAL MEDICINE

## 2020-10-30 PROCEDURE — 87176 TISSUE HOMOGENIZATION CULTR: CPT | Performed by: INTERNAL MEDICINE

## 2020-10-30 PROCEDURE — 87116 MYCOBACTERIA CULTURE: CPT | Performed by: INTERNAL MEDICINE

## 2020-10-30 PROCEDURE — 87077 CULTURE AEROBIC IDENTIFY: CPT | Performed by: INTERNAL MEDICINE

## 2020-10-30 PROCEDURE — 87206 SMEAR FLUORESCENT/ACID STAI: CPT | Performed by: INTERNAL MEDICINE

## 2020-10-30 PROCEDURE — 76000 FLUOROSCOPY <1 HR PHYS/QHP: CPT

## 2020-10-30 RX ORDER — SODIUM CHLORIDE 0.9 % (FLUSH) 0.9 %
10 SYRINGE (ML) INJECTION EVERY 12 HOURS SCHEDULED
Status: CANCELLED | OUTPATIENT
Start: 2020-10-30

## 2020-10-30 RX ORDER — LIDOCAINE HYDROCHLORIDE 10 MG/ML
INJECTION, SOLUTION EPIDURAL; INFILTRATION; INTRACAUDAL; PERINEURAL AS NEEDED
Status: DISCONTINUED | OUTPATIENT
Start: 2020-10-30 | End: 2020-10-30 | Stop reason: SURG

## 2020-10-30 RX ORDER — PROPOFOL 10 MG/ML
VIAL (ML) INTRAVENOUS AS NEEDED
Status: DISCONTINUED | OUTPATIENT
Start: 2020-10-30 | End: 2020-10-30 | Stop reason: SURG

## 2020-10-30 RX ORDER — SODIUM CHLORIDE 0.9 % (FLUSH) 0.9 %
10 SYRINGE (ML) INJECTION AS NEEDED
Status: DISCONTINUED | OUTPATIENT
Start: 2020-10-30 | End: 2020-10-30 | Stop reason: HOSPADM

## 2020-10-30 RX ORDER — DEXAMETHASONE SODIUM PHOSPHATE 4 MG/ML
8 INJECTION, SOLUTION INTRA-ARTICULAR; INTRALESIONAL; INTRAMUSCULAR; INTRAVENOUS; SOFT TISSUE ONCE AS NEEDED
Status: DISCONTINUED | OUTPATIENT
Start: 2020-10-30 | End: 2020-10-30 | Stop reason: HOSPADM

## 2020-10-30 RX ORDER — ONDANSETRON 2 MG/ML
4 INJECTION INTRAMUSCULAR; INTRAVENOUS ONCE AS NEEDED
Status: DISCONTINUED | OUTPATIENT
Start: 2020-10-30 | End: 2020-10-30 | Stop reason: HOSPADM

## 2020-10-30 RX ORDER — GLYCOPYRROLATE 0.2 MG/ML
INJECTION INTRAMUSCULAR; INTRAVENOUS AS NEEDED
Status: DISCONTINUED | OUTPATIENT
Start: 2020-10-30 | End: 2020-10-30 | Stop reason: SURG

## 2020-10-30 RX ORDER — NEOSTIGMINE METHYLSULFATE 1 MG/ML
INJECTION, SOLUTION INTRAVENOUS AS NEEDED
Status: DISCONTINUED | OUTPATIENT
Start: 2020-10-30 | End: 2020-10-30 | Stop reason: SURG

## 2020-10-30 RX ORDER — PREDNISONE 10 MG/1
TABLET ORAL
Qty: 31 TABLET | Refills: 0 | Status: ON HOLD | OUTPATIENT
Start: 2020-10-30 | End: 2020-11-18 | Stop reason: SDUPTHER

## 2020-10-30 RX ORDER — LIDOCAINE HYDROCHLORIDE 10 MG/ML
0.5 INJECTION, SOLUTION EPIDURAL; INFILTRATION; INTRACAUDAL; PERINEURAL ONCE AS NEEDED
Status: CANCELLED | OUTPATIENT
Start: 2020-10-30

## 2020-10-30 RX ORDER — ROCURONIUM BROMIDE 10 MG/ML
INJECTION, SOLUTION INTRAVENOUS AS NEEDED
Status: DISCONTINUED | OUTPATIENT
Start: 2020-10-30 | End: 2020-10-30 | Stop reason: SURG

## 2020-10-30 RX ORDER — FAMOTIDINE 20 MG/1
20 TABLET, FILM COATED ORAL ONCE
Status: CANCELLED | OUTPATIENT
Start: 2020-10-30 | End: 2020-10-30

## 2020-10-30 RX ORDER — FAMOTIDINE 10 MG/ML
20 INJECTION, SOLUTION INTRAVENOUS ONCE
Status: COMPLETED | OUTPATIENT
Start: 2020-10-30 | End: 2020-10-30

## 2020-10-30 RX ORDER — SODIUM CHLORIDE, SODIUM LACTATE, POTASSIUM CHLORIDE, CALCIUM CHLORIDE 600; 310; 30; 20 MG/100ML; MG/100ML; MG/100ML; MG/100ML
9 INJECTION, SOLUTION INTRAVENOUS CONTINUOUS
Status: DISCONTINUED | OUTPATIENT
Start: 2020-10-30 | End: 2020-10-30 | Stop reason: HOSPADM

## 2020-10-30 RX ADMIN — NEOSTIGMINE 2.5 MG: 1 INJECTION INTRAVENOUS at 15:02

## 2020-10-30 RX ADMIN — LIDOCAINE HYDROCHLORIDE 50 MG: 10 INJECTION, SOLUTION EPIDURAL; INFILTRATION; INTRACAUDAL; PERINEURAL at 14:05

## 2020-10-30 RX ADMIN — FAMOTIDINE 20 MG: 10 INJECTION INTRAVENOUS at 13:08

## 2020-10-30 RX ADMIN — GLYCOPYRROLATE 0.4 MG: 0.2 INJECTION INTRAMUSCULAR; INTRAVENOUS at 15:02

## 2020-10-30 RX ADMIN — SODIUM CHLORIDE, POTASSIUM CHLORIDE, SODIUM LACTATE AND CALCIUM CHLORIDE 9 ML/HR: 600; 310; 30; 20 INJECTION, SOLUTION INTRAVENOUS at 13:08

## 2020-10-30 RX ADMIN — PROPOFOL 200 MG: 10 INJECTION, EMULSION INTRAVENOUS at 14:05

## 2020-10-30 RX ADMIN — ROCURONIUM BROMIDE 30 MG: 10 INJECTION INTRAVENOUS at 14:05

## 2020-10-30 RX ADMIN — SODIUM CHLORIDE, PRESERVATIVE FREE 10 ML: 5 INJECTION INTRAVENOUS at 13:08

## 2020-10-30 NOTE — ANESTHESIA PROCEDURE NOTES
Airway  Urgency: elective    Date/Time: 10/30/2020 2:12 PM  Airway not difficult    General Information and Staff    Patient location during procedure: OR    Indications and Patient Condition  Indications for airway management: airway protection    Preoxygenated: yes  Mask difficulty assessment: 1 - vent by mask    Final Airway Details  Final airway type: endotracheal airway      Successful airway: ETT  Cuffed: yes   Successful intubation technique: direct laryngoscopy  Facilitating devices/methods: intubating stylet  Endotracheal tube insertion site: oral  Blade: Olivas  Blade size: 2  ETT size (mm): 8.5  Cormack-Lehane Classification: grade I - full view of glottis  Placement verified by: chest auscultation and capnometry   Measured from: lips  Number of attempts at approach: 1  Assessment: lips, teeth, and gum same as pre-op and atraumatic intubation

## 2020-10-30 NOTE — ANESTHESIA POSTPROCEDURE EVALUATION
Patient: Balwinder Willams    Procedure Summary     Date: 10/30/20 [facility]    Anesthesia Start: 1401 Anesthesia Stop: 1522    Procedure: BRONCHOSCOPY WITH FLUOROSCOPY AND ENDOBRONCHIAL VALVE REMOVAL (N/A Bronchus) Diagnosis:       Unresolved pneumonia      (Unresolved pneumonia [J18.9])    Surgeon: Phan Castellano MD Provider: Kyara Goel MD    Anesthesia Type: general ASA Status: 3          Anesthesia Type: general    Vitals  Vitals Value Taken Time   /86 10/30/20 1520   Temp 97.3 °F (36.3 °C) 10/30/20 1514   Pulse 88 10/30/20 1521   Resp 22 10/30/20 1514   SpO2 93 % 10/30/20 1521   Vitals shown include unvalidated device data.        Post Anesthesia Care and Evaluation    Patient location during evaluation: PACU  Patient participation: complete - patient participated  Level of consciousness: responsive to verbal stimuli  Pain score: 0  Pain management: adequate  Airway patency: patent  Anesthetic complications: No anesthetic complications  PONV Status: none  Cardiovascular status: hemodynamically stable and acceptable  Respiratory status: nonlabored ventilation, acceptable and nasal cannula  Hydration status: acceptable

## 2020-10-30 NOTE — ANESTHESIA PREPROCEDURE EVALUATION
Anesthesia Evaluation     Patient summary reviewed and Nursing notes reviewed   no history of anesthetic complications:  NPO Solid Status: > 8 hours  NPO Liquid Status: > 2 hours           Airway   Mallampati: II  TM distance: >3 FB  Neck ROM: full  No difficulty expected  Dental - normal exam     Pulmonary - normal exam    breath sounds clear to auscultation  (+) pneumonia (Recurrent pneumonia\) , COPD (Alpha-1 antitrypsin deficiency) severe, asthma,    ROS comment: Recurrent bronchopleural fistula  Cardiovascular - normal exam    ECG reviewed  Rhythm: regular  Rate: normal      ROS comment: 7/19 Echo:   · Left ventricular systolic function is normal. Estimated EF = 65%.  · No significant valvular abnormalities.    Neuro/Psych- negative ROS  GI/Hepatic/Renal/Endo    (+)   liver disease (alpha-1- antitrypsin disease),   (-) GERD, no renal disease, diabetes, no thyroid disorder    Musculoskeletal (-) negative ROS    Abdominal    Substance History   (+) drug use (reovering drug abuse)     OB/GYN          Other - negative ROS                         Anesthesia Plan    ASA 3     general     intravenous induction     Anesthetic plan, all risks, benefits, and alternatives have been provided, discussed and informed consent has been obtained with: patient.    Plan discussed with CRNA.

## 2020-11-01 LAB
BACTERIA SPEC RESP CULT: ABNORMAL
BACTERIA SPEC RESP CULT: ABNORMAL
GRAM STN SPEC: ABNORMAL

## 2020-11-02 LAB
BACTERIA SPEC AEROBE CULT: ABNORMAL
GIE STN SPEC: NORMAL
GIE STN SPEC: NORMAL
GRAM STN SPEC: ABNORMAL
GRAM STN SPEC: ABNORMAL

## 2020-11-03 ENCOUNTER — DOCUMENTATION (OUTPATIENT)
Dept: PULMONOLOGY | Facility: CLINIC | Age: 55
End: 2020-11-03

## 2020-11-03 DIAGNOSIS — J15.1 PNEUMONIA DUE TO PSEUDOMONAS SPECIES, UNSPECIFIED LATERALITY, UNSPECIFIED PART OF LUNG (HCC): Primary | ICD-10-CM

## 2020-11-03 RX ORDER — LEVOFLOXACIN 500 MG/1
500 TABLET, FILM COATED ORAL DAILY
Qty: 14 TABLET | Refills: 0 | Status: ON HOLD | OUTPATIENT
Start: 2020-11-03 | End: 2020-11-18 | Stop reason: SDUPTHER

## 2020-11-03 NOTE — PROGRESS NOTES
Spoke with Isabella at [facility] [phone]  called in  Refill on  His Tobramycin Nebs for 14 days, they will send to patient

## 2020-11-03 NOTE — PROGRESS NOTES
Mr. Willams had a positive sputum culture for Pseudomonas.  We are going to start him on a 14-day 500 mg of Levaquin dose and 14 days of tobramycin nebulizer.  I have called this into his local pharmacies and the patient is aware.

## 2020-11-08 ENCOUNTER — APPOINTMENT (OUTPATIENT)
Dept: GENERAL RADIOLOGY | Facility: HOSPITAL | Age: 55
End: 2020-11-08

## 2020-11-08 ENCOUNTER — APPOINTMENT (OUTPATIENT)
Dept: CT IMAGING | Facility: HOSPITAL | Age: 55
End: 2020-11-08

## 2020-11-08 ENCOUNTER — HOSPITAL ENCOUNTER (INPATIENT)
Facility: HOSPITAL | Age: 55
LOS: 10 days | Discharge: HOME | End: 2020-11-18
Attending: EMERGENCY MEDICINE | Admitting: INTERNAL MEDICINE

## 2020-11-08 DIAGNOSIS — R06.89 DYSPNEA AND RESPIRATORY ABNORMALITIES: ICD-10-CM

## 2020-11-08 DIAGNOSIS — J96.01 ACUTE RESPIRATORY FAILURE WITH HYPOXIA (HCC): ICD-10-CM

## 2020-11-08 DIAGNOSIS — J93.83 SPONTANEOUS PNEUMOTHORAX: ICD-10-CM

## 2020-11-08 DIAGNOSIS — J15.1 PNEUMONIA DUE TO PSEUDOMONAS SPECIES, UNSPECIFIED LATERALITY, UNSPECIFIED PART OF LUNG (HCC): ICD-10-CM

## 2020-11-08 DIAGNOSIS — J93.0 SPONTANEOUS TENSION PNEUMOTHORAX: Primary | ICD-10-CM

## 2020-11-08 DIAGNOSIS — R06.00 DYSPNEA AND RESPIRATORY ABNORMALITIES: ICD-10-CM

## 2020-11-08 PROBLEM — J93.9 PNEUMOTHORAX, UNSPECIFIED: Status: ACTIVE | Noted: 2020-11-08

## 2020-11-08 LAB
ALBUMIN SERPL-MCNC: 4.5 G/DL (ref 3.5–5.2)
ALBUMIN/GLOB SERPL: 1.3 G/DL
ALP SERPL-CCNC: 71 U/L (ref 39–117)
ALT SERPL W P-5'-P-CCNC: 21 U/L (ref 1–41)
ANION GAP SERPL CALCULATED.3IONS-SCNC: 14 MMOL/L (ref 5–15)
ARTERIAL PATENCY WRIST A: ABNORMAL
ARTERIAL PATENCY WRIST A: ABNORMAL
AST SERPL-CCNC: 19 U/L (ref 1–40)
ATMOSPHERIC PRESS: ABNORMAL MM[HG]
ATMOSPHERIC PRESS: ABNORMAL MM[HG]
BASE EXCESS BLDA CALC-SCNC: -5.3 MMOL/L (ref 0–2)
BASE EXCESS BLDA CALC-SCNC: -5.5 MMOL/L (ref 0–2)
BASOPHILS # BLD AUTO: 0.06 10*3/MM3 (ref 0–0.2)
BASOPHILS NFR BLD AUTO: 0.3 % (ref 0–1.5)
BDY SITE: ABNORMAL
BILIRUB SERPL-MCNC: 0.3 MG/DL (ref 0–1.2)
BODY TEMPERATURE: 37 C
BODY TEMPERATURE: 37 C
BUN SERPL-MCNC: 25 MG/DL (ref 6–20)
BUN/CREAT SERPL: 24.8 (ref 7–25)
CALCIUM SPEC-SCNC: 9.3 MG/DL (ref 8.6–10.5)
CHLORIDE SERPL-SCNC: 102 MMOL/L (ref 98–107)
CO2 BLDA-SCNC: 23.4 MMOL/L (ref 22–33)
CO2 BLDA-SCNC: 25.4 MMOL/L (ref 22–33)
CO2 SERPL-SCNC: 21 MMOL/L (ref 22–29)
COHGB MFR BLD: 0.5 % (ref 0–2)
COHGB MFR BLD: 0.6 % (ref 0–2)
CREAT SERPL-MCNC: 1.01 MG/DL (ref 0.76–1.27)
DEPRECATED RDW RBC AUTO: 47.4 FL (ref 37–54)
EOSINOPHIL # BLD AUTO: 0.07 10*3/MM3 (ref 0–0.4)
EOSINOPHIL NFR BLD AUTO: 0.3 % (ref 0.3–6.2)
EPAP: 0
EPAP: 6
ERYTHROCYTE [DISTWIDTH] IN BLOOD BY AUTOMATED COUNT: 13.4 % (ref 12.3–15.4)
GFR SERPL CREATININE-BSD FRML MDRD: 77 ML/MIN/1.73
GLOBULIN UR ELPH-MCNC: 3.4 GM/DL
GLUCOSE SERPL-MCNC: 202 MG/DL (ref 65–99)
HCO3 BLDA-SCNC: 22 MMOL/L (ref 20–26)
HCO3 BLDA-SCNC: 23.6 MMOL/L (ref 20–26)
HCT VFR BLD AUTO: 53.9 % (ref 37.5–51)
HCT VFR BLD CALC: 54.6 %
HCT VFR BLD CALC: 54.7 %
HGB BLD-MCNC: 17.6 G/DL (ref 13–17.7)
HGB BLDA-MCNC: 17.8 G/DL (ref 13.5–17.5)
HGB BLDA-MCNC: 17.8 G/DL (ref 13.5–17.5)
HOLD SPECIMEN: NORMAL
HOLD SPECIMEN: NORMAL
IMM GRANULOCYTES # BLD AUTO: 0.28 10*3/MM3 (ref 0–0.05)
IMM GRANULOCYTES NFR BLD AUTO: 1.3 % (ref 0–0.5)
INHALED O2 CONCENTRATION: 100 %
INHALED O2 CONCENTRATION: 60 %
IPAP: 0
IPAP: 18
LYMPHOCYTES # BLD AUTO: 2.74 10*3/MM3 (ref 0.7–3.1)
LYMPHOCYTES NFR BLD AUTO: 13.1 % (ref 19.6–45.3)
Lab: ABNORMAL
MCH RBC QN AUTO: 31.1 PG (ref 26.6–33)
MCHC RBC AUTO-ENTMCNC: 32.7 G/DL (ref 31.5–35.7)
MCV RBC AUTO: 95.2 FL (ref 79–97)
METHGB BLD QL: 0.4 % (ref 0–1.5)
METHGB BLD QL: 0.4 % (ref 0–1.5)
MODALITY: ABNORMAL
MODALITY: ABNORMAL
MONOCYTES # BLD AUTO: 2.26 10*3/MM3 (ref 0.1–0.9)
MONOCYTES NFR BLD AUTO: 10.8 % (ref 5–12)
NEUTROPHILS NFR BLD AUTO: 15.44 10*3/MM3 (ref 1.7–7)
NEUTROPHILS NFR BLD AUTO: 74.2 % (ref 42.7–76)
NOTE: ABNORMAL
NOTE: ABNORMAL
NOTIFIED BY: ABNORMAL
NOTIFIED WHO: ABNORMAL
NRBC BLD AUTO-RTO: 0 /100 WBC (ref 0–0.2)
NT-PROBNP SERPL-MCNC: 108.3 PG/ML (ref 0–900)
OXYHGB MFR BLDV: 94.8 % (ref 94–99)
OXYHGB MFR BLDV: 98.1 % (ref 94–99)
PAW @ PEAK INSP FLOW SETTING VENT: 0 CMH2O
PAW @ PEAK INSP FLOW SETTING VENT: 0 CMH2O
PCO2 BLDA: 47.8 MM HG (ref 35–45)
PCO2 BLDA: 58.3 MM HG (ref 35–45)
PCO2 TEMP ADJ BLD: 47.8 MM HG (ref 35–48)
PCO2 TEMP ADJ BLD: 58.3 MM HG (ref 35–48)
PH BLDA: 7.22 PH UNITS (ref 7.35–7.45)
PH BLDA: 7.27 PH UNITS (ref 7.35–7.45)
PH, TEMP CORRECTED: 7.22 PH UNITS
PH, TEMP CORRECTED: 7.27 PH UNITS
PLATELET # BLD AUTO: 319 10*3/MM3 (ref 140–450)
PMV BLD AUTO: 9.1 FL (ref 6–12)
PO2 BLDA: 182 MM HG (ref 83–108)
PO2 BLDA: 88.7 MM HG (ref 83–108)
PO2 TEMP ADJ BLD: 182 MM HG (ref 83–108)
PO2 TEMP ADJ BLD: 88.7 MM HG (ref 83–108)
POTASSIUM SERPL-SCNC: 5.2 MMOL/L (ref 3.5–5.2)
PROT SERPL-MCNC: 7.9 G/DL (ref 6–8.5)
QT INTERVAL: 280 MS
QTC INTERVAL: 434 MS
RBC # BLD AUTO: 5.66 10*6/MM3 (ref 4.14–5.8)
SARS-COV-2 RDRP RESP QL NAA+PROBE: NOT DETECTED
SODIUM SERPL-SCNC: 137 MMOL/L (ref 136–145)
TOTAL RATE: 0 BREATHS/MINUTE
TOTAL RATE: 0 BREATHS/MINUTE
TROPONIN T SERPL-MCNC: <0.01 NG/ML (ref 0–0.03)
WBC # BLD AUTO: 20.85 10*3/MM3 (ref 3.4–10.8)
WHOLE BLOOD HOLD SPECIMEN: NORMAL
WHOLE BLOOD HOLD SPECIMEN: NORMAL

## 2020-11-08 PROCEDURE — 99285 EMERGENCY DEPT VISIT HI MDM: CPT

## 2020-11-08 PROCEDURE — 82805 BLOOD GASES W/O2 SATURATION: CPT

## 2020-11-08 PROCEDURE — 25010000002 LEVOFLOXACIN PER 250 MG: Performed by: EMERGENCY MEDICINE

## 2020-11-08 PROCEDURE — 99223 1ST HOSP IP/OBS HIGH 75: CPT | Performed by: INTERNAL MEDICINE

## 2020-11-08 PROCEDURE — 36600 WITHDRAWAL OF ARTERIAL BLOOD: CPT

## 2020-11-08 PROCEDURE — 0W9B30Z DRAINAGE OF LEFT PLEURAL CAVITY WITH DRAINAGE DEVICE, PERCUTANEOUS APPROACH: ICD-10-PCS | Performed by: INTERNAL MEDICINE

## 2020-11-08 PROCEDURE — 94640 AIRWAY INHALATION TREATMENT: CPT

## 2020-11-08 PROCEDURE — 63710000001 PREDNISONE PER 1 MG: Performed by: NURSE PRACTITIONER

## 2020-11-08 PROCEDURE — 25010000002 HEPARIN (PORCINE) PER 1000 UNITS: Performed by: INTERNAL MEDICINE

## 2020-11-08 PROCEDURE — 71045 X-RAY EXAM CHEST 1 VIEW: CPT

## 2020-11-08 PROCEDURE — 94799 UNLISTED PULMONARY SVC/PX: CPT

## 2020-11-08 PROCEDURE — 25010000002 KETOROLAC TROMETHAMINE PER 15 MG: Performed by: NURSE PRACTITIONER

## 2020-11-08 PROCEDURE — 80053 COMPREHEN METABOLIC PANEL: CPT | Performed by: EMERGENCY MEDICINE

## 2020-11-08 PROCEDURE — 85025 COMPLETE CBC W/AUTO DIFF WBC: CPT | Performed by: EMERGENCY MEDICINE

## 2020-11-08 PROCEDURE — 84484 ASSAY OF TROPONIN QUANT: CPT | Performed by: EMERGENCY MEDICINE

## 2020-11-08 PROCEDURE — 83880 ASSAY OF NATRIURETIC PEPTIDE: CPT | Performed by: EMERGENCY MEDICINE

## 2020-11-08 PROCEDURE — 93005 ELECTROCARDIOGRAM TRACING: CPT | Performed by: EMERGENCY MEDICINE

## 2020-11-08 PROCEDURE — 93005 ELECTROCARDIOGRAM TRACING: CPT

## 2020-11-08 PROCEDURE — 25010000002 METHYLPREDNISOLONE PER 125 MG: Performed by: EMERGENCY MEDICINE

## 2020-11-08 PROCEDURE — 87635 SARS-COV-2 COVID-19 AMP PRB: CPT | Performed by: EMERGENCY MEDICINE

## 2020-11-08 PROCEDURE — 94660 CPAP INITIATION&MGMT: CPT

## 2020-11-08 PROCEDURE — 25010000002 LORAZEPAM PER 2 MG: Performed by: EMERGENCY MEDICINE

## 2020-11-08 PROCEDURE — 32551 INSERTION OF CHEST TUBE: CPT | Performed by: INTERNAL MEDICINE

## 2020-11-08 RX ORDER — LORAZEPAM 2 MG/ML
1 INJECTION INTRAMUSCULAR ONCE
Status: COMPLETED | OUTPATIENT
Start: 2020-11-08 | End: 2020-11-08

## 2020-11-08 RX ORDER — IPRATROPIUM BROMIDE AND ALBUTEROL SULFATE 2.5; .5 MG/3ML; MG/3ML
3 SOLUTION RESPIRATORY (INHALATION)
Status: DISCONTINUED | OUTPATIENT
Start: 2020-11-08 | End: 2020-11-08

## 2020-11-08 RX ORDER — HEPARIN SODIUM 5000 [USP'U]/ML
5000 INJECTION, SOLUTION INTRAVENOUS; SUBCUTANEOUS EVERY 8 HOURS SCHEDULED
Status: DISCONTINUED | OUTPATIENT
Start: 2020-11-08 | End: 2020-11-08

## 2020-11-08 RX ORDER — GUAIFENESIN 600 MG/1
1200 TABLET, EXTENDED RELEASE ORAL EVERY MORNING
Status: DISCONTINUED | OUTPATIENT
Start: 2020-11-08 | End: 2020-11-18 | Stop reason: HOSPADM

## 2020-11-08 RX ORDER — KETOROLAC TROMETHAMINE 30 MG/ML
30 INJECTION, SOLUTION INTRAMUSCULAR; INTRAVENOUS EVERY 6 HOURS PRN
Status: DISCONTINUED | OUTPATIENT
Start: 2020-11-08 | End: 2020-11-12

## 2020-11-08 RX ORDER — MIDAZOLAM HYDROCHLORIDE 1 MG/ML
5 INJECTION INTRAMUSCULAR; INTRAVENOUS ONCE
Status: DISCONTINUED | OUTPATIENT
Start: 2020-11-08 | End: 2020-11-08

## 2020-11-08 RX ORDER — SODIUM CHLORIDE 0.9 % (FLUSH) 0.9 %
10 SYRINGE (ML) INJECTION AS NEEDED
Status: DISCONTINUED | OUTPATIENT
Start: 2020-11-08 | End: 2020-11-09

## 2020-11-08 RX ORDER — BUDESONIDE AND FORMOTEROL FUMARATE DIHYDRATE 160; 4.5 UG/1; UG/1
2 AEROSOL RESPIRATORY (INHALATION)
Status: DISCONTINUED | OUTPATIENT
Start: 2020-11-08 | End: 2020-11-18 | Stop reason: HOSPADM

## 2020-11-08 RX ORDER — FAMOTIDINE 20 MG/1
20 TABLET, FILM COATED ORAL
Status: DISCONTINUED | OUTPATIENT
Start: 2020-11-08 | End: 2020-11-18 | Stop reason: HOSPADM

## 2020-11-08 RX ORDER — TOBRAMYCIN INHALATION SOLUTION 300 MG/5ML
300 INHALANT RESPIRATORY (INHALATION)
Status: DISCONTINUED | OUTPATIENT
Start: 2020-11-08 | End: 2020-11-18 | Stop reason: HOSPADM

## 2020-11-08 RX ORDER — SODIUM CHLORIDE 0.9 % (FLUSH) 0.9 %
10 SYRINGE (ML) INJECTION AS NEEDED
Status: DISCONTINUED | OUTPATIENT
Start: 2020-11-08 | End: 2020-11-18 | Stop reason: HOSPADM

## 2020-11-08 RX ORDER — SODIUM CHLORIDE 0.9 % (FLUSH) 0.9 %
10 SYRINGE (ML) INJECTION EVERY 12 HOURS SCHEDULED
Status: DISCONTINUED | OUTPATIENT
Start: 2020-11-08 | End: 2020-11-18 | Stop reason: HOSPADM

## 2020-11-08 RX ORDER — FAMOTIDINE 10 MG/ML
20 INJECTION, SOLUTION INTRAVENOUS EVERY 12 HOURS SCHEDULED
Status: DISCONTINUED | OUTPATIENT
Start: 2020-11-08 | End: 2020-11-08

## 2020-11-08 RX ORDER — HEPARIN SODIUM 5000 [USP'U]/ML
5000 INJECTION, SOLUTION INTRAVENOUS; SUBCUTANEOUS EVERY 8 HOURS SCHEDULED
Status: DISCONTINUED | OUTPATIENT
Start: 2020-11-08 | End: 2020-11-18 | Stop reason: HOSPADM

## 2020-11-08 RX ORDER — METHYLPREDNISOLONE SODIUM SUCCINATE 125 MG/2ML
125 INJECTION, POWDER, LYOPHILIZED, FOR SOLUTION INTRAMUSCULAR; INTRAVENOUS ONCE
Status: COMPLETED | OUTPATIENT
Start: 2020-11-08 | End: 2020-11-08

## 2020-11-08 RX ORDER — IPRATROPIUM BROMIDE AND ALBUTEROL SULFATE 2.5; .5 MG/3ML; MG/3ML
3 SOLUTION RESPIRATORY (INHALATION)
Status: DISCONTINUED | OUTPATIENT
Start: 2020-11-08 | End: 2020-11-18 | Stop reason: HOSPADM

## 2020-11-08 RX ORDER — PREDNISONE 20 MG/1
40 TABLET ORAL DAILY
Status: DISCONTINUED | OUTPATIENT
Start: 2020-11-08 | End: 2020-11-10

## 2020-11-08 RX ORDER — LEVOFLOXACIN 500 MG/1
500 TABLET, FILM COATED ORAL
Status: DISCONTINUED | OUTPATIENT
Start: 2020-11-09 | End: 2020-11-18 | Stop reason: HOSPADM

## 2020-11-08 RX ORDER — LEVOFLOXACIN 5 MG/ML
750 INJECTION, SOLUTION INTRAVENOUS ONCE
Status: COMPLETED | OUTPATIENT
Start: 2020-11-08 | End: 2020-11-08

## 2020-11-08 RX ADMIN — LEVOFLOXACIN 750 MG: 5 INJECTION, SOLUTION INTRAVENOUS at 03:59

## 2020-11-08 RX ADMIN — HEPARIN SODIUM 5000 UNITS: 5000 INJECTION, SOLUTION INTRAVENOUS; SUBCUTANEOUS at 13:57

## 2020-11-08 RX ADMIN — BUDESONIDE AND FORMOTEROL FUMARATE DIHYDRATE 2 PUFF: 160; 4.5 AEROSOL RESPIRATORY (INHALATION) at 07:20

## 2020-11-08 RX ADMIN — IPRATROPIUM BROMIDE AND ALBUTEROL SULFATE 3 ML: 2.5; .5 SOLUTION RESPIRATORY (INHALATION) at 12:39

## 2020-11-08 RX ADMIN — HEPARIN SODIUM 5000 UNITS: 5000 INJECTION, SOLUTION INTRAVENOUS; SUBCUTANEOUS at 22:12

## 2020-11-08 RX ADMIN — SODIUM CHLORIDE, PRESERVATIVE FREE 10 ML: 5 INJECTION INTRAVENOUS at 10:25

## 2020-11-08 RX ADMIN — GUAIFENESIN 1200 MG: 600 TABLET, EXTENDED RELEASE ORAL at 10:24

## 2020-11-08 RX ADMIN — KETOROLAC TROMETHAMINE 30 MG: 30 INJECTION, SOLUTION INTRAMUSCULAR at 12:11

## 2020-11-08 RX ADMIN — TOBRAMYCIN 300 MG: 300 SOLUTION ORAL at 07:19

## 2020-11-08 RX ADMIN — IPRATROPIUM BROMIDE AND ALBUTEROL SULFATE 3 ML: 2.5; .5 SOLUTION RESPIRATORY (INHALATION) at 07:19

## 2020-11-08 RX ADMIN — IPRATROPIUM BROMIDE AND ALBUTEROL SULFATE 3 ML: 2.5; .5 SOLUTION RESPIRATORY (INHALATION) at 15:37

## 2020-11-08 RX ADMIN — KETOROLAC TROMETHAMINE 30 MG: 30 INJECTION, SOLUTION INTRAMUSCULAR at 22:12

## 2020-11-08 RX ADMIN — SODIUM CHLORIDE, PRESERVATIVE FREE 10 ML: 5 INJECTION INTRAVENOUS at 22:12

## 2020-11-08 RX ADMIN — BUDESONIDE AND FORMOTEROL FUMARATE DIHYDRATE 2 PUFF: 160; 4.5 AEROSOL RESPIRATORY (INHALATION) at 19:14

## 2020-11-08 RX ADMIN — FAMOTIDINE 20 MG: 20 TABLET, FILM COATED ORAL at 16:47

## 2020-11-08 RX ADMIN — METHYLPREDNISOLONE SODIUM SUCCINATE 125 MG: 125 INJECTION, POWDER, FOR SOLUTION INTRAMUSCULAR; INTRAVENOUS at 03:24

## 2020-11-08 RX ADMIN — KETOROLAC TROMETHAMINE 30 MG: 30 INJECTION, SOLUTION INTRAMUSCULAR at 05:32

## 2020-11-08 RX ADMIN — PREDNISONE 40 MG: 20 TABLET ORAL at 10:24

## 2020-11-08 RX ADMIN — IPRATROPIUM BROMIDE AND ALBUTEROL SULFATE 3 ML: 2.5; .5 SOLUTION RESPIRATORY (INHALATION) at 19:14

## 2020-11-08 RX ADMIN — FAMOTIDINE 20 MG: 10 INJECTION INTRAVENOUS at 10:24

## 2020-11-08 RX ADMIN — LORAZEPAM 1 MG: 2 INJECTION INTRAMUSCULAR; INTRAVENOUS at 04:30

## 2020-11-08 NOTE — H&P
Intensive Care Admission Note     Spontaneous tension pneumothorax    History of Present Illness     Mr. Willams is a very nice 54-year-old gentleman whom I know very well from previous treatment of his severe emphysema related to alpha-1 antitrypsin deficiency.  I last saw him on October 30 during which time we performed bronchoscopy due to recurrent bronchitis.  I was able to remove a total of 3 valves from the left side at that time (1 from the lingula and 2 from the superior segment left lower lobe).  He did not have a postoperative pneumothorax and did well in the aftermath of that.  Secretions grew light pseudomonas aeruginosa which was very sensitive.  He was placed on tobramycin nebulizer therapy as well as Levaquin.  In addition, I did give him a course of steroids at that time.  He actually had been doing very well and had been talking to my nurse practitioner at the office however yesterday at approximately 7 PM he had a coughing spell and developed severe respiratory distress.  He denied chest pain at that time or a popping sensation however continued to worsen.  He presented to the emergency department in severe respiratory distress.  He was found to be acidotic with elevated PCO2 and chest x-ray showed at least a moderate sized left pneumothorax.  Patient was placed on BiPAP and I was contacted.  The decision was made to perform an emergent left thoracostomy tube for relief of what was believed to be a tension pneumothorax.  This was performed.  Please see the accompanying procedural note for full details.  The patient is also received 30 mg IV Toradol with some relief.  He is complaining of some chest discomfort from rapid breathing but otherwise denies pain.  He states that his breathing is doing much better now.  He has been able to be weaned down to partial nonrebreather.    Problem List, Surgical History, Family, Social History, and ROS     Patient Active Problem List    Diagnosis   •  *Spontaneous tension pneumothorax [J93.0]   • Unresolved pneumonia [J18.9]   • Recurrent pneumonia [J18.9]   • Persistent air leak [J93.82]   • Postprocedural pneumothorax [J95.811]   • Stage IV, very severe, emphysema [J44.9]   • Former smoker (Resolved 2006) [Z87.891]   • Alpha-1-antitrypsin deficiency (on replacement) [E88.01]   • Chronic cough [R05]   • Dependence on supplemental oxygen [Z99.81]     Past Surgical History:   Procedure Laterality Date   • BRONCHOSCOPY N/A 10/30/2019    Procedure: BRONCHOSCOPY WITH ENDOBRONHCIAL VALVE PLACEMENT;  Surgeon: Phan Castellano MD;  Location:  [facility];  Service: Pulmonary   • BRONCHOSCOPY N/A 10/24/2019    Procedure: BRONCHOSCOPY WITH ENDOBRONCHIAL VALVE PLACEMENT;  Surgeon: Devaughn Pressley MD;  Location:  [facility];  Service: Pulmonary   • BRONCHOSCOPY  10/24/19 & 10/29/19?   • BRONCHOSCOPY N/A 9/11/2020    Procedure: BRONCHOSCOPY;  Surgeon: Phan Castellano MD;  Location:  [facility];  Service: Pulmonary;  Laterality: N/A;   • BRONCHOSCOPY N/A 10/30/2020    Procedure: BRONCHOSCOPY WITH FLUOROSCOPY AND ENDOBRONCHIAL VALVE REMOVAL;  Surgeon: Phan Castellano MD;  Location:  [facility];  Service: Pulmonary;  Laterality: N/A;       Allergies   Allergen Reactions   • Other Other (See Comments)     Opioid Analgesics (recovering addict)     No current facility-administered medications on file prior to encounter.      Current Outpatient Medications on File Prior to Encounter   Medication Sig   • budesonide-formoterol (Symbicort) 160-4.5 MCG/ACT inhaler Inhale 2 puffs 2 (Two) Times a Day.   • COMBIVENT RESPIMAT  MCG/ACT inhaler INHALE 1 PUFF BY MOUTH AND INTO THE LUNGS FOUR TIMES A DAY (Patient taking differently: Inhale 1 puff 4 (Four) Times a Day.)   • guaiFENesin (MUCINEX) 600 MG 12 hr tablet Take 1,200 mg by mouth Every Morning.   • ibuprofen (ADVIL,MOTRIN) 800 MG tablet Take 800 mg by mouth Every 6 (Six) Hours As Needed for  "Mild Pain .   • ipratropium-albuterol (DUO-NEB) 0.5-2.5 mg/3 ml nebulizer Take 3 mL by nebulization 4 (Four) Times a Day. (Patient taking differently: Take 3 mL by nebulization As Needed.)   • levoFLOXacin (Levaquin) 500 MG tablet Take 1 tablet by mouth Daily.   • O2 (OXYGEN) Inhale 3 L/min Every Night.   • predniSONE (DELTASONE) 10 MG tablet Take 4 tabs daily x 3 days, then take 3 tabs daily x 3 days, then take 2 tabs daily x 3 days, then take 1 tab daily x 3 days   • PROLASTIN-C 1000 MG/20ML solution 1 (One) Time Per Week.     MEDICATION LIST AND ALLERGIES REVIEWED.    Family History   Problem Relation Age of Onset   • Heart disease Mother    • Diabetes Mother    • Alpha-1 antitrypsin deficiency Brother    • Emphysema Brother         also diagnosed with Alpha 1 antitripsan deficiency   • Lung cancer Other    • Emphysema Other    • Emphysema Paternal Grandmother         prognosis in  was \"lung Cancer\" , however, it is suspect that she suffered from the degeneration of her lungs due to Alpha 1 Antitripsan     Social History     Tobacco Use   • Smoking status: Former Smoker     Packs/day: 1.50     Years: 25.00     Pack years: 37.50     Types: Cigarettes     Start date: 1981     Quit date: 2006     Years since quittin.8   • Smokeless tobacco: Never Used   Substance Use Topics   • Alcohol use: No     Comment: In recovery since 1994   • Drug use: Yes     Comment: In recovery since 1994         Review of Systems   Unable to perform ROS: Acuity of condition         Physical Exam and Clinical Information   BP (!) 126/101   Pulse (!) 138   Temp 97.8 °F (36.6 °C) (Axillary)   Resp 28   Ht 172.7 cm (67.99\")   Wt 61.2 kg (135 lb)   SpO2 95%   BMI 20.53 kg/m²   Physical Exam  Vitals signs (Note this examination is prior to the placement of the chest tube.) and nursing note reviewed.   Constitutional:       General: He is in acute distress.      Appearance: Normal appearance. He is ill-appearing, " toxic-appearing and diaphoretic.      Comments: Thin gentleman in no acute distress.   HENT:      Head: Normocephalic and atraumatic.      Nose: Nose normal. No congestion.      Mouth/Throat:      Mouth: Mucous membranes are moist.      Pharynx: No oropharyngeal exudate.   Eyes:      Extraocular Movements: Extraocular movements intact.      Conjunctiva/sclera: Conjunctivae normal.      Pupils: Pupils are equal, round, and reactive to light.   Neck:      Musculoskeletal: Normal range of motion and neck supple. No neck rigidity.   Cardiovascular:      Rate and Rhythm: Regular rhythm. Tachycardia present.      Pulses: Normal pulses.      Heart sounds: Normal heart sounds. No murmur.   Pulmonary:      Effort: Respiratory distress present.      Breath sounds: No wheezing.      Comments: Poor air movement bilaterally.  Less so on the left.  Abdominal:      General: Abdomen is flat. Bowel sounds are normal. There is no distension.      Palpations: Abdomen is soft.   Musculoskeletal: Normal range of motion.   Skin:     General: Skin is warm.      Capillary Refill: Capillary refill takes less than 2 seconds.   Neurological:      General: No focal deficit present.      Mental Status: He is alert and oriented to person, place, and time.         Results from last 7 days   Lab Units 11/08/20  0302   WBC 10*3/mm3 20.85*   HEMOGLOBIN g/dL 17.6   PLATELETS 10*3/mm3 319     Results from last 7 days   Lab Units 11/08/20  0302   SODIUM mmol/L 137   POTASSIUM mmol/L 5.2   CO2 mmol/L 21.0*   BUN mg/dL 25*   CREATININE mg/dL 1.01   GLUCOSE mg/dL 202*     Estimated Creatinine Clearance: 72.4 mL/min (by C-G formula based on SCr of 1.01 mg/dL).      Results from last 7 days   Lab Units 11/08/20  0424   PH, ARTERIAL pH units 7.271*   PCO2, ARTERIAL mm Hg 47.8*   PO2 ART mm Hg 88.7     No results found for: LACTATE       I reviewed the patient's results and images.     Impression     Hospital Problem List     * (Principal) Spontaneous tension  pneumothorax    Alpha-1-antitrypsin deficiency (on replacement)    Overview Signed 11/30/2016  9:09 AM by Mary Cormier  Labs of 11/20/2014 reports an alpha 1 antitrypsin deficiency of 4.7 with a phenotype      with Z bands detected and genotyping revealing the presence of most common      deficiency allele type Z and an inferred non-expressing null allele.         Dependence on supplemental oxygen    Stage IV, very severe, emphysema, status post noninvasive lung volume reduction valve placement to the left lower lobe    Former smoker (Resolved 2006)        Plan/Recommendations     Mr. Willams is began to convalesce after decompression of his pneumothorax with a chest tube on the left.  Wean oxygen as tolerated.  Pain control as needed with Toradol.  We will try to avoid narcotics secondary to his prior history of substance abuse.  Continue with tobramycin nebs as well as Levaquin for his underlying Pseudomonas colonization  Continue with bronchodilator therapy.  We will let him eat later in the day if he begins to feel a bit better.  DVT prophylaxis with SCDs for now in anticipation of possible further interventions.  The patient remains at high risk of worsening secondary to severe underlying lung disease and pneumothorax.    High level of risk due to:  severe exacerbation of chronic illness and illness with threat to life or bodily function.    Total of 45 minutes were utilized in the care of this patient exclusive of the time it took for the procedure which is listed separately in the procedural note.    Phan Castellano MD, [facility]  Pulmonary and Critical Care Medicine  11/08/20 05:46 EST     CC: Obinna Arellano MD

## 2020-11-08 NOTE — ED PROVIDER NOTES
EMERGENCY DEPARTMENT ENCOUNTER      Pt Name: Balwinder Willams  MRN: [id number]  YOB: 1965  Date of evaluation: 11/8/2020  Provider: Obinna Valadez DO    CHIEF COMPLAINT       Chief Complaint   Patient presents with   • Respiratory Distress         HISTORY OF PRESENT ILLNESS  (Location/Symptom, Timing/Onset, Context/Setting, Quality, Duration, Modifying Factors, Severity.)   Balwinder Willams is a 54 y.o. male who presents to the emergency department via [facility] for evaluation of acute on chronic respiratory failure.  Per EMS the history is limited as the patient is unable to answer questions secondary to his respiratory distress and family had limited information.  They note the patient was having difficulty with breathing with a significant history of pulmonary lung disease they state that he just recently had some stents removed, unsure of where from 1 in the hospital over a week ago.  The EMS states the patient has been very minimally conversational in route which severely limits my HPI at this time.  The patient currently is with acute respiratory distress and not answering questions, no family history at the bedside at this time.      Nursing notes were reviewed.    REVIEW OF SYSTEMS    (2-9 systems for level 4, 10 or more for level 5)   ROS:  Unable to fully assess secondary to patient's acute respiratory failure      PAST MEDICAL HISTORY     Past Medical History:   Diagnosis Date   • Alpha-1-antitrypsin deficiency (CMS/HCC)    • Asthma, extrinsic smoke, pollen   • Chronic bronchitis (CMS/HCC)    • COPD (chronic obstructive pulmonary disease) (CMS/HCC)    • Cough    • Emphysema of lung (CMS/HCC) COPD diagnosed approximately 14 years ago   • Lung nodule I dont know    I dont know what this is   • Recurrent pneumonia 9/9/2020   • Wears glasses          SURGICAL HISTORY       Past Surgical History:   Procedure Laterality Date   • BRONCHOSCOPY N/A 10/30/2019    Procedure:  BRONCHOSCOPY WITH ENDOBRONHCIAL VALVE PLACEMENT;  Surgeon: Phan Castellano MD;  Location:  [facility];  Service: Pulmonary   • BRONCHOSCOPY N/A 10/24/2019    Procedure: BRONCHOSCOPY WITH ENDOBRONCHIAL VALVE PLACEMENT;  Surgeon: Devaughn Pressley MD;  Location:  [facility];  Service: Pulmonary   • BRONCHOSCOPY  10/24/19 & 10/29/19?   • BRONCHOSCOPY N/A 9/11/2020    Procedure: BRONCHOSCOPY;  Surgeon: Phan Castellano MD;  Location:  [facility];  Service: Pulmonary;  Laterality: N/A;   • BRONCHOSCOPY N/A 10/30/2020    Procedure: BRONCHOSCOPY WITH FLUOROSCOPY AND ENDOBRONCHIAL VALVE REMOVAL;  Surgeon: Phan Castellano MD;  Location:  [facility];  Service: Pulmonary;  Laterality: N/A;         CURRENT MEDICATIONS       Current Facility-Administered Medications:   •  budesonide-formoterol (SYMBICORT) 160-4.5 MCG/ACT inhaler 2 puff, 2 puff, Inhalation, BID - RT, Sharyn Wyatt APRN  •  famotidine (PEPCID) injection 20 mg, 20 mg, Intravenous, Q12H, Sharyn Wyatt APRN  •  guaiFENesin (MUCINEX) 12 hr tablet 1,200 mg, 1,200 mg, Oral, QAM, Sharyn Wyatt APRN  •  ipratropium-albuterol (DUO-NEB) nebulizer solution 3 mL, 3 mL, Nebulization, 4x Daily - RT, Sharyn Wyatt, APRN  •  ketorolac (TORADOL) injection 30 mg, 30 mg, Intravenous, Q6H PRN, Sharyn Wyatt APRN, 30 mg at 11/08/20 0532  •  [START ON 11/9/2020] levoFLOXacin (LEVAQUIN) tablet 500 mg, 500 mg, Oral, Q24H, Sharyn Wyatt APRN  •  midazolam (VERSED) injection 5 mg, 5 mg, Intravenous, Once, Obinna Valadez DO  •  predniSONE (DELTASONE) tablet 40 mg, 40 mg, Oral, Daily, Sharyn Wyatt, APRN  •  sodium chloride 0.9 % flush 10 mL, 10 mL, Intravenous, PRN, Obinna Valadez DO  •  sodium chloride 0.9 % flush 10 mL, 10 mL, Intravenous, Q12H, Sharyn Wyatt, JESSICA  •  sodium chloride 0.9 % flush 10 mL, 10 mL, Intravenous, PRN, Sharyn Wyatt, JESSICA  •  tobramycin PF (YOLIE) nebulizer solution 300 mg, 300 mg, Nebulization, Q12H -  "RT, Edmundo, Sharyn L, APRN    Current Outpatient Medications:   •  budesonide-formoterol (Symbicort) 160-4.5 MCG/ACT inhaler, Inhale 2 puffs 2 (Two) Times a Day., Disp: 40.8 g, Rfl: 0  •  COMBIVENT RESPIMAT  MCG/ACT inhaler, INHALE 1 PUFF BY MOUTH AND INTO THE LUNGS FOUR TIMES A DAY (Patient taking differently: Inhale 1 puff 4 (Four) Times a Day.), Disp: 4 g, Rfl: 6  •  guaiFENesin (MUCINEX) 600 MG 12 hr tablet, Take 1,200 mg by mouth Every Morning., Disp: , Rfl:   •  ibuprofen (ADVIL,MOTRIN) 800 MG tablet, Take 800 mg by mouth Every 6 (Six) Hours As Needed for Mild Pain ., Disp: , Rfl:   •  ipratropium-albuterol (DUO-NEB) 0.5-2.5 mg/3 ml nebulizer, Take 3 mL by nebulization 4 (Four) Times a Day. (Patient taking differently: Take 3 mL by nebulization As Needed.), Disp: 3240 mL, Rfl: 1  •  levoFLOXacin (Levaquin) 500 MG tablet, Take 1 tablet by mouth Daily., Disp: 14 tablet, Rfl: 0  •  O2 (OXYGEN), Inhale 3 L/min Every Night., Disp: , Rfl:   •  predniSONE (DELTASONE) 10 MG tablet, Take 4 tabs daily x 3 days, then take 3 tabs daily x 3 days, then take 2 tabs daily x 3 days, then take 1 tab daily x 3 days, Disp: 31 tablet, Rfl: 0  •  PROLASTIN-C 1000 MG/20ML solution, 1 (One) Time Per Week., Disp: , Rfl:     ALLERGIES     Other    FAMILY HISTORY       Family History   Problem Relation Age of Onset   • Heart disease Mother    • Diabetes Mother    • Alpha-1 antitrypsin deficiency Brother    • Emphysema Brother         also diagnosed with Alpha 1 antitripsan deficiency   • Lung cancer Other    • Emphysema Other    • Emphysema Paternal Grandmother         prognosis in 1975 was \"lung Cancer\" , however, it is suspect that she suffered from the degeneration of her lungs due to Alpha 1 Antitripsan          SOCIAL HISTORY       Social History     Socioeconomic History   • Marital status: Single     Spouse name: Not on file   • Number of children: Not on file   • Years of education: Not on file   • Highest education level: " Not on file   Tobacco Use   • Smoking status: Former Smoker     Packs/day: 1.50     Years: 25.00     Pack years: 37.50     Types: Cigarettes     Start date: 1981     Quit date: 2006     Years since quittin.8   • Smokeless tobacco: Never Used   Substance and Sexual Activity   • Alcohol use: No     Comment: In recovery since 1994   • Drug use: Yes     Comment: In recovery since 1994   • Sexual activity: Defer     Partners: Female     Birth control/protection: I.U.D., Rhythm         PHYSICAL EXAM    (up to 7 for level 4, 8 or more for level 5)     Vitals:    20 0600 20 0615 20 0630 20 0645   BP: 107/83 115/94 107/82 110/85   BP Location:       Patient Position:       Pulse:  115 114 112   Resp:       Temp:       TempSrc:       SpO2: 97% 96% 96% 97%   Weight:       Height:           Physical Exam  General : Patient is awake, sitting upright in bed, BiPAP in place, patient is in no acute respiratory distress  HEENT: Pupils are equally round and reactive to light, EOMI, conjunctivae clear  Neck: Neck is supple, full range of motion, trachea midline  Cardiac: Heart tachycardic rate regular rhythm  Lungs: Lungs with significantly diminished breath sounds bilaterally.  Increased work of breathing is noted, currently on BiPAP therapy.  Chest wall: There is no tenderness to palpation over the chest wall or over ribs  Abdomen: Abdomen is soft, nontender, nondistended  Musculoskeletal: No peripheral edema.  5 out of 5 strength in all 4 extremities.   Neuro: Patient awake, in acute respiratory distress, unable to fully follow commands but is voluntarily moving all 4 extremities.  Shakes his head yes and no appropriately to questioning  Dermatology: Skin is warm and dry  Psych: Patient is anxious      DIAGNOSTIC RESULTS     EKG: All EKG's are interpreted by the Emergency Department Physician who either signs or Co-signs this chart in the absence of a cardiologist.    ECG 12 Lead   Final  Result   Test Reason : SOA Protocol   Blood Pressure : **/** mmHG   Vent. Rate : 145 BPM     Atrial Rate : 145 BPM      P-R Int : 118 ms          QRS Dur : 082 ms       QT Int : 280 ms       P-R-T Axes : 087 108 068 degrees      QTc Int : 434 ms      Sinus tachycardia   motion artifact present   Right atrial enlargement   Rightward axis   Pulmonary disease pattern   Abnormal ECG   When compared with ECG of 28-OCT-2020 15:05,   Vent. rate has increased BY  63 BPM   Confirmed by GALE AYALA MD ([id number]) on 11/8/2020 3:12:07 AM      Referred By:  edmd           Confirmed By:GALE AYALA MD          RADIOLOGY:   Non-plain film images such as CT, Ultrasound and MRI are read by the radiologist. Plain radiographic images are visualized and preliminarily interpreted by the emergency physician with the below findings:      [] Radiologist's Report Reviewed:  XR Chest 1 View   Final Result      1. Status post chest tube placement on the left. Decrease in size of the left-sided pneumothorax. Advanced emphysematous changes.      Signer Name: Samuel Jarquin MD    Signed: 11/8/2020 5:48 AM    Workstation Name: [facility]      XR Chest 1 View   Final Result      1. Moderate size left-sided pneumothorax. Findings have been discussed with Dr. Ayala contemporaneously with this dictation.   2. Advanced emphysematous changes and old healed granulomatous disease.      Signer Name: Samuel Jarquin MD    Signed: 11/8/2020 4:53 AM    Workstation Name: [facility]      XR Chest 1 View    (Results Pending)         ED BEDSIDE ULTRASOUND:   Performed by ED Physician - none    LABS:    I have reviewed and interpreted all of the currently available lab results from this visit (if applicable):  Results for orders placed or performed during the hospital encounter of 11/08/20   Comprehensive Metabolic Panel    Specimen: Blood   Result Value Ref Range    Glucose 202  (H) 65 - 99 mg/dL    BUN 25 (H) 6 - 20 mg/dL    Creatinine 1.01 0.76 - 1.27 mg/dL    Sodium 137 136 - 145 mmol/L    Potassium 5.2 3.5 - 5.2 mmol/L    Chloride 102 98 - 107 mmol/L    CO2 21.0 (L) 22.0 - 29.0 mmol/L    Calcium 9.3 8.6 - 10.5 mg/dL    Total Protein 7.9 6.0 - 8.5 g/dL    Albumin 4.50 3.50 - 5.20 g/dL    ALT (SGPT) 21 1 - 41 U/L    AST (SGOT) 19 1 - 40 U/L    Alkaline Phosphatase 71 39 - 117 U/L    Total Bilirubin 0.3 0.0 - 1.2 mg/dL    eGFR Non African Amer 77 >60 mL/min/1.73    Globulin 3.4 gm/dL    A/G Ratio 1.3 g/dL    BUN/Creatinine Ratio 24.8 7.0 - 25.0    Anion Gap 14.0 5.0 - 15.0 mmol/L   BNP    Specimen: Blood   Result Value Ref Range    proBNP 108.3 0.0 - 900.0 pg/mL   Troponin    Specimen: Blood   Result Value Ref Range    Troponin T <0.010 0.000 - 0.030 ng/mL   CBC Auto Differential    Specimen: Blood   Result Value Ref Range    WBC 20.85 (H) 3.40 - 10.80 10*3/mm3    RBC 5.66 4.14 - 5.80 10*6/mm3    Hemoglobin 17.6 13.0 - 17.7 g/dL    Hematocrit 53.9 (H) 37.5 - 51.0 %    MCV 95.2 79.0 - 97.0 fL    MCH 31.1 26.6 - 33.0 pg    MCHC 32.7 31.5 - 35.7 g/dL    RDW 13.4 12.3 - 15.4 %    RDW-SD 47.4 37.0 - 54.0 fl    MPV 9.1 6.0 - 12.0 fL    Platelets 319 140 - 450 10*3/mm3    Neutrophil % 74.2 42.7 - 76.0 %    Lymphocyte % 13.1 (L) 19.6 - 45.3 %    Monocyte % 10.8 5.0 - 12.0 %    Eosinophil % 0.3 0.3 - 6.2 %    Basophil % 0.3 0.0 - 1.5 %    Immature Grans % 1.3 (H) 0.0 - 0.5 %    Neutrophils, Absolute 15.44 (H) 1.70 - 7.00 10*3/mm3    Lymphocytes, Absolute 2.74 0.70 - 3.10 10*3/mm3    Monocytes, Absolute 2.26 (H) 0.10 - 0.90 10*3/mm3    Eosinophils, Absolute 0.07 0.00 - 0.40 10*3/mm3    Basophils, Absolute 0.06 0.00 - 0.20 10*3/mm3    Immature Grans, Absolute 0.28 (H) 0.00 - 0.05 10*3/mm3    nRBC 0.0 0.0 - 0.2 /100 WBC   Blood Gas, Arterial With Co-Ox    Specimen: Arterial Blood   Result Value Ref Range    Site Left Radial     Fermin's Test N/A     pH, Arterial 7.216 (C) 7.350 - 7.450 pH units     pCO2, Arterial 58.3 (H) 35.0 - 45.0 mm Hg    pO2, Arterial 182.0 (H) 83.0 - 108.0 mm Hg    HCO3, Arterial 23.6 20.0 - 26.0 mmol/L    Base Excess, Arterial -5.5 (L) 0.0 - 2.0 mmol/L    Hemoglobin, Blood Gas 17.8 (H) 13.5 - 17.5 g/dL    Hematocrit, Blood Gas 54.6 %    Oxyhemoglobin 98.1 94 - 99 %    Methemoglobin 0.40 0.00 - 1.50 %    Carboxyhemoglobin 0.5 0 - 2 %    CO2 Content 25.4 22 - 33 mmol/L    Temperature 37.0 C    Barometric Pressure for Blood Gas      Modality BiPap     FIO2 100 %    Rate 0 Breaths/minute    PIP 0 cmH2O    IPAP 0     EPAP 0     Note      Notified Who DR CONNOR ALVAREZ     Notified By [id number]     Notified Time 11/08/2020 02:59     pH, Temp Corrected 7.216 pH Units    pCO2, Temperature Corrected 58.3 (H) 35 - 48 mm Hg    pO2, Temperature Corrected 182 (H) 83 - 108 mm Hg   Blood Gas, Arterial With Co-Ox    Specimen: Arterial Blood   Result Value Ref Range    Fermin's Test N/A     pH, Arterial 7.271 (L) 7.350 - 7.450 pH units    pCO2, Arterial 47.8 (H) 35.0 - 45.0 mm Hg    pO2, Arterial 88.7 83.0 - 108.0 mm Hg    HCO3, Arterial 22.0 20.0 - 26.0 mmol/L    Base Excess, Arterial -5.3 (L) 0.0 - 2.0 mmol/L    Hemoglobin, Blood Gas 17.8 (H) 13.5 - 17.5 g/dL    Hematocrit, Blood Gas 54.7 %    Oxyhemoglobin 94.8 94 - 99 %    Methemoglobin 0.40 0.00 - 1.50 %    Carboxyhemoglobin 0.6 0 - 2 %    CO2 Content 23.4 22 - 33 mmol/L    Temperature 37.0 C    Barometric Pressure for Blood Gas      Modality BiPap     FIO2 60 %    Rate 0 Breaths/minute    PIP 0 cmH2O    IPAP 18     EPAP 6     Note      pH, Temp Corrected 7.271 pH Units    pCO2, Temperature Corrected 47.8 35 - 48 mm Hg    pO2, Temperature Corrected 88.7 83 - 108 mm Hg   ECG 12 Lead   Result Value Ref Range    QT Interval 280 ms    QTC Interval 434 ms   Light Blue Top   Result Value Ref Range    Extra Tube hold for add-on    Green Top (Gel)   Result Value Ref Range    Extra Tube Hold for add-ons.    Lavender Top   Result Value Ref Range    Extra Tube hold  for add-on    Gold Top - SST   Result Value Ref Range    Extra Tube Hold for add-ons.         All other labs were within normal range or not returned as of this dictation.      EMERGENCY DEPARTMENT COURSE and DIFFERENTIAL DIAGNOSIS/MDM:   Vitals:    Vitals:    11/08/20 0600 11/08/20 0615 11/08/20 0630 11/08/20 0645   BP: 107/83 115/94 107/82 110/85   BP Location:       Patient Position:       Pulse:  115 114 112   Resp:       Temp:       TempSrc:       SpO2: 97% 96% 96% 97%   Weight:       Height:                Patient with significant pulmonary disease with alpha-1 antitrypsin deficiency with significant pulmonary emphysema presents with acute respiratory distress.  History is limited secondary the patient's acute presenting process.  I did review old notes from the patient's pulmonologist, Dr. Castellano it appears the patient has been battling underlying pulmonary Pseudomonas infection did have some endobronchial stents which were removed just over a week ago and the patient is currently on antibiotics.  He presents on BiPAP via EMS with multiple DuoNeb therapies, epinephrine given prior to arrival.  We did switch the patient over to our BiPAP machine, obtain an ABG with a pH of 7.216, PCO2 of 58 with a PO2 of 182.  Will continue with BiPAP management, IV steroids with advanced imaging as the patient has had concern for recurrent pulmonary infections pseudomonal pneumonia.  Chest x-ray does reveal a left upper moderate-sized pneumothorax.  With the patient's recent interventions with his pulmonologist I did discuss the case with  who happened to be on-call this evening at the hospital.  He is well aware of the patient, we discussed his current respiratory status, concern for underlying pneumothorax.  He states he will come down to evaluate the patient possible chest tube placement.  He is very aware of the patient with his history and has been removing valves as they have become infections pulmonary  sources of continued infections which she is continuing to treatment follow.  We will plan for admission up to the ICU for further work-up and evaluation.      MEDICATIONS ADMINISTERED IN ED:  Medications   sodium chloride 0.9 % flush 10 mL (has no administration in time range)   midazolam (VERSED) injection 5 mg (5 mg Intravenous Not Given 11/8/20 0535)   ketorolac (TORADOL) injection 30 mg (30 mg Intravenous Given 11/8/20 0532)   sodium chloride 0.9 % flush 10 mL (has no administration in time range)   sodium chloride 0.9 % flush 10 mL (has no administration in time range)   famotidine (PEPCID) injection 20 mg (has no administration in time range)   predniSONE (DELTASONE) tablet 40 mg (has no administration in time range)   budesonide-formoterol (SYMBICORT) 160-4.5 MCG/ACT inhaler 2 puff (has no administration in time range)   guaiFENesin (MUCINEX) 12 hr tablet 1,200 mg (has no administration in time range)   ipratropium-albuterol (DUO-NEB) nebulizer solution 3 mL (has no administration in time range)   levoFLOXacin (LEVAQUIN) tablet 500 mg (has no administration in time range)   tobramycin PF (YOLIE) nebulizer solution 300 mg (300 mg Nebulization Not Given 11/8/20 0553)   methylPREDNISolone sodium succinate (SOLU-Medrol) injection 125 mg (125 mg Intravenous Given 11/8/20 0324)   levoFLOXacin (LEVAQUIN) 750 mg/150 mL D5W (premix) (LEVAQUIN) 750 mg (0 mg Intravenous Stopped 11/8/20 0532)   LORazepam (ATIVAN) injection 1 mg (1 mg Intravenous Given 11/8/20 0430)       PROCEDURES:  Procedures    CRITICAL CARE TIME    Total Critical Care time was 45 minutes, excluding separately reportable procedures.  Acute on chronic respiratory failure, hypoxia requiring multiple interventions, reevaluations and discussions with consultants.  There was a high probability of clinically significant/life threatening deterioration in the patient's condition which required my urgent intervention.      FINAL IMPRESSION      1. Spontaneous  tension pneumothorax    2. Spontaneous pneumothorax    3. Dyspnea and respiratory abnormalities    4. Acute respiratory failure with hypoxia (CMS/[facility])          DISPOSITION/PLAN     ED Disposition     ED Disposition Condition Comment    Decision to Admit  Level of Care: Critical Care [6]   Diagnosis: Spontaneous tension pneumothorax [512.0.ICD-9-CM]   Admitting Physician: CAROLYN LARA [[id number]]   Certification: I certify that inpatient hospital services are medically necessary for greater than 2 midnights.            PATIENT REFERRED TO:  No follow-up provider specified.    DISCHARGE MEDICATIONS:     Medication List      ASK your doctor about these medications    budesonide-formoterol 160-4.5 MCG/ACT inhaler  Commonly known as: Symbicort  Inhale 2 puffs 2 (Two) Times a Day.     * Combivent Respimat  MCG/ACT inhaler  Generic drug: ipratropium-albuterol  INHALE 1 PUFF BY MOUTH AND INTO THE LUNGS FOUR TIMES A DAY     * ipratropium-albuterol 0.5-2.5 mg/3 ml nebulizer  Commonly known as: DUO-NEB  Take 3 mL by nebulization 4 (Four) Times a Day.     guaiFENesin 600 MG 12 hr tablet  Commonly known as: MUCINEX     ibuprofen 800 MG tablet  Commonly known as: ADVIL,MOTRIN     levoFLOXacin 500 MG tablet  Commonly known as: Levaquin  Take 1 tablet by mouth Daily.     O2  Commonly known as: OXYGEN     predniSONE 10 MG tablet  Commonly known as: DELTASONE  Take 4 tabs daily x 3 days, then take 3 tabs daily x 3 days, then take 2 tabs daily x 3 days, then take 1 tab daily x 3 days     Prolastin-C 1000 MG/20ML IV solution  Generic drug: Alpha1-Proteinase Inhibitor         * This list has 2 medication(s) that are the same as other medications prescribed for you. Read the directions carefully, and ask your doctor or other care provider to review them with you.                    Comment: Please note this report has been produced using speech recognition software.      Obinna Valadez DO  Attending Emergency  Physician               Obinna Valadez,   11/08/20 0708

## 2020-11-08 NOTE — PLAN OF CARE
Problem: Adult Inpatient Plan of Care  Goal: Plan of Care Review  Outcome: Ongoing, Progressing  Flowsheets (Taken 11/8/2020 6884)  Progress: improving  Plan of Care Reviewed With: patient   Goal Outcome Evaluation:  Plan of Care Reviewed With: patient  Progress: improving  Pt came to unit @ 0930 from ED w/ L chest tube. Pt's chest tube had no fluctuation and Dr. Garica was made aware. Per Dr. Garcia, RN to assess for s/s of decline and page if that occurs. Pt has had no complaints of shortness of breath and has been weaned from non-rebreather to nasal canula. Pt is currently on 6L nasal canula. Pt's pain has remained under control. Pt only complained of pain once during shift and IV Toradol was given. Pt's VSS, SBP has dropped to the 70's a couple times but has gone back up after rechecking. Pt stated that his blood pressure typically runs in the 80-90's. Pt's family visited at bedside and was updated. Will continue to monitor.

## 2020-11-08 NOTE — PROCEDURES
"Chest Tube Insertion    Date/Time: 11/8/2020 5:54 AM  Performed by: Phan Castellano MD  Authorized by: Phan Castellano MD   Consent: The procedure was performed in an emergent situation. Verbal consent obtained.  Risks and benefits: risks, benefits and alternatives were discussed  Consent given by: patient  Patient identity confirmed: arm band, verbally with patient and provided demographic data  Time out: Immediately prior to procedure a \"time out\" was called to verify the correct patient, procedure, equipment, support staff and site/side marked as required.  Indications: tension pneumothorax    Sedation:  Patient sedated: no    Anesthesia: local infiltration    Anesthesia:  Local Anesthetic: lidocaine 1% without epinephrine  Anesthetic total: 10 mL  Preparation: skin prepped with ChloraPrep  Placement location: left lateral  Scalpel size: 11  Tube size (Bulgarian): 8.5 Bulgarian mini catheter.  Ultrasound guidance: no  Tension pneumothorax heard: yes  Tube connected to: suction  Drainage characteristics: serosanguinous  Drainage amount: 5 ml  Suture material: 2-0 silk  Dressing: 4x4 sterile gauze  Post-insertion x-ray findings: tube in good position  Patient tolerance: patient tolerated the procedure well with no immediate complications        "

## 2020-11-09 ENCOUNTER — APPOINTMENT (OUTPATIENT)
Dept: GENERAL RADIOLOGY | Facility: HOSPITAL | Age: 55
End: 2020-11-09

## 2020-11-09 PROBLEM — J96.22 ACUTE ON CHRONIC RESPIRATORY FAILURE WITH HYPOXIA AND HYPERCAPNIA (HCC): Status: ACTIVE | Noted: 2020-11-09

## 2020-11-09 PROBLEM — J96.21 ACUTE ON CHRONIC RESPIRATORY FAILURE WITH HYPOXIA AND HYPERCAPNIA (HCC): Status: ACTIVE | Noted: 2020-11-09

## 2020-11-09 LAB
ANION GAP SERPL CALCULATED.3IONS-SCNC: 8 MMOL/L (ref 5–15)
BASOPHILS # BLD AUTO: 0.01 10*3/MM3 (ref 0–0.2)
BASOPHILS NFR BLD AUTO: 0.1 % (ref 0–1.5)
BUN SERPL-MCNC: 29 MG/DL (ref 6–20)
BUN/CREAT SERPL: 30.2 (ref 7–25)
CALCIUM SPEC-SCNC: 8.7 MG/DL (ref 8.6–10.5)
CHLORIDE SERPL-SCNC: 101 MMOL/L (ref 98–107)
CO2 SERPL-SCNC: 24 MMOL/L (ref 22–29)
CREAT SERPL-MCNC: 0.96 MG/DL (ref 0.76–1.27)
DEPRECATED RDW RBC AUTO: 45 FL (ref 37–54)
EOSINOPHIL # BLD AUTO: 0.03 10*3/MM3 (ref 0–0.4)
EOSINOPHIL NFR BLD AUTO: 0.2 % (ref 0.3–6.2)
ERYTHROCYTE [DISTWIDTH] IN BLOOD BY AUTOMATED COUNT: 13.2 % (ref 12.3–15.4)
GFR SERPL CREATININE-BSD FRML MDRD: 82 ML/MIN/1.73
GLUCOSE SERPL-MCNC: 130 MG/DL (ref 65–99)
HCT VFR BLD AUTO: 41.7 % (ref 37.5–51)
HGB BLD-MCNC: 13.6 G/DL (ref 13–17.7)
IMM GRANULOCYTES # BLD AUTO: 0.11 10*3/MM3 (ref 0–0.05)
IMM GRANULOCYTES NFR BLD AUTO: 0.7 % (ref 0–0.5)
LYMPHOCYTES # BLD AUTO: 1.6 10*3/MM3 (ref 0.7–3.1)
LYMPHOCYTES NFR BLD AUTO: 9.5 % (ref 19.6–45.3)
MCH RBC QN AUTO: 30.1 PG (ref 26.6–33)
MCHC RBC AUTO-ENTMCNC: 32.6 G/DL (ref 31.5–35.7)
MCV RBC AUTO: 92.3 FL (ref 79–97)
MONOCYTES # BLD AUTO: 1.82 10*3/MM3 (ref 0.1–0.9)
MONOCYTES NFR BLD AUTO: 10.9 % (ref 5–12)
NEUTROPHILS NFR BLD AUTO: 13.2 10*3/MM3 (ref 1.7–7)
NEUTROPHILS NFR BLD AUTO: 78.6 % (ref 42.7–76)
NRBC BLD AUTO-RTO: 0 /100 WBC (ref 0–0.2)
PLATELET # BLD AUTO: 242 10*3/MM3 (ref 140–450)
PMV BLD AUTO: 9.3 FL (ref 6–12)
POTASSIUM SERPL-SCNC: 4.5 MMOL/L (ref 3.5–5.2)
QT INTERVAL: 408 MS
QTC INTERVAL: 417 MS
RBC # BLD AUTO: 4.52 10*6/MM3 (ref 4.14–5.8)
SODIUM SERPL-SCNC: 133 MMOL/L (ref 136–145)
TROPONIN T SERPL-MCNC: <0.01 NG/ML (ref 0–0.03)
WBC # BLD AUTO: 16.77 10*3/MM3 (ref 3.4–10.8)

## 2020-11-09 PROCEDURE — 71045 X-RAY EXAM CHEST 1 VIEW: CPT

## 2020-11-09 PROCEDURE — 25010000002 HEPARIN (PORCINE) PER 1000 UNITS: Performed by: INTERNAL MEDICINE

## 2020-11-09 PROCEDURE — 93010 ELECTROCARDIOGRAM REPORT: CPT | Performed by: INTERNAL MEDICINE

## 2020-11-09 PROCEDURE — 94799 UNLISTED PULMONARY SVC/PX: CPT

## 2020-11-09 PROCEDURE — 80048 BASIC METABOLIC PNL TOTAL CA: CPT | Performed by: INTERNAL MEDICINE

## 2020-11-09 PROCEDURE — 85025 COMPLETE CBC W/AUTO DIFF WBC: CPT | Performed by: INTERNAL MEDICINE

## 2020-11-09 PROCEDURE — 84484 ASSAY OF TROPONIN QUANT: CPT | Performed by: INTERNAL MEDICINE

## 2020-11-09 PROCEDURE — 93005 ELECTROCARDIOGRAM TRACING: CPT | Performed by: NURSE PRACTITIONER

## 2020-11-09 PROCEDURE — 99233 SBSQ HOSP IP/OBS HIGH 50: CPT | Performed by: INTERNAL MEDICINE

## 2020-11-09 PROCEDURE — 63710000001 PREDNISONE PER 1 MG: Performed by: NURSE PRACTITIONER

## 2020-11-09 PROCEDURE — 25010000002 KETOROLAC TROMETHAMINE PER 15 MG: Performed by: NURSE PRACTITIONER

## 2020-11-09 RX ORDER — NYSTATIN 100000 [USP'U]/G
POWDER TOPICAL EVERY 12 HOURS SCHEDULED
Status: DISCONTINUED | OUTPATIENT
Start: 2020-11-09 | End: 2020-11-09

## 2020-11-09 RX ORDER — NICOTINE POLACRILEX 4 MG
15 LOZENGE BUCCAL
Status: DISCONTINUED | OUTPATIENT
Start: 2020-11-09 | End: 2020-11-09

## 2020-11-09 RX ORDER — DEXTROSE MONOHYDRATE 25 G/50ML
25 INJECTION, SOLUTION INTRAVENOUS
Status: DISCONTINUED | OUTPATIENT
Start: 2020-11-09 | End: 2020-11-09

## 2020-11-09 RX ADMIN — TOBRAMYCIN 300 MG: 300 SOLUTION ORAL at 20:19

## 2020-11-09 RX ADMIN — BUDESONIDE AND FORMOTEROL FUMARATE DIHYDRATE 2 PUFF: 160; 4.5 AEROSOL RESPIRATORY (INHALATION) at 20:19

## 2020-11-09 RX ADMIN — HEPARIN SODIUM 5000 UNITS: 5000 INJECTION, SOLUTION INTRAVENOUS; SUBCUTANEOUS at 14:13

## 2020-11-09 RX ADMIN — KETOROLAC TROMETHAMINE 30 MG: 30 INJECTION, SOLUTION INTRAMUSCULAR at 14:13

## 2020-11-09 RX ADMIN — FAMOTIDINE 20 MG: 20 TABLET, FILM COATED ORAL at 18:08

## 2020-11-09 RX ADMIN — IPRATROPIUM BROMIDE AND ALBUTEROL SULFATE 3 ML: 2.5; .5 SOLUTION RESPIRATORY (INHALATION) at 15:30

## 2020-11-09 RX ADMIN — IPRATROPIUM BROMIDE AND ALBUTEROL SULFATE 3 ML: 2.5; .5 SOLUTION RESPIRATORY (INHALATION) at 08:22

## 2020-11-09 RX ADMIN — LEVOFLOXACIN 500 MG: 500 TABLET, FILM COATED ORAL at 06:11

## 2020-11-09 RX ADMIN — IPRATROPIUM BROMIDE AND ALBUTEROL SULFATE 3 ML: 2.5; .5 SOLUTION RESPIRATORY (INHALATION) at 20:19

## 2020-11-09 RX ADMIN — HEPARIN SODIUM 5000 UNITS: 5000 INJECTION, SOLUTION INTRAVENOUS; SUBCUTANEOUS at 06:11

## 2020-11-09 RX ADMIN — HEPARIN SODIUM 5000 UNITS: 5000 INJECTION, SOLUTION INTRAVENOUS; SUBCUTANEOUS at 21:57

## 2020-11-09 RX ADMIN — TOBRAMYCIN 300 MG: 300 SOLUTION ORAL at 08:22

## 2020-11-09 RX ADMIN — KETOROLAC TROMETHAMINE 30 MG: 30 INJECTION, SOLUTION INTRAMUSCULAR at 08:15

## 2020-11-09 RX ADMIN — FAMOTIDINE 20 MG: 20 TABLET, FILM COATED ORAL at 08:15

## 2020-11-09 RX ADMIN — BUDESONIDE AND FORMOTEROL FUMARATE DIHYDRATE 2 PUFF: 160; 4.5 AEROSOL RESPIRATORY (INHALATION) at 08:25

## 2020-11-09 RX ADMIN — IPRATROPIUM BROMIDE AND ALBUTEROL SULFATE 3 ML: 2.5; .5 SOLUTION RESPIRATORY (INHALATION) at 12:50

## 2020-11-09 RX ADMIN — SODIUM CHLORIDE, PRESERVATIVE FREE 10 ML: 5 INJECTION INTRAVENOUS at 21:02

## 2020-11-09 RX ADMIN — KETOROLAC TROMETHAMINE 30 MG: 30 INJECTION, SOLUTION INTRAMUSCULAR at 21:57

## 2020-11-09 RX ADMIN — PREDNISONE 40 MG: 20 TABLET ORAL at 08:15

## 2020-11-09 RX ADMIN — SODIUM CHLORIDE, PRESERVATIVE FREE 10 ML: 5 INJECTION INTRAVENOUS at 08:15

## 2020-11-09 RX ADMIN — GUAIFENESIN 1200 MG: 600 TABLET, EXTENDED RELEASE ORAL at 08:15

## 2020-11-09 NOTE — PLAN OF CARE
Goal Outcome Evaluation:  Plan of Care Reviewed With: patient  Progress: improving  Outcome Summary: on 4L NC (baseline whats used at  home). denies SOA. chest tube without fluctuation or air leak. no SQ air palpable. no drainage. Pt states he feels much better. mild sorness with movement at insertion site. PRN Toradol effective. resting well.  BP  low (baseline per pt) and the rechecks are always sufficient. Afebrile.

## 2020-11-09 NOTE — NURSING NOTE
Spoke with Dr. Castellano in rounds. Weaned to home dose of 4L NC. Maintaining >90% 02 sat. SOA with exertion. HR 50s-80s NSR. Discussed possible ST elevation. He reviewed EKG and said we will recheck troponin just to make sure. Informed him of patient complaining of left chest discomfort. SBP 80s-90s. He stated to keep an eye on it. Ok to get up to chair. Informed him that Na+ dropped to 133. No new orders regarding this. Chest tube removed from suction. Will obtain CXR at 1300 and then clamp if stable. Will stay in ICU today.

## 2020-11-09 NOTE — PROGRESS NOTES
Discharge Planning Assessment  [facility]     Patient Name: Balwinder Willams  MRN: [id number]  Today's Date: 11/9/2020    Admit Date: 11/8/2020    Discharge Needs Assessment     Row Name 11/09/20 1215       Living Environment    Lives With  significant other    Current Living Arrangements  home/apartment/condo    Primary Care Provided by  self    Provides Primary Care For  no one    Family Caregiver if Needed  significant other    Quality of Family Relationships  involved;helpful    Able to Return to Prior Arrangements  yes       Transition Planning    Patient/Family Anticipates Transition to  home       Discharge Needs Assessment    Readmission Within the Last 30 Days  no previous admission in last 30 days    Equipment Currently Used at Home  oxygen    Concerns to be Addressed  discharge planning    Equipment Needed After Discharge  none        Discharge Plan     Row Name 11/09/20 1216       Plan    Plan  home    Patient/Family in Agreement with Plan  yes    Plan Comments  Pt lives in [facility] with his signficant other. He reports he is independent with all ADLs and uses home O2 from [employer]. He is followed by his PCP and his medications are covered by insurance. At this time his plan for discharge is to return home. CM to follow for any discharge needs    Final Discharge Disposition Code  01 - home or self-care        Continued Care and Services - Admitted Since 11/8/2020    Coordination has not been started for this encounter.         Demographic Summary     Row Name 11/09/20 1214       General Information    Admission Type  inpatient    Referral Source  physician    Reason for Consult  discharge planning    General Information Comments  PCP Obinna Arellano       Contact Information    Permission Granted to Share Info With  family/designee    Contact Information Comments  Josi [phone]        Functional Status     Row Name 11/09/20 1215       Functional Status, IADL    Medications   independent    Meal Preparation  independent    Housekeeping  independent    Laundry  independent    Shopping  independent       Mental Status    General Appearance WDL  WDL       Mental Status Summary    Recent Changes in Mental Status/Cognitive Functioning  no changes        Psychosocial    No documentation.       Abuse/Neglect    No documentation.       Legal    No documentation.       Substance Abuse    No documentation.       Patient Forms    No documentation.           Ariella Newton RN

## 2020-11-09 NOTE — PROGRESS NOTES
Intensive Care Follow-up     Hospital:  LOS: 1 day   Mr. Balwinder Willams, 54 y.o. male is followed for:   Spontaneous tension pneumothorax        Subjective   Interval History:  The chart has been reviewed and full.  The patient has done well overnight.  He has been on 20 cm of water suctioning.  Chest x-ray reviewed this morning shows resolution of the pneumothorax on the left.  No infiltrates are noted.  He states that he is feeling a whole lot better.  He complains of only a little bit of worsened shortness of breath over his baseline as well as pain at the insertion site.  Due to pain last night, he did have an EKG performed and there was a question of some ST elevation in the inferior leads.  I have reviewed this and I suspect that this is not in fact ST elevation.  Initial troponin was negative and I have ordered during rounds this morning a repeat troponin.  We will watch this very closely.    The patient's past medical, surgical and social history were reviewed and updated in Epic as appropriate.        Objective     Infusions:     Medications:  budesonide-formoterol, 2 puff, Inhalation, BID - RT  famotidine, 20 mg, Oral, BID AC  guaiFENesin, 1,200 mg, Oral, QAM  heparin (porcine), 5,000 Units, Subcutaneous, Q8H  ipratropium-albuterol, 3 mL, Nebulization, 4x Daily - RT  levoFLOXacin, 500 mg, Oral, Q24H  predniSONE, 40 mg, Oral, Daily  sodium chloride, 10 mL, Intravenous, Q12H  tobramycin PF, 300 mg, Nebulization, Q12H - RT        Vital Sign Min/Max for last 24 hours  Temp  Min: 97.1 °F (36.2 °C)  Max: 98.3 °F (36.8 °C)   BP  Min: 77/57  Max: 119/78   Pulse  Min: 53  Max: 100   Resp  Min: 16  Max: 26   SpO2  Min: 90 %  Max: 100 %   Flow (L/min)  Min: 4  Max: 6       Input/Output for last 24 hour shift  11/08 0701 - 11/09 0700  In: 655 [P.O.:655]  Out: 600 [Urine:600]      Objective:  General Appearance:  Uncomfortable, well-appearing, in no acute distress and not in pain.    Vital signs: (most recent): Blood  "pressure 101/87, pulse 100, temperature 97.1 °F (36.2 °C), temperature source Axillary, resp. rate 26, height 172.7 cm (67.99\"), weight 61.2 kg (135 lb), SpO2 90 %.    HEENT: Normal HEENT exam.    Lungs:  (Equal air movement bilaterally.  No wheezes are heard.  Left thoracostomy tube in place with no sign of air leak on 20 cm of water suction)  Heart: Tachycardia.  Regular rhythm.  S1 normal and S2 normal.  No murmur.   Chest: Symmetric chest wall expansion. Chest wall tenderness present.    Abdomen: Abdomen is soft and non-distended.  Bowel sounds are normal.     Extremities: Normal range of motion.  There is no dependent edema.    Neurological: Patient is alert and oriented to person, place and time.    Pupils:  Pupils are equal, round, and reactive to light.  Pupils are equal.   Skin:  Warm.  No rash or cyanosis.             Results from last 7 days   Lab Units 11/09/20  0639 11/08/20  0302   WBC 10*3/mm3 16.77* 20.85*   HEMOGLOBIN g/dL 13.6 17.6   PLATELETS 10*3/mm3 242 319     Results from last 7 days   Lab Units 11/09/20  0639 11/08/20  0302   SODIUM mmol/L 133* 137   POTASSIUM mmol/L 4.5 5.2   CO2 mmol/L 24.0 21.0*   BUN mg/dL 29* 25*   CREATININE mg/dL 0.96 1.01   GLUCOSE mg/dL 130* 202*     Estimated Creatinine Clearance: 76.1 mL/min (by C-G formula based on SCr of 0.96 mg/dL).    Results from last 7 days   Lab Units 11/08/20  0424   PH, ARTERIAL pH units 7.271*   PCO2, ARTERIAL mm Hg 47.8*   PO2 ART mm Hg 88.7         I reviewed the patient's results and images.     Assessment/Plan   Impression        Spontaneous tension pneumothorax    Alpha-1-antitrypsin deficiency (on replacement)    Dependence on supplemental oxygen    Stage IV, very severe, emphysema    Former smoker (Resolved 2006)  Acute on chronic respiratory failure     Plan        There is no current sign of pneumothorax on the left.  I would like to go ahead and take him off suction and placed to [facility].  We we will monitor his progress and " recheck a chest x-ray at approximately 1 PM.  If there is no sign of pneumothorax at that time, I would plan to transition to clamping the tube and observe for any changes.  Continue with current antimicrobial therapy including nebulized tobramycin.  Pain control as needed with Toradol.  Follow-up on repeat troponin.  Mobilize as tolerated in the room.  He will remain in the intensive care unit due to his high risk of rapid deterioration if he were to develop collapse of that left lung or any further exacerbation of his underlying severe emphysema.    Plan of care and goals reviewed with mulitdisciplinary/antibiotic stewardship team during rounds.   I discussed the patient's findings and my recommendations with patient and nursing staff     High level of risk due to:  severe exacerbation of chronic illness and illness with threat to life or bodily function.      Phan Castellano MD, [facility]  Pulmonology and Critical Care Medicine

## 2020-11-09 NOTE — PLAN OF CARE
Goal Outcome Evaluation:  Plan of Care Reviewed With: patient, spouse  Progress: improving  Outcome Summary: Patient remains neurologically intact. Tolerating 4L NC with 02 sat >89%. SOA with exertion. Chest tube clamped by Dr. Castellano at 1445. Tolerating. VSS. Afebrile. Toradol prn for pain.

## 2020-11-09 NOTE — PROGRESS NOTES
Clinical Nutrition     Multidisciplinary Rounds      Patient Name: Balwinder Willams  Date of Encounter: 11/09/20 10:47 EST  MRN: [id number]  Admission date: 11/8/2020    MOSHE QUACH to continue to follow per protocol.     Current diet: Diet Regular    Intervention:  Follow treatment plan  Care plan reviewed    Follow up:   Per protocol      Chantell Madrigal RD  10:47 EST  Time: 10min

## 2020-11-10 ENCOUNTER — APPOINTMENT (OUTPATIENT)
Dept: GENERAL RADIOLOGY | Facility: HOSPITAL | Age: 55
End: 2020-11-10

## 2020-11-10 PROCEDURE — 71045 X-RAY EXAM CHEST 1 VIEW: CPT

## 2020-11-10 PROCEDURE — 94799 UNLISTED PULMONARY SVC/PX: CPT

## 2020-11-10 PROCEDURE — 25010000002 HEPARIN (PORCINE) PER 1000 UNITS: Performed by: INTERNAL MEDICINE

## 2020-11-10 PROCEDURE — 25010000002 KETOROLAC TROMETHAMINE PER 15 MG: Performed by: NURSE PRACTITIONER

## 2020-11-10 PROCEDURE — 99232 SBSQ HOSP IP/OBS MODERATE 35: CPT | Performed by: INTERNAL MEDICINE

## 2020-11-10 PROCEDURE — 63710000001 PREDNISONE PER 1 MG: Performed by: NURSE PRACTITIONER

## 2020-11-10 RX ORDER — PREDNISONE 20 MG/1
20 TABLET ORAL DAILY
Status: DISCONTINUED | OUTPATIENT
Start: 2020-11-11 | End: 2020-11-18 | Stop reason: HOSPADM

## 2020-11-10 RX ADMIN — PREDNISONE 40 MG: 20 TABLET ORAL at 08:01

## 2020-11-10 RX ADMIN — LEVOFLOXACIN 500 MG: 500 TABLET, FILM COATED ORAL at 08:01

## 2020-11-10 RX ADMIN — HEPARIN SODIUM 5000 UNITS: 5000 INJECTION, SOLUTION INTRAVENOUS; SUBCUTANEOUS at 14:46

## 2020-11-10 RX ADMIN — BUDESONIDE AND FORMOTEROL FUMARATE DIHYDRATE 2 PUFF: 160; 4.5 AEROSOL RESPIRATORY (INHALATION) at 08:04

## 2020-11-10 RX ADMIN — KETOROLAC TROMETHAMINE 30 MG: 30 INJECTION, SOLUTION INTRAMUSCULAR at 06:16

## 2020-11-10 RX ADMIN — IPRATROPIUM BROMIDE AND ALBUTEROL SULFATE 3 ML: 2.5; .5 SOLUTION RESPIRATORY (INHALATION) at 12:35

## 2020-11-10 RX ADMIN — FAMOTIDINE 20 MG: 20 TABLET, FILM COATED ORAL at 08:01

## 2020-11-10 RX ADMIN — TOBRAMYCIN 300 MG: 300 SOLUTION ORAL at 08:04

## 2020-11-10 RX ADMIN — GUAIFENESIN 1200 MG: 600 TABLET, EXTENDED RELEASE ORAL at 08:01

## 2020-11-10 RX ADMIN — HEPARIN SODIUM 5000 UNITS: 5000 INJECTION, SOLUTION INTRAVENOUS; SUBCUTANEOUS at 06:16

## 2020-11-10 RX ADMIN — TOBRAMYCIN 300 MG: 300 SOLUTION ORAL at 21:16

## 2020-11-10 RX ADMIN — HEPARIN SODIUM 5000 UNITS: 5000 INJECTION, SOLUTION INTRAVENOUS; SUBCUTANEOUS at 21:06

## 2020-11-10 RX ADMIN — BUDESONIDE AND FORMOTEROL FUMARATE DIHYDRATE 2 PUFF: 160; 4.5 AEROSOL RESPIRATORY (INHALATION) at 21:16

## 2020-11-10 RX ADMIN — FAMOTIDINE 20 MG: 20 TABLET, FILM COATED ORAL at 16:52

## 2020-11-10 RX ADMIN — IPRATROPIUM BROMIDE AND ALBUTEROL SULFATE 3 ML: 2.5; .5 SOLUTION RESPIRATORY (INHALATION) at 21:16

## 2020-11-10 RX ADMIN — SODIUM CHLORIDE, PRESERVATIVE FREE 10 ML: 5 INJECTION INTRAVENOUS at 21:06

## 2020-11-10 RX ADMIN — IPRATROPIUM BROMIDE AND ALBUTEROL SULFATE 3 ML: 2.5; .5 SOLUTION RESPIRATORY (INHALATION) at 08:04

## 2020-11-10 NOTE — PLAN OF CARE
Goal Outcome Evaluation:  Plan of Care Reviewed With: patient  Progress: improving  Outcome Summary: VSS. Afeb. Chest tube pigtail removed this morning. Site CDI. Pt continues to require 4L NC. Pt up to chair today and ambulated to full laps around unit, stopping penitentiary d/t tachypnea during both laps. SPO2 remained at 91% or higher during ambulation with 4L NC. Pt is becoming more comfortable and less anxious throughout the day. No acute events during shift. Anticipate dc in the am. Will continue to monitor.

## 2020-11-10 NOTE — PLAN OF CARE
Goal Outcome Evaluation:  Plan of Care Reviewed With: patient  Progress: improving  Outcome Summary: denies SOA tonight. tolerating 4L NC HS. chest tube pigtail still clamped with no complications thus far. VSS. Afberile.

## 2020-11-10 NOTE — PROGRESS NOTES
Intensive Care Follow-up     Hospital:  LOS: 2 days   Mr. Balwinder Willams, 54 y.o. male is followed for:   Spontaneous tension pneumothorax        Subjective   The chart has been reviewed and full.  The patient has done well overnight.  He has been on 20 cm of water suctioning.  Chest x-ray reviewed this morning shows resolution of the pneumothorax on the left.  No infiltrates are noted.  He states that he is feeling a whole lot better.  He complains of only a little bit of worsened shortness of breath over his baseline as well as pain at the insertion site.  Due to pain last night, he did have an EKG performed and there was a question of some ST elevation in the inferior leads.  I have reviewed this and I suspect that this is not in fact ST elevation.  Initial troponin was negative and I have ordered during rounds this morning a repeat troponin.  We will watch this very closely.  Interval History:  Chest x-ray this morning demonstrated no sign of recurrent pneumothorax.  I was able to remove the chest tube at bedside without difficulty.  Subsequent chest x-ray showed no sign of pneumothorax in the absence of the chest tube.  Patient states that he is still winded with activity and has not been doing much in terms of walking around.  He is quite nervous regarding going home today.    The patient's past medical, surgical and social history were reviewed and updated in Epic as appropriate.        Objective     Infusions:     Medications:  budesonide-formoterol, 2 puff, Inhalation, BID - RT  famotidine, 20 mg, Oral, BID AC  guaiFENesin, 1,200 mg, Oral, QAM  heparin (porcine), 5,000 Units, Subcutaneous, Q8H  ipratropium-albuterol, 3 mL, Nebulization, 4x Daily - RT  levoFLOXacin, 500 mg, Oral, Q24H  predniSONE, 40 mg, Oral, Daily  sodium chloride, 10 mL, Intravenous, Q12H  tobramycin PF, 300 mg, Nebulization, Q12H - RT        Vital Sign Min/Max for last 24 hours  Temp  Min: 97.7 °F (36.5 °C)  Max: 98.4 °F (36.9 °C)   BP  " Min: 90/65  Max: 120/73   Pulse  Min: 63  Max: 101   Resp  Min: 16  Max: 20   SpO2  Min: 90 %  Max: 98 %   Flow (L/min)  Min: 4  Max: 4       Input/Output for last 24 hour shift  11/09 0701 - 11/10 0700  In: 240 [P.O.:240]  Out: 1480 [Urine:1480]      Objective:  General Appearance:  Well-appearing, in no acute distress, not in pain and comfortable.    Vital signs: (most recent): Blood pressure 116/80, pulse 92, temperature 97.8 °F (36.6 °C), temperature source Axillary, resp. rate 18, height 172.7 cm (67.99\"), weight 61.2 kg (135 lb), SpO2 94 %.    HEENT: Normal HEENT exam.    Lungs:  (Poor air movement bilaterally.  No rhonchi are heard.)  Heart: Normal rate.  Regular rhythm.  S1 normal and S2 normal.  No murmur.   Chest: Symmetric chest wall expansion. Chest wall tenderness present.    Abdomen: Abdomen is soft and non-distended.  Bowel sounds are normal.   There is no abdominal tenderness.     Extremities: Normal range of motion.  There is no dependent edema.    Neurological: Patient is alert and oriented to person, place and time.    Pupils:  Pupils are equal, round, and reactive to light.  Pupils are equal.   Skin:  Warm.  No rash or cyanosis.             Results from last 7 days   Lab Units 11/09/20  0639 11/08/20  0302   WBC 10*3/mm3 16.77* 20.85*   HEMOGLOBIN g/dL 13.6 17.6   PLATELETS 10*3/mm3 242 319     Results from last 7 days   Lab Units 11/09/20  0639 11/08/20  0302   SODIUM mmol/L 133* 137   POTASSIUM mmol/L 4.5 5.2   CO2 mmol/L 24.0 21.0*   BUN mg/dL 29* 25*   CREATININE mg/dL 0.96 1.01   GLUCOSE mg/dL 130* 202*     Estimated Creatinine Clearance: 76.1 mL/min (by C-G formula based on SCr of 0.96 mg/dL).    Results from last 7 days   Lab Units 11/08/20  0424   PH, ARTERIAL pH units 7.271*   PCO2, ARTERIAL mm Hg 47.8*   PO2 ART mm Hg 88.7         I reviewed the patient's results and images.     Assessment/Plan   Impression        Spontaneous tension pneumothorax    Alpha-1-antitrypsin deficiency (on " replacement)    Dependence on supplemental oxygen    Stage IV, very severe, emphysema    Former smoker (Resolved 2006)    Acute on chronic respiratory failure with hypoxia and hypercapnia (CMS/HCC)       Plan        I am very pleased at Mr. Willams's progress.   I share his concern about the possibility of recurrent pneumothorax particularly as he increases his exercise.  I would like him to remain in the hospital today and we will gradually increase his exercise activity that which is similar to his home regimen.  Chest x-ray will be ordered again tomorrow morning.  If he remains stable we will plan for discharge tomorrow.  Continue with current antimicrobial therapy.    Plan of care and goals reviewed with mulitdisciplinary/antibiotic stewardship team during rounds.   I discussed the patient's findings and my recommendations with patient and nursing staff         Phan Castellano MD, [facility]  Pulmonology and Critical Care Medicine

## 2020-11-11 ENCOUNTER — APPOINTMENT (OUTPATIENT)
Dept: GENERAL RADIOLOGY | Facility: HOSPITAL | Age: 55
End: 2020-11-11

## 2020-11-11 PROCEDURE — 71045 X-RAY EXAM CHEST 1 VIEW: CPT

## 2020-11-11 PROCEDURE — 94799 UNLISTED PULMONARY SVC/PX: CPT

## 2020-11-11 PROCEDURE — 63710000001 PREDNISONE PER 1 MG: Performed by: INTERNAL MEDICINE

## 2020-11-11 PROCEDURE — 25010000002 HEPARIN (PORCINE) PER 1000 UNITS: Performed by: INTERNAL MEDICINE

## 2020-11-11 PROCEDURE — 99233 SBSQ HOSP IP/OBS HIGH 50: CPT | Performed by: INTERNAL MEDICINE

## 2020-11-11 PROCEDURE — 25010000002 KETOROLAC TROMETHAMINE PER 15 MG: Performed by: NURSE PRACTITIONER

## 2020-11-11 RX ORDER — LIDOCAINE HYDROCHLORIDE 10 MG/ML
INJECTION, SOLUTION EPIDURAL; INFILTRATION; INTRACAUDAL; PERINEURAL
Status: DISPENSED
Start: 2020-11-11 | End: 2020-11-11

## 2020-11-11 RX ADMIN — BUDESONIDE AND FORMOTEROL FUMARATE DIHYDRATE 2 PUFF: 160; 4.5 AEROSOL RESPIRATORY (INHALATION) at 19:52

## 2020-11-11 RX ADMIN — KETOROLAC TROMETHAMINE 30 MG: 30 INJECTION, SOLUTION INTRAMUSCULAR at 18:59

## 2020-11-11 RX ADMIN — FAMOTIDINE 20 MG: 20 TABLET, FILM COATED ORAL at 08:11

## 2020-11-11 RX ADMIN — LEVOFLOXACIN 500 MG: 500 TABLET, FILM COATED ORAL at 08:11

## 2020-11-11 RX ADMIN — KETOROLAC TROMETHAMINE 30 MG: 30 INJECTION, SOLUTION INTRAMUSCULAR at 10:10

## 2020-11-11 RX ADMIN — IPRATROPIUM BROMIDE AND ALBUTEROL SULFATE 3 ML: 2.5; .5 SOLUTION RESPIRATORY (INHALATION) at 15:18

## 2020-11-11 RX ADMIN — BUDESONIDE AND FORMOTEROL FUMARATE DIHYDRATE 2 PUFF: 160; 4.5 AEROSOL RESPIRATORY (INHALATION) at 08:15

## 2020-11-11 RX ADMIN — SODIUM CHLORIDE, PRESERVATIVE FREE 10 ML: 5 INJECTION INTRAVENOUS at 20:28

## 2020-11-11 RX ADMIN — TOBRAMYCIN 300 MG: 300 SOLUTION ORAL at 08:15

## 2020-11-11 RX ADMIN — TOBRAMYCIN 300 MG: 300 SOLUTION ORAL at 19:52

## 2020-11-11 RX ADMIN — FAMOTIDINE 20 MG: 20 TABLET, FILM COATED ORAL at 16:58

## 2020-11-11 RX ADMIN — HEPARIN SODIUM 5000 UNITS: 5000 INJECTION, SOLUTION INTRAVENOUS; SUBCUTANEOUS at 06:09

## 2020-11-11 RX ADMIN — PREDNISONE 20 MG: 20 TABLET ORAL at 08:11

## 2020-11-11 RX ADMIN — GUAIFENESIN 1200 MG: 600 TABLET, EXTENDED RELEASE ORAL at 08:11

## 2020-11-11 RX ADMIN — IPRATROPIUM BROMIDE AND ALBUTEROL SULFATE 3 ML: 2.5; .5 SOLUTION RESPIRATORY (INHALATION) at 08:15

## 2020-11-11 RX ADMIN — IPRATROPIUM BROMIDE AND ALBUTEROL SULFATE 3 ML: 2.5; .5 SOLUTION RESPIRATORY (INHALATION) at 11:34

## 2020-11-11 RX ADMIN — SODIUM CHLORIDE, PRESERVATIVE FREE 10 ML: 5 INJECTION INTRAVENOUS at 08:15

## 2020-11-11 RX ADMIN — IPRATROPIUM BROMIDE AND ALBUTEROL SULFATE 3 ML: 2.5; .5 SOLUTION RESPIRATORY (INHALATION) at 19:52

## 2020-11-11 NOTE — PROGRESS NOTES
"Intensive Care Follow-up     Hospital:  LOS: 3 days   Mr. Balwinder Willams, 54 y.o. male is followed for:   Spontaneous tension pneumothorax        Subjective   Interval History:  The chart has been reviewed.  The patient has done well through the night but unfortunately repeat chest x-ray this morning has showed recurrence of the left-sided pneumothorax.  He denies any chest pain other than some soreness at the insertion site of the previous chest tube.  Blood pressure has been stable and saturations for the most part have been in the upper 80s lower 90s.  We treated him with 100% nonrebreather for approximately 1 hour with no obvious change in his chest x-ray.    The patient's past medical, surgical and social history were reviewed and updated in Epic as appropriate.        Objective     Infusions:     Medications:  budesonide-formoterol, 2 puff, Inhalation, BID - RT  famotidine, 20 mg, Oral, BID AC  guaiFENesin, 1,200 mg, Oral, QAM  heparin (porcine), 5,000 Units, Subcutaneous, Q8H  ipratropium-albuterol, 3 mL, Nebulization, 4x Daily - RT  levoFLOXacin, 500 mg, Oral, Q24H  lidocaine PF 1%, , ,   predniSONE, 20 mg, Oral, Daily  sodium chloride, 10 mL, Intravenous, Q12H  tobramycin PF, 300 mg, Nebulization, Q12H - RT        Vital Sign Min/Max for last 24 hours  Temp  Min: 97.7 °F (36.5 °C)  Max: 98.2 °F (36.8 °C)   BP  Min: 94/75  Max: 149/108   Pulse  Min: 67  Max: 108   Resp  Min: 18  Max: 24   SpO2  Min: 91 %  Max: 100 %   Flow (L/min)  Min: 4  Max: 15       Input/Output for last 24 hour shift  11/10 0701 - 11/11 0700  In: 960 [P.O.:960]  Out: 1050 [Urine:1050]      Objective:  General Appearance:  Well-appearing, in no acute distress, not in pain and uncomfortable.    Vital signs: (most recent): Blood pressure 97/76, pulse 92, temperature 97.7 °F (36.5 °C), temperature source Axillary, resp. rate 20, height 172.7 cm (67.99\"), weight 61.2 kg (135 lb), SpO2 95 %.    HEENT: Normal HEENT exam.    Lungs:  (Poor air " movement bilaterally.  No rhonchi are heard.)  Heart: Normal rate.  Regular rhythm.  S1 normal and S2 normal.  No murmur.   Chest: Symmetric chest wall expansion. Chest wall tenderness present.    Abdomen: Abdomen is soft and non-distended.  Bowel sounds are normal.   There is no abdominal tenderness.     Extremities: Normal range of motion.  There is no dependent edema.    Neurological: Patient is alert and oriented to person, place and time.    Pupils:  Pupils are equal, round, and reactive to light.  Pupils are equal.   Skin:  Warm.  No rash or cyanosis.             Results from last 7 days   Lab Units 11/09/20  0639 11/08/20  0302   WBC 10*3/mm3 16.77* 20.85*   HEMOGLOBIN g/dL 13.6 17.6   PLATELETS 10*3/mm3 242 319     Results from last 7 days   Lab Units 11/09/20  0639 11/08/20  0302   SODIUM mmol/L 133* 137   POTASSIUM mmol/L 4.5 5.2   CO2 mmol/L 24.0 21.0*   BUN mg/dL 29* 25*   CREATININE mg/dL 0.96 1.01   GLUCOSE mg/dL 130* 202*     Estimated Creatinine Clearance: 76.1 mL/min (by C-G formula based on SCr of 0.96 mg/dL).    Results from last 7 days   Lab Units 11/08/20  0424   PH, ARTERIAL pH units 7.271*   PCO2, ARTERIAL mm Hg 47.8*   PO2 ART mm Hg 88.7           I reviewed the patient's results and images.     Assessment/Plan   Impression        Spontaneous tension pneumothorax    Alpha-1-antitrypsin deficiency (on replacement)    Dependence on supplemental oxygen    Stage IV, very severe, emphysema    Former smoker (Resolved 2006)    Acute on chronic respiratory failure with hypoxia and hypercapnia (CMS/HCC)       Plan        Unfortunately there has been recurrence of the pneumothorax on the left without an obvious inciting event.  He is currently comfortable and I feel that rather than place another chest tube at this moment, we will try to use 100% oxygen to see if we can allow it to reexpand on its own.  I explained this plan to Mr. Willams and he agrees to proceed with this.  We will periodically  check a chest x-rays as well as serial examinations on him today.  The next chest x-ray will be performed at 3 PM.  Orders have been placed.  He does remain at high risk of worsening secondary to large left-sided pneumothorax with potential to become a tension pneumothorax and precipitate respiratory failure.    Plan of care and goals reviewed with mulitdisciplinary/antibiotic stewardship team during rounds.   I discussed the patient's findings and my recommendations with patient and nursing staff     High level of risk due to:  severe exacerbation of chronic illness and illness with threat to life or bodily function.        Phan Castellano MD, [facility]  Pulmonology and Critical Care Medicine

## 2020-11-11 NOTE — PLAN OF CARE
Goal Outcome Evaluation:  Plan of Care Reviewed With: patient  Progress: improving  Outcome Summary: s/p CT site c/d/i.  pt tolerating 4l nasal cannula.  Pt ambulating frequently, tolerating well.  Expect pt to be dsicharged home in the am

## 2020-11-11 NOTE — PROGRESS NOTES
Continued Stay Note  [facility]     Patient Name: Balwinder Willams  MRN: [id number]  Today's Date: 11/11/2020    Admit Date: 11/8/2020    Discharge Plan     Row Name 11/11/20 1254       Plan    Plan  home    Patient/Family in Agreement with Plan  yes    Plan Comments  Mr. Willams developed another pneumothorax remains in ICU.  Plans are still home at d/c.  Patient has home O2.  CM following    Final Discharge Disposition Code  01 - home or self-care        Discharge Codes    No documentation.       Expected Discharge Date and Time     Expected Discharge Date Expected Discharge Time    Nov 13, 2020             Cheyanne Kraus RN

## 2020-11-11 NOTE — PLAN OF CARE
Goal Outcome Evaluation:  Plan of Care Reviewed With: patient  Progress: improving  Outcome Summary: This AM on the chest x-ray the patient was found to have a large left pneumothorax. Currently being treated with 100% O2 via NRB, but potential for chest tube placement remains. The next chest x-ray will be done at 0700. Patient is calm, and is otherwise stable. VSS. Will continue to monitor patient closely.

## 2020-11-11 NOTE — PROGRESS NOTES
Clinical Nutrition     Multidisciplinary Rounds      Patient Name: Balwinder Willams  Date of Encounter: 11/11/20 10:38 EST  MRN: [id number]  Admission date: 11/8/2020    MOSHE QUACH to continue to follow per protocol.     Current diet: Diet Regular  Boot+ 2x/day    Intervention:  Follow treatment plan  Care plan reviewed    Follow up:   Per protocol      Chantell Madrigal RD  10:38 EST  Time: 10min

## 2020-11-12 ENCOUNTER — APPOINTMENT (OUTPATIENT)
Dept: GENERAL RADIOLOGY | Facility: HOSPITAL | Age: 55
End: 2020-11-12

## 2020-11-12 LAB
QT INTERVAL: 344 MS
QTC INTERVAL: 420 MS
TROPONIN T SERPL-MCNC: <0.01 NG/ML (ref 0–0.03)

## 2020-11-12 PROCEDURE — 25010000002 HEPARIN (PORCINE) PER 1000 UNITS: Performed by: INTERNAL MEDICINE

## 2020-11-12 PROCEDURE — 99232 SBSQ HOSP IP/OBS MODERATE 35: CPT | Performed by: INTERNAL MEDICINE

## 2020-11-12 PROCEDURE — 71045 X-RAY EXAM CHEST 1 VIEW: CPT

## 2020-11-12 PROCEDURE — 84484 ASSAY OF TROPONIN QUANT: CPT | Performed by: NURSE PRACTITIONER

## 2020-11-12 PROCEDURE — 94799 UNLISTED PULMONARY SVC/PX: CPT

## 2020-11-12 PROCEDURE — 0W9B30Z DRAINAGE OF LEFT PLEURAL CAVITY WITH DRAINAGE DEVICE, PERCUTANEOUS APPROACH: ICD-10-PCS | Performed by: INTERNAL MEDICINE

## 2020-11-12 PROCEDURE — 93010 ELECTROCARDIOGRAM REPORT: CPT | Performed by: INTERNAL MEDICINE

## 2020-11-12 PROCEDURE — 25010000002 KETOROLAC TROMETHAMINE PER 15 MG: Performed by: NURSE PRACTITIONER

## 2020-11-12 PROCEDURE — 63710000001 PREDNISONE PER 1 MG: Performed by: INTERNAL MEDICINE

## 2020-11-12 PROCEDURE — 25010000002 KETOROLAC TROMETHAMINE PER 15 MG: Performed by: INTERNAL MEDICINE

## 2020-11-12 PROCEDURE — 93005 ELECTROCARDIOGRAM TRACING: CPT | Performed by: NURSE PRACTITIONER

## 2020-11-12 PROCEDURE — 32551 INSERTION OF CHEST TUBE: CPT | Performed by: INTERNAL MEDICINE

## 2020-11-12 RX ORDER — KETOROLAC TROMETHAMINE 30 MG/ML
30 INJECTION, SOLUTION INTRAMUSCULAR; INTRAVENOUS EVERY 6 HOURS PRN
Status: DISPENSED | OUTPATIENT
Start: 2020-11-12 | End: 2020-11-17

## 2020-11-12 RX ADMIN — IPRATROPIUM BROMIDE AND ALBUTEROL SULFATE 3 ML: 2.5; .5 SOLUTION RESPIRATORY (INHALATION) at 15:43

## 2020-11-12 RX ADMIN — FAMOTIDINE 20 MG: 20 TABLET, FILM COATED ORAL at 09:14

## 2020-11-12 RX ADMIN — IPRATROPIUM BROMIDE AND ALBUTEROL SULFATE 3 ML: 2.5; .5 SOLUTION RESPIRATORY (INHALATION) at 19:59

## 2020-11-12 RX ADMIN — FAMOTIDINE 20 MG: 20 TABLET, FILM COATED ORAL at 18:21

## 2020-11-12 RX ADMIN — SODIUM CHLORIDE, PRESERVATIVE FREE 10 ML: 5 INJECTION INTRAVENOUS at 20:20

## 2020-11-12 RX ADMIN — TOBRAMYCIN 300 MG: 300 SOLUTION ORAL at 19:59

## 2020-11-12 RX ADMIN — BUDESONIDE AND FORMOTEROL FUMARATE DIHYDRATE 2 PUFF: 160; 4.5 AEROSOL RESPIRATORY (INHALATION) at 07:34

## 2020-11-12 RX ADMIN — HEPARIN SODIUM 5000 UNITS: 5000 INJECTION, SOLUTION INTRAVENOUS; SUBCUTANEOUS at 22:06

## 2020-11-12 RX ADMIN — IPRATROPIUM BROMIDE AND ALBUTEROL SULFATE 3 ML: 2.5; .5 SOLUTION RESPIRATORY (INHALATION) at 07:34

## 2020-11-12 RX ADMIN — HEPARIN SODIUM 5000 UNITS: 5000 INJECTION, SOLUTION INTRAVENOUS; SUBCUTANEOUS at 13:42

## 2020-11-12 RX ADMIN — GUAIFENESIN 1200 MG: 600 TABLET, EXTENDED RELEASE ORAL at 09:14

## 2020-11-12 RX ADMIN — TOBRAMYCIN 300 MG: 300 SOLUTION ORAL at 07:34

## 2020-11-12 RX ADMIN — KETOROLAC TROMETHAMINE 30 MG: 30 INJECTION, SOLUTION INTRAMUSCULAR at 09:24

## 2020-11-12 RX ADMIN — KETOROLAC TROMETHAMINE 30 MG: 30 INJECTION, SOLUTION INTRAMUSCULAR; INTRAVENOUS at 20:20

## 2020-11-12 RX ADMIN — KETOROLAC TROMETHAMINE 30 MG: 30 INJECTION, SOLUTION INTRAMUSCULAR; INTRAVENOUS at 13:36

## 2020-11-12 RX ADMIN — IPRATROPIUM BROMIDE AND ALBUTEROL SULFATE 3 ML: 2.5; .5 SOLUTION RESPIRATORY (INHALATION) at 12:23

## 2020-11-12 RX ADMIN — SODIUM CHLORIDE, PRESERVATIVE FREE 10 ML: 5 INJECTION INTRAVENOUS at 09:14

## 2020-11-12 RX ADMIN — PREDNISONE 20 MG: 20 TABLET ORAL at 09:14

## 2020-11-12 RX ADMIN — BUDESONIDE AND FORMOTEROL FUMARATE DIHYDRATE 2 PUFF: 160; 4.5 AEROSOL RESPIRATORY (INHALATION) at 19:59

## 2020-11-12 RX ADMIN — LEVOFLOXACIN 500 MG: 500 TABLET, FILM COATED ORAL at 09:14

## 2020-11-12 NOTE — PROGRESS NOTES
Clinical Nutrition     Multidisciplinary Rounds      Patient Name: Balwinder Willams  Date of Encounter: 11/12/20 10:31 EST  MRN: [id number]  Admission date: 11/8/2020    MOSHE QUACH to continue to follow per protocol.     Current diet: Diet Regular  Boost+ 2x/day    Intervention:  Follow treatment plan  Care plan reviewed    Follow up:   Per protocol      Chantell Madrigal RD  10:31 EST  Time: 10min

## 2020-11-12 NOTE — PLAN OF CARE
Goal Outcome Evaluation:  Plan of Care Reviewed With: patient  Progress: no change     Patient alert. Vitals stable. Dr. Castellano placed left chest tube today. Remains in place, water sealed. Patient received Toradol for pain. Complained of chest pain around 1400. I called JESSICA GONZALEZ. We completed EKG and Troponin. Normal results. Patient received pain medication and stated that chest pain went away.

## 2020-11-12 NOTE — PROGRESS NOTES
"Intensive Care Follow-up     Hospital:  LOS: 4 days   Mr. Balwinder Willams, 54 y.o. male is followed for:   Spontaneous tension pneumothorax        Subjective   Interval History:  The chart has been reviewed.  Mr. Willams is remained on 100% oxygen through the night.  He is rested very comfortably.  He denies any chest pain.  He has had minimal cough.  I have been able to review his chest x-ray this morning which shows persistence of the left pneumothorax.  There is slight decrease in the size of the left apical portion however there has been worsening of the basilar component.    The patient's past medical, surgical and social history were reviewed and updated in Epic as appropriate.        Objective     Infusions:     Medications:  budesonide-formoterol, 2 puff, Inhalation, BID - RT  famotidine, 20 mg, Oral, BID AC  guaiFENesin, 1,200 mg, Oral, QAM  heparin (porcine), 5,000 Units, Subcutaneous, Q8H  ipratropium-albuterol, 3 mL, Nebulization, 4x Daily - RT  levoFLOXacin, 500 mg, Oral, Q24H  predniSONE, 20 mg, Oral, Daily  sodium chloride, 10 mL, Intravenous, Q12H  tobramycin PF, 300 mg, Nebulization, Q12H - RT        Vital Sign Min/Max for last 24 hours  Temp  Min: 97.7 °F (36.5 °C)  Max: 98 °F (36.7 °C)   BP  Min: 91/56  Max: 143/82   Pulse  Min: 68  Max: 113   Resp  Min: 16  Max: 24   SpO2  Min: 92 %  Max: 100 %   Flow (L/min)  Min: 15  Max: 15       Input/Output for last 24 hour shift  11/11 0701 - 11/12 0700  In: 240 [P.O.:240]  Out: 800 [Urine:800]      Objective:  General Appearance:  Well-appearing, in no acute distress, not in pain and comfortable.    Vital signs: (most recent): Blood pressure 100/79, pulse 87, temperature 97.7 °F (36.5 °C), temperature source Axillary, resp. rate 24, height 172.7 cm (67.99\"), weight 59.3 kg (130 lb 11.7 oz), SpO2 94 %.    HEENT: Normal HEENT exam.    Lungs:  (Poor air movement bilaterally.  No rhonchi are heard.)  Heart: Normal rate.  Regular rhythm.  S1 normal and S2 " normal.  No murmur.   Chest: Symmetric chest wall expansion. Chest wall tenderness present.    Abdomen: Abdomen is soft and non-distended.  Bowel sounds are normal.   There is no abdominal tenderness.     Extremities: Normal range of motion.  There is no dependent edema.    Neurological: Patient is alert and oriented to person, place and time.    Pupils:  Pupils are equal, round, and reactive to light.  Pupils are equal.   Skin:  Warm.  No rash or cyanosis.             Results from last 7 days   Lab Units 11/09/20  0639 11/08/20  0302   WBC 10*3/mm3 16.77* 20.85*   HEMOGLOBIN g/dL 13.6 17.6   PLATELETS 10*3/mm3 242 319     Results from last 7 days   Lab Units 11/09/20  0639 11/08/20  0302   SODIUM mmol/L 133* 137   POTASSIUM mmol/L 4.5 5.2   CO2 mmol/L 24.0 21.0*   BUN mg/dL 29* 25*   CREATININE mg/dL 0.96 1.01   GLUCOSE mg/dL 130* 202*     Estimated Creatinine Clearance: 73.8 mL/min (by C-G formula based on SCr of 0.96 mg/dL).    Results from last 7 days   Lab Units 11/08/20  0424   PH, ARTERIAL pH units 7.271*   PCO2, ARTERIAL mm Hg 47.8*   PO2 ART mm Hg 88.7         I reviewed the patient's results and images.     Assessment/Plan   Impression        Spontaneous tension pneumothorax    Alpha-1-antitrypsin deficiency (on replacement)    Dependence on supplemental oxygen    Stage IV, very severe, emphysema    Former smoker (Resolved 2006)    Acute on chronic respiratory failure with hypoxia and hypercapnia (CMS/HCC)       Plan        Continue with current steroids as well as antibiotics.  I was able to discuss the persistent pneumothorax with my partner Dr. Hernandez as well as Mr. Willams.  With my feeling that this likely is not going to resolve on its own and I am concerned about leaving such a large pneumothorax without a tube at this point.  We will plan to go ahead and place a small bore thoracostomy tube in the left side and likely leave this for the next day or 2 to ensure that we have good resolution of the  pneumothorax and what ever defect is happened.  I also took the opportunity to discuss the patient's overall lung health with him including showing him some CT imaging and explaining the difficulties that were facing with the very severe emphysema.  He will remain in the intensive care unit today.  If he does well, I will likely move him to the progressive care unit tomorrow.        Plan of care and goals reviewed with mulitdisciplinary/antibiotic stewardship team during rounds.   I discussed the patient's findings and my recommendations with patient, nursing staff and consulting provider     High level of risk due to:  severe exacerbation of chronic illness and illness with threat to life or bodily function.      Phan Castellano MD, [facility]  Pulmonology and Critical Care Medicine

## 2020-11-12 NOTE — PROCEDURES
"Chest Tube Insertion    Date/Time: 11/12/2020 11:26 AM  Performed by: Phan Castellano MD  Authorized by: Phan Castellano MD   Consent: Verbal consent obtained.  Risks and benefits: risks, benefits and alternatives were discussed  Consent given by: patient  Patient understanding: patient states understanding of the procedure being performed  Required items: required blood products, implants, devices, and special equipment available  Patient identity confirmed: verbally with patient and arm band  Time out: Immediately prior to procedure a \"time out\" was called to verify the correct patient, procedure, equipment, support staff and site/side marked as required.  Indications: pneumothorax    Sedation:  Patient sedated: no    Anesthesia: local infiltration    Anesthesia:  Local Anesthetic: lidocaine 1% without epinephrine  Anesthetic total: 15 mL  Placement location: left lateral  Tube size (Slovenian): 8.5 Slovenian.  Ultrasound guidance: no  Tension pneumothorax heard: no  Tube connected to: water seal  Suture material: 2-0 silk  Dressing: 4x4 sterile gauze        "

## 2020-11-12 NOTE — PLAN OF CARE
Goal Outcome Evaluation:  Plan of Care Reviewed With: patient  Progress: no change  Outcome Summary: Pt remains on non-rebreather mask at 100% FiO2. Respiratory status and lung sounds remain unchanged overnight. VSS. Pt complains of some pain that is relieved by medication.

## 2020-11-13 ENCOUNTER — APPOINTMENT (OUTPATIENT)
Dept: GENERAL RADIOLOGY | Facility: HOSPITAL | Age: 55
End: 2020-11-13

## 2020-11-13 PROCEDURE — 99232 SBSQ HOSP IP/OBS MODERATE 35: CPT | Performed by: INTERNAL MEDICINE

## 2020-11-13 PROCEDURE — 63710000001 PREDNISONE PER 1 MG: Performed by: INTERNAL MEDICINE

## 2020-11-13 PROCEDURE — 25010000002 KETOROLAC TROMETHAMINE PER 15 MG: Performed by: INTERNAL MEDICINE

## 2020-11-13 PROCEDURE — 71045 X-RAY EXAM CHEST 1 VIEW: CPT

## 2020-11-13 PROCEDURE — 94799 UNLISTED PULMONARY SVC/PX: CPT

## 2020-11-13 PROCEDURE — 25010000002 HEPARIN (PORCINE) PER 1000 UNITS: Performed by: INTERNAL MEDICINE

## 2020-11-13 RX ORDER — ACETAMINOPHEN 325 MG/1
650 TABLET ORAL EVERY 6 HOURS PRN
Status: DISCONTINUED | OUTPATIENT
Start: 2020-11-13 | End: 2020-11-18 | Stop reason: HOSPADM

## 2020-11-13 RX ADMIN — IPRATROPIUM BROMIDE AND ALBUTEROL SULFATE 3 ML: 2.5; .5 SOLUTION RESPIRATORY (INHALATION) at 09:11

## 2020-11-13 RX ADMIN — BUDESONIDE AND FORMOTEROL FUMARATE DIHYDRATE 2 PUFF: 160; 4.5 AEROSOL RESPIRATORY (INHALATION) at 19:38

## 2020-11-13 RX ADMIN — GUAIFENESIN 1200 MG: 600 TABLET, EXTENDED RELEASE ORAL at 08:00

## 2020-11-13 RX ADMIN — LEVOFLOXACIN 500 MG: 500 TABLET, FILM COATED ORAL at 08:00

## 2020-11-13 RX ADMIN — TOBRAMYCIN 300 MG: 300 SOLUTION ORAL at 19:38

## 2020-11-13 RX ADMIN — KETOROLAC TROMETHAMINE 30 MG: 30 INJECTION, SOLUTION INTRAMUSCULAR; INTRAVENOUS at 06:28

## 2020-11-13 RX ADMIN — PREDNISONE 20 MG: 20 TABLET ORAL at 07:58

## 2020-11-13 RX ADMIN — HEPARIN SODIUM 5000 UNITS: 5000 INJECTION, SOLUTION INTRAVENOUS; SUBCUTANEOUS at 06:23

## 2020-11-13 RX ADMIN — HEPARIN SODIUM 5000 UNITS: 5000 INJECTION, SOLUTION INTRAVENOUS; SUBCUTANEOUS at 22:38

## 2020-11-13 RX ADMIN — BUDESONIDE AND FORMOTEROL FUMARATE DIHYDRATE 2 PUFF: 160; 4.5 AEROSOL RESPIRATORY (INHALATION) at 09:10

## 2020-11-13 RX ADMIN — HEPARIN SODIUM 5000 UNITS: 5000 INJECTION, SOLUTION INTRAVENOUS; SUBCUTANEOUS at 13:41

## 2020-11-13 RX ADMIN — SODIUM CHLORIDE, PRESERVATIVE FREE 10 ML: 5 INJECTION INTRAVENOUS at 20:15

## 2020-11-13 RX ADMIN — SODIUM CHLORIDE, PRESERVATIVE FREE 10 ML: 5 INJECTION INTRAVENOUS at 08:00

## 2020-11-13 RX ADMIN — IPRATROPIUM BROMIDE AND ALBUTEROL SULFATE 3 ML: 2.5; .5 SOLUTION RESPIRATORY (INHALATION) at 19:39

## 2020-11-13 RX ADMIN — FAMOTIDINE 20 MG: 20 TABLET, FILM COATED ORAL at 06:23

## 2020-11-13 RX ADMIN — FAMOTIDINE 20 MG: 20 TABLET, FILM COATED ORAL at 18:13

## 2020-11-13 RX ADMIN — TOBRAMYCIN 300 MG: 300 SOLUTION ORAL at 09:11

## 2020-11-13 RX ADMIN — IPRATROPIUM BROMIDE AND ALBUTEROL SULFATE 3 ML: 2.5; .5 SOLUTION RESPIRATORY (INHALATION) at 11:34

## 2020-11-13 RX ADMIN — IPRATROPIUM BROMIDE AND ALBUTEROL SULFATE 3 ML: 2.5; .5 SOLUTION RESPIRATORY (INHALATION) at 15:44

## 2020-11-13 NOTE — PROGRESS NOTES
Continued Stay Note  [facility]     Patient Name: Balwinder Willams  MRN: [id number]  Today's Date: 11/13/2020    Admit Date: 11/8/2020    Discharge Plan     Row Name 11/13/20 1132       Plan    Plan  home    Patient/Family in Agreement with Plan  yes    Plan Comments  Mr. Willams now has another chest tube.   Is to remain in ICU for close monitoring of respiratory status.  Plan is still home at d/c at this time.  CM will follow for d/c needs.    Final Discharge Disposition Code  01 - home or self-care        Discharge Codes    No documentation.       Expected Discharge Date and Time     Expected Discharge Date Expected Discharge Time    Nov 15, 2020             Cheyanne Kraus RN

## 2020-11-13 NOTE — PLAN OF CARE
Goal Outcome Evaluation:  Plan of Care Reviewed With: patient  Progress: no change  Outcome Summary: Pt on 3.5L NC. Lung sounds improving. clear and diminished bilaterally. Chest tube in place, no output. Intermittent complaints of pain controlled with toradol. VSS.

## 2020-11-13 NOTE — PROGRESS NOTES
Clinical Nutrition     Multidisciplinary Rounds      Patient Name: Balwinder Willams  Date of Encounter: 11/13/20 10:55 EST  MRN: [id number]  Admission date: 11/8/2020    MOSHE QUACH to continue to follow per protocol.     Current diet: Diet Regular  Boost+ 2x/day    Intervention:  Follow treatment plan  Care plan reviewed    Follow up:   Per protocol      Chantell Madrigal RD  10:55 EST  Time: 10min

## 2020-11-13 NOTE — PROGRESS NOTES
Intensive Care Follow-up     Hospital:  LOS: 5 days   Mr. Balwinder Willams, 54 y.o. male is followed for:   Spontaneous tension pneumothorax        Subjective   Interval History:  54-year-old patient of mine and Dr. Pressley who has a history of alpha-1 antitrypsin deficiency as well as former cigarette abuse with widespread bullous emphysema and severe COPD.  He was a recipient of endobronchial valves in the left lower lobe for lung volume reduction with initially good results last year.  He did have a history of pneumothorax immediately following the procedure however.  This had resolved however his last several months been complicated by recurrent bronchitis and he has been growing pansensitive Pseudomonas.  Despite multiple courses of antibiotics, he had continued to have problems and it was suspected that he perhaps had postobstructive pneumonia or infection.  About a week and a half ago I took him for bronchoscopy and remove several of the valves and he initially did very well.  Approximately a week later he developed abrupt shortness of breath and respiratory distress.  I saw him that Sunday morning in the emergency department he was found to have tension pneumothorax.  A chest tube was placed at that time.  We slowly weaned the chest tube down to waterseal and then clamped it giving 12 to 24 hours in between each.  The lung had stayed up on that side however we did keep him in the hospital overnight after we had removed it.  Despite not having any worsening symptoms, he was found to have at least partial collapse of the left lung once again that morning.  We attempted 100% oxygen therapy without much improvement over the course of about 24 hours.  I was able to place another small bore thoracostomy tube on the left side on 11/12/2020 with interval improvement in the radiograph appearance of the pneumothorax.  The patient has done well overnight with this.  He did have some mild chest pain last night.   "Troponin and EKG were negative.  He states that he is feeling quite well this morning.    The patient's past medical, surgical and social history were reviewed and updated in Epic as appropriate.        Objective     Infusions:     Medications:  budesonide-formoterol, 2 puff, Inhalation, BID - RT  famotidine, 20 mg, Oral, BID AC  guaiFENesin, 1,200 mg, Oral, QAM  heparin (porcine), 5,000 Units, Subcutaneous, Q8H  ipratropium-albuterol, 3 mL, Nebulization, 4x Daily - RT  levoFLOXacin, 500 mg, Oral, Q24H  predniSONE, 20 mg, Oral, Daily  sodium chloride, 10 mL, Intravenous, Q12H  tobramycin PF, 300 mg, Nebulization, Q12H - RT        Vital Sign Min/Max for last 24 hours  Temp  Min: 97.1 °F (36.2 °C)  Max: 98.1 °F (36.7 °C)   BP  Min: 89/66  Max: 115/76   Pulse  Min: 58  Max: 118   Resp  Min: 18  Max: 22   SpO2  Min: 88 %  Max: 100 %   Flow (L/min)  Min: 3.5  Max: 5       Input/Output for last 24 hour shift  11/12 0701 - 11/13 0700  In: -   Out: 500 [Urine:500]      Objective:  General Appearance:  Well-appearing, in no acute distress, not in pain and comfortable.    Vital signs: (most recent): Blood pressure 115/76, pulse 90, temperature 97.1 °F (36.2 °C), temperature source Axillary, resp. rate 18, height 172.7 cm (67.99\"), weight 61.1 kg (134 lb 11.2 oz), SpO2 94 %.    HEENT: Normal HEENT exam.    Lungs:  (Poor air movement bilaterally.  No rhonchi are heard.)  Heart: Normal rate.  Regular rhythm.  S1 normal and S2 normal.  No murmur.   Chest: Symmetric chest wall expansion. Chest wall tenderness present.  (Left 8.5 Polish thoracostomy tube.  No air leak even with cough.)  Abdomen: Abdomen is soft and non-distended.  Bowel sounds are normal.   There is no abdominal tenderness.     Extremities: Normal range of motion.  There is no dependent edema.    Neurological: Patient is alert and oriented to person, place and time.    Pupils:  Pupils are equal, round, and reactive to light.  Pupils are equal.   Skin:  Warm.  No " rash or cyanosis.             Results from last 7 days   Lab Units 11/09/20  0639 11/08/20  0302   WBC 10*3/mm3 16.77* 20.85*   HEMOGLOBIN g/dL 13.6 17.6   PLATELETS 10*3/mm3 242 319     Results from last 7 days   Lab Units 11/09/20  0639 11/08/20  0302   SODIUM mmol/L 133* 137   POTASSIUM mmol/L 4.5 5.2   CO2 mmol/L 24.0 21.0*   BUN mg/dL 29* 25*   CREATININE mg/dL 0.96 1.01   GLUCOSE mg/dL 130* 202*     Estimated Creatinine Clearance: 76 mL/min (by C-G formula based on SCr of 0.96 mg/dL).    Results from last 7 days   Lab Units 11/08/20  0424   PH, ARTERIAL pH units 7.271*   PCO2, ARTERIAL mm Hg 47.8*   PO2 ART mm Hg 88.7         I reviewed the patient's results and images.     Assessment/Plan   Impression        Spontaneous tension pneumothorax    Alpha-1-antitrypsin deficiency (on replacement)    Dependence on supplemental oxygen    Stage IV, very severe, emphysema    Former smoker (Resolved 2006)    Acute on chronic respiratory failure with hypoxia and hypercapnia (CMS/[facility])       Plan        Continue with tobramycin nebs as well as Levaquin.  He needs to have a total of 14 days of these including what he had at home previously.  Maintain the left thoracostomy tube on waterseal.  His pulmonary status is very tenuous with this.  I believe that we need to leave the tube in for at least the next several days and then give at least 12 to 24 hours trial of clamping due to the recent failed tube removal despite favorable predictive measures.  Continue with pain control as needed.  I would like him to remain under close observation in the intensive care unit as when he does have a change in his pneumothorax he deteriorates rapidly.    Plan of care and goals reviewed with mulitdisciplinary/antibiotic stewardship team during rounds.   I discussed the patient's findings and my recommendations with patient and nursing staff            Phan Castellano MD, [facility]  Pulmonology and Critical Care Medicine

## 2020-11-14 ENCOUNTER — APPOINTMENT (OUTPATIENT)
Dept: GENERAL RADIOLOGY | Facility: HOSPITAL | Age: 55
End: 2020-11-14

## 2020-11-14 LAB
ANION GAP SERPL CALCULATED.3IONS-SCNC: 10 MMOL/L (ref 5–15)
BASOPHILS # BLD AUTO: 0.05 10*3/MM3 (ref 0–0.2)
BASOPHILS NFR BLD AUTO: 0.4 % (ref 0–1.5)
BUN SERPL-MCNC: 16 MG/DL (ref 6–20)
BUN/CREAT SERPL: 18.8 (ref 7–25)
CALCIUM SPEC-SCNC: 9 MG/DL (ref 8.6–10.5)
CHLORIDE SERPL-SCNC: 101 MMOL/L (ref 98–107)
CO2 SERPL-SCNC: 26 MMOL/L (ref 22–29)
CREAT SERPL-MCNC: 0.85 MG/DL (ref 0.76–1.27)
DEPRECATED RDW RBC AUTO: 46 FL (ref 37–54)
EOSINOPHIL # BLD AUTO: 0.21 10*3/MM3 (ref 0–0.4)
EOSINOPHIL NFR BLD AUTO: 1.8 % (ref 0.3–6.2)
ERYTHROCYTE [DISTWIDTH] IN BLOOD BY AUTOMATED COUNT: 13.2 % (ref 12.3–15.4)
GFR SERPL CREATININE-BSD FRML MDRD: 94 ML/MIN/1.73
GLUCOSE SERPL-MCNC: 182 MG/DL (ref 65–99)
HCT VFR BLD AUTO: 44.9 % (ref 37.5–51)
HGB BLD-MCNC: 14.8 G/DL (ref 13–17.7)
IMM GRANULOCYTES # BLD AUTO: 0.2 10*3/MM3 (ref 0–0.05)
IMM GRANULOCYTES NFR BLD AUTO: 1.7 % (ref 0–0.5)
LYMPHOCYTES # BLD AUTO: 2 10*3/MM3 (ref 0.7–3.1)
LYMPHOCYTES NFR BLD AUTO: 17.1 % (ref 19.6–45.3)
MAGNESIUM SERPL-MCNC: 2.2 MG/DL (ref 1.6–2.6)
MCH RBC QN AUTO: 31.2 PG (ref 26.6–33)
MCHC RBC AUTO-ENTMCNC: 33 G/DL (ref 31.5–35.7)
MCV RBC AUTO: 94.5 FL (ref 79–97)
MONOCYTES # BLD AUTO: 1.35 10*3/MM3 (ref 0.1–0.9)
MONOCYTES NFR BLD AUTO: 11.6 % (ref 5–12)
NEUTROPHILS NFR BLD AUTO: 67.4 % (ref 42.7–76)
NEUTROPHILS NFR BLD AUTO: 7.87 10*3/MM3 (ref 1.7–7)
NRBC BLD AUTO-RTO: 0 /100 WBC (ref 0–0.2)
PLATELET # BLD AUTO: 259 10*3/MM3 (ref 140–450)
PMV BLD AUTO: 9.4 FL (ref 6–12)
POTASSIUM SERPL-SCNC: 4 MMOL/L (ref 3.5–5.2)
RBC # BLD AUTO: 4.75 10*6/MM3 (ref 4.14–5.8)
SODIUM SERPL-SCNC: 137 MMOL/L (ref 136–145)
WBC # BLD AUTO: 11.68 10*3/MM3 (ref 3.4–10.8)

## 2020-11-14 PROCEDURE — 71045 X-RAY EXAM CHEST 1 VIEW: CPT

## 2020-11-14 PROCEDURE — 94799 UNLISTED PULMONARY SVC/PX: CPT

## 2020-11-14 PROCEDURE — 25010000002 KETOROLAC TROMETHAMINE PER 15 MG: Performed by: INTERNAL MEDICINE

## 2020-11-14 PROCEDURE — 63710000001 PREDNISONE PER 1 MG: Performed by: INTERNAL MEDICINE

## 2020-11-14 PROCEDURE — 83735 ASSAY OF MAGNESIUM: CPT | Performed by: INTERNAL MEDICINE

## 2020-11-14 PROCEDURE — 80048 BASIC METABOLIC PNL TOTAL CA: CPT | Performed by: INTERNAL MEDICINE

## 2020-11-14 PROCEDURE — 85025 COMPLETE CBC W/AUTO DIFF WBC: CPT | Performed by: INTERNAL MEDICINE

## 2020-11-14 PROCEDURE — 99232 SBSQ HOSP IP/OBS MODERATE 35: CPT | Performed by: INTERNAL MEDICINE

## 2020-11-14 PROCEDURE — 25010000002 HEPARIN (PORCINE) PER 1000 UNITS: Performed by: INTERNAL MEDICINE

## 2020-11-14 RX ADMIN — IPRATROPIUM BROMIDE AND ALBUTEROL SULFATE 3 ML: 2.5; .5 SOLUTION RESPIRATORY (INHALATION) at 12:29

## 2020-11-14 RX ADMIN — TOBRAMYCIN 300 MG: 300 SOLUTION ORAL at 09:11

## 2020-11-14 RX ADMIN — BUDESONIDE AND FORMOTEROL FUMARATE DIHYDRATE 2 PUFF: 160; 4.5 AEROSOL RESPIRATORY (INHALATION) at 20:03

## 2020-11-14 RX ADMIN — TOBRAMYCIN 300 MG: 300 SOLUTION ORAL at 20:02

## 2020-11-14 RX ADMIN — FAMOTIDINE 20 MG: 20 TABLET, FILM COATED ORAL at 08:09

## 2020-11-14 RX ADMIN — HEPARIN SODIUM 5000 UNITS: 5000 INJECTION, SOLUTION INTRAVENOUS; SUBCUTANEOUS at 06:21

## 2020-11-14 RX ADMIN — KETOROLAC TROMETHAMINE 30 MG: 30 INJECTION, SOLUTION INTRAMUSCULAR; INTRAVENOUS at 11:56

## 2020-11-14 RX ADMIN — GUAIFENESIN 1200 MG: 600 TABLET, EXTENDED RELEASE ORAL at 08:09

## 2020-11-14 RX ADMIN — PREDNISONE 20 MG: 20 TABLET ORAL at 08:09

## 2020-11-14 RX ADMIN — IPRATROPIUM BROMIDE AND ALBUTEROL SULFATE 3 ML: 2.5; .5 SOLUTION RESPIRATORY (INHALATION) at 20:02

## 2020-11-14 RX ADMIN — IPRATROPIUM BROMIDE AND ALBUTEROL SULFATE 3 ML: 2.5; .5 SOLUTION RESPIRATORY (INHALATION) at 16:22

## 2020-11-14 RX ADMIN — FAMOTIDINE 20 MG: 20 TABLET, FILM COATED ORAL at 17:39

## 2020-11-14 RX ADMIN — BUDESONIDE AND FORMOTEROL FUMARATE DIHYDRATE 2 PUFF: 160; 4.5 AEROSOL RESPIRATORY (INHALATION) at 09:11

## 2020-11-14 RX ADMIN — KETOROLAC TROMETHAMINE 30 MG: 30 INJECTION, SOLUTION INTRAMUSCULAR; INTRAVENOUS at 00:04

## 2020-11-14 RX ADMIN — SODIUM CHLORIDE, PRESERVATIVE FREE 10 ML: 5 INJECTION INTRAVENOUS at 20:54

## 2020-11-14 RX ADMIN — LEVOFLOXACIN 500 MG: 500 TABLET, FILM COATED ORAL at 08:09

## 2020-11-14 RX ADMIN — SODIUM CHLORIDE, PRESERVATIVE FREE 10 ML: 5 INJECTION INTRAVENOUS at 08:09

## 2020-11-14 RX ADMIN — IPRATROPIUM BROMIDE AND ALBUTEROL SULFATE 3 ML: 2.5; .5 SOLUTION RESPIRATORY (INHALATION) at 09:11

## 2020-11-14 RX ADMIN — HEPARIN SODIUM 5000 UNITS: 5000 INJECTION, SOLUTION INTRAVENOUS; SUBCUTANEOUS at 22:21

## 2020-11-14 RX ADMIN — HEPARIN SODIUM 5000 UNITS: 5000 INJECTION, SOLUTION INTRAVENOUS; SUBCUTANEOUS at 15:03

## 2020-11-14 RX ADMIN — KETOROLAC TROMETHAMINE 30 MG: 30 INJECTION, SOLUTION INTRAMUSCULAR; INTRAVENOUS at 18:19

## 2020-11-14 NOTE — PLAN OF CARE
Goal Outcome Evaluation:  Plan of Care Reviewed With: patient  Progress: no change  Outcome Summary: VSS, 4L NC. Chest tube to water seal with no output thus far. Toradol PRN for pain/discomfort. Will continue to monitor patient closely.

## 2020-11-14 NOTE — PLAN OF CARE
Goal Outcome Evaluation:  Plan of Care Reviewed With: patient  Progress: no change  Outcome Summary: Pt lung sounds are unchanged on 4L NC. Atrium remains on water seal with no output. Toradol given once. Pt rested well. VSS.

## 2020-11-14 NOTE — PROGRESS NOTES
INTENSIVIST   PROGRESS NOTE     Hospital:  LOS: 6 days     Mr. Balwinder Willams, 54 y.o. male is followed for a Chief Complaint of: Pneumothorax      Subjective   S     Interval History:  No events overnight. Chest tube is on water seal. Breathing is stable.        The patient's relevant past medical, surgical and social history were reviewed and updated in Epic as appropriate.      ROS:   Constitutional: Negative for fever.   Respiratory: Negative for acute dyspnea.   Cardiovascular: Negative for chest pain.   Gastrointestinal: Negative for  nausea, vomiting and diarrhea.     Objective   O     Vitals:  Temp  Min: 97.7 °F (36.5 °C)  Max: 98.4 °F (36.9 °C)  BP  Min: 88/75  Max: 130/96  Pulse  Min: 61  Max: 110  Resp  Min: 16  Max: 20  SpO2  Min: 85 %  Max: 97 % Flow (L/min)  Min: 3.5  Max: 4    Intake/Ouptut 24 hrs (7:00AM - 6:59 AM)  Intake & Output (last 3 days)       11/11 0701 - 11/12 0700 11/12 0701 - 11/13 0700 11/13 0701 - 11/14 0700 11/14 0701 - 11/15 0700    P.O. 240  1320     Total Intake(mL/kg) 240 (4)  1320 (22.8)     Urine (mL/kg/hr) 800 (0.6) 500 (0.3) 775 (0.6)     Stool 0       Total Output 800 500 775     Net -560 -500 +545             Urine Unmeasured Occurrence 2 x       Stool Unmeasured Occurrence 1 x               Physical Examination  Telemetry:  Normal sinus rhythm.    Constitutional:  No acute distress.  Sitting up in bed on nasal cannula.    Eyes: No scleral icterus.   PERRL, EOM intact.    Neck:  Supple, FROM   Cardiovascular: Normal rate, regular and rhythm. Normal heart sounds.  No murmurs, gallop or rub.   Respiratory: No respiratory distress. Normal respiratory effort.  Diminished bilaterally. No wheezing.   Left chest tube to water seal with no air leak.    Abdominal:  Soft. No masses. Non-tender. No distension. No HSM.   Extremities: No digital cyanosis. No clubbing.  No peripheral edema.   Skin: No rashes, lesions or ulcers   Neurological:   Alert and Oriented to person, place, and  time.              Results from last 7 days   Lab Units 11/14/20  0438 11/09/20  0639 11/08/20  0302   WBC 10*3/mm3 11.68* 16.77* 20.85*   HEMOGLOBIN g/dL 14.8 13.6 17.6   MCV fL 94.5 92.3 95.2   PLATELETS 10*3/mm3 259 242 319     Results from last 7 days   Lab Units 11/14/20  0438 11/09/20  0639 11/08/20  0302   SODIUM mmol/L 137 133* 137   POTASSIUM mmol/L 4.0 4.5 5.2   CO2 mmol/L 26.0 24.0 21.0*   CREATININE mg/dL 0.85 0.96 1.01   GLUCOSE mg/dL 182* 130* 202*   MAGNESIUM mg/dL 2.2  --   --      Estimated Creatinine Clearance: 81.4 mL/min (by C-G formula based on SCr of 0.85 mg/dL).  Results from last 7 days   Lab Units 11/08/20  0302   ALK PHOS U/L 71   BILIRUBIN mg/dL 0.3   ALT (SGPT) U/L 21   AST (SGOT) U/L 19       Results from last 7 days   Lab Units 11/08/20  0424 11/08/20  0305   PH, ARTERIAL pH units 7.271* 7.216*   PCO2, ARTERIAL mm Hg 47.8* 58.3*   PO2 ART mm Hg 88.7 182.0*   FIO2 % 60 100       Images:  Imaging Results (Last 24 Hours)     Procedure Component Value Units Date/Time    XR Chest 1 View [[id number]] Collected: 11/14/20 0822     Updated: 11/14/20 0823    Narrative:         EXAMINATION: XR CHEST 1 VW-      INDICATION: left ptx; J93.0-Spontaneous tension pneumothorax;  J93.83-Other pneumothorax; R06.00-Dyspnea, unspecified; R06.89-Other  abnormalities of breathing; J96.01-Acute respiratory failure with  hypoxia      COMPARISON: 11/13/2020     FINDINGS: Cardiac size within normal limits. Hyperinflated appearance of  the lungs with left chest tube in place. Faint pleural line remains at  the left lung apex of likely trace residual pneumothorax decreased from  prior without mediastinal shift or new process otherwise noted           Impression:      Hyperinflated appearance of the lungs with left chest tube  in place. Faint pleural line remains at the left lung apex of likely  trace residual pneumothorax decreased from prior without mediastinal  shift or new process otherwise noted          XR Chest  1 View [[id number]] Collected: 11/13/20 0830     Updated: 11/13/20 2146    Narrative:      EXAMINATION: XR CHEST 1 VW-     INDICATION: Left thoracostomy tube; J93.0-Spontaneous tension  pneumothorax; J93.83-Other pneumothorax; R06.00-Dyspnea, unspecified;  R06.89-Other abnormalities of breathing; J96.01-Acute respiratory  failure with hypoxia.     COMPARISON: 11/12/2020.     FINDINGS: Small bore left pleural drain remains in place. Apical  pneumothorax on the left is decreased from roughly 4.0 cm to 2.7 cm.  There is a minimal basilar component. No new pulmonary parenchymal  disease is seen. Heart and vasculature appear normal in size. Note is  again made of left infrahilar endobronchial valves.       Impression:      Left pneumothorax appears further improved. No new chest  disease is seen.      D:  11/13/2020  E:  11/13/2020     This report was finalized on 11/13/2020 9:43 PM by Dr. Manoj Ring MD.               Results: Reviewed.  I reviewed the patient's new laboratory and imaging results.  I independently reviewed the patient's new images.    Medications: Reviewed.    Assessment/Plan   A / P     Mr. Willams is a 55yo M with a history of alpha-1 deficiency, Stage IV emphysema and former smoking who recently underwent left lower lobe endobronchial valve placement for lung volume reduction. He had a left pneumothorax after the procedure with resolution. The past couple of months have been complicated by Pseudomonas and suspected post-obstructive pneumonia. He underwent removal of several of the valves and was discharged home. He presented approximately 1 week later with a tension pneumothorax to the [facility] ED. A chest tube was placed with resolution and was later removed. He remained in the hospital for monitoring and a repeat CXR showed a recurrent pneumothorax. 100% O2 therapy was tried without improvement. He had another left chest tube placed on 11/12/20 with interval improvement. The chest tube was placed back to  water seal on 11/13 and remains in place. CXR this AM shows a trace left pneumothorax.     Nutrition:   Diet Regular  Advance Directives:   Code Status and Medical Interventions:   Ordered at: 11/08/20 0542     Code Status:    CPR     Medical Interventions (Level of Support Prior to Arrest):    Full       Active Hospital Problems    Diagnosis   • **Spontaneous tension pneumothorax   • Acute on chronic respiratory failure with hypoxia and hypercapnia (CMS/HCC)   • Former smoker (Resolved 2006)   • Stage IV, very severe, emphysema   • Alpha-1-antitrypsin deficiency (on replacement)     A.  Labs of 11/20/2014 reports an alpha 1 antitrypsin deficiency of 4.7 with a phenotype      with Z bands detected and genotyping revealing the presence of most common      deficiency allele type Z and an inferred non-expressing null allele.     • Dependence on supplemental oxygen       Assessment / Plan:    1. Continue chest tube in place to water seal.  2. If he develops worsening chest pain or oxygenation, repeat a CXR.   3. Chest xray in AM.   4. Plan to clamp chest tube on Monday if he does okay on water seal through the weekend.   5. Continue nebulizer therapy.     High level of risk due to:  severe exacerbation of chronic illness and illness with threat to life or bodily function.    Plan of care and goals reviewed during interdisciplinary rounds.  I discussed the patient's findings and my recommendations with patient and nursing staff      Christal Almodovar, DO    Intensive Care Medicine and Pulmonary Medicine

## 2020-11-14 NOTE — PLAN OF CARE
Goal Outcome Evaluation:  Plan of Care Reviewed With: patient  Progress: no change     Patient alert and oriented. Vitals stable. Sat up in the chair most of the day. Ambulated around room. Chest tube remains in place. No output. No air leak. Denies pain, did not want any pain medication today.

## 2020-11-15 ENCOUNTER — APPOINTMENT (OUTPATIENT)
Dept: GENERAL RADIOLOGY | Facility: HOSPITAL | Age: 55
End: 2020-11-15

## 2020-11-15 PROCEDURE — 25010000002 KETOROLAC TROMETHAMINE PER 15 MG: Performed by: INTERNAL MEDICINE

## 2020-11-15 PROCEDURE — 25010000002 HEPARIN (PORCINE) PER 1000 UNITS: Performed by: INTERNAL MEDICINE

## 2020-11-15 PROCEDURE — 94799 UNLISTED PULMONARY SVC/PX: CPT

## 2020-11-15 PROCEDURE — 71045 X-RAY EXAM CHEST 1 VIEW: CPT

## 2020-11-15 PROCEDURE — 63710000001 PREDNISONE PER 1 MG: Performed by: INTERNAL MEDICINE

## 2020-11-15 PROCEDURE — 99232 SBSQ HOSP IP/OBS MODERATE 35: CPT | Performed by: INTERNAL MEDICINE

## 2020-11-15 RX ADMIN — HEPARIN SODIUM 5000 UNITS: 5000 INJECTION, SOLUTION INTRAVENOUS; SUBCUTANEOUS at 13:47

## 2020-11-15 RX ADMIN — SODIUM CHLORIDE, PRESERVATIVE FREE 10 ML: 5 INJECTION INTRAVENOUS at 20:30

## 2020-11-15 RX ADMIN — TOBRAMYCIN 300 MG: 300 SOLUTION ORAL at 08:35

## 2020-11-15 RX ADMIN — IPRATROPIUM BROMIDE AND ALBUTEROL SULFATE 3 ML: 2.5; .5 SOLUTION RESPIRATORY (INHALATION) at 20:12

## 2020-11-15 RX ADMIN — KETOROLAC TROMETHAMINE 30 MG: 30 INJECTION, SOLUTION INTRAMUSCULAR; INTRAVENOUS at 00:11

## 2020-11-15 RX ADMIN — PREDNISONE 20 MG: 20 TABLET ORAL at 08:16

## 2020-11-15 RX ADMIN — BUDESONIDE AND FORMOTEROL FUMARATE DIHYDRATE 2 PUFF: 160; 4.5 AEROSOL RESPIRATORY (INHALATION) at 08:35

## 2020-11-15 RX ADMIN — BUDESONIDE AND FORMOTEROL FUMARATE DIHYDRATE 2 PUFF: 160; 4.5 AEROSOL RESPIRATORY (INHALATION) at 20:12

## 2020-11-15 RX ADMIN — SODIUM CHLORIDE, PRESERVATIVE FREE 10 ML: 5 INJECTION INTRAVENOUS at 08:16

## 2020-11-15 RX ADMIN — IPRATROPIUM BROMIDE AND ALBUTEROL SULFATE 3 ML: 2.5; .5 SOLUTION RESPIRATORY (INHALATION) at 13:15

## 2020-11-15 RX ADMIN — LEVOFLOXACIN 500 MG: 500 TABLET, FILM COATED ORAL at 08:16

## 2020-11-15 RX ADMIN — IPRATROPIUM BROMIDE AND ALBUTEROL SULFATE 3 ML: 2.5; .5 SOLUTION RESPIRATORY (INHALATION) at 17:15

## 2020-11-15 RX ADMIN — FAMOTIDINE 20 MG: 20 TABLET, FILM COATED ORAL at 16:55

## 2020-11-15 RX ADMIN — TOBRAMYCIN 300 MG: 300 SOLUTION ORAL at 20:11

## 2020-11-15 RX ADMIN — HEPARIN SODIUM 5000 UNITS: 5000 INJECTION, SOLUTION INTRAVENOUS; SUBCUTANEOUS at 06:32

## 2020-11-15 RX ADMIN — IPRATROPIUM BROMIDE AND ALBUTEROL SULFATE 3 ML: 2.5; .5 SOLUTION RESPIRATORY (INHALATION) at 08:35

## 2020-11-15 RX ADMIN — KETOROLAC TROMETHAMINE 30 MG: 30 INJECTION, SOLUTION INTRAMUSCULAR; INTRAVENOUS at 14:59

## 2020-11-15 RX ADMIN — FAMOTIDINE 20 MG: 20 TABLET, FILM COATED ORAL at 08:16

## 2020-11-15 RX ADMIN — HEPARIN SODIUM 5000 UNITS: 5000 INJECTION, SOLUTION INTRAVENOUS; SUBCUTANEOUS at 22:24

## 2020-11-15 RX ADMIN — GUAIFENESIN 1200 MG: 600 TABLET, EXTENDED RELEASE ORAL at 08:16

## 2020-11-15 NOTE — PLAN OF CARE
Goal Outcome Evaluation:    No changes per shift. Plan to clamp chest tube tomorrow. Hopefully with be discharged soon.

## 2020-11-15 NOTE — PLAN OF CARE
Goal Outcome Evaluation:  Plan of Care Reviewed With: patient  Progress: no change  Outcome Summary: VSS. Patient remains on 4 L NC. Toradol for pain given. Chest tube to water seal and no output. UOP adequate. Chest tube dressing clean, dry, intact.

## 2020-11-15 NOTE — PROGRESS NOTES
INTENSIVIST   PROGRESS NOTE     Hospital:  LOS: 7 days     Mr. Balwinder Willams, 54 y.o. male is followed for a Chief Complaint of: Pneumothorax      Subjective   S     Interval History:  No events overnight. Breathing is at baseline.        The patient's relevant past medical, surgical and social history were reviewed and updated in Epic as appropriate.      ROS:   Constitutional: Negative for fever.   Respiratory: Negative for acute dyspnea.   Cardiovascular: Negative for chest pain.   Gastrointestinal: Negative for  nausea, vomiting and diarrhea.     Objective   O     Vitals:  Temp  Min: 97.7 °F (36.5 °C)  Max: 98.3 °F (36.8 °C)  BP  Min: 81/71  Max: 118/72  Pulse  Min: 67  Max: 111  Resp  Min: 16  Max: 22  SpO2  Min: 91 %  Max: 97 % Flow (L/min)  Min: 4  Max: 4    Intake/Ouptut 24 hrs (7:00AM - 6:59 AM)  Intake & Output (last 3 days)       11/12 0701 - 11/13 0700 11/13 0701 - 11/14 0700 11/14 0701 - 11/15 0700 11/15 0701 - 11/16 0700    P.O.  1320 915     Total Intake(mL/kg)  1320 (22.8) 915 (15.9)     Urine (mL/kg/hr) 500 (0.3) 775 (0.6) 895 (0.6)     Stool        Chest Tube   0 0    Total Output 500 775 895 0    Net -500 +545 +20 0            Stool Unmeasured Occurrence   1 x             Physical Examination  Telemetry:  Normal sinus rhythm.    Constitutional:  No acute distress.  Sitting up on the side of the bed on nasal cannula.    Eyes: No scleral icterus.   PERRL, EOM intact.    Neck:  Supple, FROM   Cardiovascular: Normal rate, regular and rhythm. Normal heart sounds.  No murmurs, gallop or rub.   Respiratory: No respiratory distress. Normal respiratory effort.  Diminished bilaterally. No wheezing.   Left chest tube to water seal with no air leak.    Abdominal:  Soft. No masses. Non-tender. No distension. No HSM.   Extremities: No digital cyanosis. No clubbing.  No peripheral edema.   Skin: No rashes, lesions or ulcers   Neurological:   Alert and Oriented to person, place, and time.              Results  from last 7 days   Lab Units 11/14/20 0438 11/09/20  0639   WBC 10*3/mm3 11.68* 16.77*   HEMOGLOBIN g/dL 14.8 13.6   MCV fL 94.5 92.3   PLATELETS 10*3/mm3 259 242     Results from last 7 days   Lab Units 11/14/20  0438 11/09/20  0639   SODIUM mmol/L 137 133*   POTASSIUM mmol/L 4.0 4.5   CO2 mmol/L 26.0 24.0   CREATININE mg/dL 0.85 0.96   GLUCOSE mg/dL 182* 130*   MAGNESIUM mg/dL 2.2  --      Estimated Creatinine Clearance: 80.8 mL/min (by C-G formula based on SCr of 0.85 mg/dL).              Images:  Imaging Results (Last 24 Hours)     Procedure Component Value Units Date/Time    XR Chest 1 View [[id number]] Collected: 11/15/20 0738     Updated: 11/15/20 0738    Narrative:         EXAMINATION: XR CHEST 1 VW-      INDICATION: Left pneumothorax with chest tube management;  J93.0-Spontaneous tension pneumothorax; J93.83-Other pneumothorax;  R06.00-Dyspnea, unspecified; R06.89-Other abnormalities of breathing;  J96.01-Acute respiratory failure with hypoxia      COMPARISON: Chest x-ray 11/14/2020     FINDINGS: Left chest tube in place similar to prior with decreased  evidence of pleural line at the left lung apex and lucency consistent  with resolved pneumothorax as this is no longer suggest discretely  identified. Hyperinflation appearance otherwise unchanged without new  findings.           Impression:      Left chest tube in place similar to prior with decreased  evidence of pleural line at the left lung apex and lucency consistent  with resolved pneumothorax as this is no longer suggest discretely  identified. Hyperinflation appearance otherwise unchanged without new  findings.          XR Chest 1 View [[id number]] Collected: 11/14/20 0822     Updated: 11/14/20 1707    Narrative:         EXAMINATION: XR CHEST, SINGLE VIEW - 11/14/2020     INDICATION: J93.0-Spontaneous tension pneumothorax; J93.83-Other  pneumothorax; R06.00-Dyspnea, unspecified; R06.89-Other abnormalities of  breathing; J96.01-Acute respiratory  failure with hypoxia. Left  pneumothorax.     COMPARISON: 11/13/2020     FINDINGS: Cardiac size within normal limits. Hyperinflated appearance of  the lungs with left chest tube in place. Faint pleural line remains at  the left lung apex of likely trace residual pneumothorax decreased from  prior without mediastinal shift or new process otherwise noted.       Impression:      Hyperinflated appearance of the lungs with left chest tube  in place. Faint pleural line remains at the left lung apex of likely  trace residual pneumothorax decreased from prior without mediastinal  shift or new process otherwise noted.     DICTATED:   11/14/2020  EDITED/ls :   11/14/2020      This report was finalized on 11/14/2020 5:04 PM by Dr. William Rae.               Results: Reviewed.  I reviewed the patient's new laboratory and imaging results.  I independently reviewed the patient's new images.    Medications: Reviewed.    Assessment/Plan   A / P     Mr. Willams is a 53yo M with a history of alpha-1 deficiency, Stage IV emphysema and former smoking who recently underwent left lower lobe endobronchial valve placement for lung volume reduction. He had a left pneumothorax after the procedure with resolution. The past couple of months have been complicated by Pseudomonas and suspected post-obstructive pneumonia. He underwent removal of several of the valves and was discharged home. He presented approximately 1 week later with a tension pneumothorax to the [facility] ED. A chest tube was placed with resolution and was later removed. He remained in the hospital for monitoring and a repeat CXR showed a recurrent pneumothorax. 100% O2 therapy was tried without improvement. He had another left chest tube placed on 11/12/20 with interval improvement. The chest tube was placed back to water seal on 11/13 and remains in place. CXR this AM shows a resolved pneumothorax.     Nutrition:   Diet Regular  Advance Directives:   Code Status and Medical  Interventions:   Ordered at: 11/08/20 0542     Code Status:    CPR     Medical Interventions (Level of Support Prior to Arrest):    Full       Active Hospital Problems    Diagnosis   • **Spontaneous tension pneumothorax   • Acute on chronic respiratory failure with hypoxia and hypercapnia (CMS/HCC)   • Former smoker (Resolved 2006)   • Stage IV, very severe, emphysema   • Alpha-1-antitrypsin deficiency (on replacement)     A.  Labs of 11/20/2014 reports an alpha 1 antitrypsin deficiency of 4.7 with a phenotype      with Z bands detected and genotyping revealing the presence of most common      deficiency allele type Z and an inferred non-expressing null allele.     • Dependence on supplemental oxygen       Assessment / Plan:    1. Continue chest tube in place to water seal. Plan to clamp for 24 hours in the AM.   2. If he develops worsening chest pain or oxygenation, repeat a CXR.   3. Chest xray in AM.    4. Continue nebulizer therapy.     High level of risk due to:  severe exacerbation of chronic illness and illness with threat to life or bodily function.    Plan of care and goals reviewed during interdisciplinary rounds.  I discussed the patient's findings and my recommendations with patient and nursing staff      Christal Almodovar, DO    Intensive Care Medicine and Pulmonary Medicine

## 2020-11-16 ENCOUNTER — APPOINTMENT (OUTPATIENT)
Dept: GENERAL RADIOLOGY | Facility: HOSPITAL | Age: 55
End: 2020-11-16

## 2020-11-16 PROCEDURE — 25010000002 KETOROLAC TROMETHAMINE PER 15 MG: Performed by: INTERNAL MEDICINE

## 2020-11-16 PROCEDURE — 63710000001 PREDNISONE PER 1 MG: Performed by: INTERNAL MEDICINE

## 2020-11-16 PROCEDURE — 94799 UNLISTED PULMONARY SVC/PX: CPT

## 2020-11-16 PROCEDURE — 71045 X-RAY EXAM CHEST 1 VIEW: CPT

## 2020-11-16 PROCEDURE — 25010000002 HEPARIN (PORCINE) PER 1000 UNITS: Performed by: INTERNAL MEDICINE

## 2020-11-16 PROCEDURE — 99232 SBSQ HOSP IP/OBS MODERATE 35: CPT | Performed by: INTERNAL MEDICINE

## 2020-11-16 RX ADMIN — HEPARIN SODIUM 5000 UNITS: 5000 INJECTION, SOLUTION INTRAVENOUS; SUBCUTANEOUS at 13:38

## 2020-11-16 RX ADMIN — LEVOFLOXACIN 500 MG: 500 TABLET, FILM COATED ORAL at 08:58

## 2020-11-16 RX ADMIN — IPRATROPIUM BROMIDE AND ALBUTEROL SULFATE 3 ML: 2.5; .5 SOLUTION RESPIRATORY (INHALATION) at 20:36

## 2020-11-16 RX ADMIN — IPRATROPIUM BROMIDE AND ALBUTEROL SULFATE 3 ML: 2.5; .5 SOLUTION RESPIRATORY (INHALATION) at 08:33

## 2020-11-16 RX ADMIN — HEPARIN SODIUM 5000 UNITS: 5000 INJECTION, SOLUTION INTRAVENOUS; SUBCUTANEOUS at 21:43

## 2020-11-16 RX ADMIN — TOBRAMYCIN 300 MG: 300 SOLUTION ORAL at 08:33

## 2020-11-16 RX ADMIN — HEPARIN SODIUM 5000 UNITS: 5000 INJECTION, SOLUTION INTRAVENOUS; SUBCUTANEOUS at 06:38

## 2020-11-16 RX ADMIN — PREDNISONE 20 MG: 20 TABLET ORAL at 08:46

## 2020-11-16 RX ADMIN — IPRATROPIUM BROMIDE AND ALBUTEROL SULFATE 3 ML: 2.5; .5 SOLUTION RESPIRATORY (INHALATION) at 11:45

## 2020-11-16 RX ADMIN — IPRATROPIUM BROMIDE AND ALBUTEROL SULFATE 3 ML: 2.5; .5 SOLUTION RESPIRATORY (INHALATION) at 16:20

## 2020-11-16 RX ADMIN — TOBRAMYCIN 300 MG: 300 SOLUTION ORAL at 20:36

## 2020-11-16 RX ADMIN — BUDESONIDE AND FORMOTEROL FUMARATE DIHYDRATE 2 PUFF: 160; 4.5 AEROSOL RESPIRATORY (INHALATION) at 20:36

## 2020-11-16 RX ADMIN — KETOROLAC TROMETHAMINE 30 MG: 30 INJECTION, SOLUTION INTRAMUSCULAR; INTRAVENOUS at 00:34

## 2020-11-16 RX ADMIN — FAMOTIDINE 20 MG: 20 TABLET, FILM COATED ORAL at 08:46

## 2020-11-16 RX ADMIN — GUAIFENESIN 1200 MG: 600 TABLET, EXTENDED RELEASE ORAL at 08:46

## 2020-11-16 RX ADMIN — KETOROLAC TROMETHAMINE 30 MG: 30 INJECTION, SOLUTION INTRAMUSCULAR; INTRAVENOUS at 13:41

## 2020-11-16 RX ADMIN — BUDESONIDE AND FORMOTEROL FUMARATE DIHYDRATE 2 PUFF: 160; 4.5 AEROSOL RESPIRATORY (INHALATION) at 08:33

## 2020-11-16 RX ADMIN — FAMOTIDINE 20 MG: 20 TABLET, FILM COATED ORAL at 16:58

## 2020-11-16 RX ADMIN — SODIUM CHLORIDE, PRESERVATIVE FREE 10 ML: 5 INJECTION INTRAVENOUS at 21:44

## 2020-11-16 NOTE — PROGRESS NOTES
"Clinical Nutrition Note      Patient Name: Balwinder Willams  MRN: [id number]  Admission date: 11/8/2020      Multidisciplinary Rounds    Additional information obtained during MDR:  RN reports CT clamped, pt to ambulate. MD will check CXR in am.  Pt eating and drinking well.    Current diet: Diet Regular    Oral Nutrition Supplement: Boost Plus twice daily    Pertinent medical data reviewed:  No nutrition risk identified on nursing screen; MST score \"2\"    Intervention:  Plan of care and goals reviewed    Monitor:  RD to follow per protocol      Bria Arcos MS,RD,LD  11/16/20 16:25 EST  Time: 15  mins       "

## 2020-11-16 NOTE — PROGRESS NOTES
Continued Stay Note  [facility]     Patient Name: Balwinder Willams  MRN: [id number]  Today's Date: 11/16/2020    Admit Date: 11/8/2020    Discharge Plan     Row Name 11/16/20 8984       Plan    Plan  home    Patient/Family in Agreement with Plan  yes    Plan Comments  Plans are to clamp chest tube today.  If tolerates may be ready for discharge soon.  Patient has home O2.  CM following for d/c needs    Final Discharge Disposition Code  01 - home or self-care        Discharge Codes    No documentation.       Expected Discharge Date and Time     Expected Discharge Date Expected Discharge Time    Nov 18, 2020             Cheyanne Kraus RN

## 2020-11-16 NOTE — PLAN OF CARE
Goal Outcome Evaluation:  Plan of Care Reviewed With: patient  Progress: no change  Outcome Summary: VSS. Ambulated in the room independently. Chest tube clamped at 0900. Has had no complaints of pain or shortness of breath since. Optimistic outlook. Updated on his plan of care.

## 2020-11-16 NOTE — PLAN OF CARE
Goal Outcome Evaluation:  Plan of Care Reviewed With: patient  Progress: no change  Outcome Summary: VSS. Patient remains on 4L NC which is baseline at home. Chest tube to water seal still. No output from chest tube. Chest tube dressing clean, dry, intact. UOP adequate. Toradol given for pain PRN. Lung sounds are coarse and diminshed.

## 2020-11-17 ENCOUNTER — APPOINTMENT (OUTPATIENT)
Dept: GENERAL RADIOLOGY | Facility: HOSPITAL | Age: 55
End: 2020-11-17

## 2020-11-17 PROCEDURE — 99232 SBSQ HOSP IP/OBS MODERATE 35: CPT | Performed by: INTERNAL MEDICINE

## 2020-11-17 PROCEDURE — 94799 UNLISTED PULMONARY SVC/PX: CPT

## 2020-11-17 PROCEDURE — 25010000002 KETOROLAC TROMETHAMINE PER 15 MG: Performed by: INTERNAL MEDICINE

## 2020-11-17 PROCEDURE — 71045 X-RAY EXAM CHEST 1 VIEW: CPT

## 2020-11-17 PROCEDURE — 25010000002 ALPHA1-PROTEINASE INHIBITOR 1000 MG/20ML SOLUTION: Performed by: INTERNAL MEDICINE

## 2020-11-17 PROCEDURE — 63710000001 PREDNISONE PER 1 MG: Performed by: INTERNAL MEDICINE

## 2020-11-17 PROCEDURE — 25010000002 HEPARIN (PORCINE) PER 1000 UNITS: Performed by: INTERNAL MEDICINE

## 2020-11-17 RX ORDER — KETOROLAC TROMETHAMINE 30 MG/ML
30 INJECTION, SOLUTION INTRAMUSCULAR; INTRAVENOUS EVERY 6 HOURS PRN
Status: DISCONTINUED | OUTPATIENT
Start: 2020-11-17 | End: 2020-11-18 | Stop reason: HOSPADM

## 2020-11-17 RX ADMIN — IPRATROPIUM BROMIDE AND ALBUTEROL SULFATE 3 ML: 2.5; .5 SOLUTION RESPIRATORY (INHALATION) at 12:31

## 2020-11-17 RX ADMIN — HEPARIN SODIUM 5000 UNITS: 5000 INJECTION, SOLUTION INTRAVENOUS; SUBCUTANEOUS at 05:23

## 2020-11-17 RX ADMIN — HEPARIN SODIUM 5000 UNITS: 5000 INJECTION, SOLUTION INTRAVENOUS; SUBCUTANEOUS at 14:58

## 2020-11-17 RX ADMIN — BUDESONIDE AND FORMOTEROL FUMARATE DIHYDRATE 2 PUFF: 160; 4.5 AEROSOL RESPIRATORY (INHALATION) at 20:05

## 2020-11-17 RX ADMIN — ALPHA1-PROTEINASE INHIBITOR (HUMAN) 3480 MG: 1000 INJECTION, SOLUTION INTRAVENOUS at 18:23

## 2020-11-17 RX ADMIN — LEVOFLOXACIN 500 MG: 500 TABLET, FILM COATED ORAL at 08:06

## 2020-11-17 RX ADMIN — PREDNISONE 20 MG: 20 TABLET ORAL at 08:06

## 2020-11-17 RX ADMIN — IPRATROPIUM BROMIDE AND ALBUTEROL SULFATE 3 ML: 2.5; .5 SOLUTION RESPIRATORY (INHALATION) at 07:21

## 2020-11-17 RX ADMIN — HEPARIN SODIUM 5000 UNITS: 5000 INJECTION, SOLUTION INTRAVENOUS; SUBCUTANEOUS at 21:19

## 2020-11-17 RX ADMIN — FAMOTIDINE 20 MG: 20 TABLET, FILM COATED ORAL at 18:23

## 2020-11-17 RX ADMIN — SODIUM CHLORIDE, PRESERVATIVE FREE 10 ML: 5 INJECTION INTRAVENOUS at 08:07

## 2020-11-17 RX ADMIN — TOBRAMYCIN 300 MG: 300 SOLUTION ORAL at 07:21

## 2020-11-17 RX ADMIN — IPRATROPIUM BROMIDE AND ALBUTEROL SULFATE 3 ML: 2.5; .5 SOLUTION RESPIRATORY (INHALATION) at 15:28

## 2020-11-17 RX ADMIN — GUAIFENESIN 1200 MG: 600 TABLET, EXTENDED RELEASE ORAL at 08:06

## 2020-11-17 RX ADMIN — TOBRAMYCIN 300 MG: 300 SOLUTION ORAL at 20:05

## 2020-11-17 RX ADMIN — BUDESONIDE AND FORMOTEROL FUMARATE DIHYDRATE 2 PUFF: 160; 4.5 AEROSOL RESPIRATORY (INHALATION) at 07:21

## 2020-11-17 RX ADMIN — SODIUM CHLORIDE, PRESERVATIVE FREE 10 ML: 5 INJECTION INTRAVENOUS at 21:19

## 2020-11-17 RX ADMIN — KETOROLAC TROMETHAMINE 30 MG: 30 INJECTION, SOLUTION INTRAMUSCULAR at 10:23

## 2020-11-17 RX ADMIN — IPRATROPIUM BROMIDE AND ALBUTEROL SULFATE 3 ML: 2.5; .5 SOLUTION RESPIRATORY (INHALATION) at 20:05

## 2020-11-17 RX ADMIN — FAMOTIDINE 20 MG: 20 TABLET, FILM COATED ORAL at 08:06

## 2020-11-17 NOTE — PROGRESS NOTES
"Clinical Nutrition Note      Patient Name: Balwinder Willams  MRN: [id number]  Admission date: 11/8/2020      Multidisciplinary Rounds    Additional information obtained during MDR:  RN reports pt doing fine this morning. MD will dc CT and put to water seal. Will dc tomorrow if CXR lloks ok. Pt to have prolastin brought in for for administration    Current diet: Diet Regular    Oral Nutrition Supplement: Boost Plus twice daily    Pertinent medical data reviewed:  No nutrition risk identified on nursing screen; MST score \"2\"  Intake data reviewed: pt has consumed on average 90% of last 3 meals documented.    Intervention:  RD visit: pt advises food is good he prefers hot entrees and does drink his Boost Plus supplements -2 every night. He states his appetite remains good.  Plan of care and goals reviewed    Monitor:  RD to follow per protocol      Bria Arcos MS,RD,LD  11/17/20 13:59 EST  Time: 15  mins       "

## 2020-11-17 NOTE — PROGRESS NOTES
INTENSIVIST   PROGRESS NOTE     Hospital:  LOS: 9 days     Mr. Balwinder Willams, 54 y.o. male is followed for a Chief Complaint of: Pneumothorax      Subjective   S     Interval History:  No events overnight. No chest pain or changes in breathing.        The patient's relevant past medical, surgical and social history were reviewed and updated in Epic as appropriate.      ROS:   Constitutional: Negative for fever.   Respiratory: Negative for acute dyspnea.   Cardiovascular: Negative for chest pain.   Gastrointestinal: Negative for  nausea, vomiting and diarrhea.     Objective   O     Vitals:  Temp  Min: 97.8 °F (36.6 °C)  Max: 98.2 °F (36.8 °C)  BP  Min: 86/66  Max: 109/65  Pulse  Min: 70  Max: 106  Resp  Min: 16  Max: 18  SpO2  Min: 91 %  Max: 98 % Flow (L/min)  Min: 4  Max: 4    Intake/Ouptut 24 hrs (7:00AM - 6:59 AM)  Intake & Output (last 3 days)       11/14 0701 - 11/15 0700 11/15 0701 - 11/16 0700 11/16 0701 - 11/17 0700 11/17 0701 - 11/18 0700    P.O. 915 1390      Total Intake(mL/kg) 915 (15.9) 1390 (24)      Urine (mL/kg/hr) 895 (0.6) 1175 (0.8) 800 (0.6) 250 (0.7)    Stool  0      Chest Tube 0 0 0     Total Output 895 1175 800 250    Net +20 +215 -800 -250            Urine Unmeasured Occurrence  1 x      Stool Unmeasured Occurrence 1 x 1 x              Physical Examination  Telemetry:  Normal sinus rhythm.    Constitutional:  No acute distress.  Sitting up in a chair.     Eyes: No scleral icterus.   PERRL, EOM intact.    Neck:  Supple, FROM   Cardiovascular: Normal rate, regular and rhythm. Normal heart sounds.  No murmurs, gallop or rub.   Respiratory: No respiratory distress. Normal respiratory effort.  Diminished bilaterally. No wheezing.   Left chest tube to water seal with no air leak.    Abdominal:  Soft. No masses. Non-tender. No distension. No HSM.   Extremities: No digital cyanosis. No clubbing.  No peripheral edema.   Skin: No rashes, lesions or ulcers   Neurological:   Alert and Oriented to  person, place, and time.              Results from last 7 days   Lab Units 11/14/20  0438   WBC 10*3/mm3 11.68*   HEMOGLOBIN g/dL 14.8   MCV fL 94.5   PLATELETS 10*3/mm3 259     Results from last 7 days   Lab Units 11/14/20  0438   SODIUM mmol/L 137   POTASSIUM mmol/L 4.0   CO2 mmol/L 26.0   CREATININE mg/dL 0.85   GLUCOSE mg/dL 182*   MAGNESIUM mg/dL 2.2     Estimated Creatinine Clearance: 81.5 mL/min (by C-G formula based on SCr of 0.85 mg/dL).              Images:  Imaging Results (Last 24 Hours)     Procedure Component Value Units Date/Time    XR Chest 1 View [[id number]] Collected: 11/17/20 0824     Updated: 11/17/20 0945    Narrative:      EXAMINATION: XR CHEST 1 VW-      INDICATION: Left pneumothorax with chest tube management;  J93.0-Spontaneous tension pneumothorax; J93.83-Other pneumothorax;  R06.00-Dyspnea, unspecified; R06.89-Other abnormalities of breathing;  J96.01-Acute respiratory failure with hypoxia.      COMPARISON: Chest x-ray 11/16/2020.     FINDINGS: Left chest tube remains in place. No discrete pneumothorax.  Persistent hyperinflated appearance of the lucent lungs bilateral  without new process.       Impression:      No interval change of left chest tube in place, however no  discrete pneumothorax.     D:  11/17/2020  E:  11/17/2020          XR Chest 1 View [[id number]] Collected: 11/16/20 0908     Updated: 11/16/20 1335    Narrative:      EXAMINATION: XR CHEST 1 VW- 11/16/2020     INDICATION: Left pneumothorax with chest tube management;  J93.0-Spontaneous tension pneumothorax; J93.83-Other pneumothorax;  R06.00-Dyspnea, unspecified; R06.89-Other abnormalities of breathing;  J96.01-Acute respiratory failure with hypoxia      COMPARISON: 11/15/2020     FINDINGS: Portable chest reveals a chest tube identified on the left.   Tiny left apical pneumothorax. Severe emphysematous and chronic changes  seen within the lung fields bilaterally. There is no superimposed  infectious process.        Impression:      Chest tube identified on the left. Tiny apical pneumothorax  identified on the left. Underlying chronic and emphysematous changes  seen within the lung fields bilaterally. No new focal parenchymal  opacification present.     D:  11/16/2020  E:  11/16/2020     This report was finalized on 11/16/2020 1:32 PM by Dr. Amrita Eden MD.               Results: Reviewed.  I reviewed the patient's new laboratory and imaging results.  I independently reviewed the patient's new images.    Medications: Reviewed.    Assessment/Plan   A / P     Mr. Willams is a 55yo M with a history of alpha-1 deficiency, Stage IV emphysema and former smoking who recently underwent left lower lobe endobronchial valve placement for lung volume reduction. He had a left pneumothorax after the procedure with resolution. The past couple of months have been complicated by Pseudomonas and suspected post-obstructive pneumonia. He underwent removal of several of the valves and was discharged home. He presented approximately 1 week later with a tension pneumothorax to the [facility] ED. A chest tube was placed with resolution and was later removed. He remained in the hospital for monitoring and a repeat CXR showed a recurrent pneumothorax. 100% O2 therapy was tried without improvement. He had another left chest tube placed on 11/12/20 with interval improvement. The chest tube was placed back to water seal on 11/13 and remains in place.     Nutrition:   Diet Regular  Advance Directives:   Code Status and Medical Interventions:   Ordered at: 11/08/20 0542     Code Status:    CPR     Medical Interventions (Level of Support Prior to Arrest):    Full       Active Hospital Problems    Diagnosis   • **Spontaneous tension pneumothorax   • Acute on chronic respiratory failure with hypoxia and hypercapnia (CMS/HCC)   • Former smoker (Resolved 2006)   • Stage IV, very severe, emphysema   • Alpha-1-antitrypsin deficiency (on replacement)     A.  Labs  of 11/20/2014 reports an alpha 1 antitrypsin deficiency of 4.7 with a phenotype      with Z bands detected and genotyping revealing the presence of most common      deficiency allele type Z and an inferred non-expressing null allele.     • Dependence on supplemental oxygen       Assessment / Plan:    1. Remove chest tube this AM.    2. If he develops worsening chest pain or oxygenation, repeat a CXR.   3. Chest xray in AM.  If no pneumothorax, may discharge home.   4. Continue nebulizer therapy.   5. He will have someone bring in his Prolastin so that we may do his infusion this evening.     High level of risk due to:  severe exacerbation of chronic illness and illness with threat to life or bodily function.    Plan of care and goals reviewed during interdisciplinary rounds.  I discussed the patient's findings and my recommendations with patient and nursing staff      Christal Almodovar, DO    Intensive Care Medicine and Pulmonary Medicine

## 2020-11-17 NOTE — PROCEDURES
Procedures     L sided SBCT removed without immediate complication. Respiratory status changed post-procedure. Duoderm applied. CXR in the AM. Patient educated on s/s respiratory distress and recurrent PTX. Pt verbalizes understanding.     JESSICA Neville, AGACNP-BC, FNP-BC   Pulmonary and Critical Care

## 2020-11-17 NOTE — PLAN OF CARE
Goal Outcome Evaluation:  Plan of Care Reviewed With: patient  Progress: improving  Outcome Summary: Chest tube remains clamped. Pt has had no new complaints throughout the night. VSS. Will continue to monitor.

## 2020-11-17 NOTE — PLAN OF CARE
Goal Outcome Evaluation:    Chest tube pulled at 1000. Plan for discharge home tomorrow. Watching for 24 hours to make sure a repeat pneumothorax does not occur.

## 2020-11-18 ENCOUNTER — APPOINTMENT (OUTPATIENT)
Dept: GENERAL RADIOLOGY | Facility: HOSPITAL | Age: 55
End: 2020-11-18

## 2020-11-18 VITALS
WEIGHT: 127.87 LBS | DIASTOLIC BLOOD PRESSURE: 70 MMHG | HEIGHT: 68 IN | HEART RATE: 79 BPM | OXYGEN SATURATION: 99 % | TEMPERATURE: 97.8 F | SYSTOLIC BLOOD PRESSURE: 101 MMHG | RESPIRATION RATE: 18 BRPM | BODY MASS INDEX: 19.38 KG/M2

## 2020-11-18 PROBLEM — J93.0 SPONTANEOUS TENSION PNEUMOTHORAX: Status: RESOLVED | Noted: 2020-11-08 | Resolved: 2020-11-18

## 2020-11-18 PROCEDURE — 94799 UNLISTED PULMONARY SVC/PX: CPT

## 2020-11-18 PROCEDURE — 25010000002 HEPARIN (PORCINE) PER 1000 UNITS: Performed by: INTERNAL MEDICINE

## 2020-11-18 PROCEDURE — 99239 HOSP IP/OBS DSCHRG MGMT >30: CPT | Performed by: NURSE PRACTITIONER

## 2020-11-18 PROCEDURE — 71045 X-RAY EXAM CHEST 1 VIEW: CPT

## 2020-11-18 PROCEDURE — 63710000001 PREDNISONE PER 1 MG: Performed by: INTERNAL MEDICINE

## 2020-11-18 PROCEDURE — G0008 ADMIN INFLUENZA VIRUS VAC: HCPCS | Performed by: INTERNAL MEDICINE

## 2020-11-18 PROCEDURE — 90674 CCIIV4 VAC NO PRSV 0.5 ML IM: CPT | Performed by: INTERNAL MEDICINE

## 2020-11-18 PROCEDURE — 25010000002 INFLUENZA VAC SUBUNIT QUAD 0.5 ML SUSPENSION PREFILLED SYRINGE: Performed by: INTERNAL MEDICINE

## 2020-11-18 RX ORDER — PREDNISONE 10 MG/1
10 TABLET ORAL DAILY
Qty: 3 TABLET | Refills: 0 | Status: SHIPPED | OUTPATIENT
Start: 2020-11-18 | End: 2020-11-21

## 2020-11-18 RX ORDER — LEVOFLOXACIN 500 MG/1
500 TABLET, FILM COATED ORAL DAILY
Qty: 4 TABLET | Refills: 0 | Status: SHIPPED | OUTPATIENT
Start: 2020-11-18 | End: 2020-11-23

## 2020-11-18 RX ORDER — TOBRAMYCIN INHALATION SOLUTION 300 MG/5ML
300 INHALANT RESPIRATORY (INHALATION)
Qty: 45 ML | Refills: 0 | Status: SHIPPED | OUTPATIENT
Start: 2020-11-18 | End: 2020-11-23

## 2020-11-18 RX ADMIN — TOBRAMYCIN 300 MG: 300 SOLUTION ORAL at 07:30

## 2020-11-18 RX ADMIN — HEPARIN SODIUM 5000 UNITS: 5000 INJECTION, SOLUTION INTRAVENOUS; SUBCUTANEOUS at 05:04

## 2020-11-18 RX ADMIN — PREDNISONE 20 MG: 20 TABLET ORAL at 08:54

## 2020-11-18 RX ADMIN — IPRATROPIUM BROMIDE AND ALBUTEROL SULFATE 3 ML: 2.5; .5 SOLUTION RESPIRATORY (INHALATION) at 12:06

## 2020-11-18 RX ADMIN — IPRATROPIUM BROMIDE AND ALBUTEROL SULFATE 3 ML: 2.5; .5 SOLUTION RESPIRATORY (INHALATION) at 07:26

## 2020-11-18 RX ADMIN — FAMOTIDINE 20 MG: 20 TABLET, FILM COATED ORAL at 08:54

## 2020-11-18 RX ADMIN — INFLUENZA A VIRUS A/NEBRASKA/14/2019 (H1N1) ANTIGEN (MDCK CELL DERIVED, PROPIOLACTONE INACTIVATED), INFLUENZA A VIRUS A/DELAWARE/39/2019 (H3N2) ANTIGEN (MDCK CELL DERIVED, PROPIOLACTONE INACTIVATED), INFLUENZA B VIRUS B/SINGAPORE/INFTT-16-0610/2016 ANTIGEN (MDCK CELL DERIVED, PROPIOLACTONE INACTIVATED), INFLUENZA B VIRUS B/DARWIN/7/2019 ANTIGEN (MDCK CELL DERIVED, PROPIOLACTONE INACTIVATED) 0.5 ML: 15; 15; 15; 15 INJECTION, SUSPENSION INTRAMUSCULAR at 11:49

## 2020-11-18 RX ADMIN — GUAIFENESIN 1200 MG: 600 TABLET, EXTENDED RELEASE ORAL at 08:54

## 2020-11-18 RX ADMIN — BUDESONIDE AND FORMOTEROL FUMARATE DIHYDRATE 2 PUFF: 160; 4.5 AEROSOL RESPIRATORY (INHALATION) at 07:30

## 2020-11-18 RX ADMIN — SODIUM CHLORIDE, PRESERVATIVE FREE 10 ML: 5 INJECTION INTRAVENOUS at 08:54

## 2020-11-18 RX ADMIN — LEVOFLOXACIN 500 MG: 500 TABLET, FILM COATED ORAL at 08:54

## 2020-11-18 NOTE — PLAN OF CARE
Goal Outcome Evaluation:  Plan of Care Reviewed With: patient  Progress: improving  Outcome Summary: VSS. No new complaints. Will continue to monitor.

## 2020-11-18 NOTE — DISCHARGE SUMMARY
Discharge Summary    Patient name: Balwinder Willams  CSN: [id number]  MRN: [id number]  : 1965  Today's date: 2020     Date of Admission: 2020  Date of Discharge:  2020    Admitting Physician:  Dr. Castellano  Primary Care Provider: Obinna Arellano MD    Admission Diagnosis:  PTX     Discharge Diagnoses:   Active Hospital Problems    Diagnosis   • **Spontaneous tension pneumothorax   • Acute on chronic respiratory failure with hypoxia and hypercapnia (CMS/HCC)   • Stage IV, very severe, emphysema     S/p multiple EBV placement with subsequent extraction     • Alpha-1-antitrypsin deficiency (on replacement)     A.  Labs of 2014 reports an alpha 1 antitrypsin deficiency of 4.7 with a phenotype with Z bands detected and genotyping revealing the presence of most common deficiency allele type Z and an inferred non-expressing null allele.    B.  On Zemaira     • Former smoker (Resolved )     Allergies:  Other    Code Status and Medical Interventions:   Ordered at: 20 0542     Code Status:    CPR     Medical Interventions (Level of Support Prior to Arrest):    Full     Procedures/Testin20 Lt chest tube insertion    20 Lt chest tube insertion    History of Present Illness:  Mr. Willams is a very nice 54-year-old gentleman whom I know very well from previous treatment of his severe emphysema related to alpha-1 antitrypsin deficiency.  I last saw him on  during which time we performed bronchoscopy due to recurrent bronchitis.  I was able to remove a total of 3 valves from the left side at that time (1 from the lingula and 2 from the superior segment left lower lobe).  He did not have a postoperative pneumothorax and did well in the aftermath of that.  Secretions grew light pseudomonas aeruginosa which was very sensitive.  He was placed on tobramycin nebulizer therapy as well as Levaquin.  In addition, I did give him a course of steroids at that time.  He  "actually had been doing very well and had been talking to my nurse practitioner at the office however yesterday at approximately 7 PM he had a coughing spell and developed severe respiratory distress.  He denied chest pain at that time or a popping sensation however continued to worsen.  He presented to the emergency department in severe respiratory distress.  He was found to be acidotic with elevated PCO2 and chest x-ray showed at least a moderate sized left pneumothorax.  Patient was placed on BiPAP and I was contacted.  The decision was made to perform an emergent left thoracostomy tube for relief of what was believed to be a tension pneumothorax.  This was performed.  Please see the accompanying procedural note for full details.  The patient is also received 30 mg IV Toradol with some relief.  He is complaining of some chest discomfort from rapid breathing but otherwise denies pain.  He states that his breathing is doing much better now.  He has been able to be weaned down to partial nonrebreather.    Hospital Course:  PTX resolved and the chest tube was pulled two days later.  PTX reoccured the following day.  He was placed on 100% oxygen to try to resolve the PTX.  However, it remained persistent and another chest tube was placed 11/12/20.  It did resolve again and the chest tube was placed on waterseal for a few days.  PTX did not reoccur, so chest tube was discontinued 11/17/20.  CXR still reveals resolution of pneumothorax.  It is felt at this time that he is ready for discharge. He will need to follow up with our office next week with a CXR.      Culture from bronch wash on 10/30/20 had light growth PSA.  He was continued on LVQ and placed on Tobramycin nebs.  He will be discharged home to complete a 14 day course each of LVQ and Phan nebs.    Vitals:  /67 (BP Location: Left arm, Patient Position: Lying)   Pulse 79   Temp 97.8 °F (36.6 °C) (Axillary)   Resp 18   Ht 172.7 cm (67.99\")   Wt 58 kg " (127 lb 13.9 oz)   SpO2 99%   BMI 19.45 kg/m²     Physical Exam:  Constitutional:  Appears well-developed and well-nourished. No distress.   HEENT:  Normocephalic and atraumatic. PERRL  Neck:  Neck supple. No JVD present.   CV: Normal rate, regular rhythm,  intact distal pulses.  No gallop, murmur or rub.  Pulmonary/Chest: Effort normal and breath sounds normal. No respiratory distress. No wheezes, rhonchi or rales.   Abdominal: Soft. +BS. No distension and no mass. There is no tenderness.   Musculoskeletal: Normal muscle tone and strength  Neurological: Alert and oriented to person, place, and time.  No focal deficits  Skin: Skin is warm and dry. No rash noted.   Extremities:  No clubbing, edema or cyanosis  Psychiatric: Normal mood and affect. Behavior is normal.     Labs:  Results from last 7 days   Lab Units 11/14/20  0438   WBC 10*3/mm3 11.68*   HEMOGLOBIN g/dL 14.8   HEMATOCRIT % 44.9   PLATELETS 10*3/mm3 259     Results from last 7 days   Lab Units 11/14/20  0438   SODIUM mmol/L 137   POTASSIUM mmol/L 4.0   CHLORIDE mmol/L 101   CO2 mmol/L 26.0   BUN mg/dL 16   CREATININE mg/dL 0.85   CALCIUM mg/dL 9.0   GLUCOSE mg/dL 182*         No results found for: MG, PHOS              Discharge Medications      New Medications      Instructions Start Date   influenza vac split quad 0.5 ML suspension prefilled syringe injection  Commonly known as: FLUZONE,FLUARIX,AFLURIA,FLULAVAL   0.5 mL, Intramuscular, Once      tobramycin  MG/5ML nebulizer solution  Commonly known as: YOLIE   300 mg, Nebulization, Every 12 Hours - RT         Changes to Medications      Instructions Start Date   Combivent Respimat  MCG/ACT inhaler  Generic drug: ipratropium-albuterol  What changed: See the new instructions.   INHALE 1 PUFF BY MOUTH AND INTO THE LUNGS FOUR TIMES A DAY      ipratropium-albuterol 0.5-2.5 mg/3 ml nebulizer  Commonly known as: DUO-NEB  What changed:   · when to take this  · reasons to take this   3 mL,  Nebulization, 4 Times Daily - RT      predniSONE 10 MG tablet  Commonly known as: DELTASONE  What changed:   · how much to take  · how to take this  · when to take this  · additional instructions   10 mg, Oral, Daily, Then stop         Continue These Medications      Instructions Start Date   budesonide-formoterol 160-4.5 MCG/ACT inhaler  Commonly known as: Symbicort   2 puffs, Inhalation, 2 Times Daily      guaiFENesin 600 MG 12 hr tablet  Commonly known as: MUCINEX   1,200 mg, Oral, Every Morning      ibuprofen 800 MG tablet  Commonly known as: ADVIL,MOTRIN   800 mg, Oral, Every 6 Hours PRN      levoFLOXacin 500 MG tablet  Commonly known as: Levaquin   500 mg, Oral, Daily      O2  Commonly known as: OXYGEN   3 L/min, Inhalation, Nightly      Prolastin-C 1000 MG/20ML IV solution  Generic drug: Alpha1-Proteinase Inhibitor   Weekly           Diet Instructions     Diet: Regular; Thin      Discharge Diet: Regular    Fluid Consistency: Thin        Activity Instructions     Activity as Tolerated          Follow-up Appointments  No future appointments.  Additional Instructions for the Follow-ups that You Need to Schedule     Discharge Follow-up with Specified Provider: JESSICA Levine   As directed      To: JESSICA Levine    Follow Up Details: Monday 11/23/20 with CXR             Discharge Instructions:  Ok to go home today  Needs to follow up with JESSICA Levine 11/23/20 in clinic with CXR  CM to assist with getting him a home nebulizer    JESSICA Rodriguez, [facility], [facility]  Pulmonary & Critical Care Medicine    Time: I spent  45  minutes on this discharge activity which included: face-to-face encounter with the patient, reviewing the data in the system, coordination of the care with the nursing staff as well as consultants, documentation, and entering orders.      CC: Obinna Arellano MD

## 2020-11-18 NOTE — PROGRESS NOTES
"Clinical Nutrition Note      Patient Name: Balwinder Willams  MRN: [id number]  Admission date: 11/8/2020      Multidisciplinary Rounds    Additional information obtained during MDR:  RN reports pt is doing fine this morning. MD states if CXR is okay pt can be dc'd home today      Current diet: Diet Regular    Oral Nutrition Supplement: Boost Plus twice daily    Pertinent medical data reviewed:  No nutrition risk identified on nursing screen; MST score \"2\"    Intervention:  Plan of care and goals reviewed    Monitor:  RD to follow per protocol      Bria Arcos MS,RD,LD  11/18/20 11:05 EST  Time: 15  mins       "

## 2020-11-18 NOTE — PROGRESS NOTES
Case Management Discharge Note      Final Note: Mr. Willams being discharged home today.  Order placed and faxed to [facility] for nebulizer.  His Significant other is going to  nebulizer at office.  Patient has home O2 supplied by [facility].  Significant other to transport home.         Selected Continued Care - Admitted Since 11/8/2020     Destination    No services have been selected for the patient.              Durable Medical Equipment Coordination complete    Service Provider Selected Services Address Phone Fax Patient Preferred    [facility] [place]  Durable Medical Equipment [address], [place] [id number] [phone] --          Dialysis/Infusion    No services have been selected for the patient.              Home Medical Care    No services have been selected for the patient.              Therapy    No services have been selected for the patient.              Community Resources    No services have been selected for the patient.                       Final Discharge Disposition Code: 01 - home or self-care

## 2020-11-18 NOTE — DISCHARGE PLACEMENT REQUEST
"Mariela Willams (54 y.o. Male)     Date of Birth Social Security Number Address [address] Phone MRN    1965  [address] [facility] [address] [phone] [id number]    [employer] Marital Status          None Single       Admission Date Admission Type Admitting Provider Attending Provider Department, Room/Bed    11/8/20 Emergency Pio Mendoza MD Villaran, Yuri, MD [facility] 2B ICU, N237/1    Discharge Date Discharge Disposition Discharge Destination         Home or Self Care              Attending Provider: Pio Mendoza MD    Allergies: Other    Isolation: None   Infection: None   Code Status: CPR    Ht: 172.7 cm (67.99\")   Wt: 58 kg (127 lb 13.9 oz)    Admission Cmt: None   Principal Problem: Spontaneous tension pneumothorax [J93.0]                 Active Insurance as of 11/8/2020     Primary Coverage     Payor Plan Insurance Group Employer/Plan Group    MEDICARE MEDICARE A & B      Payor Plan Address Payor Plan Phone Number Payor Plan Fax Number Effective Dates    [address] [phone]  10/1/2016 - None Entered    [facility] [address]       Subscriber Name Subscriber Birth Date Member ID       MARIELA WILLAMS 1965 [id number]           Secondary Coverage     Payor Plan Insurance Group Employer/Plan Group    [place]      Payor Plan Address Payor Plan Phone Number Payor Plan Fax Number Effective Dates    [address] [phone]  3/15/2018 - None Entered    [place] [address]       Subscriber Name Subscriber Birth Date Member ID       MARIELA WILLAMS 1965 [id number]                 Emergency Contacts      (Rel.) Home Phone Work Phone Mobile Phone    Josi Fay (Significant Other) -- -- [phone]        [facility] 2B ICU  [address] [facility] [address]  Dept. Phone:  [phone]  Dept. Fax:  [phone] Date Ordered: Nov 18, 2020         Patient:  Mariela Willams MRN:  [id number] " "  [address] [place] [address] :  1965  SSN:    Phone: [phone] Sex:  M     Weight: 58 kg (127 lb 13.9 oz)         Ht Readings from Last 1 Encounters:   20 172.7 cm (67.99\")         Home Nebulizer          (Order ID: [id number])    Diagnosis:  Spontaneous tension pneumothorax (J93.0 [ICD-10-CM] 512.0 [ICD-9-CM])   Quantity:  1     Nebulizer Equipment:  Nebulizer w/ Compressor  Nebulizer Accessories:  Nebulizer Kit - Administration Set, Non-Disposable  Length of Need (99 Months = Lifetime): 99 Months = Lifetime        Authorizing Provider's Phone: [phone]   Verbal Order Mode: Verbal with readback   Authorizing Provider: Christal Almodovar DO  Authorizing Provider's NPI: [id number]     Order Entered By: Cheyanne Kraus RN 2020 11:32 AM     Electronically signed by: Christal Almodovar DO 2020 11:37 AM               History & Physical      Phan Castellano MD at 20 0545            Intensive Care Admission Note     Spontaneous tension pneumothorax    History of Present Illness     Mr. Willams is a very nice 54-year-old gentleman whom I know very well from previous treatment of his severe emphysema related to alpha-1 antitrypsin deficiency.  I last saw him on  during which time we performed bronchoscopy due to recurrent bronchitis.  I was able to remove a total of 3 valves from the left side at that time (1 from the lingula and 2 from the superior segment left lower lobe).  He did not have a postoperative pneumothorax and did well in the aftermath of that.  Secretions grew light pseudomonas aeruginosa which was very sensitive.  He was placed on tobramycin nebulizer therapy as well as Levaquin.  In addition, I did give him a course of steroids at that time.  He actually had been doing very well and had been talking to my nurse practitioner at the office however yesterday at approximately 7 PM he had a coughing spell and developed severe " respiratory distress.  He denied chest pain at that time or a popping sensation however continued to worsen.  He presented to the emergency department in severe respiratory distress.  He was found to be acidotic with elevated PCO2 and chest x-ray showed at least a moderate sized left pneumothorax.  Patient was placed on BiPAP and I was contacted.  The decision was made to perform an emergent left thoracostomy tube for relief of what was believed to be a tension pneumothorax.  This was performed.  Please see the accompanying procedural note for full details.  The patient is also received 30 mg IV Toradol with some relief.  He is complaining of some chest discomfort from rapid breathing but otherwise denies pain.  He states that his breathing is doing much better now.  He has been able to be weaned down to partial nonrebreather.    Problem List, Surgical History, Family, Social History, and ROS     Patient Active Problem List    Diagnosis   • *Spontaneous tension pneumothorax [J93.0]   • Unresolved pneumonia [J18.9]   • Recurrent pneumonia [J18.9]   • Persistent air leak [J93.82]   • Postprocedural pneumothorax [J95.811]   • Stage IV, very severe, emphysema [J44.9]   • Former smoker (Resolved 2006) [Z87.891]   • Alpha-1-antitrypsin deficiency (on replacement) [E88.01]   • Chronic cough [R05]   • Dependence on supplemental oxygen [Z99.81]     Past Surgical History:   Procedure Laterality Date   • BRONCHOSCOPY N/A 10/30/2019    Procedure: BRONCHOSCOPY WITH ENDOBRONHCIAL VALVE PLACEMENT;  Surgeon: Phan Castellano MD;  Location:  [facility];  Service: Pulmonary   • BRONCHOSCOPY N/A 10/24/2019    Procedure: BRONCHOSCOPY WITH ENDOBRONCHIAL VALVE PLACEMENT;  Surgeon: Devaughn Pressley MD;  Location:  [facility];  Service: Pulmonary   • BRONCHOSCOPY  10/24/19 & 10/29/19?   • BRONCHOSCOPY N/A 9/11/2020    Procedure: BRONCHOSCOPY;  Surgeon: Phan Castellano MD;  Location:  [facility];  Service:  "Pulmonary;  Laterality: N/A;   • BRONCHOSCOPY N/A 10/30/2020    Procedure: BRONCHOSCOPY WITH FLUOROSCOPY AND ENDOBRONCHIAL VALVE REMOVAL;  Surgeon: Phan Castellano MD;  Location: [facility];  Service: Pulmonary;  Laterality: N/A;       Allergies   Allergen Reactions   • Other Other (See Comments)     Opioid Analgesics (recovering addict)     No current facility-administered medications on file prior to encounter.      Current Outpatient Medications on File Prior to Encounter   Medication Sig   • budesonide-formoterol (Symbicort) 160-4.5 MCG/ACT inhaler Inhale 2 puffs 2 (Two) Times a Day.   • COMBIVENT RESPIMAT  MCG/ACT inhaler INHALE 1 PUFF BY MOUTH AND INTO THE LUNGS FOUR TIMES A DAY (Patient taking differently: Inhale 1 puff 4 (Four) Times a Day.)   • guaiFENesin (MUCINEX) 600 MG 12 hr tablet Take 1,200 mg by mouth Every Morning.   • ibuprofen (ADVIL,MOTRIN) 800 MG tablet Take 800 mg by mouth Every 6 (Six) Hours As Needed for Mild Pain .   • ipratropium-albuterol (DUO-NEB) 0.5-2.5 mg/3 ml nebulizer Take 3 mL by nebulization 4 (Four) Times a Day. (Patient taking differently: Take 3 mL by nebulization As Needed.)   • levoFLOXacin (Levaquin) 500 MG tablet Take 1 tablet by mouth Daily.   • O2 (OXYGEN) Inhale 3 L/min Every Night.   • predniSONE (DELTASONE) 10 MG tablet Take 4 tabs daily x 3 days, then take 3 tabs daily x 3 days, then take 2 tabs daily x 3 days, then take 1 tab daily x 3 days   • PROLASTIN-C 1000 MG/20ML solution 1 (One) Time Per Week.     MEDICATION LIST AND ALLERGIES REVIEWED.    Family History   Problem Relation Age of Onset   • Heart disease Mother    • Diabetes Mother    • Alpha-1 antitrypsin deficiency Brother    • Emphysema Brother         also diagnosed with Alpha 1 antitripsan deficiency   • Lung cancer Other    • Emphysema Other    • Emphysema Paternal Grandmother         prognosis in 1975 was \"lung Cancer\" , however, it is suspect that she suffered from the degeneration of " "her lungs due to Alpha 1 Antitripsan     Social History     Tobacco Use   • Smoking status: Former Smoker     Packs/day: 1.50     Years: 25.00     Pack years: 37.50     Types: Cigarettes     Start date: 1981     Quit date: 2006     Years since quittin.8   • Smokeless tobacco: Never Used   Substance Use Topics   • Alcohol use: No     Comment: In recovery since 1994   • Drug use: Yes     Comment: In recovery since 1994         Review of Systems   Unable to perform ROS: Acuity of condition         Physical Exam and Clinical Information   BP (!) 126/101   Pulse (!) 138   Temp 97.8 °F (36.6 °C) (Axillary)   Resp 28   Ht 172.7 cm (67.99\")   Wt 61.2 kg (135 lb)   SpO2 95%   BMI 20.53 kg/m²   Physical Exam  Vitals signs (Note this examination is prior to the placement of the chest tube.) and nursing note reviewed.   Constitutional:       General: He is in acute distress.      Appearance: Normal appearance. He is ill-appearing, toxic-appearing and diaphoretic.      Comments: Thin gentleman in no acute distress.   HENT:      Head: Normocephalic and atraumatic.      Nose: Nose normal. No congestion.      Mouth/Throat:      Mouth: Mucous membranes are moist.      Pharynx: No oropharyngeal exudate.   Eyes:      Extraocular Movements: Extraocular movements intact.      Conjunctiva/sclera: Conjunctivae normal.      Pupils: Pupils are equal, round, and reactive to light.   Neck:      Musculoskeletal: Normal range of motion and neck supple. No neck rigidity.   Cardiovascular:      Rate and Rhythm: Regular rhythm. Tachycardia present.      Pulses: Normal pulses.      Heart sounds: Normal heart sounds. No murmur.   Pulmonary:      Effort: Respiratory distress present.      Breath sounds: No wheezing.      Comments: Poor air movement bilaterally.  Less so on the left.  Abdominal:      General: Abdomen is flat. Bowel sounds are normal. There is no distension.      Palpations: Abdomen is soft.   Musculoskeletal: " Normal range of motion.   Skin:     General: Skin is warm.      Capillary Refill: Capillary refill takes less than 2 seconds.   Neurological:      General: No focal deficit present.      Mental Status: He is alert and oriented to person, place, and time.         Results from last 7 days   Lab Units 11/08/20  0302   WBC 10*3/mm3 20.85*   HEMOGLOBIN g/dL 17.6   PLATELETS 10*3/mm3 319     Results from last 7 days   Lab Units 11/08/20  0302   SODIUM mmol/L 137   POTASSIUM mmol/L 5.2   CO2 mmol/L 21.0*   BUN mg/dL 25*   CREATININE mg/dL 1.01   GLUCOSE mg/dL 202*     Estimated Creatinine Clearance: 72.4 mL/min (by C-G formula based on SCr of 1.01 mg/dL).      Results from last 7 days   Lab Units 11/08/20  0424   PH, ARTERIAL pH units 7.271*   PCO2, ARTERIAL mm Hg 47.8*   PO2 ART mm Hg 88.7     No results found for: LACTATE       I reviewed the patient's results and images.     Impression     Hospital Problem List     * (Principal) Spontaneous tension pneumothorax    Alpha-1-antitrypsin deficiency (on replacement)    Overview Signed 11/30/2016  9:09 AM by Mary Cormier  Labs of 11/20/2014 reports an alpha 1 antitrypsin deficiency of 4.7 with a phenotype      with Z bands detected and genotyping revealing the presence of most common      deficiency allele type Z and an inferred non-expressing null allele.         Dependence on supplemental oxygen    Stage IV, very severe, emphysema, status post noninvasive lung volume reduction valve placement to the left lower lobe    Former smoker (Resolved 2006)        Plan/Recommendations     Mr. Willams is began to convalesce after decompression of his pneumothorax with a chest tube on the left.  Wean oxygen as tolerated.  Pain control as needed with Toradol.  We will try to avoid narcotics secondary to his prior history of substance abuse.  Continue with tobramycin nebs as well as Levaquin for his underlying Pseudomonas colonization  Continue with bronchodilator  therapy.  We will let him eat later in the day if he begins to feel a bit better.  DVT prophylaxis with SCDs for now in anticipation of possible further interventions.  The patient remains at high risk of worsening secondary to severe underlying lung disease and pneumothorax.    High level of risk due to:  severe exacerbation of chronic illness and illness with threat to life or bodily function.    Total of 45 minutes were utilized in the care of this patient exclusive of the time it took for the procedure which is listed separately in the procedural note.    Phan Castellano MD, [facility]  Pulmonary and Critical Care Medicine  11/08/20 05:46 EST     CC: Obinna Arellano MD    Electronically signed by Phan Castellano MD at 11/08/20 0553

## 2020-11-19 ENCOUNTER — READMISSION MANAGEMENT (OUTPATIENT)
Dept: CALL CENTER | Facility: HOSPITAL | Age: 55
End: 2020-11-19

## 2020-11-19 NOTE — OUTREACH NOTE
Prep Survey      Responses   Spiritism facility patient discharged from?  [facility]   Is LACE score < 7 ?  No   Eligibility  Readm Mgmt   Discharge diagnosis  Spontaneous tension pneumothorax   Does the patient have one of the following disease processes/diagnoses(primary or secondary)?  Other   Does the patient have Home health ordered?  No   Is there a DME ordered?  Yes   What DME was ordered?  [facility]   Prep survey completed?  Yes          Anamika Cameron RN

## 2020-11-23 ENCOUNTER — OFFICE VISIT (OUTPATIENT)
Dept: PULMONOLOGY | Facility: CLINIC | Age: 55
End: 2020-11-23

## 2020-11-23 VITALS
WEIGHT: 129.8 LBS | BODY MASS INDEX: 19.67 KG/M2 | SYSTOLIC BLOOD PRESSURE: 136 MMHG | OXYGEN SATURATION: 91 % | HEIGHT: 68 IN | HEART RATE: 98 BPM | TEMPERATURE: 97.1 F | DIASTOLIC BLOOD PRESSURE: 84 MMHG

## 2020-11-23 DIAGNOSIS — J30.9 ALLERGIC RHINITIS, UNSPECIFIED SEASONALITY, UNSPECIFIED TRIGGER: ICD-10-CM

## 2020-11-23 DIAGNOSIS — E88.01 ALPHA-1-ANTITRYPSIN DEFICIENCY (HCC): ICD-10-CM

## 2020-11-23 DIAGNOSIS — J96.21 ACUTE ON CHRONIC RESPIRATORY FAILURE WITH HYPOXIA AND HYPERCAPNIA (HCC): ICD-10-CM

## 2020-11-23 DIAGNOSIS — J96.22 ACUTE ON CHRONIC RESPIRATORY FAILURE WITH HYPOXIA AND HYPERCAPNIA (HCC): ICD-10-CM

## 2020-11-23 DIAGNOSIS — J93.0 SPONTANEOUS TENSION PNEUMOTHORAX: Primary | ICD-10-CM

## 2020-11-23 DIAGNOSIS — J44.9 COPD MIXED TYPE (HCC): ICD-10-CM

## 2020-11-23 DIAGNOSIS — Z87.891 FORMER SMOKER: ICD-10-CM

## 2020-11-23 PROCEDURE — 99214 OFFICE O/P EST MOD 30 MIN: CPT | Performed by: NURSE PRACTITIONER

## 2020-11-23 RX ORDER — PREDNISONE 10 MG/1
TABLET ORAL
Qty: 31 TABLET | Refills: 0 | Status: SHIPPED | OUTPATIENT
Start: 2020-11-23 | End: 2020-12-03 | Stop reason: HOSPADM

## 2020-11-23 RX ORDER — FLUTICASONE PROPIONATE 50 MCG
2 SPRAY, SUSPENSION (ML) NASAL DAILY
Qty: 9.9 ML | Refills: 6 | Status: SHIPPED | OUTPATIENT
Start: 2020-11-23

## 2020-11-23 NOTE — PROGRESS NOTES
[facility] Pulmonary Follow up    CHIEF COMPLAINT    Hospital follow-up    HISTORY OF PRESENT ILLNESS    Balwinder Willams is a 54 y.o.male here today for a hospital follow-up.  He was last seen in the office in August by me.  He had a bronchoscopy on 9/11 for recurrent pneumonias.  He had some sputum cultures completed at that time as well.  He had been on several rounds of antibiotics prior to this bronchoscopy per Dr. Marie.  His sputum cultures came back as light growth of Pseudomonas.  We started him on a 2-week tobramycin nebulizer treatment.    He then had another bronchoscopy on 10/30 for removal of the endobronchial valves.  He had one removed from the lingula and 2 removed from the left lower lobe superior segment.  He again grew Pseudomonas from his bronchial washings and was treated with a 14-day tobramycin nebulizer treatment.    He presented to [facility] on 11/8 in respiratory distress.  He was found to have a moderate sized left sided pneumothorax that occurred spontaneously.  He had a chest tube placed and was transferred to the ICU.  He had his chest tube removed on 11/10.  On 11/12 he had a recurring pneumothorax on the left side and another chest tube was placed.  His chest tube was removed on 11/17 and no recurring pneumothorax occurred.  He was discharged on 11/18.  He did complete a total of 14 days of Levaquin and 7 days of YOLIE nebs.    He continues to use Symbicort 2 puffs twice a day and his Combivent as needed.  He is also been using his DuoNeb's twice a day.  He continues to wear his oxygen at 3 L nasal cannula at nighttime and occasionally wears it between 3 and 4 L at home.    He denies any sputum production currently.  He does think he has some worsening sinus drainage recently.    He denies fever, chills, hemoptysis, night sweats, weight loss, chest pain or palpitations.  He denies any lower extremity edema or calf tenderness.  He denies reflux symptoms.    He continues  to receive Prolastin infusions weekly for his alpha-1 antitrypsin deficiency.  He is scheduled for this Wednesday for his infusion.    He did receive his influenza vaccination while hospitalized.    He quit smoking in 2016 is a 37-pack-year history.    Patient Active Problem List   Diagnosis   • Alpha-1-antitrypsin deficiency (on replacement)   • Chronic cough   • Dependence on supplemental oxygen   • Stage IV, very severe, emphysema   • Former smoker (Resolved 2006)   • Postprocedural pneumothorax   • Persistent air leak   • Recurrent pneumonia   • Unresolved pneumonia   • Spontaneous tension pneumothorax   • Acute on chronic respiratory failure with hypoxia and hypercapnia (CMS/HCC)       Allergies   Allergen Reactions   • Other Other (See Comments)     Opioid Analgesics (recovering addict)       Current Outpatient Medications:   •  budesonide-formoterol (Symbicort) 160-4.5 MCG/ACT inhaler, Inhale 2 puffs 2 (Two) Times a Day., Disp: 40.8 g, Rfl: 0  •  COMBIVENT RESPIMAT  MCG/ACT inhaler, INHALE 1 PUFF BY MOUTH AND INTO THE LUNGS FOUR TIMES A DAY (Patient taking differently: Inhale 1 puff 4 (Four) Times a Day.), Disp: 4 g, Rfl: 6  •  guaiFENesin (MUCINEX) 600 MG 12 hr tablet, Take 1,200 mg by mouth Every Morning., Disp: , Rfl:   •  ibuprofen (ADVIL,MOTRIN) 800 MG tablet, Take 800 mg by mouth Every 6 (Six) Hours As Needed for Mild Pain ., Disp: , Rfl:   •  ipratropium-albuterol (DUO-NEB) 0.5-2.5 mg/3 ml nebulizer, Take 3 mL by nebulization 4 (Four) Times a Day. (Patient taking differently: Take 3 mL by nebulization As Needed.), Disp: 3240 mL, Rfl: 1  •  O2 (OXYGEN), Inhale 3 L/min Every Night., Disp: , Rfl:   •  PROLASTIN-C 1000 MG/20ML solution, 1 (One) Time Per Week., Disp: , Rfl:   •  fluticasone (Flonase) 50 MCG/ACT nasal spray, 2 sprays into the nostril(s) as directed by provider Daily., Disp: 9.9 mL, Rfl: 6  •  predniSONE (DELTASONE) 10 MG tablet, Take 4 tabs daily x 3 days, then take 3 tabs daily x 3  "days, then take 2 tabs daily x 3 days, then take 1 tab daily x 3 days, Disp: 31 tablet, Rfl: 0  MEDICATION LIST AND ALLERGIES REVIEWED.    Social History     Tobacco Use   • Smoking status: Former Smoker     Packs/day: 1.50     Years: 25.00     Pack years: 37.50     Types: Cigarettes     Start date: 1981     Quit date: 2006     Years since quittin.9   • Smokeless tobacco: Never Used   Substance Use Topics   • Alcohol use: No     Comment: In recovery since 1994   • Drug use: Yes     Comment: In recovery since 1994       FAMILY AND SOCIAL HISTORY REVIEWED.    Review of Systems   Constitutional: Positive for fatigue. Negative for activity change, appetite change, fever and unexpected weight change.   HENT: Positive for congestion, postnasal drip and rhinorrhea. Negative for sinus pressure, sore throat and voice change.    Eyes: Negative for visual disturbance.   Respiratory: Positive for cough, shortness of breath and wheezing. Negative for chest tightness.    Cardiovascular: Negative for chest pain, palpitations and leg swelling.   Gastrointestinal: Negative for abdominal distention, abdominal pain, nausea and vomiting.   Endocrine: Negative for cold intolerance and heat intolerance.   Genitourinary: Negative for difficulty urinating and urgency.   Musculoskeletal: Negative for arthralgias, back pain and neck pain.   Skin: Negative for color change and pallor.   Allergic/Immunologic: Negative for environmental allergies and food allergies.   Neurological: Negative for dizziness, syncope, weakness and light-headedness.   Hematological: Negative for adenopathy. Does not bruise/bleed easily.   Psychiatric/Behavioral: Negative for agitation and behavioral problems.   .    /84   Pulse 98   Temp 97.1 °F (36.2 °C)   Ht 172.7 cm (67.99\")   Wt 58.9 kg (129 lb 12.8 oz)   SpO2 91% Comment: resting, room air  BMI 19.74 kg/m²     Immunization History   Administered Date(s) Administered   • Flu " Vaccine Quad PF >36MO 11/15/2019   • Flulaval/Fluarix/Fluzone Quad 11/18/2020   • Influenza, Unspecified 11/18/2019   • Pneumococcal Conjugate 13-Valent (PCV13) 12/10/2015, 11/18/2019   • flucelvax quad pfs =>4 YRS 11/18/2019       Physical Exam  Vitals signs and nursing note reviewed.   Constitutional:       Appearance: He is well-developed. He is not diaphoretic.   HENT:      Head: Normocephalic and atraumatic.   Eyes:      Pupils: Pupils are equal, round, and reactive to light.   Neck:      Musculoskeletal: Normal range of motion and neck supple.      Thyroid: No thyromegaly.   Cardiovascular:      Rate and Rhythm: Normal rate and regular rhythm.      Heart sounds: Normal heart sounds. No murmur. No friction rub. No gallop.    Pulmonary:      Effort: Pulmonary effort is normal. No respiratory distress.      Breath sounds: Normal breath sounds. No wheezing or rales.   Chest:      Chest wall: No tenderness.   Abdominal:      General: Bowel sounds are normal.      Palpations: Abdomen is soft.      Tenderness: There is no abdominal tenderness.   Musculoskeletal: Normal range of motion.         General: No swelling.   Lymphadenopathy:      Cervical: No cervical adenopathy.   Skin:     General: Skin is warm and dry.      Capillary Refill: Capillary refill takes less than 2 seconds.   Neurological:      Mental Status: He is alert and oriented to person, place, and time.   Psychiatric:         Mood and Affect: Mood normal.         Behavior: Behavior normal.           RESULTS    Xr Chest Pa & Lateral    Result Date: 11/23/2020  Stable appearance of the chest with no evidence of pneumothorax or other acute disease process.  This report was finalized on 11/23/2020 11:22 AM by Vahid Wyatt.       PROBLEM LIST    Problem List Items Addressed This Visit        Respiratory    Alpha-1-antitrypsin deficiency (on replacement)    Overview     A.  Labs of 11/20/2014 reports an alpha 1 antitrypsin deficiency of 4.7 with a  phenotype with Z bands detected and genotyping revealing the presence of most common deficiency allele type Z and an inferred non-expressing null allele.    B.  On Zemaira         Relevant Medications    fluticasone (Flonase) 50 MCG/ACT nasal spray    Stage IV, very severe, emphysema    Overview     S/p multiple EBV placement with subsequent extraction         Relevant Medications    fluticasone (Flonase) 50 MCG/ACT nasal spray    predniSONE (DELTASONE) 10 MG tablet    Spontaneous tension pneumothorax - Primary    Relevant Medications    fluticasone (Flonase) 50 MCG/ACT nasal spray    Other Relevant Orders    XR Chest PA & Lateral (Completed)    Acute on chronic respiratory failure with hypoxia and hypercapnia (CMS/HCC)       Other    Former smoker (Resolved 2006)      Other Visit Diagnoses     Allergic rhinitis, unspecified seasonality, unspecified trigger        Relevant Medications    fluticasone (Flonase) 50 MCG/ACT nasal spray    predniSONE (DELTASONE) 10 MG tablet            DISCUSSION    Mr. Willams is here for follow-up after he was hospitalized at [facility] and discharged on 11/18.  He states he is slowly returning back to his baseline.  He feels better than he has in the past.    He will continue Symbicort, DuoNeb's and his Combivent rescue inhaler for his COPD.    We did review his chest x-ray in the office today and he has no recurring pneumothorax.    I did encourage him to complete the remainder of the tobramycin nebulizers that he has at home.  He has about 5 more days left to complete.  I will call in a prednisone taper for him to use if he were to worsen over the Thanksgiving holiday.    I will start him on Flonase and Zyrtec daily for his postnasal drip.  I do suspect that this will help his sinus drainage with time.    I did encourage him to wear his oxygen during the day with activity and at night while he sleeps at 3 L.    He will continue to receive Prolastin for his alpha-1  deficiency.    He will follow-up in approximately 4 weeks for follow-up.  Did advise him to call me if he needs to be seen sooner in the office.  I spent 20-29 minutes with the patient. I spent > 50% percent of this time counseling and discussing diagnosis, prognosis, diagnostic testing, evaluation, current status, treatment options and management.    Neris JASMIN Cárdenas, APRN  11/23/202011:59 EST  Electronically signed     Please note that portions of this note were completed with a voice recognition program. Efforts were made to edit the dictations, but occasionally words are mistranscribed.      CC: Obinna Arellano MD

## 2020-11-24 ENCOUNTER — APPOINTMENT (OUTPATIENT)
Dept: CT IMAGING | Facility: HOSPITAL | Age: 55
End: 2020-11-24

## 2020-11-24 ENCOUNTER — READMISSION MANAGEMENT (OUTPATIENT)
Dept: CALL CENTER | Facility: HOSPITAL | Age: 55
End: 2020-11-24

## 2020-11-24 ENCOUNTER — DOCUMENTATION (OUTPATIENT)
Dept: PULMONOLOGY | Facility: CLINIC | Age: 55
End: 2020-11-24

## 2020-11-24 ENCOUNTER — APPOINTMENT (OUTPATIENT)
Dept: GENERAL RADIOLOGY | Facility: HOSPITAL | Age: 55
End: 2020-11-24

## 2020-11-24 ENCOUNTER — HOSPITAL ENCOUNTER (INPATIENT)
Facility: HOSPITAL | Age: 55
LOS: 9 days | Discharge: HOME | End: 2020-12-03
Attending: EMERGENCY MEDICINE | Admitting: HOSPITALIST

## 2020-11-24 DIAGNOSIS — J96.21 ACUTE ON CHRONIC RESPIRATORY FAILURE WITH HYPOXIA (HCC): Primary | ICD-10-CM

## 2020-11-24 DIAGNOSIS — J44.1 COPD EXACERBATION (HCC): ICD-10-CM

## 2020-11-24 LAB
ALBUMIN SERPL-MCNC: 4 G/DL (ref 3.5–5.2)
ALBUMIN/GLOB SERPL: 1.4 G/DL
ALP SERPL-CCNC: 51 U/L (ref 39–117)
ALT SERPL W P-5'-P-CCNC: 23 U/L (ref 1–41)
ANION GAP SERPL CALCULATED.3IONS-SCNC: 12 MMOL/L (ref 5–15)
AST SERPL-CCNC: 14 U/L (ref 1–40)
B PARAPERT DNA SPEC QL NAA+PROBE: NOT DETECTED
B PERT DNA SPEC QL NAA+PROBE: NOT DETECTED
BASOPHILS # BLD AUTO: 0.05 10*3/MM3 (ref 0–0.2)
BASOPHILS NFR BLD AUTO: 0.5 % (ref 0–1.5)
BILIRUB SERPL-MCNC: 0.6 MG/DL (ref 0–1.2)
BUN SERPL-MCNC: 15 MG/DL (ref 6–20)
BUN/CREAT SERPL: 18.3 (ref 7–25)
C PNEUM DNA NPH QL NAA+NON-PROBE: NOT DETECTED
CALCIUM SPEC-SCNC: 9.1 MG/DL (ref 8.6–10.5)
CHLORIDE SERPL-SCNC: 104 MMOL/L (ref 98–107)
CO2 SERPL-SCNC: 23 MMOL/L (ref 22–29)
CREAT SERPL-MCNC: 0.82 MG/DL (ref 0.76–1.27)
D-LACTATE SERPL-SCNC: 1.6 MMOL/L (ref 0.5–2)
DEPRECATED RDW RBC AUTO: 43.8 FL (ref 37–54)
EOSINOPHIL # BLD AUTO: 0.16 10*3/MM3 (ref 0–0.4)
EOSINOPHIL NFR BLD AUTO: 1.4 % (ref 0.3–6.2)
ERYTHROCYTE [DISTWIDTH] IN BLOOD BY AUTOMATED COUNT: 13.2 % (ref 12.3–15.4)
FLUAV H1 2009 PAND RNA NPH QL NAA+PROBE: NOT DETECTED
FLUAV H1 HA GENE NPH QL NAA+PROBE: NOT DETECTED
FLUAV H3 RNA NPH QL NAA+PROBE: NOT DETECTED
FLUAV SUBTYP SPEC NAA+PROBE: NOT DETECTED
FLUBV RNA ISLT QL NAA+PROBE: NOT DETECTED
GFR SERPL CREATININE-BSD FRML MDRD: 98 ML/MIN/1.73
GLOBULIN UR ELPH-MCNC: 2.8 GM/DL
GLUCOSE SERPL-MCNC: 152 MG/DL (ref 65–99)
HADV DNA SPEC NAA+PROBE: NOT DETECTED
HCOV 229E RNA SPEC QL NAA+PROBE: NOT DETECTED
HCOV HKU1 RNA SPEC QL NAA+PROBE: NOT DETECTED
HCOV NL63 RNA SPEC QL NAA+PROBE: NOT DETECTED
HCOV OC43 RNA SPEC QL NAA+PROBE: NOT DETECTED
HCT VFR BLD AUTO: 45.7 % (ref 37.5–51)
HGB BLD-MCNC: 15.2 G/DL (ref 13–17.7)
HMPV RNA NPH QL NAA+NON-PROBE: NOT DETECTED
HOLD SPECIMEN: NORMAL
HOLD SPECIMEN: NORMAL
HPIV1 RNA SPEC QL NAA+PROBE: NOT DETECTED
HPIV2 RNA SPEC QL NAA+PROBE: NOT DETECTED
HPIV3 RNA NPH QL NAA+PROBE: NOT DETECTED
HPIV4 P GENE NPH QL NAA+PROBE: NOT DETECTED
IMM GRANULOCYTES # BLD AUTO: 0.08 10*3/MM3 (ref 0–0.05)
IMM GRANULOCYTES NFR BLD AUTO: 0.7 % (ref 0–0.5)
L PNEUMO1 AG UR QL IA: NEGATIVE
LYMPHOCYTES # BLD AUTO: 1.03 10*3/MM3 (ref 0.7–3.1)
LYMPHOCYTES NFR BLD AUTO: 9.3 % (ref 19.6–45.3)
M PNEUMO IGG SER IA-ACNC: NOT DETECTED
MCH RBC QN AUTO: 30.3 PG (ref 26.6–33)
MCHC RBC AUTO-ENTMCNC: 33.3 G/DL (ref 31.5–35.7)
MCV RBC AUTO: 91.2 FL (ref 79–97)
MONOCYTES # BLD AUTO: 1.24 10*3/MM3 (ref 0.1–0.9)
MONOCYTES NFR BLD AUTO: 11.2 % (ref 5–12)
MRSA DNA SPEC QL NAA+PROBE: NEGATIVE
NEUTROPHILS NFR BLD AUTO: 76.9 % (ref 42.7–76)
NEUTROPHILS NFR BLD AUTO: 8.51 10*3/MM3 (ref 1.7–7)
NRBC BLD AUTO-RTO: 0 /100 WBC (ref 0–0.2)
NT-PROBNP SERPL-MCNC: 84 PG/ML (ref 0–900)
PLATELET # BLD AUTO: 233 10*3/MM3 (ref 140–450)
PMV BLD AUTO: 8.9 FL (ref 6–12)
POTASSIUM SERPL-SCNC: 3.8 MMOL/L (ref 3.5–5.2)
PROCALCITONIN SERPL-MCNC: 0.07 NG/ML (ref 0–0.25)
PROT SERPL-MCNC: 6.8 G/DL (ref 6–8.5)
RBC # BLD AUTO: 5.01 10*6/MM3 (ref 4.14–5.8)
RHINOVIRUS RNA SPEC NAA+PROBE: NOT DETECTED
RSV RNA NPH QL NAA+NON-PROBE: NOT DETECTED
S PNEUM AG SPEC QL LA: NEGATIVE
SARS-COV-2 RNA NPH QL NAA+NON-PROBE: NOT DETECTED
SODIUM SERPL-SCNC: 139 MMOL/L (ref 136–145)
TROPONIN T SERPL-MCNC: <0.01 NG/ML (ref 0–0.03)
WBC # BLD AUTO: 11.07 10*3/MM3 (ref 3.4–10.8)
WHOLE BLOOD HOLD SPECIMEN: NORMAL
WHOLE BLOOD HOLD SPECIMEN: NORMAL

## 2020-11-24 PROCEDURE — 25010000002 VANCOMYCIN 10 G RECONSTITUTED SOLUTION: Performed by: EMERGENCY MEDICINE

## 2020-11-24 PROCEDURE — 71250 CT THORAX DX C-: CPT

## 2020-11-24 PROCEDURE — 87040 BLOOD CULTURE FOR BACTERIA: CPT | Performed by: EMERGENCY MEDICINE

## 2020-11-24 PROCEDURE — 93005 ELECTROCARDIOGRAM TRACING: CPT | Performed by: EMERGENCY MEDICINE

## 2020-11-24 PROCEDURE — 83880 ASSAY OF NATRIURETIC PEPTIDE: CPT | Performed by: EMERGENCY MEDICINE

## 2020-11-24 PROCEDURE — 84145 PROCALCITONIN (PCT): CPT | Performed by: FAMILY MEDICINE

## 2020-11-24 PROCEDURE — 87641 MR-STAPH DNA AMP PROBE: CPT | Performed by: FAMILY MEDICINE

## 2020-11-24 PROCEDURE — 94799 UNLISTED PULMONARY SVC/PX: CPT

## 2020-11-24 PROCEDURE — 99223 1ST HOSP IP/OBS HIGH 75: CPT | Performed by: FAMILY MEDICINE

## 2020-11-24 PROCEDURE — 87899 AGENT NOS ASSAY W/OPTIC: CPT | Performed by: FAMILY MEDICINE

## 2020-11-24 PROCEDURE — 83605 ASSAY OF LACTIC ACID: CPT | Performed by: EMERGENCY MEDICINE

## 2020-11-24 PROCEDURE — 84484 ASSAY OF TROPONIN QUANT: CPT | Performed by: EMERGENCY MEDICINE

## 2020-11-24 PROCEDURE — 94640 AIRWAY INHALATION TREATMENT: CPT

## 2020-11-24 PROCEDURE — 85025 COMPLETE CBC W/AUTO DIFF WBC: CPT | Performed by: EMERGENCY MEDICINE

## 2020-11-24 PROCEDURE — 71045 X-RAY EXAM CHEST 1 VIEW: CPT

## 2020-11-24 PROCEDURE — 25010000002 METHYLPREDNISOLONE PER 125 MG: Performed by: EMERGENCY MEDICINE

## 2020-11-24 PROCEDURE — 80053 COMPREHEN METABOLIC PANEL: CPT | Performed by: EMERGENCY MEDICINE

## 2020-11-24 PROCEDURE — 25010000002 PIPERACILLIN SOD-TAZOBACTAM PER 1 G: Performed by: EMERGENCY MEDICINE

## 2020-11-24 PROCEDURE — 25010000002 HEPARIN (PORCINE) PER 1000 UNITS: Performed by: FAMILY MEDICINE

## 2020-11-24 PROCEDURE — 0202U NFCT DS 22 TRGT SARS-COV-2: CPT | Performed by: EMERGENCY MEDICINE

## 2020-11-24 PROCEDURE — 99284 EMERGENCY DEPT VISIT MOD MDM: CPT

## 2020-11-24 RX ORDER — BISACODYL 5 MG/1
5 TABLET, DELAYED RELEASE ORAL DAILY PRN
Status: DISCONTINUED | OUTPATIENT
Start: 2020-11-24 | End: 2020-12-03 | Stop reason: HOSPADM

## 2020-11-24 RX ORDER — BUDESONIDE AND FORMOTEROL FUMARATE DIHYDRATE 160; 4.5 UG/1; UG/1
2 AEROSOL RESPIRATORY (INHALATION) 2 TIMES DAILY
Status: DISCONTINUED | OUTPATIENT
Start: 2020-11-24 | End: 2020-11-25

## 2020-11-24 RX ORDER — ACETAMINOPHEN 325 MG/1
650 TABLET ORAL EVERY 4 HOURS PRN
Status: DISCONTINUED | OUTPATIENT
Start: 2020-11-24 | End: 2020-12-03 | Stop reason: HOSPADM

## 2020-11-24 RX ORDER — TOBRAMYCIN INHALATION SOLUTION 300 MG/5ML
300 INHALANT RESPIRATORY (INHALATION)
COMMUNITY
End: 2020-12-11 | Stop reason: HOSPADM

## 2020-11-24 RX ORDER — BISACODYL 10 MG
10 SUPPOSITORY, RECTAL RECTAL DAILY PRN
Status: DISCONTINUED | OUTPATIENT
Start: 2020-11-24 | End: 2020-12-03 | Stop reason: HOSPADM

## 2020-11-24 RX ORDER — FLUTICASONE PROPIONATE 50 MCG
2 SPRAY, SUSPENSION (ML) NASAL DAILY
Status: DISCONTINUED | OUTPATIENT
Start: 2020-11-24 | End: 2020-12-03 | Stop reason: HOSPADM

## 2020-11-24 RX ORDER — IPRATROPIUM BROMIDE AND ALBUTEROL SULFATE 2.5; .5 MG/3ML; MG/3ML
3 SOLUTION RESPIRATORY (INHALATION)
Status: DISCONTINUED | OUTPATIENT
Start: 2020-11-24 | End: 2020-12-03 | Stop reason: HOSPADM

## 2020-11-24 RX ORDER — ACETAMINOPHEN 650 MG/1
650 SUPPOSITORY RECTAL EVERY 4 HOURS PRN
Status: DISCONTINUED | OUTPATIENT
Start: 2020-11-24 | End: 2020-12-03 | Stop reason: HOSPADM

## 2020-11-24 RX ORDER — SODIUM CHLORIDE 0.9 % (FLUSH) 0.9 %
10 SYRINGE (ML) INJECTION AS NEEDED
Status: DISCONTINUED | OUTPATIENT
Start: 2020-11-24 | End: 2020-12-03 | Stop reason: HOSPADM

## 2020-11-24 RX ORDER — ONDANSETRON 2 MG/ML
4 INJECTION INTRAMUSCULAR; INTRAVENOUS EVERY 6 HOURS PRN
Status: DISCONTINUED | OUTPATIENT
Start: 2020-11-24 | End: 2020-12-03 | Stop reason: HOSPADM

## 2020-11-24 RX ORDER — SODIUM CHLORIDE 0.9 % (FLUSH) 0.9 %
10 SYRINGE (ML) INJECTION EVERY 12 HOURS SCHEDULED
Status: DISCONTINUED | OUTPATIENT
Start: 2020-11-24 | End: 2020-12-03 | Stop reason: HOSPADM

## 2020-11-24 RX ORDER — METHYLPREDNISOLONE SODIUM SUCCINATE 125 MG/2ML
125 INJECTION, POWDER, LYOPHILIZED, FOR SOLUTION INTRAMUSCULAR; INTRAVENOUS ONCE
Status: COMPLETED | OUTPATIENT
Start: 2020-11-24 | End: 2020-11-24

## 2020-11-24 RX ORDER — GUAIFENESIN 600 MG/1
1200 TABLET, EXTENDED RELEASE ORAL DAILY PRN
Status: DISCONTINUED | OUTPATIENT
Start: 2020-11-24 | End: 2020-11-25

## 2020-11-24 RX ORDER — HEPARIN SODIUM 5000 [USP'U]/ML
5000 INJECTION, SOLUTION INTRAVENOUS; SUBCUTANEOUS EVERY 12 HOURS SCHEDULED
Status: DISCONTINUED | OUTPATIENT
Start: 2020-11-24 | End: 2020-12-03 | Stop reason: HOSPADM

## 2020-11-24 RX ORDER — ONDANSETRON 4 MG/1
4 TABLET, FILM COATED ORAL EVERY 6 HOURS PRN
Status: DISCONTINUED | OUTPATIENT
Start: 2020-11-24 | End: 2020-12-03 | Stop reason: HOSPADM

## 2020-11-24 RX ORDER — ACETAMINOPHEN 160 MG/5ML
650 SOLUTION ORAL EVERY 4 HOURS PRN
Status: DISCONTINUED | OUTPATIENT
Start: 2020-11-24 | End: 2020-12-03 | Stop reason: HOSPADM

## 2020-11-24 RX ORDER — TOBRAMYCIN INHALATION SOLUTION 300 MG/5ML
300 INHALANT RESPIRATORY (INHALATION)
Status: COMPLETED | OUTPATIENT
Start: 2020-11-24 | End: 2020-12-01

## 2020-11-24 RX ADMIN — VANCOMYCIN HYDROCHLORIDE 1250 MG: 100 INJECTION, POWDER, LYOPHILIZED, FOR SOLUTION INTRAVENOUS at 16:10

## 2020-11-24 RX ADMIN — METHYLPREDNISOLONE SODIUM SUCCINATE 125 MG: 125 INJECTION, POWDER, FOR SOLUTION INTRAMUSCULAR; INTRAVENOUS at 13:36

## 2020-11-24 RX ADMIN — BUDESONIDE AND FORMOTEROL FUMARATE DIHYDRATE 2 PUFF: 160; 4.5 AEROSOL RESPIRATORY (INHALATION) at 20:16

## 2020-11-24 RX ADMIN — SODIUM CHLORIDE, PRESERVATIVE FREE 10 ML: 5 INJECTION INTRAVENOUS at 20:38

## 2020-11-24 RX ADMIN — FLUTICASONE PROPIONATE 2 SPRAY: 50 SPRAY, METERED NASAL at 17:29

## 2020-11-24 RX ADMIN — IPRATROPIUM BROMIDE AND ALBUTEROL SULFATE 3 ML: 2.5; .5 SOLUTION RESPIRATORY (INHALATION) at 20:09

## 2020-11-24 RX ADMIN — TOBRAMYCIN 300 MG: 300 SOLUTION ORAL at 20:09

## 2020-11-24 RX ADMIN — TAZOBACTAM SODIUM AND PIPERACILLIN SODIUM 3.38 G: 375; 3 INJECTION, SOLUTION INTRAVENOUS at 13:37

## 2020-11-24 RX ADMIN — HEPARIN SODIUM 5000 UNITS: 5000 INJECTION INTRAVENOUS; SUBCUTANEOUS at 20:39

## 2020-11-24 NOTE — OUTREACH NOTE
Medical Week 1 Survey      Responses   [facility] patient discharged from?  [place]   Does the patient have one of the following disease processes/diagnoses(primary or secondary)?  Other   Week 1 attempt successful?  No   Revoke  Readmitted          Gisela Christensen RN

## 2020-11-24 NOTE — PROGRESS NOTES
Mr. Willams called me this morning stating that he is currently on 5 L and is satting around 91%.  With any activity he is dropping into the low 80s.  He was concerned and wanted me to know.  I did advise him that it would be best for him to be evaluated in the ED for further management.  I saw him in the office yesterday and his oxygenation was 91% on room air.  He did not appear acutely ill in the office yesterday.    He has had difficulty in the past with pneumothoraces.  I did advise him to call if he had any other questions or concerns.  I did advise him to go to the ED for further management.

## 2020-11-25 PROBLEM — E43 SEVERE MALNUTRITION (HCC): Status: ACTIVE | Noted: 2020-11-25

## 2020-11-25 LAB — GLUCOSE BLDC GLUCOMTR-MCNC: 190 MG/DL (ref 70–130)

## 2020-11-25 PROCEDURE — 99223 1ST HOSP IP/OBS HIGH 75: CPT | Performed by: INTERNAL MEDICINE

## 2020-11-25 PROCEDURE — 94799 UNLISTED PULMONARY SVC/PX: CPT

## 2020-11-25 PROCEDURE — 99232 SBSQ HOSP IP/OBS MODERATE 35: CPT | Performed by: INTERNAL MEDICINE

## 2020-11-25 PROCEDURE — 25010000002 ALPHA1-PROTEINASE INHIBITOR 1000 MG/20ML SOLUTION: Performed by: INTERNAL MEDICINE

## 2020-11-25 PROCEDURE — 82962 GLUCOSE BLOOD TEST: CPT

## 2020-11-25 PROCEDURE — 25010000002 HEPARIN (PORCINE) PER 1000 UNITS: Performed by: FAMILY MEDICINE

## 2020-11-25 PROCEDURE — 25010000002 PIPERACILLIN SOD-TAZOBACTAM PER 1 G: Performed by: INTERNAL MEDICINE

## 2020-11-25 PROCEDURE — 25010000002 METHYLPREDNISOLONE PER 125 MG: Performed by: INTERNAL MEDICINE

## 2020-11-25 RX ORDER — GUAIFENESIN 600 MG/1
1200 TABLET, EXTENDED RELEASE ORAL EVERY 12 HOURS SCHEDULED
Status: DISCONTINUED | OUTPATIENT
Start: 2020-11-25 | End: 2020-12-03 | Stop reason: HOSPADM

## 2020-11-25 RX ORDER — BUDESONIDE 0.5 MG/2ML
0.5 INHALANT ORAL
Status: DISCONTINUED | OUTPATIENT
Start: 2020-11-25 | End: 2020-12-03 | Stop reason: HOSPADM

## 2020-11-25 RX ORDER — METHYLPREDNISOLONE SODIUM SUCCINATE 125 MG/2ML
60 INJECTION, POWDER, LYOPHILIZED, FOR SOLUTION INTRAMUSCULAR; INTRAVENOUS EVERY 24 HOURS
Status: DISCONTINUED | OUTPATIENT
Start: 2020-11-25 | End: 2020-11-30

## 2020-11-25 RX ORDER — ARFORMOTEROL TARTRATE 15 UG/2ML
15 SOLUTION RESPIRATORY (INHALATION)
Status: DISCONTINUED | OUTPATIENT
Start: 2020-11-25 | End: 2020-12-03 | Stop reason: HOSPADM

## 2020-11-25 RX ADMIN — SODIUM CHLORIDE, PRESERVATIVE FREE 10 ML: 5 INJECTION INTRAVENOUS at 22:32

## 2020-11-25 RX ADMIN — ARFORMOTEROL TARTRATE 15 MCG: 15 SOLUTION RESPIRATORY (INHALATION) at 12:35

## 2020-11-25 RX ADMIN — IPRATROPIUM BROMIDE AND ALBUTEROL SULFATE 3 ML: 2.5; .5 SOLUTION RESPIRATORY (INHALATION) at 08:05

## 2020-11-25 RX ADMIN — IPRATROPIUM BROMIDE AND ALBUTEROL SULFATE 3 ML: 2.5; .5 SOLUTION RESPIRATORY (INHALATION) at 12:34

## 2020-11-25 RX ADMIN — GUAIFENESIN 1200 MG: 600 TABLET, EXTENDED RELEASE ORAL at 21:25

## 2020-11-25 RX ADMIN — IPRATROPIUM BROMIDE AND ALBUTEROL SULFATE 3 ML: 2.5; .5 SOLUTION RESPIRATORY (INHALATION) at 16:00

## 2020-11-25 RX ADMIN — TOBRAMYCIN 300 MG: 300 SOLUTION ORAL at 08:06

## 2020-11-25 RX ADMIN — HEPARIN SODIUM 5000 UNITS: 5000 INJECTION INTRAVENOUS; SUBCUTANEOUS at 08:33

## 2020-11-25 RX ADMIN — BUDESONIDE 0.5 MG: 0.5 INHALANT RESPIRATORY (INHALATION) at 20:46

## 2020-11-25 RX ADMIN — ARFORMOTEROL TARTRATE 15 MCG: 15 SOLUTION RESPIRATORY (INHALATION) at 20:46

## 2020-11-25 RX ADMIN — FLUTICASONE PROPIONATE 2 SPRAY: 50 SPRAY, METERED NASAL at 08:33

## 2020-11-25 RX ADMIN — TAZOBACTAM SODIUM AND PIPERACILLIN SODIUM 4.5 G: 500; 4 INJECTION, SOLUTION INTRAVENOUS at 10:31

## 2020-11-25 RX ADMIN — ALPHA1-PROTEINASE INHIBITOR (HUMAN) 4252 MG: 1000 INJECTION, SOLUTION INTRAVENOUS at 14:35

## 2020-11-25 RX ADMIN — IPRATROPIUM BROMIDE AND ALBUTEROL SULFATE 3 ML: 2.5; .5 SOLUTION RESPIRATORY (INHALATION) at 20:46

## 2020-11-25 RX ADMIN — TAZOBACTAM SODIUM AND PIPERACILLIN SODIUM 4.5 G: 500; 4 INJECTION, SOLUTION INTRAVENOUS at 17:12

## 2020-11-25 RX ADMIN — BUDESONIDE AND FORMOTEROL FUMARATE DIHYDRATE 2 PUFF: 160; 4.5 AEROSOL RESPIRATORY (INHALATION) at 08:06

## 2020-11-25 RX ADMIN — DOXYCYCLINE 100 MG: 100 INJECTION, POWDER, LYOPHILIZED, FOR SOLUTION INTRAVENOUS at 22:23

## 2020-11-25 RX ADMIN — BUDESONIDE 0.5 MG: 0.5 INHALANT RESPIRATORY (INHALATION) at 12:35

## 2020-11-25 RX ADMIN — DOXYCYCLINE 100 MG: 100 INJECTION, POWDER, LYOPHILIZED, FOR SOLUTION INTRAVENOUS at 10:31

## 2020-11-25 RX ADMIN — TOBRAMYCIN 300 MG: 300 SOLUTION ORAL at 20:46

## 2020-11-25 RX ADMIN — HEPARIN SODIUM 5000 UNITS: 5000 INJECTION INTRAVENOUS; SUBCUTANEOUS at 21:25

## 2020-11-25 RX ADMIN — SODIUM CHLORIDE, PRESERVATIVE FREE 10 ML: 5 INJECTION INTRAVENOUS at 08:35

## 2020-11-25 RX ADMIN — METHYLPREDNISOLONE SODIUM SUCCINATE 60 MG: 125 INJECTION, POWDER, FOR SOLUTION INTRAMUSCULAR; INTRAVENOUS at 10:30

## 2020-11-25 RX ADMIN — GUAIFENESIN 1200 MG: 600 TABLET, EXTENDED RELEASE ORAL at 10:30

## 2020-11-26 LAB
ANION GAP SERPL CALCULATED.3IONS-SCNC: 11 MMOL/L (ref 5–15)
BUN SERPL-MCNC: 18 MG/DL (ref 6–20)
BUN/CREAT SERPL: 18.4 (ref 7–25)
CALCIUM SPEC-SCNC: 8.6 MG/DL (ref 8.6–10.5)
CHLORIDE SERPL-SCNC: 105 MMOL/L (ref 98–107)
CO2 SERPL-SCNC: 21 MMOL/L (ref 22–29)
CREAT SERPL-MCNC: 0.98 MG/DL (ref 0.76–1.27)
DEPRECATED RDW RBC AUTO: 44.5 FL (ref 37–54)
ERYTHROCYTE [DISTWIDTH] IN BLOOD BY AUTOMATED COUNT: 13.1 % (ref 12.3–15.4)
GFR SERPL CREATININE-BSD FRML MDRD: 80 ML/MIN/1.73
GLUCOSE SERPL-MCNC: 155 MG/DL (ref 65–99)
HCT VFR BLD AUTO: 39.3 % (ref 37.5–51)
HGB BLD-MCNC: 12.8 G/DL (ref 13–17.7)
MCH RBC QN AUTO: 30.3 PG (ref 26.6–33)
MCHC RBC AUTO-ENTMCNC: 32.6 G/DL (ref 31.5–35.7)
MCV RBC AUTO: 93.1 FL (ref 79–97)
PLATELET # BLD AUTO: 231 10*3/MM3 (ref 140–450)
PMV BLD AUTO: 9.7 FL (ref 6–12)
POTASSIUM SERPL-SCNC: 4 MMOL/L (ref 3.5–5.2)
RBC # BLD AUTO: 4.22 10*6/MM3 (ref 4.14–5.8)
SODIUM SERPL-SCNC: 137 MMOL/L (ref 136–145)
WBC # BLD AUTO: 15.52 10*3/MM3 (ref 3.4–10.8)

## 2020-11-26 PROCEDURE — 25010000002 METHYLPREDNISOLONE PER 125 MG: Performed by: INTERNAL MEDICINE

## 2020-11-26 PROCEDURE — 94799 UNLISTED PULMONARY SVC/PX: CPT

## 2020-11-26 PROCEDURE — 25010000002 PIPERACILLIN SOD-TAZOBACTAM PER 1 G: Performed by: INTERNAL MEDICINE

## 2020-11-26 PROCEDURE — 85027 COMPLETE CBC AUTOMATED: CPT | Performed by: INTERNAL MEDICINE

## 2020-11-26 PROCEDURE — 25010000002 HEPARIN (PORCINE) PER 1000 UNITS: Performed by: FAMILY MEDICINE

## 2020-11-26 PROCEDURE — 80048 BASIC METABOLIC PNL TOTAL CA: CPT | Performed by: INTERNAL MEDICINE

## 2020-11-26 PROCEDURE — 99232 SBSQ HOSP IP/OBS MODERATE 35: CPT | Performed by: INTERNAL MEDICINE

## 2020-11-26 RX ADMIN — GUAIFENESIN 1200 MG: 600 TABLET, EXTENDED RELEASE ORAL at 09:44

## 2020-11-26 RX ADMIN — FLUTICASONE PROPIONATE 2 SPRAY: 50 SPRAY, METERED NASAL at 09:44

## 2020-11-26 RX ADMIN — BUDESONIDE 0.5 MG: 0.5 INHALANT RESPIRATORY (INHALATION) at 07:04

## 2020-11-26 RX ADMIN — HEPARIN SODIUM 5000 UNITS: 5000 INJECTION INTRAVENOUS; SUBCUTANEOUS at 09:45

## 2020-11-26 RX ADMIN — ARFORMOTEROL TARTRATE 15 MCG: 15 SOLUTION RESPIRATORY (INHALATION) at 19:45

## 2020-11-26 RX ADMIN — TAZOBACTAM SODIUM AND PIPERACILLIN SODIUM 4.5 G: 500; 4 INJECTION, SOLUTION INTRAVENOUS at 16:57

## 2020-11-26 RX ADMIN — TAZOBACTAM SODIUM AND PIPERACILLIN SODIUM 4.5 G: 500; 4 INJECTION, SOLUTION INTRAVENOUS at 00:06

## 2020-11-26 RX ADMIN — SODIUM CHLORIDE, PRESERVATIVE FREE 10 ML: 5 INJECTION INTRAVENOUS at 20:27

## 2020-11-26 RX ADMIN — HEPARIN SODIUM 5000 UNITS: 5000 INJECTION INTRAVENOUS; SUBCUTANEOUS at 20:27

## 2020-11-26 RX ADMIN — DOXYCYCLINE 100 MG: 100 INJECTION, POWDER, LYOPHILIZED, FOR SOLUTION INTRAVENOUS at 14:10

## 2020-11-26 RX ADMIN — IPRATROPIUM BROMIDE AND ALBUTEROL SULFATE 3 ML: 2.5; .5 SOLUTION RESPIRATORY (INHALATION) at 11:56

## 2020-11-26 RX ADMIN — IPRATROPIUM BROMIDE AND ALBUTEROL SULFATE 3 ML: 2.5; .5 SOLUTION RESPIRATORY (INHALATION) at 15:21

## 2020-11-26 RX ADMIN — SODIUM CHLORIDE, PRESERVATIVE FREE 10 ML: 5 INJECTION INTRAVENOUS at 09:45

## 2020-11-26 RX ADMIN — TAZOBACTAM SODIUM AND PIPERACILLIN SODIUM 4.5 G: 500; 4 INJECTION, SOLUTION INTRAVENOUS at 09:45

## 2020-11-26 RX ADMIN — METHYLPREDNISOLONE SODIUM SUCCINATE 60 MG: 125 INJECTION, POWDER, FOR SOLUTION INTRAMUSCULAR; INTRAVENOUS at 12:53

## 2020-11-26 RX ADMIN — DOXYCYCLINE 100 MG: 100 INJECTION, POWDER, LYOPHILIZED, FOR SOLUTION INTRAVENOUS at 22:49

## 2020-11-26 RX ADMIN — ARFORMOTEROL TARTRATE 15 MCG: 15 SOLUTION RESPIRATORY (INHALATION) at 07:04

## 2020-11-26 RX ADMIN — TOBRAMYCIN 300 MG: 300 SOLUTION ORAL at 07:04

## 2020-11-26 RX ADMIN — IPRATROPIUM BROMIDE AND ALBUTEROL SULFATE 3 ML: 2.5; .5 SOLUTION RESPIRATORY (INHALATION) at 19:45

## 2020-11-26 RX ADMIN — BUDESONIDE 0.5 MG: 0.5 INHALANT RESPIRATORY (INHALATION) at 19:45

## 2020-11-26 RX ADMIN — GUAIFENESIN 1200 MG: 600 TABLET, EXTENDED RELEASE ORAL at 20:26

## 2020-11-26 RX ADMIN — TOBRAMYCIN 300 MG: 300 SOLUTION ORAL at 19:45

## 2020-11-26 RX ADMIN — IPRATROPIUM BROMIDE AND ALBUTEROL SULFATE 3 ML: 2.5; .5 SOLUTION RESPIRATORY (INHALATION) at 07:03

## 2020-11-27 PROBLEM — J18.9 PNEUMONIA OF LEFT LOWER LOBE DUE TO INFECTIOUS ORGANISM: Status: ACTIVE | Noted: 2020-11-27

## 2020-11-27 LAB
ANION GAP SERPL CALCULATED.3IONS-SCNC: 7 MMOL/L (ref 5–15)
BUN SERPL-MCNC: 18 MG/DL (ref 6–20)
BUN/CREAT SERPL: 17.3 (ref 7–25)
CALCIUM SPEC-SCNC: 9 MG/DL (ref 8.6–10.5)
CHLORIDE SERPL-SCNC: 104 MMOL/L (ref 98–107)
CO2 SERPL-SCNC: 26 MMOL/L (ref 22–29)
CREAT SERPL-MCNC: 1.04 MG/DL (ref 0.76–1.27)
DEPRECATED RDW RBC AUTO: 44.9 FL (ref 37–54)
ERYTHROCYTE [DISTWIDTH] IN BLOOD BY AUTOMATED COUNT: 12.9 % (ref 12.3–15.4)
GFR SERPL CREATININE-BSD FRML MDRD: 74 ML/MIN/1.73
GLUCOSE SERPL-MCNC: 231 MG/DL (ref 65–99)
HCT VFR BLD AUTO: 40.5 % (ref 37.5–51)
HGB BLD-MCNC: 13.4 G/DL (ref 13–17.7)
MCH RBC QN AUTO: 31.3 PG (ref 26.6–33)
MCHC RBC AUTO-ENTMCNC: 33.1 G/DL (ref 31.5–35.7)
MCV RBC AUTO: 94.6 FL (ref 79–97)
PLATELET # BLD AUTO: 232 10*3/MM3 (ref 140–450)
PMV BLD AUTO: 9.1 FL (ref 6–12)
POTASSIUM SERPL-SCNC: 4 MMOL/L (ref 3.5–5.2)
RBC # BLD AUTO: 4.28 10*6/MM3 (ref 4.14–5.8)
SODIUM SERPL-SCNC: 137 MMOL/L (ref 136–145)
WBC # BLD AUTO: 12.52 10*3/MM3 (ref 3.4–10.8)

## 2020-11-27 PROCEDURE — 80048 BASIC METABOLIC PNL TOTAL CA: CPT | Performed by: INTERNAL MEDICINE

## 2020-11-27 PROCEDURE — 99233 SBSQ HOSP IP/OBS HIGH 50: CPT | Performed by: HOSPITALIST

## 2020-11-27 PROCEDURE — 25010000002 PIPERACILLIN SOD-TAZOBACTAM PER 1 G: Performed by: INTERNAL MEDICINE

## 2020-11-27 PROCEDURE — 94799 UNLISTED PULMONARY SVC/PX: CPT

## 2020-11-27 PROCEDURE — 25010000002 METHYLPREDNISOLONE PER 125 MG: Performed by: INTERNAL MEDICINE

## 2020-11-27 PROCEDURE — 25010000002 HEPARIN (PORCINE) PER 1000 UNITS: Performed by: FAMILY MEDICINE

## 2020-11-27 PROCEDURE — 85027 COMPLETE CBC AUTOMATED: CPT | Performed by: INTERNAL MEDICINE

## 2020-11-27 RX ADMIN — SODIUM CHLORIDE, PRESERVATIVE FREE 10 ML: 5 INJECTION INTRAVENOUS at 22:13

## 2020-11-27 RX ADMIN — BUDESONIDE 0.5 MG: 0.5 INHALANT RESPIRATORY (INHALATION) at 08:14

## 2020-11-27 RX ADMIN — BUDESONIDE 0.5 MG: 0.5 INHALANT RESPIRATORY (INHALATION) at 19:46

## 2020-11-27 RX ADMIN — TAZOBACTAM SODIUM AND PIPERACILLIN SODIUM 4.5 G: 500; 4 INJECTION, SOLUTION INTRAVENOUS at 17:03

## 2020-11-27 RX ADMIN — TAZOBACTAM SODIUM AND PIPERACILLIN SODIUM 4.5 G: 500; 4 INJECTION, SOLUTION INTRAVENOUS at 09:22

## 2020-11-27 RX ADMIN — TOBRAMYCIN 300 MG: 300 SOLUTION ORAL at 08:14

## 2020-11-27 RX ADMIN — GUAIFENESIN 1200 MG: 600 TABLET, EXTENDED RELEASE ORAL at 09:22

## 2020-11-27 RX ADMIN — GUAIFENESIN 1200 MG: 600 TABLET, EXTENDED RELEASE ORAL at 20:19

## 2020-11-27 RX ADMIN — SODIUM CHLORIDE, PRESERVATIVE FREE 10 ML: 5 INJECTION INTRAVENOUS at 09:22

## 2020-11-27 RX ADMIN — IPRATROPIUM BROMIDE AND ALBUTEROL SULFATE 3 ML: 2.5; .5 SOLUTION RESPIRATORY (INHALATION) at 19:46

## 2020-11-27 RX ADMIN — IPRATROPIUM BROMIDE AND ALBUTEROL SULFATE 3 ML: 2.5; .5 SOLUTION RESPIRATORY (INHALATION) at 16:04

## 2020-11-27 RX ADMIN — ARFORMOTEROL TARTRATE 15 MCG: 15 SOLUTION RESPIRATORY (INHALATION) at 08:14

## 2020-11-27 RX ADMIN — TOBRAMYCIN 300 MG: 300 SOLUTION ORAL at 19:46

## 2020-11-27 RX ADMIN — HEPARIN SODIUM 5000 UNITS: 5000 INJECTION INTRAVENOUS; SUBCUTANEOUS at 20:19

## 2020-11-27 RX ADMIN — METHYLPREDNISOLONE SODIUM SUCCINATE 60 MG: 125 INJECTION, POWDER, FOR SOLUTION INTRAMUSCULAR; INTRAVENOUS at 14:23

## 2020-11-27 RX ADMIN — DOXYCYCLINE 100 MG: 100 INJECTION, POWDER, LYOPHILIZED, FOR SOLUTION INTRAVENOUS at 22:19

## 2020-11-27 RX ADMIN — IPRATROPIUM BROMIDE AND ALBUTEROL SULFATE 3 ML: 2.5; .5 SOLUTION RESPIRATORY (INHALATION) at 12:43

## 2020-11-27 RX ADMIN — ARFORMOTEROL TARTRATE 15 MCG: 15 SOLUTION RESPIRATORY (INHALATION) at 19:46

## 2020-11-27 RX ADMIN — IPRATROPIUM BROMIDE AND ALBUTEROL SULFATE 3 ML: 2.5; .5 SOLUTION RESPIRATORY (INHALATION) at 08:14

## 2020-11-27 RX ADMIN — HEPARIN SODIUM 5000 UNITS: 5000 INJECTION INTRAVENOUS; SUBCUTANEOUS at 09:22

## 2020-11-27 RX ADMIN — TAZOBACTAM SODIUM AND PIPERACILLIN SODIUM 4.5 G: 500; 4 INJECTION, SOLUTION INTRAVENOUS at 00:14

## 2020-11-27 RX ADMIN — DOXYCYCLINE 100 MG: 100 INJECTION, POWDER, LYOPHILIZED, FOR SOLUTION INTRAVENOUS at 14:23

## 2020-11-28 PROBLEM — T38.0X5A STEROID-INDUCED HYPERGLYCEMIA: Status: ACTIVE | Noted: 2020-11-28

## 2020-11-28 PROBLEM — R73.9 STEROID-INDUCED HYPERGLYCEMIA: Status: ACTIVE | Noted: 2020-11-28

## 2020-11-28 LAB
ANION GAP SERPL CALCULATED.3IONS-SCNC: 11 MMOL/L (ref 5–15)
BUN SERPL-MCNC: 17 MG/DL (ref 6–20)
BUN/CREAT SERPL: 15.3 (ref 7–25)
CALCIUM SPEC-SCNC: 9.1 MG/DL (ref 8.6–10.5)
CHLORIDE SERPL-SCNC: 99 MMOL/L (ref 98–107)
CO2 SERPL-SCNC: 24 MMOL/L (ref 22–29)
CREAT SERPL-MCNC: 1.11 MG/DL (ref 0.76–1.27)
DEPRECATED RDW RBC AUTO: 44.9 FL (ref 37–54)
ERYTHROCYTE [DISTWIDTH] IN BLOOD BY AUTOMATED COUNT: 12.9 % (ref 12.3–15.4)
GFR SERPL CREATININE-BSD FRML MDRD: 69 ML/MIN/1.73
GLUCOSE BLDC GLUCOMTR-MCNC: 117 MG/DL (ref 70–130)
GLUCOSE BLDC GLUCOMTR-MCNC: 144 MG/DL (ref 70–130)
GLUCOSE BLDC GLUCOMTR-MCNC: 351 MG/DL (ref 70–130)
GLUCOSE BLDC GLUCOMTR-MCNC: 96 MG/DL (ref 70–130)
GLUCOSE SERPL-MCNC: 375 MG/DL (ref 65–99)
HCT VFR BLD AUTO: 41.7 % (ref 37.5–51)
HGB BLD-MCNC: 13.7 G/DL (ref 13–17.7)
MCH RBC QN AUTO: 30.9 PG (ref 26.6–33)
MCHC RBC AUTO-ENTMCNC: 32.9 G/DL (ref 31.5–35.7)
MCV RBC AUTO: 93.9 FL (ref 79–97)
PLATELET # BLD AUTO: 225 10*3/MM3 (ref 140–450)
PMV BLD AUTO: 9.1 FL (ref 6–12)
POTASSIUM SERPL-SCNC: 4.3 MMOL/L (ref 3.5–5.2)
RBC # BLD AUTO: 4.44 10*6/MM3 (ref 4.14–5.8)
SODIUM SERPL-SCNC: 134 MMOL/L (ref 136–145)
WBC # BLD AUTO: 10.48 10*3/MM3 (ref 3.4–10.8)

## 2020-11-28 PROCEDURE — 85027 COMPLETE CBC AUTOMATED: CPT | Performed by: HOSPITALIST

## 2020-11-28 PROCEDURE — 63710000001 INSULIN LISPRO (HUMAN) PER 5 UNITS: Performed by: HOSPITALIST

## 2020-11-28 PROCEDURE — 25010000002 HEPARIN (PORCINE) PER 1000 UNITS: Performed by: FAMILY MEDICINE

## 2020-11-28 PROCEDURE — 99233 SBSQ HOSP IP/OBS HIGH 50: CPT | Performed by: HOSPITALIST

## 2020-11-28 PROCEDURE — 94799 UNLISTED PULMONARY SVC/PX: CPT

## 2020-11-28 PROCEDURE — 80048 BASIC METABOLIC PNL TOTAL CA: CPT | Performed by: HOSPITALIST

## 2020-11-28 PROCEDURE — 25010000002 PIPERACILLIN SOD-TAZOBACTAM PER 1 G: Performed by: INTERNAL MEDICINE

## 2020-11-28 PROCEDURE — 82962 GLUCOSE BLOOD TEST: CPT

## 2020-11-28 PROCEDURE — 25010000002 METHYLPREDNISOLONE PER 125 MG: Performed by: INTERNAL MEDICINE

## 2020-11-28 RX ORDER — NICOTINE POLACRILEX 4 MG
15 LOZENGE BUCCAL
Status: DISCONTINUED | OUTPATIENT
Start: 2020-11-28 | End: 2020-12-03 | Stop reason: HOSPADM

## 2020-11-28 RX ORDER — DEXTROSE MONOHYDRATE 25 G/50ML
25 INJECTION, SOLUTION INTRAVENOUS
Status: DISCONTINUED | OUTPATIENT
Start: 2020-11-28 | End: 2020-12-03 | Stop reason: HOSPADM

## 2020-11-28 RX ADMIN — DOXYCYCLINE 100 MG: 100 INJECTION, POWDER, LYOPHILIZED, FOR SOLUTION INTRAVENOUS at 22:34

## 2020-11-28 RX ADMIN — TAZOBACTAM SODIUM AND PIPERACILLIN SODIUM 4.5 G: 500; 4 INJECTION, SOLUTION INTRAVENOUS at 08:25

## 2020-11-28 RX ADMIN — GUAIFENESIN 1200 MG: 600 TABLET, EXTENDED RELEASE ORAL at 08:22

## 2020-11-28 RX ADMIN — SODIUM CHLORIDE, PRESERVATIVE FREE 10 ML: 5 INJECTION INTRAVENOUS at 08:23

## 2020-11-28 RX ADMIN — ACETAMINOPHEN 650 MG: 325 TABLET, FILM COATED ORAL at 17:51

## 2020-11-28 RX ADMIN — GUAIFENESIN 1200 MG: 600 TABLET, EXTENDED RELEASE ORAL at 20:20

## 2020-11-28 RX ADMIN — BUDESONIDE 0.5 MG: 0.5 INHALANT RESPIRATORY (INHALATION) at 10:30

## 2020-11-28 RX ADMIN — METHYLPREDNISOLONE SODIUM SUCCINATE 60 MG: 125 INJECTION, POWDER, FOR SOLUTION INTRAMUSCULAR; INTRAVENOUS at 11:50

## 2020-11-28 RX ADMIN — TAZOBACTAM SODIUM AND PIPERACILLIN SODIUM 4.5 G: 500; 4 INJECTION, SOLUTION INTRAVENOUS at 00:09

## 2020-11-28 RX ADMIN — IPRATROPIUM BROMIDE AND ALBUTEROL SULFATE 3 ML: 2.5; .5 SOLUTION RESPIRATORY (INHALATION) at 19:46

## 2020-11-28 RX ADMIN — TOBRAMYCIN 300 MG: 300 SOLUTION ORAL at 10:44

## 2020-11-28 RX ADMIN — TOBRAMYCIN 300 MG: 300 SOLUTION ORAL at 19:49

## 2020-11-28 RX ADMIN — TAZOBACTAM SODIUM AND PIPERACILLIN SODIUM 4.5 G: 500; 4 INJECTION, SOLUTION INTRAVENOUS at 17:50

## 2020-11-28 RX ADMIN — INSULIN LISPRO 6 UNITS: 100 INJECTION, SOLUTION INTRAVENOUS; SUBCUTANEOUS at 17:50

## 2020-11-28 RX ADMIN — IPRATROPIUM BROMIDE AND ALBUTEROL SULFATE 3 ML: 2.5; .5 SOLUTION RESPIRATORY (INHALATION) at 10:24

## 2020-11-28 RX ADMIN — IPRATROPIUM BROMIDE AND ALBUTEROL SULFATE 3 ML: 2.5; .5 SOLUTION RESPIRATORY (INHALATION) at 16:24

## 2020-11-28 RX ADMIN — ARFORMOTEROL TARTRATE 15 MCG: 15 SOLUTION RESPIRATORY (INHALATION) at 19:48

## 2020-11-28 RX ADMIN — ARFORMOTEROL TARTRATE 15 MCG: 15 SOLUTION RESPIRATORY (INHALATION) at 10:38

## 2020-11-28 RX ADMIN — DOXYCYCLINE 100 MG: 100 INJECTION, POWDER, LYOPHILIZED, FOR SOLUTION INTRAVENOUS at 11:52

## 2020-11-28 RX ADMIN — ACETAMINOPHEN 650 MG: 325 TABLET, FILM COATED ORAL at 22:33

## 2020-11-28 RX ADMIN — HEPARIN SODIUM 5000 UNITS: 5000 INJECTION INTRAVENOUS; SUBCUTANEOUS at 08:22

## 2020-11-28 RX ADMIN — SODIUM CHLORIDE, PRESERVATIVE FREE 10 ML: 5 INJECTION INTRAVENOUS at 20:21

## 2020-11-28 RX ADMIN — BUDESONIDE 0.5 MG: 0.5 INHALANT RESPIRATORY (INHALATION) at 19:48

## 2020-11-28 RX ADMIN — HEPARIN SODIUM 5000 UNITS: 5000 INJECTION INTRAVENOUS; SUBCUTANEOUS at 20:20

## 2020-11-29 LAB
ANION GAP SERPL CALCULATED.3IONS-SCNC: 10 MMOL/L (ref 5–15)
BACTERIA SPEC AEROBE CULT: NORMAL
BACTERIA SPEC AEROBE CULT: NORMAL
BUN SERPL-MCNC: 14 MG/DL (ref 6–20)
BUN/CREAT SERPL: 14 (ref 7–25)
CALCIUM SPEC-SCNC: 9.1 MG/DL (ref 8.6–10.5)
CHLORIDE SERPL-SCNC: 104 MMOL/L (ref 98–107)
CO2 SERPL-SCNC: 25 MMOL/L (ref 22–29)
CREAT SERPL-MCNC: 1 MG/DL (ref 0.76–1.27)
DEPRECATED RDW RBC AUTO: 44.6 FL (ref 37–54)
ERYTHROCYTE [DISTWIDTH] IN BLOOD BY AUTOMATED COUNT: 13 % (ref 12.3–15.4)
GFR SERPL CREATININE-BSD FRML MDRD: 78 ML/MIN/1.73
GLUCOSE BLDC GLUCOMTR-MCNC: 102 MG/DL (ref 70–130)
GLUCOSE BLDC GLUCOMTR-MCNC: 167 MG/DL (ref 70–130)
GLUCOSE BLDC GLUCOMTR-MCNC: 228 MG/DL (ref 70–130)
GLUCOSE BLDC GLUCOMTR-MCNC: 288 MG/DL (ref 70–130)
GLUCOSE SERPL-MCNC: 195 MG/DL (ref 65–99)
HCT VFR BLD AUTO: 42.8 % (ref 37.5–51)
HGB BLD-MCNC: 14.2 G/DL (ref 13–17.7)
MCH RBC QN AUTO: 31 PG (ref 26.6–33)
MCHC RBC AUTO-ENTMCNC: 33.2 G/DL (ref 31.5–35.7)
MCV RBC AUTO: 93.4 FL (ref 79–97)
PLATELET # BLD AUTO: 249 10*3/MM3 (ref 140–450)
PMV BLD AUTO: 9.1 FL (ref 6–12)
POTASSIUM SERPL-SCNC: 4 MMOL/L (ref 3.5–5.2)
RBC # BLD AUTO: 4.58 10*6/MM3 (ref 4.14–5.8)
SODIUM SERPL-SCNC: 139 MMOL/L (ref 136–145)
WBC # BLD AUTO: 11.34 10*3/MM3 (ref 3.4–10.8)

## 2020-11-29 PROCEDURE — 80048 BASIC METABOLIC PNL TOTAL CA: CPT | Performed by: HOSPITALIST

## 2020-11-29 PROCEDURE — 94799 UNLISTED PULMONARY SVC/PX: CPT

## 2020-11-29 PROCEDURE — 82962 GLUCOSE BLOOD TEST: CPT

## 2020-11-29 PROCEDURE — 25010000002 METHYLPREDNISOLONE PER 125 MG: Performed by: INTERNAL MEDICINE

## 2020-11-29 PROCEDURE — 25010000002 PIPERACILLIN SOD-TAZOBACTAM PER 1 G: Performed by: INTERNAL MEDICINE

## 2020-11-29 PROCEDURE — 63710000001 INSULIN LISPRO (HUMAN) PER 5 UNITS: Performed by: HOSPITALIST

## 2020-11-29 PROCEDURE — 25010000002 HEPARIN (PORCINE) PER 1000 UNITS: Performed by: FAMILY MEDICINE

## 2020-11-29 PROCEDURE — 85027 COMPLETE CBC AUTOMATED: CPT | Performed by: HOSPITALIST

## 2020-11-29 PROCEDURE — 99233 SBSQ HOSP IP/OBS HIGH 50: CPT | Performed by: HOSPITALIST

## 2020-11-29 RX ORDER — DOXYCYCLINE 100 MG/1
100 CAPSULE ORAL EVERY 12 HOURS SCHEDULED
Status: DISCONTINUED | OUTPATIENT
Start: 2020-11-29 | End: 2020-12-03 | Stop reason: HOSPADM

## 2020-11-29 RX ADMIN — HEPARIN SODIUM 5000 UNITS: 5000 INJECTION INTRAVENOUS; SUBCUTANEOUS at 20:14

## 2020-11-29 RX ADMIN — IPRATROPIUM BROMIDE AND ALBUTEROL SULFATE 3 ML: 2.5; .5 SOLUTION RESPIRATORY (INHALATION) at 12:51

## 2020-11-29 RX ADMIN — DOXYCYCLINE 100 MG: 100 CAPSULE ORAL at 20:14

## 2020-11-29 RX ADMIN — TAZOBACTAM SODIUM AND PIPERACILLIN SODIUM 4.5 G: 500; 4 INJECTION, SOLUTION INTRAVENOUS at 09:19

## 2020-11-29 RX ADMIN — METHYLPREDNISOLONE SODIUM SUCCINATE 60 MG: 125 INJECTION, POWDER, FOR SOLUTION INTRAMUSCULAR; INTRAVENOUS at 12:44

## 2020-11-29 RX ADMIN — TOBRAMYCIN 300 MG: 300 SOLUTION ORAL at 20:37

## 2020-11-29 RX ADMIN — GUAIFENESIN 1200 MG: 600 TABLET, EXTENDED RELEASE ORAL at 09:19

## 2020-11-29 RX ADMIN — TOBRAMYCIN 300 MG: 300 SOLUTION ORAL at 08:32

## 2020-11-29 RX ADMIN — HEPARIN SODIUM 5000 UNITS: 5000 INJECTION INTRAVENOUS; SUBCUTANEOUS at 09:19

## 2020-11-29 RX ADMIN — SODIUM CHLORIDE, PRESERVATIVE FREE 10 ML: 5 INJECTION INTRAVENOUS at 09:19

## 2020-11-29 RX ADMIN — ARFORMOTEROL TARTRATE 15 MCG: 15 SOLUTION RESPIRATORY (INHALATION) at 20:37

## 2020-11-29 RX ADMIN — BUDESONIDE 0.5 MG: 0.5 INHALANT RESPIRATORY (INHALATION) at 20:37

## 2020-11-29 RX ADMIN — ACETAMINOPHEN 650 MG: 325 TABLET, FILM COATED ORAL at 12:44

## 2020-11-29 RX ADMIN — IPRATROPIUM BROMIDE AND ALBUTEROL SULFATE 3 ML: 2.5; .5 SOLUTION RESPIRATORY (INHALATION) at 08:15

## 2020-11-29 RX ADMIN — TAZOBACTAM SODIUM AND PIPERACILLIN SODIUM 4.5 G: 500; 4 INJECTION, SOLUTION INTRAVENOUS at 00:35

## 2020-11-29 RX ADMIN — ARFORMOTEROL TARTRATE 15 MCG: 15 SOLUTION RESPIRATORY (INHALATION) at 08:27

## 2020-11-29 RX ADMIN — INSULIN LISPRO 4 UNITS: 100 INJECTION, SOLUTION INTRAVENOUS; SUBCUTANEOUS at 17:36

## 2020-11-29 RX ADMIN — BUDESONIDE 0.5 MG: 0.5 INHALANT RESPIRATORY (INHALATION) at 08:21

## 2020-11-29 RX ADMIN — INSULIN LISPRO 2 UNITS: 100 INJECTION, SOLUTION INTRAVENOUS; SUBCUTANEOUS at 12:45

## 2020-11-29 RX ADMIN — SODIUM CHLORIDE, PRESERVATIVE FREE 10 ML: 5 INJECTION INTRAVENOUS at 20:16

## 2020-11-29 RX ADMIN — DOXYCYCLINE 100 MG: 100 CAPSULE ORAL at 12:44

## 2020-11-29 RX ADMIN — TAZOBACTAM SODIUM AND PIPERACILLIN SODIUM 4.5 G: 500; 4 INJECTION, SOLUTION INTRAVENOUS at 16:19

## 2020-11-29 RX ADMIN — IPRATROPIUM BROMIDE AND ALBUTEROL SULFATE 3 ML: 2.5; .5 SOLUTION RESPIRATORY (INHALATION) at 17:40

## 2020-11-29 RX ADMIN — IPRATROPIUM BROMIDE AND ALBUTEROL SULFATE 3 ML: 2.5; .5 SOLUTION RESPIRATORY (INHALATION) at 20:37

## 2020-11-29 RX ADMIN — GUAIFENESIN 1200 MG: 600 TABLET, EXTENDED RELEASE ORAL at 20:14

## 2020-11-30 LAB
ANION GAP SERPL CALCULATED.3IONS-SCNC: 11 MMOL/L (ref 5–15)
BUN SERPL-MCNC: 16 MG/DL (ref 6–20)
BUN/CREAT SERPL: 13.6 (ref 7–25)
CALCIUM SPEC-SCNC: 9.3 MG/DL (ref 8.6–10.5)
CHLORIDE SERPL-SCNC: 99 MMOL/L (ref 98–107)
CO2 SERPL-SCNC: 27 MMOL/L (ref 22–29)
CREAT SERPL-MCNC: 1.18 MG/DL (ref 0.76–1.27)
DEPRECATED RDW RBC AUTO: 44.4 FL (ref 37–54)
ERYTHROCYTE [DISTWIDTH] IN BLOOD BY AUTOMATED COUNT: 12.9 % (ref 12.3–15.4)
GFR SERPL CREATININE-BSD FRML MDRD: 64 ML/MIN/1.73
GLUCOSE BLDC GLUCOMTR-MCNC: 115 MG/DL (ref 70–130)
GLUCOSE BLDC GLUCOMTR-MCNC: 162 MG/DL (ref 70–130)
GLUCOSE BLDC GLUCOMTR-MCNC: 247 MG/DL (ref 70–130)
GLUCOSE BLDC GLUCOMTR-MCNC: 271 MG/DL (ref 70–130)
GLUCOSE SERPL-MCNC: 176 MG/DL (ref 65–99)
HCT VFR BLD AUTO: 43.3 % (ref 37.5–51)
HGB BLD-MCNC: 14 G/DL (ref 13–17.7)
MCH RBC QN AUTO: 30.3 PG (ref 26.6–33)
MCHC RBC AUTO-ENTMCNC: 32.3 G/DL (ref 31.5–35.7)
MCV RBC AUTO: 93.7 FL (ref 79–97)
PLATELET # BLD AUTO: 261 10*3/MM3 (ref 140–450)
PMV BLD AUTO: 9.1 FL (ref 6–12)
POTASSIUM SERPL-SCNC: 3.7 MMOL/L (ref 3.5–5.2)
QT INTERVAL: 346 MS
QTC INTERVAL: 446 MS
RBC # BLD AUTO: 4.62 10*6/MM3 (ref 4.14–5.8)
SODIUM SERPL-SCNC: 137 MMOL/L (ref 136–145)
WBC # BLD AUTO: 10.75 10*3/MM3 (ref 3.4–10.8)

## 2020-11-30 PROCEDURE — 25010000002 HEPARIN (PORCINE) PER 1000 UNITS: Performed by: FAMILY MEDICINE

## 2020-11-30 PROCEDURE — 99233 SBSQ HOSP IP/OBS HIGH 50: CPT | Performed by: HOSPITALIST

## 2020-11-30 PROCEDURE — 85027 COMPLETE CBC AUTOMATED: CPT | Performed by: HOSPITALIST

## 2020-11-30 PROCEDURE — 25010000002 METHYLPREDNISOLONE PER 40 MG: Performed by: HOSPITALIST

## 2020-11-30 PROCEDURE — 82962 GLUCOSE BLOOD TEST: CPT

## 2020-11-30 PROCEDURE — 25010000002 PIPERACILLIN SOD-TAZOBACTAM PER 1 G: Performed by: INTERNAL MEDICINE

## 2020-11-30 PROCEDURE — 99233 SBSQ HOSP IP/OBS HIGH 50: CPT | Performed by: INTERNAL MEDICINE

## 2020-11-30 PROCEDURE — 94799 UNLISTED PULMONARY SVC/PX: CPT

## 2020-11-30 PROCEDURE — 80048 BASIC METABOLIC PNL TOTAL CA: CPT | Performed by: HOSPITALIST

## 2020-11-30 PROCEDURE — 63710000001 INSULIN LISPRO (HUMAN) PER 5 UNITS: Performed by: HOSPITALIST

## 2020-11-30 RX ORDER — METHYLPREDNISOLONE SODIUM SUCCINATE 40 MG/ML
40 INJECTION, POWDER, LYOPHILIZED, FOR SOLUTION INTRAMUSCULAR; INTRAVENOUS EVERY 24 HOURS
Status: DISCONTINUED | OUTPATIENT
Start: 2020-11-30 | End: 2020-11-30

## 2020-11-30 RX ORDER — PREDNISONE 20 MG/1
20 TABLET ORAL
Status: DISCONTINUED | OUTPATIENT
Start: 2020-12-01 | End: 2020-12-03 | Stop reason: HOSPADM

## 2020-11-30 RX ADMIN — NYSTATIN 500000 UNITS: 100000 SUSPENSION ORAL at 17:49

## 2020-11-30 RX ADMIN — DOXYCYCLINE 100 MG: 100 CAPSULE ORAL at 20:48

## 2020-11-30 RX ADMIN — TAZOBACTAM SODIUM AND PIPERACILLIN SODIUM 4.5 G: 500; 4 INJECTION, SOLUTION INTRAVENOUS at 08:51

## 2020-11-30 RX ADMIN — HEPARIN SODIUM 5000 UNITS: 5000 INJECTION INTRAVENOUS; SUBCUTANEOUS at 20:49

## 2020-11-30 RX ADMIN — DOXYCYCLINE 100 MG: 100 CAPSULE ORAL at 08:51

## 2020-11-30 RX ADMIN — BUDESONIDE 0.5 MG: 0.5 INHALANT RESPIRATORY (INHALATION) at 08:05

## 2020-11-30 RX ADMIN — IPRATROPIUM BROMIDE AND ALBUTEROL SULFATE 3 ML: 2.5; .5 SOLUTION RESPIRATORY (INHALATION) at 12:56

## 2020-11-30 RX ADMIN — SODIUM CHLORIDE, PRESERVATIVE FREE 10 ML: 5 INJECTION INTRAVENOUS at 08:51

## 2020-11-30 RX ADMIN — ARFORMOTEROL TARTRATE 15 MCG: 15 SOLUTION RESPIRATORY (INHALATION) at 21:29

## 2020-11-30 RX ADMIN — BUDESONIDE 0.5 MG: 0.5 INHALANT RESPIRATORY (INHALATION) at 21:29

## 2020-11-30 RX ADMIN — INSULIN LISPRO 2 UNITS: 100 INJECTION, SOLUTION INTRAVENOUS; SUBCUTANEOUS at 12:10

## 2020-11-30 RX ADMIN — TOBRAMYCIN 300 MG: 300 SOLUTION ORAL at 21:29

## 2020-11-30 RX ADMIN — INSULIN LISPRO 4 UNITS: 100 INJECTION, SOLUTION INTRAVENOUS; SUBCUTANEOUS at 17:49

## 2020-11-30 RX ADMIN — DORNASE ALFA 2.5 MG: 1 SOLUTION RESPIRATORY (INHALATION) at 15:45

## 2020-11-30 RX ADMIN — ARFORMOTEROL TARTRATE 15 MCG: 15 SOLUTION RESPIRATORY (INHALATION) at 08:05

## 2020-11-30 RX ADMIN — NYSTATIN 500000 UNITS: 100000 SUSPENSION ORAL at 20:49

## 2020-11-30 RX ADMIN — DORNASE ALFA 2.5 MG: 1 SOLUTION RESPIRATORY (INHALATION) at 21:29

## 2020-11-30 RX ADMIN — TOBRAMYCIN 300 MG: 300 SOLUTION ORAL at 08:21

## 2020-11-30 RX ADMIN — SODIUM CHLORIDE, PRESERVATIVE FREE 10 ML: 5 INJECTION INTRAVENOUS at 20:49

## 2020-11-30 RX ADMIN — IPRATROPIUM BROMIDE AND ALBUTEROL SULFATE 3 ML: 2.5; .5 SOLUTION RESPIRATORY (INHALATION) at 15:44

## 2020-11-30 RX ADMIN — GUAIFENESIN 1200 MG: 600 TABLET, EXTENDED RELEASE ORAL at 08:51

## 2020-11-30 RX ADMIN — IPRATROPIUM BROMIDE AND ALBUTEROL SULFATE 3 ML: 2.5; .5 SOLUTION RESPIRATORY (INHALATION) at 08:05

## 2020-11-30 RX ADMIN — TAZOBACTAM SODIUM AND PIPERACILLIN SODIUM 4.5 G: 500; 4 INJECTION, SOLUTION INTRAVENOUS at 16:51

## 2020-11-30 RX ADMIN — METHYLPREDNISOLONE SODIUM SUCCINATE 40 MG: 40 INJECTION, POWDER, FOR SOLUTION INTRAMUSCULAR; INTRAVENOUS at 12:11

## 2020-11-30 RX ADMIN — TAZOBACTAM SODIUM AND PIPERACILLIN SODIUM 4.5 G: 500; 4 INJECTION, SOLUTION INTRAVENOUS at 00:12

## 2020-11-30 RX ADMIN — GUAIFENESIN 1200 MG: 600 TABLET, EXTENDED RELEASE ORAL at 20:48

## 2020-11-30 RX ADMIN — IPRATROPIUM BROMIDE AND ALBUTEROL SULFATE 3 ML: 2.5; .5 SOLUTION RESPIRATORY (INHALATION) at 21:29

## 2020-11-30 RX ADMIN — NYSTATIN 500000 UNITS: 100000 SUSPENSION ORAL at 14:28

## 2020-11-30 RX ADMIN — HEPARIN SODIUM 5000 UNITS: 5000 INJECTION INTRAVENOUS; SUBCUTANEOUS at 08:51

## 2020-12-01 LAB
GLUCOSE BLDC GLUCOMTR-MCNC: 109 MG/DL (ref 70–130)
GLUCOSE BLDC GLUCOMTR-MCNC: 147 MG/DL (ref 70–130)
GLUCOSE BLDC GLUCOMTR-MCNC: 257 MG/DL (ref 70–130)
GLUCOSE BLDC GLUCOMTR-MCNC: 306 MG/DL (ref 70–130)

## 2020-12-01 PROCEDURE — 99232 SBSQ HOSP IP/OBS MODERATE 35: CPT | Performed by: PHYSICIAN ASSISTANT

## 2020-12-01 PROCEDURE — 99233 SBSQ HOSP IP/OBS HIGH 50: CPT | Performed by: INTERNAL MEDICINE

## 2020-12-01 PROCEDURE — 25010000002 PIPERACILLIN SOD-TAZOBACTAM PER 1 G: Performed by: PHYSICIAN ASSISTANT

## 2020-12-01 PROCEDURE — 25010000002 PIPERACILLIN SOD-TAZOBACTAM PER 1 G: Performed by: INTERNAL MEDICINE

## 2020-12-01 PROCEDURE — 94799 UNLISTED PULMONARY SVC/PX: CPT

## 2020-12-01 PROCEDURE — 63710000001 INSULIN LISPRO (HUMAN) PER 5 UNITS: Performed by: HOSPITALIST

## 2020-12-01 PROCEDURE — 82962 GLUCOSE BLOOD TEST: CPT

## 2020-12-01 PROCEDURE — 63710000001 PREDNISONE PER 1 MG: Performed by: INTERNAL MEDICINE

## 2020-12-01 PROCEDURE — 25010000002 HEPARIN (PORCINE) PER 1000 UNITS: Performed by: FAMILY MEDICINE

## 2020-12-01 RX ORDER — TOBRAMYCIN INHALATION SOLUTION 300 MG/5ML
300 INHALANT RESPIRATORY (INHALATION)
Status: DISCONTINUED | OUTPATIENT
Start: 2020-12-01 | End: 2020-12-03 | Stop reason: HOSPADM

## 2020-12-01 RX ADMIN — GUAIFENESIN 1200 MG: 600 TABLET, EXTENDED RELEASE ORAL at 08:30

## 2020-12-01 RX ADMIN — TOBRAMYCIN 300 MG: 300 SOLUTION ORAL at 21:41

## 2020-12-01 RX ADMIN — HEPARIN SODIUM 5000 UNITS: 5000 INJECTION INTRAVENOUS; SUBCUTANEOUS at 08:31

## 2020-12-01 RX ADMIN — TAZOBACTAM SODIUM AND PIPERACILLIN SODIUM 4.5 G: 500; 4 INJECTION, SOLUTION INTRAVENOUS at 18:02

## 2020-12-01 RX ADMIN — DOXYCYCLINE 100 MG: 100 CAPSULE ORAL at 08:30

## 2020-12-01 RX ADMIN — DORNASE ALFA 2.5 MG: 1 SOLUTION RESPIRATORY (INHALATION) at 21:41

## 2020-12-01 RX ADMIN — DOXYCYCLINE 100 MG: 100 CAPSULE ORAL at 22:10

## 2020-12-01 RX ADMIN — TAZOBACTAM SODIUM AND PIPERACILLIN SODIUM 4.5 G: 500; 4 INJECTION, SOLUTION INTRAVENOUS at 00:13

## 2020-12-01 RX ADMIN — PREDNISONE 20 MG: 20 TABLET ORAL at 08:30

## 2020-12-01 RX ADMIN — IPRATROPIUM BROMIDE AND ALBUTEROL SULFATE 3 ML: 2.5; .5 SOLUTION RESPIRATORY (INHALATION) at 07:58

## 2020-12-01 RX ADMIN — BUDESONIDE 0.5 MG: 0.5 INHALANT RESPIRATORY (INHALATION) at 07:58

## 2020-12-01 RX ADMIN — DORNASE ALFA 2.5 MG: 1 SOLUTION RESPIRATORY (INHALATION) at 07:58

## 2020-12-01 RX ADMIN — GUAIFENESIN 1200 MG: 600 TABLET, EXTENDED RELEASE ORAL at 22:11

## 2020-12-01 RX ADMIN — BUDESONIDE 0.5 MG: 0.5 INHALANT RESPIRATORY (INHALATION) at 21:41

## 2020-12-01 RX ADMIN — HEPARIN SODIUM 5000 UNITS: 5000 INJECTION INTRAVENOUS; SUBCUTANEOUS at 22:10

## 2020-12-01 RX ADMIN — ARFORMOTEROL TARTRATE 15 MCG: 15 SOLUTION RESPIRATORY (INHALATION) at 07:58

## 2020-12-01 RX ADMIN — IPRATROPIUM BROMIDE AND ALBUTEROL SULFATE 3 ML: 2.5; .5 SOLUTION RESPIRATORY (INHALATION) at 14:30

## 2020-12-01 RX ADMIN — INSULIN LISPRO 6 UNITS: 100 INJECTION, SOLUTION INTRAVENOUS; SUBCUTANEOUS at 12:09

## 2020-12-01 RX ADMIN — TAZOBACTAM SODIUM AND PIPERACILLIN SODIUM 4.5 G: 500; 4 INJECTION, SOLUTION INTRAVENOUS at 08:30

## 2020-12-01 RX ADMIN — TOBRAMYCIN 300 MG: 300 SOLUTION ORAL at 07:58

## 2020-12-01 RX ADMIN — ARFORMOTEROL TARTRATE 15 MCG: 15 SOLUTION RESPIRATORY (INHALATION) at 21:41

## 2020-12-01 RX ADMIN — IPRATROPIUM BROMIDE AND ALBUTEROL SULFATE 3 ML: 2.5; .5 SOLUTION RESPIRATORY (INHALATION) at 21:41

## 2020-12-01 RX ADMIN — SODIUM CHLORIDE, PRESERVATIVE FREE 10 ML: 5 INJECTION INTRAVENOUS at 08:31

## 2020-12-01 RX ADMIN — SODIUM CHLORIDE, PRESERVATIVE FREE 10 ML: 5 INJECTION INTRAVENOUS at 22:15

## 2020-12-01 NOTE — PROGRESS NOTES
"PULMONARY PROGRESS NOTE    Patient Care Team:  Obinna Arellano MD as PCP - General (Adolescent Medicine)    Reason for Consult     COPD exacerbation  Alpha-1 antitrypsin disease  Recurrent Pseudomonas pneumonia  History of tension pneumothorax, endobronchial valve placement and removal    Subjective     54-year-old gentleman with a known history of recurrent Pseudomonas pneumonia, endobronchial valve replacement and removal for a pneumothorax, chronic respiratory failure and alpha-1 antitrypsin disease with severe stage IV emphysema hospitalized November 24 for worsening shortness of air and hypoxia.  He receives Prolastin for his alpha-1 deficiency.  He chronically uses 3 L nasal cannula.  He quit smoking in 2016 after 37 pack years  Zosyn and tobramycin nebulizers initiated as well as doxycycline.  He was placed on IV steroids, started on Brovana and budesonide nebulized twice daily and duo nebs every 4 hours.    Interval History:     Slept better last night.  He was able to lay flat.  Less productive cough.  Less chest tightness.  Currently on 3 L nasal cannula with a saturation of 97%    Review of Systems:     ROS negative except for: No nausea vomiting or diarrhea.  No skin rash.  Denies chest pain or edema      Objective     Vital Signs  Blood pressure 122/76, pulse 76, temperature 98.3 °F (36.8 °C), temperature source Oral, resp. rate 18, height 172.7 cm (68\"), weight 59 kg (130 lb), SpO2 97 %.    Physical Exam:  GENERAL: Thin middle-aged gentleman in no acute distress  HEENT: Conjunctiva pink.  No jaundice.  Oral mucosa moist.  NECK: Trachea midline, no JVD  CHEST: Hyperinflated, hyperresonant to percussion, large tattoo of an Eagle on his back.  Diminished chest excursion.  Breath sounds are bilateral with prolonged expiration, no wheezing today, end expiratory rhonchi improved  HEART: S1, S2 auscultated, increased rate, regular, no murmur  ABDOMEN: Flat, bowel sounds present, soft  EXTREMITIS: No " pretibial edema, nailbeds pink  NEURO: Alert and oriented, conversant, speech fluent, generalized decreased musculature       Results Review:    Lab Results (last 24 hours)     Procedure Component Value Units Date/Time    POC Glucose Once [[id number]]  (Normal) Collected: 12/01/20 0732    Specimen: Blood Updated: 12/01/20 0734     Glucose 109 mg/dL     POC Glucose Once [[id number]]  (Abnormal) Collected: 11/30/20 1953    Specimen: Blood Updated: 11/30/20 1955     Glucose 247 mg/dL     POC Glucose Once [[id number]]  (Abnormal) Collected: 11/30/20 1713    Specimen: Blood Updated: 11/30/20 1723     Glucose 271 mg/dL     POC Glucose Once [[id number]]  (Abnormal) Collected: 11/30/20 1152    Specimen: Blood Updated: 11/30/20 1208     Glucose 162 mg/dL         Imaging Results (Last 24 Hours)     ** No results found for the last 24 hours. **         Specimen Information: Bronchus; Tissue        Tissue Culture   Lab   Light growth (2+) Pseudomonas aeruginosaAbnormal    [facility]             Gram Stain   Lab   Rare (1+) WBCs seen  [facility]      No organisms seen  [facility]            Susceptibility     Pseudomonas aeruginosa     [facility]     Cefepime <=1 ug/ml Susceptible     Ceftazidime 2 ug/ml Susceptible     Ciprofloxacin <=0.25 ug/ml Susceptible     Gentamicin <=1 ug/ml Susceptible     Levofloxacin <=0.12 ug/ml Susceptible     Piperacillin + Tazobactam <=4 ug/ml Susceptible                  AFB Culture Mycobacterium avium complexAbnormal     Isolated from broth culture     Identified by [facility]     See scanned report              CT scan of the chest November 24, 2020  FINDINGS: The thyroid is homogeneous in attenuation. No bulky  mediastinal adenopathy. Central airways are patent. Esophagus in normal  course and caliber. Nonaneurysmal thoracic aorta. Cardiac size unchanged  and within normal limits. Extended lung windows demonstrate severe  emphysematous change of the lungs with bronchial valves left lower lobe  as seen  on prior comparison with continued atelectasis and scarring  adjacent as well as soft tissue nodularity in the left lower lung  similar to prior without new consolidation, pneumothorax or pleural  effusion development from prior comparison 09/24/2020. Degenerative  changes of the thoracic spine without aggressive osseous lesion. No soft  tissue body wall findings of the chest demonstrating acuity or  abnormality of the partially visualized upper abdominal structures.     IMPRESSION:  Grossly stable appearance of hyperinflated lungs, advanced  chronic obstructive pulmonary disease and emphysematous involvement with  bronchial valves left lower lung similar in appearance with adjacent  atelectasis and scarring as well as minimal nodularity from 09/24/2020  comparison without new consolidation, pneumothorax or pleural effusion  development.     D:  11/24/2020    I personally reviewed his CT scan and concur with above report        arformoterol, 15 mcg, Nebulization, BID - RT  budesonide, 0.5 mg, Nebulization, BID - RT  dornase alpha, 2.5 mg, Inhalation, BID - RT  doxycycline, 100 mg, Oral, Q12H  fluticasone, 2 spray, Nasal, Daily  guaiFENesin, 1,200 mg, Oral, Q12H  heparin (porcine), 5,000 Units, Subcutaneous, Q12H  insulin lispro, 0-7 Units, Subcutaneous, TID AC  ipratropium-albuterol, 3 mL, Nebulization, 4x Daily - RT  pharmacy consult - MTM, , Does not apply, Daily  piperacillin-tazobactam, 4.5 g, Intravenous, Q8H  predniSONE, 20 mg, Oral, Daily With Breakfast  sodium chloride, 10 mL, Intravenous, Q12H          Pneumonia of left lower lobe due to infectious organism    Alpha-1-antitrypsin deficiency (on replacement)    Dependence on supplemental oxygen    Stage IV, very severe, emphysema    Acute on chronic respiratory failure with hypoxia (CMS/HCC)    Severe malnutrition (CMS/HCC)    Steroid-induced hyperglycemia      Assessment/Plan     #1 stage IV emphysema secondary to alpha-1 antitrypsin deficiency and previous  tobacco use with acute exacerbation.  Baseline FEV1 1.11L32%, DlCO only 35%, %. Today wheezing improved.  He is currently receiving Prolastin.  On admission he was requiring 5 to 6 L nasal cannula for marginal oxygenation.  Today he is on 3 L with a saturation of 97%.  He is symptomatically short of air with exertion but it is improving.  He did undergo endobronchial valve placement in 2019.  He developed recurrent infections and several of the valves were removed October 30.    #2  Recurrent pneumothorax requiring chest tube for expansion.  Chest tube was removed November 18.       #3 recurrent Pseudomonas aeruginosa respiratory infections, left lower lobe pneumonia he is receiving Zosyn and nebulized tobramycin.  He failed outpatient oral Levaquin.     #4 Mycobacterium avium complex positive bronchial washing from October 30, September 11 of this year and there was one specimen from 2019 that is positive for MAC.  He has always been smear negative.  However he does have the nodular changes in the left lower lobe that has persisted and is actually a little worse compared to 2019 but unchanged over the last 6 weeks.  Discussed with Dr. Castellano and we should probably consider treating this organism because of his severe underlying disease and persistent nodular infiltrate.    ,prednisone 20 mg daily x7 days then 10 mg daily x7 days and stop   arrange Pulmicort and Brovana nebulized twice daily for his maintenance therapy instead of Symbicort; my office notified his insurance company and this has been approved  Dornase alpha nebulized twice daily for 48 hours  Continue DuoNeb, but decrease to 3 times daily  Complete 10 days of Zosyn and 30 days of YOLIE neb, this should be completed by the end of this week  Refer to our outpatient pulmonary rehabilitation  I have requested that his AFB culture have a sensitivity panel run and will anticipate initiating therapy for MAC  Typically it is azithromycin 500 mg 3 times  a week, Rifampin 600 mg 3 times per week, ethambutol 25 mg/kg 3 times a week  He does need a baseline eye examination for ethambutol toxicity and normal liver function tests  Resume Prolastin post discharge    It is likely time for him to undergo a transplant evaluation.  I did discuss this with Dr. Castellano but have not yet told the patient    Anticipate discharge Thursday    Aileen Casey MD  12/01/20  10:22 EST      Time: 35min

## 2020-12-01 NOTE — PROGRESS NOTES
[facility] Medicine Services  PROGRESS NOTE    Patient Name: Balwinder Willams  : 1965  MRN: [id number]    Date of Admission: 2020  Primary Care Physician: Obinna Arellano MD    Subjective   Subjective     CC:  F/u hypoxic respiratory failure, PNA    HPI:  Mr. Willams reports he feels well today.  He states he is chronically short of breath but this is better since admission.    Review of Systems  Gen- No fevers, chills  CV- No chest pain, palpitations  Resp- positive for shortness of breath  GI- No N/V/D, abd pain       Objective   Objective     Vital Signs:   Temp:  [97.8 °F (36.6 °C)-98.3 °F (36.8 °C)] 98.3 °F (36.8 °C)  Heart Rate:  [] 100  Resp:  [18-20] 18  BP: ()/(76-81) 122/76        Physical Exam:  Constitutional: No acute distress, awake, alert  HENT: NCAT, mucous membranes moist  Respiratory: Clear to auscultation bilaterally, respiratory effort normal   Cardiovascular: RRR, no murmurs, rubs, or gallops  Gastrointestinal: Positive bowel sounds, soft, nontender, nondistended  Musculoskeletal: No bilateral ankle edema  Psychiatric: Appropriate affect, cooperative  Neurologic: Oriented x 3, strength symmetric in all extremities, Cranial Nerves grossly intact to confrontation, speech clear  Skin: No rashes      Results Reviewed:  Results from last 7 days   Lab Units 20  0250   WBC 10*3/mm3 10.75 11.34* 10.48   HEMOGLOBIN g/dL 14.0 14.2 13.7   HEMATOCRIT % 43.3 42.8 41.7   PLATELETS 10*3/mm3 261 249 225     Results from last 7 days   Lab Units 205 20  0250   SODIUM mmol/L 137 139 134*   POTASSIUM mmol/L 3.7 4.0 4.3   CHLORIDE mmol/L 99 104 99   CO2 mmol/L 27.0 25.0 24.0   BUN mg/dL 16 14 17   CREATININE mg/dL 1.18 1.00 1.11   GLUCOSE mg/dL 176* 195* 375*   CALCIUM mg/dL 9.3 9.1 9.1     Estimated Creatinine Clearance: 59.7 mL/min (by C-G formula based on SCr of 1.18  mg/dL).    Microbiology Results Abnormal     Procedure Component Value - Date/Time    Blood Culture - Blood, Arm, Right [[id number]] Collected: 11/24/20 1131    Lab Status: Final result Specimen: Blood from Arm, Right Updated: 11/29/20 1245     Blood Culture No growth at 5 days    Blood Culture - Blood, Arm, Left [[id number]] Collected: 11/24/20 1131    Lab Status: Final result Specimen: Blood from Arm, Left Updated: 11/29/20 1245     Blood Culture No growth at 5 days    S. Pneumo Ag Urine or CSF - Urine, Urine, Clean Catch [[id number]]  (Normal) Collected: 11/24/20 2007    Lab Status: Final result Specimen: Urine, Clean Catch Updated: 11/24/20 2357     Strep Pneumo Ag Negative    Legionella Antigen, Urine - Urine, Urine, Clean Catch [[id number]]  (Normal) Collected: 11/24/20 2007    Lab Status: Final result Specimen: Urine, Clean Catch Updated: 11/24/20 2357     LEGIONELLA ANTIGEN, URINE Negative    MRSA Screen, PCR (Inpatient) - Swab, Nares [[id number]]  (Normal) Collected: 11/24/20 1723    Lab Status: Final result Specimen: Swab from Nares Updated: 11/24/20 1848     MRSA PCR Negative    Narrative:      MRSA Negative    Respiratory Panel PCR w/COVID-19(SARS-CoV-2) BARBI/HELEN/FREDRICK/PAD/COR/MAD/YVONNE In-House, NP Swab in UTM/VTM, 3-4 HR TAT - Swab, Nasopharynx [[id number]]  (Normal) Collected: 11/24/20 1229    Lab Status: Final result Specimen: Swab from Nasopharynx Updated: 11/24/20 1333     ADENOVIRUS, PCR Not Detected     Coronavirus 229E Not Detected     Coronavirus HKU1 Not Detected     Coronavirus NL63 Not Detected     Coronavirus OC43 Not Detected     COVID19 Not Detected     Human Metapneumovirus Not Detected     Human Rhinovirus/Enterovirus Not Detected     Influenza A PCR Not Detected     Influenza A H1 Not Detected     Influenza A H1 2009 PCR Not Detected     Influenza A H3 Not Detected     Influenza B PCR Not Detected     Parainfluenza Virus 1 Not Detected     Parainfluenza Virus 2 Not Detected     Parainfluenza  Virus 3 Not Detected     Parainfluenza Virus 4 Not Detected     RSV, PCR Not Detected     Bordetella pertussis pcr Not Detected     Bordetella parapertussis PCR Not Detected     Chlamydophila pneumoniae PCR Not Detected     Mycoplasma pneumo by PCR Not Detected    Narrative:      Fact sheet for providers: https://docs.[employer]/wp-content/uploads/JZZ2542-4206-XU6.1-EUA-Provider-Fact-Sheet-3.pdf    Fact sheet for patients: https://docs.[employer]/wp-content/uploads/EKE6015-2247-MF5.1-EUA-Patient-Fact-Sheet-1.pdf          Imaging Results (Last 24 Hours)     ** No results found for the last 24 hours. **          Results for orders placed during the hospital encounter of 07/17/19   Adult Transthoracic Echo Complete W/ Cont if Necessary Per Protocol    Narrative · Left ventricular systolic function is normal. Estimated EF = 65%.  · No significant valvular abnormalities.          I have reviewed the medications:  Scheduled Meds:[START ON 12/2/2020] Alpha1-Proteinase Inhibitor, 4,252 mg, Intravenous, Once  arformoterol, 15 mcg, Nebulization, BID - RT  budesonide, 0.5 mg, Nebulization, BID - RT  dornase alpha, 2.5 mg, Inhalation, BID - RT  doxycycline, 100 mg, Oral, Q12H  fluticasone, 2 spray, Nasal, Daily  guaiFENesin, 1,200 mg, Oral, Q12H  heparin (porcine), 5,000 Units, Subcutaneous, Q12H  insulin lispro, 0-7 Units, Subcutaneous, TID AC  ipratropium-albuterol, 3 mL, Nebulization, 4x Daily - RT  pharmacy consult - MTM, , Does not apply, Daily  piperacillin-tazobactam, 4.5 g, Intravenous, Q8H  predniSONE, 20 mg, Oral, Daily With Breakfast  sodium chloride, 10 mL, Intravenous, Q12H  tobramycin PF, 300 mg, Nebulization, BID - RT      Continuous Infusions:   PRN Meds:.•  acetaminophen **OR** acetaminophen **OR** acetaminophen  •  bisacodyl  •  bisacodyl  •  dextrose  •  dextrose  •  glucagon (human recombinant)  •  ondansetron **OR** ondansetron  •  sodium chloride  •  sodium chloride    Assessment/Plan   Assessment &  Plan     Active Hospital Problems    Diagnosis  POA   • **Pneumonia of left lower lobe due to infectious organism [J18.9]  Yes   • Steroid-induced hyperglycemia [R73.9, T38.0X5A]  No   • Severe malnutrition (CMS/HCC) [E43]  Yes   • Acute on chronic respiratory failure with hypoxia (CMS/HCC) [J96.21]  Yes   • Stage IV, very severe, emphysema [J44.9]  Yes   • Alpha-1-antitrypsin deficiency (on replacement) [E88.01]  Yes   • Dependence on supplemental oxygen [Z99.81]  Not Applicable      Resolved Hospital Problems   No resolved problems to display.        Brief Hospital Course to date:  Balwinder Willams is a 54 y.o. male with hx of severe emphysema related to alpha-1 antitrypsin deficiency with chronic hypoxic respiratory failure (3-4 liters at baseline) who presents due to SOA. He has had recent admissions for spontaneous pneumothorax and Pseudomonas pneumonia. He is followed closely by Pulmonology. Since his recent discharge on 11/18/2020, he followed up with Pulmonology in the office on 11/23, but since then he has had worsening SOA and hypoxia. CXR showed no pneumothorax and chronic emphysematous changes, and CT chest showed possible LLL infiltrate. Admitted to hospitalist service with Pulmonary consultation.      Acute on chronic hypoxic respiratory failure  LLL pneumonia  Hx of Pseudomonas pneumonia  Mycobacterium avium complex positive bronchial washing  Alpha-1 antitrypsin deficiency  Severe emphysema/COPD exacerbation  --COVID-19/respiratory PCR panel negative, blood cultures NGTD. Negative urine Strep and Legionella.  --Continue on Zosyn x 10 days (should complete on 12/3) and Tobramycin nebs x 30 days total (should complete by the end of the week as he was on this prior to admission).   --s/p IV Solumedrol. Transition to prednisone today and taper: 20 mg daily x 7 days, 10 mg daily x 7 days, then stop.  --Pulmonary following. Discussed with Dr. Casey today.  --Bronchoscopy 10/30: cultures with  Pseudomonas. Fungal culture still pending. AFB culture with Mycobacterium avium complex, which he also grew 9/11/20. Pulmonary is running a sensitivity panel and may initiate therapy for this. Pulm anticipates initiating therapy for MAC.  Typically it is azithromycin 500 mg 3 times a week, Rifampin 600 mg 3 times per week, ethambutol 25 mg/kg 3 times a week.  He does need a baseline eye examination for ethambutol toxicity and normal liver function tests  --Mobilize, continue incentive spirometry.  -transplant surgery referral at discharge    Steroid induced hyperglycemia  --Continue qAC/HS finger sticks and low dose SSI.        DVT Prophylaxis:  GERRY      Disposition: I expect the patient to be discharged after abx completed.    CODE STATUS:   Code Status and Medical Interventions:   Ordered at: 11/24/20 1423     Code Status:    CPR     Medical Interventions (Level of Support Prior to Arrest):    Full       Kassie Orlando PA-C  12/01/20

## 2020-12-01 NOTE — PROGRESS NOTES
NN spoke with patient at BS.  Patient alert and able to answer questions appropriately.  O2 at L, home baseline 2-3L HS.  Patient reports that his SOB is somewhat better.  He states that he has already been on the phone with pharmacy to check the price of nebulized medications.  Deep breathing exercises encouraged.  No new questions or concerns at this time.  NN continues to follow.      Per current [employer] Standards, please consider: No LAMA in place, Outpatient PFT, Pulmonary Rehab as appropriate, Patient would like to complete ACP documentation (done)

## 2020-12-02 DIAGNOSIS — J43.9 PULMONARY EMPHYSEMA, UNSPECIFIED EMPHYSEMA TYPE (HCC): Primary | ICD-10-CM

## 2020-12-02 LAB
GLUCOSE BLDC GLUCOMTR-MCNC: 107 MG/DL (ref 70–130)
GLUCOSE BLDC GLUCOMTR-MCNC: 197 MG/DL (ref 70–130)
GLUCOSE BLDC GLUCOMTR-MCNC: 213 MG/DL (ref 70–130)
GLUCOSE BLDC GLUCOMTR-MCNC: 248 MG/DL (ref 70–130)

## 2020-12-02 PROCEDURE — 94799 UNLISTED PULMONARY SVC/PX: CPT

## 2020-12-02 PROCEDURE — 63710000001 PREDNISONE PER 1 MG: Performed by: INTERNAL MEDICINE

## 2020-12-02 PROCEDURE — 99232 SBSQ HOSP IP/OBS MODERATE 35: CPT | Performed by: INTERNAL MEDICINE

## 2020-12-02 PROCEDURE — 25010000002 PIPERACILLIN SOD-TAZOBACTAM PER 1 G: Performed by: PHYSICIAN ASSISTANT

## 2020-12-02 PROCEDURE — 82962 GLUCOSE BLOOD TEST: CPT

## 2020-12-02 PROCEDURE — 25010000002 ALPHA1-PROTEINASE INHIBITOR 1000 MG/20ML SOLUTION: Performed by: INTERNAL MEDICINE

## 2020-12-02 PROCEDURE — 63710000001 INSULIN LISPRO (HUMAN) PER 5 UNITS: Performed by: HOSPITALIST

## 2020-12-02 PROCEDURE — 25010000002 HEPARIN (PORCINE) PER 1000 UNITS: Performed by: FAMILY MEDICINE

## 2020-12-02 PROCEDURE — 99232 SBSQ HOSP IP/OBS MODERATE 35: CPT | Performed by: PHYSICIAN ASSISTANT

## 2020-12-02 RX ORDER — BUDESONIDE 0.5 MG/2ML
0.5 INHALANT ORAL
Qty: 60 ML | Refills: 0 | Status: SHIPPED | OUTPATIENT
Start: 2020-12-02 | End: 2021-03-16

## 2020-12-02 RX ORDER — ARFORMOTEROL TARTRATE 15 UG/2ML
15 SOLUTION RESPIRATORY (INHALATION)
Qty: 60 ML | Refills: 0 | Status: ON HOLD | OUTPATIENT
Start: 2020-12-02 | End: 2020-12-27

## 2020-12-02 RX ADMIN — BUDESONIDE 0.5 MG: 0.5 INHALANT RESPIRATORY (INHALATION) at 20:54

## 2020-12-02 RX ADMIN — DOXYCYCLINE 100 MG: 100 CAPSULE ORAL at 21:48

## 2020-12-02 RX ADMIN — INSULIN LISPRO 3 UNITS: 100 INJECTION, SOLUTION INTRAVENOUS; SUBCUTANEOUS at 12:02

## 2020-12-02 RX ADMIN — HEPARIN SODIUM 5000 UNITS: 5000 INJECTION INTRAVENOUS; SUBCUTANEOUS at 09:02

## 2020-12-02 RX ADMIN — IPRATROPIUM BROMIDE AND ALBUTEROL SULFATE 3 ML: 2.5; .5 SOLUTION RESPIRATORY (INHALATION) at 20:54

## 2020-12-02 RX ADMIN — BUDESONIDE 0.5 MG: 0.5 INHALANT RESPIRATORY (INHALATION) at 07:54

## 2020-12-02 RX ADMIN — SODIUM CHLORIDE, PRESERVATIVE FREE 10 ML: 5 INJECTION INTRAVENOUS at 09:04

## 2020-12-02 RX ADMIN — DORNASE ALFA 2.5 MG: 1 SOLUTION RESPIRATORY (INHALATION) at 20:54

## 2020-12-02 RX ADMIN — TOBRAMYCIN 300 MG: 300 SOLUTION ORAL at 20:54

## 2020-12-02 RX ADMIN — TAZOBACTAM SODIUM AND PIPERACILLIN SODIUM 4.5 G: 500; 4 INJECTION, SOLUTION INTRAVENOUS at 09:02

## 2020-12-02 RX ADMIN — INSULIN LISPRO 3 UNITS: 100 INJECTION, SOLUTION INTRAVENOUS; SUBCUTANEOUS at 17:00

## 2020-12-02 RX ADMIN — ARFORMOTEROL TARTRATE 15 MCG: 15 SOLUTION RESPIRATORY (INHALATION) at 20:54

## 2020-12-02 RX ADMIN — ALPHA1-PROTEINASE INHIBITOR (HUMAN) 4252 MG: 1000 INJECTION, SOLUTION INTRAVENOUS at 10:22

## 2020-12-02 RX ADMIN — IPRATROPIUM BROMIDE AND ALBUTEROL SULFATE 3 ML: 2.5; .5 SOLUTION RESPIRATORY (INHALATION) at 12:07

## 2020-12-02 RX ADMIN — TAZOBACTAM SODIUM AND PIPERACILLIN SODIUM 4.5 G: 500; 4 INJECTION, SOLUTION INTRAVENOUS at 00:49

## 2020-12-02 RX ADMIN — TOBRAMYCIN 300 MG: 300 SOLUTION ORAL at 12:07

## 2020-12-02 RX ADMIN — HEPARIN SODIUM 5000 UNITS: 5000 INJECTION INTRAVENOUS; SUBCUTANEOUS at 21:48

## 2020-12-02 RX ADMIN — DORNASE ALFA 2.5 MG: 1 SOLUTION RESPIRATORY (INHALATION) at 07:53

## 2020-12-02 RX ADMIN — PREDNISONE 20 MG: 20 TABLET ORAL at 09:02

## 2020-12-02 RX ADMIN — GUAIFENESIN 1200 MG: 600 TABLET, EXTENDED RELEASE ORAL at 21:48

## 2020-12-02 RX ADMIN — IPRATROPIUM BROMIDE AND ALBUTEROL SULFATE 3 ML: 2.5; .5 SOLUTION RESPIRATORY (INHALATION) at 16:24

## 2020-12-02 RX ADMIN — SODIUM CHLORIDE, PRESERVATIVE FREE 10 ML: 5 INJECTION INTRAVENOUS at 21:48

## 2020-12-02 RX ADMIN — TAZOBACTAM SODIUM AND PIPERACILLIN SODIUM 4.5 G: 500; 4 INJECTION, SOLUTION INTRAVENOUS at 17:00

## 2020-12-02 RX ADMIN — IPRATROPIUM BROMIDE AND ALBUTEROL SULFATE 3 ML: 2.5; .5 SOLUTION RESPIRATORY (INHALATION) at 07:54

## 2020-12-02 RX ADMIN — ARFORMOTEROL TARTRATE 15 MCG: 15 SOLUTION RESPIRATORY (INHALATION) at 07:54

## 2020-12-02 RX ADMIN — GUAIFENESIN 1200 MG: 600 TABLET, EXTENDED RELEASE ORAL at 09:02

## 2020-12-02 RX ADMIN — DOXYCYCLINE 100 MG: 100 CAPSULE ORAL at 09:02

## 2020-12-02 NOTE — PROGRESS NOTES
[facility] Medicine Services  PROGRESS NOTE    Patient Name: Balwinder Willams  : 1965  MRN: [id number]    Date of Admission: 2020  Primary Care Physician: Obinna Arellano MD    Subjective   Subjective     CC:  F/u chronic respiratory failure And PNA    HPI:  Mr. Willams reports he is feeling fine.  He cleaned his room well today.      Review of Systems  Gen- No fevers, chills  CV- No chest pain, palpitations  Resp- No cough, dyspnea  GI- No N/V/D, abd pain        Objective   Objective     Vital Signs:   Temp:  [98.3 °F (36.8 °C)-98.5 °F (36.9 °C)] 98.5 °F (36.9 °C)  Heart Rate:  [] 101  Resp:  [14-20] 18  BP: ()/(74-86) 118/84        Physical Exam:  Constitutional: No acute distress, awake, alert  HENT: NCAT, mucous membranes moist  Respiratory: Clear to auscultation bilaterally, respiratory effort normal   Cardiovascular: RRR, no murmurs, rubs, or gallops  Gastrointestinal: Positive bowel sounds, soft, nontender, nondistended  Musculoskeletal: No bilateral ankle edema  Psychiatric: Appropriate affect, cooperative  Neurologic: Oriented x 3, strength symmetric in all extremities, Cranial Nerves grossly intact to confrontation, speech clear  Skin: No rashes      Results Reviewed:  Results from last 7 days   Lab Units 20  0250   WBC 10*3/mm3 10.75 11.34* 10.48   HEMOGLOBIN g/dL 14.0 14.2 13.7   HEMATOCRIT % 43.3 42.8 41.7   PLATELETS 10*3/mm3 261 249 225     Results from last 7 days   Lab Units 20  0315 20  0250   SODIUM mmol/L 137 139 134*   POTASSIUM mmol/L 3.7 4.0 4.3   CHLORIDE mmol/L 99 104 99   CO2 mmol/L 27.0 25.0 24.0   BUN mg/dL 16 14 17   CREATININE mg/dL 1.18 1.00 1.11   GLUCOSE mg/dL 176* 195* 375*   CALCIUM mg/dL 9.3 9.1 9.1     Estimated Creatinine Clearance: 59.7 mL/min (by C-G formula based on SCr of 1.18 mg/dL).    Microbiology Results Abnormal     Procedure Component Value -  Date/Time    Blood Culture - Blood, Arm, Right [[id number]] Collected: 11/24/20 1131    Lab Status: Final result Specimen: Blood from Arm, Right Updated: 11/29/20 1245     Blood Culture No growth at 5 days    Blood Culture - Blood, Arm, Left [[id number]] Collected: 11/24/20 1131    Lab Status: Final result Specimen: Blood from Arm, Left Updated: 11/29/20 1245     Blood Culture No growth at 5 days    S. Pneumo Ag Urine or CSF - Urine, Urine, Clean Catch [[id number]]  (Normal) Collected: 11/24/20 2007    Lab Status: Final result Specimen: Urine, Clean Catch Updated: 11/24/20 2357     Strep Pneumo Ag Negative    Legionella Antigen, Urine - Urine, Urine, Clean Catch [[id number]]  (Normal) Collected: 11/24/20 2007    Lab Status: Final result Specimen: Urine, Clean Catch Updated: 11/24/20 2357     LEGIONELLA ANTIGEN, URINE Negative    MRSA Screen, PCR (Inpatient) - Swab, Nares [[id number]]  (Normal) Collected: 11/24/20 1723    Lab Status: Final result Specimen: Swab from Nares Updated: 11/24/20 1848     MRSA PCR Negative    Narrative:      MRSA Negative    Respiratory Panel PCR w/COVID-19(SARS-CoV-2) BARBI/HELEN/FREDRICK/PAD/COR/MAD/YVONNE In-House, NP Swab in UTM/VTM, 3-4 HR TAT - Swab, Nasopharynx [[id number]]  (Normal) Collected: 11/24/20 1229    Lab Status: Final result Specimen: Swab from Nasopharynx Updated: 11/24/20 1333     ADENOVIRUS, PCR Not Detected     Coronavirus 229E Not Detected     Coronavirus HKU1 Not Detected     Coronavirus NL63 Not Detected     Coronavirus OC43 Not Detected     COVID19 Not Detected     Human Metapneumovirus Not Detected     Human Rhinovirus/Enterovirus Not Detected     Influenza A PCR Not Detected     Influenza A H1 Not Detected     Influenza A H1 2009 PCR Not Detected     Influenza A H3 Not Detected     Influenza B PCR Not Detected     Parainfluenza Virus 1 Not Detected     Parainfluenza Virus 2 Not Detected     Parainfluenza Virus 3 Not Detected     Parainfluenza Virus 4 Not Detected     RSV, PCR Not  Detected     Bordetella pertussis pcr Not Detected     Bordetella parapertussis PCR Not Detected     Chlamydophila pneumoniae PCR Not Detected     Mycoplasma pneumo by PCR Not Detected    Narrative:      Fact sheet for providers: https://docs.[employer]/wp-content/uploads/QQJ6985-3798-VL7.1-EUA-Provider-Fact-Sheet-3.pdf    Fact sheet for patients: https://docs.[employer]/wp-content/uploads/FXS7403-5745-KX6.1-EUA-Patient-Fact-Sheet-1.pdf          Imaging Results (Last 24 Hours)     ** No results found for the last 24 hours. **          Results for orders placed during the hospital encounter of 07/17/19   Adult Transthoracic Echo Complete W/ Cont if Necessary Per Protocol    Narrative · Left ventricular systolic function is normal. Estimated EF = 65%.  · No significant valvular abnormalities.          I have reviewed the medications:  Scheduled Meds:arformoterol, 15 mcg, Nebulization, BID - RT  budesonide, 0.5 mg, Nebulization, BID - RT  dornase alpha, 2.5 mg, Inhalation, BID - RT  doxycycline, 100 mg, Oral, Q12H  fluticasone, 2 spray, Nasal, Daily  guaiFENesin, 1,200 mg, Oral, Q12H  heparin (porcine), 5,000 Units, Subcutaneous, Q12H  insulin lispro, 0-7 Units, Subcutaneous, TID AC  ipratropium-albuterol, 3 mL, Nebulization, 4x Daily - RT  pharmacy consult - MTM, , Does not apply, Daily  piperacillin-tazobactam, 4.5 g, Intravenous, Q8H  predniSONE, 20 mg, Oral, Daily With Breakfast  sodium chloride, 10 mL, Intravenous, Q12H  tobramycin PF, 300 mg, Nebulization, BID - RT      Continuous Infusions:   PRN Meds:.•  acetaminophen **OR** acetaminophen **OR** acetaminophen  •  bisacodyl  •  bisacodyl  •  dextrose  •  dextrose  •  glucagon (human recombinant)  •  ondansetron **OR** ondansetron  •  sodium chloride  •  sodium chloride    Assessment/Plan   Assessment & Plan     Active Hospital Problems    Diagnosis  POA   • **Pneumonia of left lower lobe due to infectious organism [J18.9]  Yes   • Steroid-induced  hyperglycemia [R73.9, T38.0X5A]  No   • Severe malnutrition (CMS/HCC) [E43]  Yes   • Acute on chronic respiratory failure with hypoxia (CMS/HCC) [J96.21]  Yes   • Stage IV, very severe, emphysema [J44.9]  Yes   • Alpha-1-antitrypsin deficiency (on replacement) [E88.01]  Yes   • Dependence on supplemental oxygen [Z99.81]  Not Applicable      Resolved Hospital Problems   No resolved problems to display.        Brief Hospital Course to date:  Balwinder Willams is a 54 y.o. male  with hx of severe emphysema related to alpha-1 antitrypsin deficiency with chronic hypoxic respiratory failure (3-4 liters at baseline) who presents due to SOA. He has had recent admissions for spontaneous pneumothorax and Pseudomonas pneumonia. He is followed closely by Pulmonology. Since his recent discharge on 11/18/2020, he followed up with Pulmonology in the office on 11/23, but since then he has had worsening SOA and hypoxia. CXR showed no pneumothorax and chronic emphysematous changes, and CT chest showed possible LLL infiltrate. Admitted to hospitalist service with Pulmonary consultation.      Acute on chronic hypoxic respiratory failure  LLL pneumonia  Hx of Pseudomonas pneumonia  Mycobacterium avium complex positive bronchial washing  Alpha-1 antitrypsin deficiency  Severe emphysema/COPD exacerbation  --COVID-19/respiratory PCR panel negative, blood cultures NGTD. Negative urine Strep and Legionella.  --Continue on Zosyn x 10 days (should complete on 12/3) and Tobramycin nebs x 30 days total (should complete by the end of the week as he was on this prior to admission).   --s/p IV Solumedrol. Transition to prednisone today and taper: 20 mg daily x 7 days, 10 mg daily x 7 days, then stop.  --Pulmonary following. Discussed with Dr. Casey today.  --Bronchoscopy 10/30: cultures with Pseudomonas. Fungal culture still pending. AFB culture with Mycobacterium avium complex, which he also grew 9/11/20. Pulmonary is running a sensitivity  panel and may initiate therapy for this. Pulm anticipates initiating therapy for MAC.  Typically it is azithromycin 500 mg 3 times a week, Rifampin 600 mg 3 times per week, ethambutol 25 mg/kg 3 times a week.  He does need a baseline eye examination for ethambutol toxicity and normal liver function tests  --Mobilize, continue incentive spirometry.  -transplant surgery referral at discharge     Steroid induced hyperglycemia  --Continue qAC/HS finger sticks and low dose SSI.       DVT Prophylaxis:  heparin      Disposition: I expect the patient to be discharged tomorrow    CODE STATUS:   Code Status and Medical Interventions:   Ordered at: 11/24/20 1423     Code Status:    CPR     Medical Interventions (Level of Support Prior to Arrest):    Full       Kassie Orlando PA-C  12/02/20

## 2020-12-02 NOTE — PROGRESS NOTES
"PULMONARY PROGRESS NOTE    Patient Care Team:  Obinna Arellano MD as PCP - General (Adolescent Medicine)    Reason for Consult     COPD exacerbation  Alpha-1 antitrypsin disease  Recurrent Pseudomonas pneumonia  History of tension pneumothorax, endobronchial valve placement and removal    Subjective     54-year-old gentleman with a known history of recurrent Pseudomonas pneumonia, endobronchial valve replacement and removal for a pneumothorax, chronic respiratory failure and alpha-1 antitrypsin disease with severe stage IV emphysema hospitalized November 24 for worsening shortness of air and hypoxia.  He receives Prolastin for his alpha-1 deficiency.  He chronically uses 3 L nasal cannula.  He quit smoking in 2016 after 37 pack years  Zosyn and tobramycin nebulizers initiated as well as doxycycline.  He was placed on IV steroids, started on Brovana and budesonide nebulized twice daily and duo nebs every 4 hours.  Dornase alpha added for secretions    Interval History:     Resting saturation 97%.  He reports desaturation into the mid 80s when he ambulates around the room.  He denies feeling dizzy.  Productive cough has resolved.  Denies nausea, diarrhea, rash    Review of Systems:     ROS negative except for: No nausea vomiting or diarrhea.  No skin rash.  Denies chest pain or edema      Objective     Vital Signs  Blood pressure 99/86, pulse 83, temperature 98.5 °F (36.9 °C), temperature source Oral, resp. rate 16, height 172.7 cm (68\"), weight 59 kg (130 lb), SpO2 97 %.    Physical Exam:  GENERAL: Thin middle-aged gentleman in no acute distress, upright in bed  HEENT: Conjunctiva pink.  No jaundice.  Oral mucosa moist.  NECK: Trachea midline, no JVD  CHEST: Hyperinflated, hyperresonant to percussion, large tattoo of an Eagle on his back.  Diminished chest excursion.  Hyperresonant to percussion. breath sounds are bilateral with prolonged expiration, no wheezing or rhonchi  HEART: S1, S2 auscultated, increased " rate, regular, no murmur  ABDOMEN: Flat, bowel sounds present, soft  EXTREMITIS: No pretibial edema, nailbeds pink  NEURO: Alert and oriented, conversant, speech fluent, generalized decreased musculature       Results Review:    Lab Results (last 24 hours)     Procedure Component Value Units Date/Time    POC Glucose Once [[id number]]  (Normal) Collected: 12/02/20 0824    Specimen: Blood Updated: 12/02/20 0827     Glucose 107 mg/dL     POC Glucose Once [[id number]]  (Abnormal) Collected: 12/01/20 2019    Specimen: Blood Updated: 12/01/20 2020     Glucose 257 mg/dL     POC Glucose Once [[id number]]  (Abnormal) Collected: 12/01/20 1640    Specimen: Blood Updated: 12/01/20 1642     Glucose 147 mg/dL     POC Glucose Once [[id number]]  (Abnormal) Collected: 12/01/20 1123    Specimen: Blood Updated: 12/01/20 1130     Glucose 306 mg/dL         Imaging Results (Last 24 Hours)     ** No results found for the last 24 hours. **         Specimen Information: Bronchus; Tissue        Tissue Culture   Lab   Light growth (2+) Pseudomonas aeruginosaAbnormal    [facility]             Gram Stain   Lab   Rare (1+) WBCs seen  [facility]      No organisms seen  [facility]            Susceptibility     Pseudomonas aeruginosa     [facility]     Cefepime <=1 ug/ml Susceptible     Ceftazidime 2 ug/ml Susceptible     Ciprofloxacin <=0.25 ug/ml Susceptible     Gentamicin <=1 ug/ml Susceptible     Levofloxacin <=0.12 ug/ml Susceptible     Piperacillin + Tazobactam <=4 ug/ml Susceptible                  AFB Culture Mycobacterium avium complexAbnormal     Isolated from broth culture     Identified by [facility]     See scanned report              CT scan of the chest November 24, 2020  FINDINGS: The thyroid is homogeneous in attenuation. No bulky  mediastinal adenopathy. Central airways are patent. Esophagus in normal  course and caliber. Nonaneurysmal thoracic aorta. Cardiac size unchanged  and within normal limits. Extended lung windows demonstrate  severe  emphysematous change of the lungs with bronchial valves left lower lobe  as seen on prior comparison with continued atelectasis and scarring  adjacent as well as soft tissue nodularity in the left lower lung  similar to prior without new consolidation, pneumothorax or pleural  effusion development from prior comparison 09/24/2020. Degenerative  changes of the thoracic spine without aggressive osseous lesion. No soft  tissue body wall findings of the chest demonstrating acuity or  abnormality of the partially visualized upper abdominal structures.     IMPRESSION:  Grossly stable appearance of hyperinflated lungs, advanced  chronic obstructive pulmonary disease and emphysematous involvement with  bronchial valves left lower lung similar in appearance with adjacent  atelectasis and scarring as well as minimal nodularity from 09/24/2020  comparison without new consolidation, pneumothorax or pleural effusion  development.     D:  11/24/2020    I personally reviewed his CT scan and concur with above report        arformoterol, 15 mcg, Nebulization, BID - RT  budesonide, 0.5 mg, Nebulization, BID - RT  dornase alpha, 2.5 mg, Inhalation, BID - RT  doxycycline, 100 mg, Oral, Q12H  fluticasone, 2 spray, Nasal, Daily  guaiFENesin, 1,200 mg, Oral, Q12H  heparin (porcine), 5,000 Units, Subcutaneous, Q12H  insulin lispro, 0-7 Units, Subcutaneous, TID AC  ipratropium-albuterol, 3 mL, Nebulization, 4x Daily - RT  pharmacy consult - MTM, , Does not apply, Daily  piperacillin-tazobactam, 4.5 g, Intravenous, Q8H  predniSONE, 20 mg, Oral, Daily With Breakfast  sodium chloride, 10 mL, Intravenous, Q12H  tobramycin PF, 300 mg, Nebulization, BID - RT          Pneumonia of left lower lobe due to infectious organism    Alpha-1-antitrypsin deficiency (on replacement)    Dependence on supplemental oxygen    Stage IV, very severe, emphysema    Acute on chronic respiratory failure with hypoxia (CMS/HCC)    Severe malnutrition  (CMS/[facility])    Steroid-induced hyperglycemia      Assessment/Plan     #1 stage IV emphysema secondary to alpha-1 antitrypsin deficiency and previous tobacco use with acute exacerbation.  Baseline FEV1 1.11L32%, DlCO only 35%, %. Today chest is clear to auscultation.  He is currently receiving Prolastin.  On admission he was requiring 5 to 6 L nasal cannula for marginal oxygenation.  Today he is on 3 L with a saturation of 95%.  He is symptomatically short of air with exertion and admits to desaturation into the 80s when he is ambulating around the room.  This does not result in feeling lightheaded.  He did undergo endobronchial valve placement in 2019.  He developed recurrent infections and several of the valves were removed October 30.    #2  Recurrent pneumothorax requiring chest tube for expansion.  Chest tube was removed November 18.  Chest x-ray reveals no recurrent pneumothorax.      #3 recurrent Pseudomonas aeruginosa respiratory infections, left lower lobe pneumonia he is receiving Zosyn and nebulized tobramycin.  He failed outpatient oral Levaquin.     #4 Mycobacterium avium complex positive bronchial washing from October 30, September 11 of this year and there was one specimen from 2019 that is positive for MAC.  He has always been smear negative.  However he does have the nodular changes in the left lower lobe that has persisted and is actually a little worse compared to 2019 but unchanged over the last 6 weeks.  Discussed with Dr. Castellano and we should probably consider treating this organism because of his severe underlying disease and persistent nodular infiltrate.    ,prednisone 20 mg daily x7 days then 10 mg daily x7 days and stop      Pulmicort and Brovana nebulized twice daily for his maintenance therapy his insurance company has approved this medication but it will take 2 to 3 days to ship so he will need a 3-day supply at discharge    Dornase alpha nebulized twice should complete tonight and  will not continue at discharge    Continue DuoNeb, but decrease to 3 times daily    Complete 10 days of Zosyn and 30 days of YOLIE neb, this should be completed by tomorrow morning.  If he has any tobramycin nebulizer left he can complete his home supply but I would not continue it after he completes what he has left    Refer to our outpatient pulmonary rehabilitation    I have requested that his AFB culture have a sensitivity panel run and will anticipate initiating therapy for MAC    Typically it is azithromycin 500 mg 3 times a week, Rifampin 600 mg 3 times per week, ethambutol 25 mg/kg 3 times a week  He does need a baseline eye examination for ethambutol toxicity and normal liver function tests    Resume Prolastin post discharge    My office will arrange an outpatient appointment with [facility] pulmonary transplant team    Anticipate discharge tomorrow    Aileen Casey MD  12/02/20  10:43 EST      Time: 35min

## 2020-12-02 NOTE — PROGRESS NOTES
NN spoke with patient at BS.  Patient alert and able to answer questions appropriately.  O2 sat 94% on 3L NC, home baseline is 2-3L HS.  Patient reports the plan is to DC tomorrow.  Discussion about new baseline.  COPD action plan reviewed.  Deep breathing exercises encouraged.  No new questions or concerns voiced at this time.  NN continues to follow.    Per current G[employer] Standards, please consider: No LAMA in place, Outpatient PFT, Pulmonary Rehab as appropriate, Patient would like to complete ACP documentation (done)

## 2020-12-02 NOTE — PROGRESS NOTES
Continued Stay Note  [facility]     Patient Name: Balwinder Willams  MRN: [id number]  Today's Date: 12/2/2020    Admit Date: 11/24/2020    Discharge Plan     Row Name 12/02/20 0948       Plan    Plan  Home    Patient/Family in Agreement with Plan  yes    Plan Comments  Spoke with patient in room.  His plan is still to return home at discharge.  He denies any needs at this time.  CM will continue to follow.  Daniel Brambila RN [phone]    Final Discharge Disposition Code  01 - home or self-care        Discharge Codes    No documentation.       Expected Discharge Date and Time     Expected Discharge Date Expected Discharge Time    Dec 3, 2020             Gauri Brambila RN

## 2020-12-03 VITALS
HEIGHT: 68 IN | DIASTOLIC BLOOD PRESSURE: 65 MMHG | BODY MASS INDEX: 19.7 KG/M2 | SYSTOLIC BLOOD PRESSURE: 102 MMHG | RESPIRATION RATE: 20 BRPM | TEMPERATURE: 98 F | OXYGEN SATURATION: 92 % | HEART RATE: 99 BPM | WEIGHT: 130 LBS

## 2020-12-03 LAB
GLUCOSE BLDC GLUCOMTR-MCNC: 116 MG/DL (ref 70–130)
GLUCOSE BLDC GLUCOMTR-MCNC: 242 MG/DL (ref 70–130)

## 2020-12-03 PROCEDURE — 99239 HOSP IP/OBS DSCHRG MGMT >30: CPT | Performed by: NURSE PRACTITIONER

## 2020-12-03 PROCEDURE — 94799 UNLISTED PULMONARY SVC/PX: CPT

## 2020-12-03 PROCEDURE — 25010000002 HEPARIN (PORCINE) PER 1000 UNITS: Performed by: FAMILY MEDICINE

## 2020-12-03 PROCEDURE — 63710000001 INSULIN LISPRO (HUMAN) PER 5 UNITS: Performed by: HOSPITALIST

## 2020-12-03 PROCEDURE — 82962 GLUCOSE BLOOD TEST: CPT

## 2020-12-03 PROCEDURE — 63710000001 PREDNISONE PER 1 MG: Performed by: INTERNAL MEDICINE

## 2020-12-03 PROCEDURE — 25010000002 PIPERACILLIN SOD-TAZOBACTAM PER 1 G: Performed by: PHYSICIAN ASSISTANT

## 2020-12-03 RX ORDER — DOXYCYCLINE 100 MG/1
100 CAPSULE ORAL EVERY 12 HOURS SCHEDULED
Qty: 3 CAPSULE | Refills: 0 | Status: SHIPPED | OUTPATIENT
Start: 2020-12-03 | End: 2020-12-11 | Stop reason: HOSPADM

## 2020-12-03 RX ORDER — IPRATROPIUM BROMIDE AND ALBUTEROL SULFATE 2.5; .5 MG/3ML; MG/3ML
3 SOLUTION RESPIRATORY (INHALATION)
Qty: 360 ML
Start: 2020-12-03 | End: 2021-05-03 | Stop reason: SDUPTHER

## 2020-12-03 RX ORDER — PREDNISONE 20 MG/1
TABLET ORAL
Qty: 11 TABLET | Refills: 0 | Status: SHIPPED | OUTPATIENT
Start: 2020-12-03 | End: 2020-12-11 | Stop reason: HOSPADM

## 2020-12-03 RX ADMIN — BUDESONIDE 0.5 MG: 0.5 INHALANT RESPIRATORY (INHALATION) at 09:06

## 2020-12-03 RX ADMIN — TAZOBACTAM SODIUM AND PIPERACILLIN SODIUM 4.5 G: 500; 4 INJECTION, SOLUTION INTRAVENOUS at 00:22

## 2020-12-03 RX ADMIN — IPRATROPIUM BROMIDE AND ALBUTEROL SULFATE 3 ML: 2.5; .5 SOLUTION RESPIRATORY (INHALATION) at 09:02

## 2020-12-03 RX ADMIN — GUAIFENESIN 1200 MG: 600 TABLET, EXTENDED RELEASE ORAL at 08:44

## 2020-12-03 RX ADMIN — TAZOBACTAM SODIUM AND PIPERACILLIN SODIUM 4.5 G: 500; 4 INJECTION, SOLUTION INTRAVENOUS at 08:44

## 2020-12-03 RX ADMIN — INSULIN LISPRO 3 UNITS: 100 INJECTION, SOLUTION INTRAVENOUS; SUBCUTANEOUS at 14:23

## 2020-12-03 RX ADMIN — PREDNISONE 20 MG: 20 TABLET ORAL at 08:45

## 2020-12-03 RX ADMIN — SODIUM CHLORIDE, PRESERVATIVE FREE 10 ML: 5 INJECTION INTRAVENOUS at 08:45

## 2020-12-03 RX ADMIN — IPRATROPIUM BROMIDE AND ALBUTEROL SULFATE 3 ML: 2.5; .5 SOLUTION RESPIRATORY (INHALATION) at 13:52

## 2020-12-03 RX ADMIN — ARFORMOTEROL TARTRATE 15 MCG: 15 SOLUTION RESPIRATORY (INHALATION) at 09:05

## 2020-12-03 RX ADMIN — TOBRAMYCIN 300 MG: 300 SOLUTION ORAL at 10:28

## 2020-12-03 RX ADMIN — HEPARIN SODIUM 5000 UNITS: 5000 INJECTION INTRAVENOUS; SUBCUTANEOUS at 08:45

## 2020-12-03 RX ADMIN — DOXYCYCLINE 100 MG: 100 CAPSULE ORAL at 08:44

## 2020-12-03 NOTE — DISCHARGE SUMMARY
[facility] Medicine Services  DISCHARGE SUMMARY    Patient Name: Balwinder Willams  : 1965  MRN: [id number]    Date of Admission: 2020 10:34 AM  Date of Discharge:  12/3/2020  Primary Care Physician: Obinna Arellano MD    Consults     Date and Time Order Name Status Description    2020 0032 Inpatient Pulmonology Consult Completed           Hospital Course     Presenting Problem:   Acute on chronic respiratory failure with hypoxia (CMS/HCC) [J96.21]  Acute on chronic respiratory failure with hypoxia (CMS/HCC) [J96.21]    Active Hospital Problems    Diagnosis  POA   • **Pneumonia of left lower lobe due to infectious organism [J18.9]  Yes   • Steroid-induced hyperglycemia [R73.9, T38.0X5A]  No   • Severe malnutrition (CMS/HCC) [E43]  Yes   • Acute on chronic respiratory failure with hypoxia (CMS/HCC) [J96.21]  Yes   • Stage IV, very severe, emphysema [J44.9]  Yes   • Alpha-1-antitrypsin deficiency (on replacement) [E88.01]  Yes   • Dependence on supplemental oxygen [Z99.81]  Not Applicable      Resolved Hospital Problems   No resolved problems to display.          Hospital Course:  Balwinder Willams is a 54 y.o. male with hx of severe emphysema related to alpha-1 antitrypsin deficiency with chronic hypoxic respiratory failure (3-4 liters at baseline) who presents due to SOA. He has had recent admissions for spontaneous pneumothorax and Pseudomonas pneumonia. He is followed closely by Pulmonology. Since his recent discharge on 2020, he followed up with Pulmonology in the office on , but since then he has had worsening SOA and hypoxia. CXR showed no pneumothorax and chronic emphysematous changes, and CT chest showed possible LLL infiltrate. Admitted to hospitalist service with Pulmonary consultation.     He has completed 10 days of Zosyn.  He can complete tobramycin neb course by the end of this week as he was on this prior to admission.  Has remaining supply at  home.  Needs 3 additional doses of doxycycline to complete 10-day course.  He was given IV steroids.  Prednisone taper as follows: 20 mg daily x7 days, 10 mg daily x7 days then stop.  Continue Brovana and budesonide nebs.  He has good supply of DuoNeb's at home.  He can resume Prolastin infusions.  Bronchoscopy 10/30: cultures with Pseudomonas. Fungal culture still pending. AFB culture with Mycobacterium avium complex, which he also grew 9/11/20. Pulmonary is running a sensitivity panel and may initiate therapy for this. Pulm anticipates initiating therapy for MAC.  Typically it is azithromycin 500 mg 3 times a week, Rifampin 600 mg 3 times per week, ethambutol 25 mg/kg 3 times a week.  He does need a baseline eye examination for ethambutol toxicity and normal liver function tests    Discharge Follow Up Recommendations for outpatient labs/diagnostics:  PCP in 1 week,   Pulm as scheduled next week    Day of Discharge     HPI:   Breathing and cough have improved. He wants to go home    Review of Systems  Gen- No fevers, chills  CV- No chest pain, palpitations  GI- No N/V/D, abd pain        Vital Signs:   Temp:  [98 °F (36.7 °C)-98.6 °F (37 °C)] 98 °F (36.7 °C)  Heart Rate:  [] 91  Resp:  [18-20] 18  BP: (102-109)/(65-75) 102/65     Physical Exam:  Gen-no acute distress, up walking around room  CV-RRR, S1 S2 normal, no m/r/g  Resp-CTAB, normal WOB  Abd-soft, NT, ND, +BS  Ext-no edema, PPP  Neuro-A&Ox3, no focal deficits  Skin-no overt rashes noted  Psych-appropriate mood        Pertinent  and/or Most Recent Results     Results from last 7 days   Lab Units 11/30/20  0427 11/29/20  0315 11/28/20  0250 11/27/20  0450   WBC 10*3/mm3 10.75 11.34* 10.48 12.52*   HEMOGLOBIN g/dL 14.0 14.2 13.7 13.4   HEMATOCRIT % 43.3 42.8 41.7 40.5   PLATELETS 10*3/mm3 261 249 225 232   SODIUM mmol/L 137 139 134* 137   POTASSIUM mmol/L 3.7 4.0 4.3 4.0   CHLORIDE mmol/L 99 104 99 104   CO2 mmol/L 27.0 25.0 24.0 26.0   BUN mg/dL 16 14 17  18   CREATININE mg/dL 1.18 1.00 1.11 1.04   GLUCOSE mg/dL 176* 195* 375* 231*   CALCIUM mg/dL 9.3 9.1 9.1 9.0           Invalid input(s): PROT, LABALBU          Brief Urine Lab Results     None          Microbiology Results Abnormal     Procedure Component Value - Date/Time    Blood Culture - Blood, Arm, Right [[id number]] Collected: 11/24/20 1131    Lab Status: Final result Specimen: Blood from Arm, Right Updated: 11/29/20 1245     Blood Culture No growth at 5 days    Blood Culture - Blood, Arm, Left [[id number]] Collected: 11/24/20 1131    Lab Status: Final result Specimen: Blood from Arm, Left Updated: 11/29/20 1245     Blood Culture No growth at 5 days    S. Pneumo Ag Urine or CSF - Urine, Urine, Clean Catch [[id number]]  (Normal) Collected: 11/24/20 2007    Lab Status: Final result Specimen: Urine, Clean Catch Updated: 11/24/20 2357     Strep Pneumo Ag Negative    Legionella Antigen, Urine - Urine, Urine, Clean Catch [[id number]]  (Normal) Collected: 11/24/20 2007    Lab Status: Final result Specimen: Urine, Clean Catch Updated: 11/24/20 2357     LEGIONELLA ANTIGEN, URINE Negative    MRSA Screen, PCR (Inpatient) - Swab, Nares [[id number]]  (Normal) Collected: 11/24/20 1723    Lab Status: Final result Specimen: Swab from Nares Updated: 11/24/20 1848     MRSA PCR Negative    Narrative:      MRSA Negative    Respiratory Panel PCR w/COVID-19(SARS-CoV-2) [facility]/YVONNE In-House, NP Swab in UTM/VTM, 3-4 HR TAT - Swab, Nasopharynx [[id number]]  (Normal) Collected: 11/24/20 1229    Lab Status: Final result Specimen: Swab from Nasopharynx Updated: 11/24/20 1333     ADENOVIRUS, PCR Not Detected     Coronavirus 229E Not Detected     Coronavirus HKU1 Not Detected     Coronavirus NL63 Not Detected     Coronavirus OC43 Not Detected     COVID19 Not Detected     Human Metapneumovirus Not Detected     Human Rhinovirus/Enterovirus Not Detected     Influenza A PCR Not Detected     Influenza A H1 Not Detected     Influenza  A H1 2009 PCR Not Detected     Influenza A H3 Not Detected     Influenza B PCR Not Detected     Parainfluenza Virus 1 Not Detected     Parainfluenza Virus 2 Not Detected     Parainfluenza Virus 3 Not Detected     Parainfluenza Virus 4 Not Detected     RSV, PCR Not Detected     Bordetella pertussis pcr Not Detected     Bordetella parapertussis PCR Not Detected     Chlamydophila pneumoniae PCR Not Detected     Mycoplasma pneumo by PCR Not Detected    Narrative:      Fact sheet for providers: https://docs.[employer]/wp-content/uploads/PSA3210-8899-TU6.1-EUA-Provider-Fact-Sheet-3.pdf    Fact sheet for patients: https://docs.[employer]/wp-content/uploads/MAR2639-8464-TS2.1-EUA-Patient-Fact-Sheet-1.pdf          Imaging Results (All)     Procedure Component Value Units Date/Time    CT Chest Without Contrast [[id number]] Collected: 11/24/20 1602     Updated: 11/27/20 1330    Narrative:      EXAMINATION: CT CHEST WO CONTRAST-11/24/2020:      INDICATION: Respiratory failure; J96.21-Acute and chronic respiratory  failure with hypoxia; J44.1-Chronic obstructive pulmonary disease with  (acute) exacerbation.      TECHNIQUE: CT chest without intravenous contrast.     The radiation dose reduction device was turned on for each scan per the  ALARA (As Low as Reasonably Achievable) protocol.     COMPARISON: CT chest dated 09/24/2020.     FINDINGS: The thyroid is homogeneous in attenuation. No bulky  mediastinal adenopathy. Central airways are patent. Esophagus in normal  course and caliber. Nonaneurysmal thoracic aorta. Cardiac size unchanged  and within normal limits. Extended lung windows demonstrate severe  emphysematous change of the lungs with bronchial valves left lower lobe  as seen on prior comparison with continued atelectasis and scarring  adjacent as well as soft tissue nodularity in the left lower lung  similar to prior without new consolidation, pneumothorax or pleural  effusion development from prior comparison  09/24/2020. Degenerative  changes of the thoracic spine without aggressive osseous lesion. No soft  tissue body wall findings of the chest demonstrating acuity or  abnormality of the partially visualized upper abdominal structures.       Impression:      Grossly stable appearance of hyperinflated lungs, advanced  chronic obstructive pulmonary disease and emphysematous involvement with  bronchial valves left lower lung similar in appearance with adjacent  atelectasis and scarring as well as minimal nodularity from 09/24/2020  comparison without new consolidation, pneumothorax or pleural effusion  development.     D:  11/24/2020  E:  11/24/2020     This report was finalized on 11/27/2020 1:26 PM by Dr. William Rae.       XR Chest 1 View [[id number]] Collected: 11/24/20 1106     Updated: 11/24/20 1338    Narrative:      EXAMINATION: XR CHEST 1 VW- 11/24/2020     INDICATION: SOA triage protocol      COMPARISON: NONE     FINDINGS: Portable chest reveals cardiac and mediastinal silhouettes  within normal limits. Severe emphysematous changes seen diffusely  throughout the lung fields with bullous disease present and unchanged.  Bony structures are unremarkable. No focal parenchymal opacification  present. No pleural effusion or definite pneumothorax identified. Some  stable increased markings identified at the left lung base.       Impression:      No definite pneumothorax identified with severe chronic and  emphysematous changes seen within the lung fields. No definite  superimposed infectious process.     D:  11/24/2020  E:  11/24/2020     This report was finalized on 11/24/2020 1:35 PM by Dr. Amrita Eden MD.                       Results for orders placed during the hospital encounter of 07/17/19   Adult Transthoracic Echo Complete W/ Cont if Necessary Per Protocol    Narrative · Left ventricular systolic function is normal. Estimated EF = 65%.  · No significant valvular abnormalities.            Discharge  Details        Discharge Medications      New Medications      Instructions Start Date   arformoterol 15 MCG/2ML nebulizer solution  Commonly known as: BROVANA   15 mcg, Nebulization, 2 Times Daily - RT      budesonide 0.5 MG/2ML nebulizer solution  Commonly known as: PULMICORT   0.5 mg, Nebulization, 2 Times Daily - RT      doxycycline 100 MG capsule  Commonly known as: MONODOX   100 mg, Oral, Every 12 Hours Scheduled      ipratropium-albuterol 0.5-2.5 mg/3 ml nebulizer  Commonly known as: DUO-NEB  Replaces: Combivent Respimat  MCG/ACT inhaler   3 mL, Nebulization, 4 Times Daily - RT         Changes to Medications      Instructions Start Date   predniSONE 20 MG tablet  Commonly known as: DELTASONE  What changed:   · medication strength  · additional instructions   Take 20mg x 7 days, 10mg x 7 days         Continue These Medications      Instructions Start Date   fluticasone 50 MCG/ACT nasal spray  Commonly known as: Flonase   2 sprays, Nasal, Daily      guaiFENesin 600 MG 12 hr tablet  Commonly known as: MUCINEX   1,200 mg, Oral, Daily PRN      ibuprofen 800 MG tablet  Commonly known as: ADVIL,MOTRIN   800 mg, Oral, Every 6 Hours PRN      Prolastin-C 1000 MG/20ML IV solution  Generic drug: Alpha1-Proteinase Inhibitor   Weekly      tobramycin  MG/5ML nebulizer solution  Commonly known as: YOLIE   300 mg, Nebulization, 2 Times Daily - RT         Stop These Medications    budesonide-formoterol 160-4.5 MCG/ACT inhaler  Commonly known as: Symbicort     Combivent Respimat  MCG/ACT inhaler  Generic drug: ipratropium-albuterol  Replaced by: ipratropium-albuterol 0.5-2.5 mg/3 ml nebulizer            Allergies   Allergen Reactions   • Other Other (See Comments)     Opioid Analgesics (recovering addict)     Discharge Disposition:  Home or Self Care    Diet:  Hospital:  Diet Order   Procedures   • Diet Regular       Activity: as tolerated     CODE STATUS:    Code Status and Medical Interventions:   Ordered at:  11/24/20 1423     Code Status:    CPR     Medical Interventions (Level of Support Prior to Arrest):    Full       Future Appointments   Date Time Provider Department Center   12/9/2020 11:00 AM Neris Cárdenas APRN [facility] None   12/15/2020  1:30 PM Phan Castellano MD [facility] None       Additional Instructions for the Follow-ups that You Need to Schedule     Discharge Follow-up with PCP   As directed       Currently Documented PCP:    Obinna Arellano MD    PCP Phone Number:    [phone]     Follow Up Details: PCP in 1 week         Discharge Follow-up with Specialty: pulm as scheduled on 12/9   As directed      Specialty: pulm as scheduled on 12/9             JESSICA Paula  12/03/20      Time Spent on Discharge:  I spent  40  minutes on this discharge activity which included: face-to-face encounter with the patient, reviewing the data in the system, coordination of the care with the nursing staff as well as consultants, documentation, and entering orders.

## 2020-12-03 NOTE — PROGRESS NOTES
Anticipate dc today  He can complete tobineb, that he has at home    My office placed order for brovana and budesonide and it will be mailed to his home.  He will need our meds to beds to give him 72hrs    Taper steroids 20mg dailyx7, 10mg dailyx7    He uses duoneb at home for rescue. Make sure RX is current    He will resume his Prolastin infusions    Follow up in office next week with either Gray or JESSICA; will decide on treatment for MAC.  He needs baseline eye exam before starting ethambutol  [phone]

## 2020-12-03 NOTE — PLAN OF CARE
Goal Outcome Evaluation:  Plan of Care Reviewed With: patient  Progress: improving  Outcome Summary: pt doing well, anticipating d/c

## 2020-12-03 NOTE — PROGRESS NOTES
NN spoke with patient at .  Patient alert and able to answer questions appropriately.  Patient expected to DC today.  He reports that he has been experimenting with taking his O2 off.  Patient encouraged to wear O2 as ordered and communicate with care team.  He has a pulse oximeter at home.  COPD action plan and deep breathing exercises reviewed.  Patient has been scheduled for a hospital follow up with [facility] for 12/9/2020 @ 11 am with JESSICA Pierre.  This was scheduled per pulmonary note.  Patient was scheduled PTA with Dr. Castellano for 12/15/2020 at 130 pm.  No new questions or concerns voiced at this time.  NN continues to follow.        Per current GOLD Standards, please consider: No LAMA in place, Outpatient PFT, Pulmonary Rehab as appropriate, Patient would like to complete ACP documentation (done)

## 2020-12-04 ENCOUNTER — READMISSION MANAGEMENT (OUTPATIENT)
Dept: CALL CENTER | Facility: HOSPITAL | Age: 55
End: 2020-12-04

## 2020-12-04 NOTE — OUTREACH NOTE
Prep Survey      Responses   [facility] facility patient discharged from?  [place]   Is LACE score < 7 ?  No   Eligibility  Readm Mgmt   Discharge diagnosis  Pneumonia of left lower lobe due to infectious organism   Does the patient have one of the following disease processes/diagnoses(primary or secondary)?  COPD/Pneumonia   Does the patient have Home health ordered?  No   Is there a DME ordered?  No   Comments regarding appointments  Per AVS   Prep survey completed?  Yes          Yolette Najera RN

## 2020-12-04 NOTE — PROGRESS NOTES
INTENSIVIST   PROGRESS NOTE     Hospital:  LOS: 8 days     Mr. Balwinder Willams, 54 y.o. male is followed for a Chief Complaint of: Pneumothorax      Subjective   S     Interval History:  No events overnight.       The patient's relevant past medical, surgical and social history were reviewed and updated in Epic as appropriate.      ROS:   Constitutional: Negative for fever.   Respiratory: Negative for acute dyspnea.   Cardiovascular: Negative for chest pain.   Gastrointestinal: Negative for  nausea, vomiting and diarrhea.     Objective   O     Vitals:  Temp  Min: 98.1 °F (36.7 °C)  Max: 98.4 °F (36.9 °C)  BP  Min: 92/61  Max: 119/100  Pulse  Min: 65  Max: 117  Resp  Min: 14  Max: 18  SpO2  Min: 92 %  Max: 98 % Flow (L/min)  Min: 4  Max: 4    Intake/Ouptut 24 hrs (7:00AM - 6:59 AM)  Intake & Output (last 3 days)       11/13 0701 - 11/14 0700 11/14 0701 - 11/15 0700 11/15 0701 - 11/16 0700 11/16 0701 - 11/17 0700    P.O. 5730 754 6417     Total Intake(mL/kg) 1320 (22.8) 915 (15.9) 1390 (24)     Urine (mL/kg/hr) 775 (0.6) 895 (0.6) 1175 (0.8)     Stool   0     Chest Tube  0 0 0    Total Output  0    Net +545 +20 +215 0            Urine Unmeasured Occurrence   1 x     Stool Unmeasured Occurrence  1 x 1 x             Physical Examination  Telemetry:  Normal sinus rhythm.    Constitutional:  No acute distress.  Sitting up in bed on nasal cannula.    Eyes: No scleral icterus.   PERRL, EOM intact.    Neck:  Supple, FROM   Cardiovascular: Normal rate, regular and rhythm. Normal heart sounds.  No murmurs, gallop or rub.   Respiratory: No respiratory distress. Normal respiratory effort.  Diminished bilaterally. No wheezing.   Left chest tube to water seal with no air leak.    Abdominal:  Soft. No masses. Non-tender. No distension. No HSM.   Extremities: No digital cyanosis. No clubbing.  No peripheral edema.   Skin: No rashes, lesions or ulcers   Neurological:   Alert and Oriented to person, place, and time.               Results from last 7 days   Lab Units 11/14/20  0438   WBC 10*3/mm3 11.68*   HEMOGLOBIN g/dL 14.8   MCV fL 94.5   PLATELETS 10*3/mm3 259     Results from last 7 days   Lab Units 11/14/20  0438   SODIUM mmol/L 137   POTASSIUM mmol/L 4.0   CO2 mmol/L 26.0   CREATININE mg/dL 0.85   GLUCOSE mg/dL 182*   MAGNESIUM mg/dL 2.2     Estimated Creatinine Clearance: 81.5 mL/min (by C-G formula based on SCr of 0.85 mg/dL).              Images:  Imaging Results (Last 24 Hours)     Procedure Component Value Units Date/Time    XR Chest 1 View [[id number]] Collected: 11/15/20 0738     Updated: 11/16/20 1051    Narrative:         EXAMINATION: XR CHEST, SINGLE VIEW - 11/15/2020     INDICATION: J93.0-Spontaneous tension pneumothorax; J93.83-Other  pneumothorax; R06.00-Dyspnea, unspecified; R06.89-Other abnormalities of  breathing; J96.01-Acute respiratory failure with hypoxia. Left  pneumothorax with chest tube management.       COMPARISON: Chest x-ray 11/14/2020.     FINDINGS: Left chest tube in place similar to prior with decreased  evidence of pleural line at the left lung apex and lucency consistent  with resolved pneumothorax, as this is no longer discretely identified.  Hyperinflation appearance otherwise unchanged without new findings.          Impression:      Left chest tube in place similar to prior with decreased  evidence of pleural line at the left lung apex and lucency consistent  with resolved pneumothorax as this is no longer discretely identified.  Hyperinflation appearance otherwise unchanged without new findings.     DICTATED:   11/15/2020  EDITED/ls :   11/15/2020      This report was finalized on 11/16/2020 10:48 AM by Dr. William Rae.       XR Chest 1 View [[id number]] Collected: 11/16/20 0908     Updated: 11/16/20 0954    Narrative:      EXAMINATION: XR CHEST 1 VW- 11/16/2020     INDICATION: Left pneumothorax with chest tube management;  J93.0-Spontaneous tension pneumothorax; J93.83-Other  pneumothorax;  R06.00-Dyspnea, unspecified; R06.89-Other abnormalities of breathing;  J96.01-Acute respiratory failure with hypoxia      COMPARISON: 11/15/2020     FINDINGS: Portable chest reveals a chest tube identified on the left.   Tiny left apical pneumothorax. Severe emphysematous and chronic changes  seen within the lung fields bilaterally. There is no superimposed  infectious process.       Impression:      Chest tube identified on the left. Tiny apical pneumothorax  identified on the left. Underlying chronic and emphysematous changes  seen within the lung fields bilaterally. No new focal parenchymal  opacification present.     D:  11/16/2020  E:  11/16/2020               Results: Reviewed.  I reviewed the patient's new laboratory and imaging results.  I independently reviewed the patient's new images.    Medications: Reviewed.    Assessment/Plan   A / P     Mr. Willams is a 53yo M with a history of alpha-1 deficiency, Stage IV emphysema and former smoking who recently underwent left lower lobe endobronchial valve placement for lung volume reduction. He had a left pneumothorax after the procedure with resolution. The past couple of months have been complicated by Pseudomonas and suspected post-obstructive pneumonia. He underwent removal of several of the valves and was discharged home. He presented approximately 1 week later with a tension pneumothorax to the [facility] ED. A chest tube was placed with resolution and was later removed. He remained in the hospital for monitoring and a repeat CXR showed a recurrent pneumothorax. 100% O2 therapy was tried without improvement. He had another left chest tube placed on 11/12/20 with interval improvement. The chest tube was placed back to water seal on 11/13 and remains in place.     Nutrition:   Diet Regular  Advance Directives:   Code Status and Medical Interventions:   Ordered at: 11/08/20 0542     Code Status:    CPR     Medical Interventions (Level of Support Prior  to Arrest):    Full       Active Hospital Problems    Diagnosis   • **Spontaneous tension pneumothorax   • Acute on chronic respiratory failure with hypoxia and hypercapnia (CMS/HCC)   • Former smoker (Resolved 2006)   • Stage IV, very severe, emphysema   • Alpha-1-antitrypsin deficiency (on replacement)     A.  Labs of 11/20/2014 reports an alpha 1 antitrypsin deficiency of 4.7 with a phenotype      with Z bands detected and genotyping revealing the presence of most common      deficiency allele type Z and an inferred non-expressing null allele.     • Dependence on supplemental oxygen       Assessment / Plan:    1. Clamp chest tube this AM. Leave clamped for next 24 hours.   2. If he develops worsening chest pain or oxygenation, repeat a CXR.   3. Chest xray in AM.  If no pneumothorax, plan to d/c chest tube.   4. Continue nebulizer therapy.   5. He will have someone bring in his Prolastin so that we may do his infusion.     High level of risk due to:  severe exacerbation of chronic illness and illness with threat to life or bodily function.    Plan of care and goals reviewed during interdisciplinary rounds.  I discussed the patient's findings and my recommendations with patient and nursing staff      Christal Almodovar, DO    Intensive Care Medicine and Pulmonary Medicine      04-Dec-2020

## 2020-12-05 ENCOUNTER — HOSPITAL ENCOUNTER (INPATIENT)
Facility: HOSPITAL | Age: 55
LOS: 6 days | Discharge: HOME HEALTH | End: 2020-12-11
Attending: EMERGENCY MEDICINE | Admitting: INTERNAL MEDICINE

## 2020-12-05 ENCOUNTER — APPOINTMENT (OUTPATIENT)
Dept: GENERAL RADIOLOGY | Facility: HOSPITAL | Age: 55
End: 2020-12-05

## 2020-12-05 ENCOUNTER — APPOINTMENT (OUTPATIENT)
Dept: CT IMAGING | Facility: HOSPITAL | Age: 55
End: 2020-12-05

## 2020-12-05 DIAGNOSIS — J44.1 COPD EXACERBATION (HCC): ICD-10-CM

## 2020-12-05 DIAGNOSIS — J96.01 ACUTE RESPIRATORY FAILURE WITH HYPOXIA (HCC): ICD-10-CM

## 2020-12-05 DIAGNOSIS — D72.829 LEUKOCYTOSIS, UNSPECIFIED TYPE: ICD-10-CM

## 2020-12-05 DIAGNOSIS — J93.9 PNEUMOTHORAX ON LEFT: Primary | ICD-10-CM

## 2020-12-05 PROBLEM — J44.9 COPD MIXED TYPE (HCC): Chronic | Status: ACTIVE | Noted: 2019-10-18

## 2020-12-05 PROBLEM — A31.0 MYCOBACTERIUM AVIUM COMPLEX (HCC): Status: ACTIVE | Noted: 2020-12-05

## 2020-12-05 PROBLEM — J15.1 PNEUMONIA DUE TO PSEUDOMONAS (HCC): Status: ACTIVE | Noted: 2020-12-05

## 2020-12-05 LAB
ALBUMIN SERPL-MCNC: 4.1 G/DL (ref 3.5–5.2)
ALBUMIN/GLOB SERPL: 1.2 G/DL
ALP SERPL-CCNC: 58 U/L (ref 39–117)
ALT SERPL W P-5'-P-CCNC: 40 U/L (ref 1–41)
ANION GAP SERPL CALCULATED.3IONS-SCNC: 13 MMOL/L (ref 5–15)
ARTERIAL PATENCY WRIST A: ABNORMAL
AST SERPL-CCNC: 24 U/L (ref 1–40)
ATMOSPHERIC PRESS: ABNORMAL MM[HG]
B PARAPERT DNA SPEC QL NAA+PROBE: NOT DETECTED
B PERT DNA SPEC QL NAA+PROBE: NOT DETECTED
BASE EXCESS BLDA CALC-SCNC: 2.3 MMOL/L (ref 0–2)
BASOPHILS # BLD AUTO: 0.09 10*3/MM3 (ref 0–0.2)
BASOPHILS NFR BLD AUTO: 0.6 % (ref 0–1.5)
BDY SITE: ABNORMAL
BILIRUB SERPL-MCNC: 0.4 MG/DL (ref 0–1.2)
BODY TEMPERATURE: 37 C
BUN SERPL-MCNC: 16 MG/DL (ref 6–20)
BUN/CREAT SERPL: 17.2 (ref 7–25)
C PNEUM DNA NPH QL NAA+NON-PROBE: NOT DETECTED
CALCIUM SPEC-SCNC: 9.8 MG/DL (ref 8.6–10.5)
CHLORIDE SERPL-SCNC: 99 MMOL/L (ref 98–107)
CO2 BLDA-SCNC: 27.7 MMOL/L (ref 22–33)
CO2 SERPL-SCNC: 25 MMOL/L (ref 22–29)
COHGB MFR BLD: 0.8 % (ref 0–2)
CREAT SERPL-MCNC: 0.93 MG/DL (ref 0.76–1.27)
D-LACTATE SERPL-SCNC: 1.6 MMOL/L (ref 0.5–2)
D-LACTATE SERPL-SCNC: 2.5 MMOL/L (ref 0.5–2)
DEPRECATED RDW RBC AUTO: 47 FL (ref 37–54)
EOSINOPHIL # BLD AUTO: 0.14 10*3/MM3 (ref 0–0.4)
EOSINOPHIL NFR BLD AUTO: 0.9 % (ref 0.3–6.2)
EPAP: 0
ERYTHROCYTE [DISTWIDTH] IN BLOOD BY AUTOMATED COUNT: 13.2 % (ref 12.3–15.4)
FLUAV H1 2009 PAND RNA NPH QL NAA+PROBE: NOT DETECTED
FLUAV H1 HA GENE NPH QL NAA+PROBE: NOT DETECTED
FLUAV H3 RNA NPH QL NAA+PROBE: NOT DETECTED
FLUAV SUBTYP SPEC NAA+PROBE: NOT DETECTED
FLUBV RNA ISLT QL NAA+PROBE: NOT DETECTED
GFR SERPL CREATININE-BSD FRML MDRD: 85 ML/MIN/1.73
GLOBULIN UR ELPH-MCNC: 3.5 GM/DL
GLUCOSE SERPL-MCNC: 165 MG/DL (ref 65–99)
HADV DNA SPEC NAA+PROBE: NOT DETECTED
HCO3 BLDA-SCNC: 26.5 MMOL/L (ref 20–26)
HCOV 229E RNA SPEC QL NAA+PROBE: NOT DETECTED
HCOV HKU1 RNA SPEC QL NAA+PROBE: NOT DETECTED
HCOV NL63 RNA SPEC QL NAA+PROBE: NOT DETECTED
HCOV OC43 RNA SPEC QL NAA+PROBE: NOT DETECTED
HCT VFR BLD AUTO: 50.6 % (ref 37.5–51)
HCT VFR BLD CALC: 47.2 %
HGB BLD-MCNC: 16.2 G/DL (ref 13–17.7)
HGB BLDA-MCNC: 15.4 G/DL (ref 13.5–17.5)
HMPV RNA NPH QL NAA+NON-PROBE: NOT DETECTED
HPIV1 RNA SPEC QL NAA+PROBE: NOT DETECTED
HPIV2 RNA SPEC QL NAA+PROBE: NOT DETECTED
HPIV3 RNA NPH QL NAA+PROBE: NOT DETECTED
HPIV4 P GENE NPH QL NAA+PROBE: NOT DETECTED
IMM GRANULOCYTES # BLD AUTO: 0.46 10*3/MM3 (ref 0–0.05)
IMM GRANULOCYTES NFR BLD AUTO: 3 % (ref 0–0.5)
INHALED O2 CONCENTRATION: 44 %
INR PPP: 1.06 (ref 0.85–1.16)
IPAP: 0
LACTATE HOLD SPECIMEN: NORMAL
LYMPHOCYTES # BLD AUTO: 4.33 10*3/MM3 (ref 0.7–3.1)
LYMPHOCYTES NFR BLD AUTO: 28.4 % (ref 19.6–45.3)
M PNEUMO IGG SER IA-ACNC: NOT DETECTED
MAGNESIUM SERPL-MCNC: 2.3 MG/DL (ref 1.6–2.6)
MCH RBC QN AUTO: 30.5 PG (ref 26.6–33)
MCHC RBC AUTO-ENTMCNC: 32 G/DL (ref 31.5–35.7)
MCV RBC AUTO: 95.3 FL (ref 79–97)
METHGB BLD QL: 0.4 % (ref 0–1.5)
MODALITY: ABNORMAL
MONOCYTES # BLD AUTO: 1.76 10*3/MM3 (ref 0.1–0.9)
MONOCYTES NFR BLD AUTO: 11.5 % (ref 5–12)
NEUTROPHILS NFR BLD AUTO: 55.6 % (ref 42.7–76)
NEUTROPHILS NFR BLD AUTO: 8.46 10*3/MM3 (ref 1.7–7)
NOTE: ABNORMAL
NRBC BLD AUTO-RTO: 0 /100 WBC (ref 0–0.2)
NT-PROBNP SERPL-MCNC: 150.7 PG/ML (ref 0–900)
OXYHGB MFR BLDV: 95.3 % (ref 94–99)
PAW @ PEAK INSP FLOW SETTING VENT: 0 CMH2O
PCO2 BLDA: 38.8 MM HG (ref 35–45)
PCO2 TEMP ADJ BLD: 38.8 MM HG (ref 35–48)
PH BLDA: 7.44 PH UNITS (ref 7.35–7.45)
PH, TEMP CORRECTED: 7.44 PH UNITS
PLATELET # BLD AUTO: 303 10*3/MM3 (ref 140–450)
PMV BLD AUTO: 8.7 FL (ref 6–12)
PO2 BLDA: 78.4 MM HG (ref 83–108)
PO2 TEMP ADJ BLD: 78.4 MM HG (ref 83–108)
POTASSIUM SERPL-SCNC: 4.3 MMOL/L (ref 3.5–5.2)
PROCALCITONIN SERPL-MCNC: 0.09 NG/ML (ref 0–0.25)
PROT SERPL-MCNC: 7.6 G/DL (ref 6–8.5)
PROTHROMBIN TIME: 13.5 SECONDS (ref 11.5–14)
RBC # BLD AUTO: 5.31 10*6/MM3 (ref 4.14–5.8)
RHINOVIRUS RNA SPEC NAA+PROBE: NOT DETECTED
RSV RNA NPH QL NAA+NON-PROBE: NOT DETECTED
SARS-COV-2 RNA NPH QL NAA+NON-PROBE: NOT DETECTED
SODIUM SERPL-SCNC: 137 MMOL/L (ref 136–145)
T4 FREE SERPL-MCNC: 1.69 NG/DL (ref 0.93–1.7)
TOTAL RATE: 0 BREATHS/MINUTE
TROPONIN T SERPL-MCNC: <0.01 NG/ML (ref 0–0.03)
TSH SERPL DL<=0.05 MIU/L-ACNC: 4.19 UIU/ML (ref 0.27–4.2)
WBC # BLD AUTO: 15.24 10*3/MM3 (ref 3.4–10.8)

## 2020-12-05 PROCEDURE — 83880 ASSAY OF NATRIURETIC PEPTIDE: CPT | Performed by: EMERGENCY MEDICINE

## 2020-12-05 PROCEDURE — 94640 AIRWAY INHALATION TREATMENT: CPT

## 2020-12-05 PROCEDURE — 36600 WITHDRAWAL OF ARTERIAL BLOOD: CPT

## 2020-12-05 PROCEDURE — 80053 COMPREHEN METABOLIC PANEL: CPT | Performed by: EMERGENCY MEDICINE

## 2020-12-05 PROCEDURE — 0 IOPAMIDOL PER 1 ML: Performed by: EMERGENCY MEDICINE

## 2020-12-05 PROCEDURE — 82805 BLOOD GASES W/O2 SATURATION: CPT

## 2020-12-05 PROCEDURE — 71275 CT ANGIOGRAPHY CHEST: CPT

## 2020-12-05 PROCEDURE — 99285 EMERGENCY DEPT VISIT HI MDM: CPT

## 2020-12-05 PROCEDURE — 25010000002 HEPARIN (PORCINE) PER 1000 UNITS: Performed by: NURSE PRACTITIONER

## 2020-12-05 PROCEDURE — 83605 ASSAY OF LACTIC ACID: CPT | Performed by: EMERGENCY MEDICINE

## 2020-12-05 PROCEDURE — 25010000002 KETOROLAC TROMETHAMINE PER 15 MG: Performed by: EMERGENCY MEDICINE

## 2020-12-05 PROCEDURE — 94799 UNLISTED PULMONARY SVC/PX: CPT

## 2020-12-05 PROCEDURE — 82375 ASSAY CARBOXYHB QUANT: CPT

## 2020-12-05 PROCEDURE — 87040 BLOOD CULTURE FOR BACTERIA: CPT | Performed by: EMERGENCY MEDICINE

## 2020-12-05 PROCEDURE — 84439 ASSAY OF FREE THYROXINE: CPT | Performed by: EMERGENCY MEDICINE

## 2020-12-05 PROCEDURE — 85025 COMPLETE CBC W/AUTO DIFF WBC: CPT | Performed by: EMERGENCY MEDICINE

## 2020-12-05 PROCEDURE — 99223 1ST HOSP IP/OBS HIGH 75: CPT | Performed by: INTERNAL MEDICINE

## 2020-12-05 PROCEDURE — 0202U NFCT DS 22 TRGT SARS-COV-2: CPT | Performed by: EMERGENCY MEDICINE

## 2020-12-05 PROCEDURE — 84484 ASSAY OF TROPONIN QUANT: CPT | Performed by: EMERGENCY MEDICINE

## 2020-12-05 PROCEDURE — 85610 PROTHROMBIN TIME: CPT | Performed by: EMERGENCY MEDICINE

## 2020-12-05 PROCEDURE — 93005 ELECTROCARDIOGRAM TRACING: CPT | Performed by: EMERGENCY MEDICINE

## 2020-12-05 PROCEDURE — 84443 ASSAY THYROID STIM HORMONE: CPT | Performed by: EMERGENCY MEDICINE

## 2020-12-05 PROCEDURE — 83050 HGB METHEMOGLOBIN QUAN: CPT

## 2020-12-05 PROCEDURE — 63710000001 PREDNISONE PER 1 MG: Performed by: NURSE PRACTITIONER

## 2020-12-05 PROCEDURE — 83735 ASSAY OF MAGNESIUM: CPT | Performed by: EMERGENCY MEDICINE

## 2020-12-05 PROCEDURE — 84145 PROCALCITONIN (PCT): CPT | Performed by: EMERGENCY MEDICINE

## 2020-12-05 PROCEDURE — 71045 X-RAY EXAM CHEST 1 VIEW: CPT

## 2020-12-05 RX ORDER — TOBRAMYCIN INHALATION SOLUTION 300 MG/5ML
300 INHALANT RESPIRATORY (INHALATION)
Status: COMPLETED | OUTPATIENT
Start: 2020-12-05 | End: 2020-12-09

## 2020-12-05 RX ORDER — BUDESONIDE 0.5 MG/2ML
0.5 INHALANT ORAL
Status: DISCONTINUED | OUTPATIENT
Start: 2020-12-05 | End: 2020-12-11 | Stop reason: HOSPADM

## 2020-12-05 RX ORDER — LIDOCAINE HYDROCHLORIDE AND EPINEPHRINE 10; 10 MG/ML; UG/ML
10 INJECTION, SOLUTION INFILTRATION; PERINEURAL ONCE
Status: COMPLETED | OUTPATIENT
Start: 2020-12-05 | End: 2020-12-05

## 2020-12-05 RX ORDER — KETOROLAC TROMETHAMINE 15 MG/ML
15 INJECTION, SOLUTION INTRAMUSCULAR; INTRAVENOUS EVERY 6 HOURS PRN
Status: DISPENSED | OUTPATIENT
Start: 2020-12-05 | End: 2020-12-10

## 2020-12-05 RX ORDER — ARFORMOTEROL TARTRATE 15 UG/2ML
15 SOLUTION RESPIRATORY (INHALATION)
Status: DISCONTINUED | OUTPATIENT
Start: 2020-12-05 | End: 2020-12-11 | Stop reason: HOSPADM

## 2020-12-05 RX ORDER — GUAIFENESIN 600 MG/1
1200 TABLET, EXTENDED RELEASE ORAL DAILY PRN
Status: DISCONTINUED | OUTPATIENT
Start: 2020-12-05 | End: 2020-12-08

## 2020-12-05 RX ORDER — PREDNISONE 20 MG/1
20 TABLET ORAL
Status: DISCONTINUED | OUTPATIENT
Start: 2020-12-05 | End: 2020-12-10

## 2020-12-05 RX ORDER — HEPARIN SODIUM 5000 [USP'U]/ML
5000 INJECTION, SOLUTION INTRAVENOUS; SUBCUTANEOUS EVERY 8 HOURS SCHEDULED
Status: DISCONTINUED | OUTPATIENT
Start: 2020-12-05 | End: 2020-12-06

## 2020-12-05 RX ORDER — IPRATROPIUM BROMIDE AND ALBUTEROL SULFATE 2.5; .5 MG/3ML; MG/3ML
3 SOLUTION RESPIRATORY (INHALATION)
Status: DISCONTINUED | OUTPATIENT
Start: 2020-12-05 | End: 2020-12-11 | Stop reason: HOSPADM

## 2020-12-05 RX ORDER — DOXYCYCLINE 100 MG/1
100 CAPSULE ORAL EVERY 12 HOURS SCHEDULED
Status: COMPLETED | OUTPATIENT
Start: 2020-12-05 | End: 2020-12-10

## 2020-12-05 RX ADMIN — PREDNISONE 20 MG: 20 TABLET ORAL at 08:15

## 2020-12-05 RX ADMIN — ARFORMOTEROL TARTRATE 15 MCG: 15 SOLUTION RESPIRATORY (INHALATION) at 08:51

## 2020-12-05 RX ADMIN — IPRATROPIUM BROMIDE AND ALBUTEROL SULFATE 3 ML: 2.5; .5 SOLUTION RESPIRATORY (INHALATION) at 17:07

## 2020-12-05 RX ADMIN — IPRATROPIUM BROMIDE AND ALBUTEROL SULFATE 3 ML: 2.5; .5 SOLUTION RESPIRATORY (INHALATION) at 12:33

## 2020-12-05 RX ADMIN — HEPARIN SODIUM 5000 UNITS: 5000 INJECTION, SOLUTION INTRAVENOUS; SUBCUTANEOUS at 21:05

## 2020-12-05 RX ADMIN — KETOROLAC TROMETHAMINE 15 MG: 15 INJECTION, SOLUTION INTRAMUSCULAR; INTRAVENOUS at 17:47

## 2020-12-05 RX ADMIN — ARFORMOTEROL TARTRATE 15 MCG: 15 SOLUTION RESPIRATORY (INHALATION) at 19:53

## 2020-12-05 RX ADMIN — KETOROLAC TROMETHAMINE 15 MG: 15 INJECTION, SOLUTION INTRAMUSCULAR; INTRAVENOUS at 03:24

## 2020-12-05 RX ADMIN — TOBRAMYCIN 300 MG: 300 SOLUTION ORAL at 20:02

## 2020-12-05 RX ADMIN — IPRATROPIUM BROMIDE AND ALBUTEROL SULFATE 3 ML: 2.5; .5 SOLUTION RESPIRATORY (INHALATION) at 19:53

## 2020-12-05 RX ADMIN — DOXYCYCLINE 100 MG: 100 CAPSULE ORAL at 20:48

## 2020-12-05 RX ADMIN — IOPAMIDOL 75 ML: 755 INJECTION, SOLUTION INTRAVENOUS at 02:33

## 2020-12-05 RX ADMIN — HEPARIN SODIUM 5000 UNITS: 5000 INJECTION, SOLUTION INTRAVENOUS; SUBCUTANEOUS at 08:15

## 2020-12-05 RX ADMIN — DOXYCYCLINE 100 MG: 100 CAPSULE ORAL at 08:15

## 2020-12-05 RX ADMIN — KETOROLAC TROMETHAMINE 15 MG: 15 INJECTION, SOLUTION INTRAMUSCULAR; INTRAVENOUS at 11:52

## 2020-12-05 RX ADMIN — LIDOCAINE HYDROCHLORIDE,EPINEPHRINE BITARTRATE 10 ML: 10; .01 INJECTION, SOLUTION INFILTRATION; PERINEURAL at 03:11

## 2020-12-05 RX ADMIN — IPRATROPIUM BROMIDE AND ALBUTEROL SULFATE 3 ML: 2.5; .5 SOLUTION RESPIRATORY (INHALATION) at 08:51

## 2020-12-05 RX ADMIN — BUDESONIDE 0.5 MG: 0.5 INHALANT RESPIRATORY (INHALATION) at 08:51

## 2020-12-05 RX ADMIN — BUDESONIDE 0.5 MG: 0.5 INHALANT RESPIRATORY (INHALATION) at 19:54

## 2020-12-05 RX ADMIN — TOBRAMYCIN 300 MG: 300 SOLUTION ORAL at 08:51

## 2020-12-05 NOTE — ED PROVIDER NOTES
Subjective   54-year-old male with a history of alpha-1 antitrypsin deficiency and severe COPD presents for evaluation of shortness of breath.  Of note, the patient was just released from our facility on 12/3 after being treated for acute on chronic respiratory failure.  He states that earlier today he became significantly short of breath.  He typically wears 2 to 3 L of oxygen at all times but increase his oxygen requirement and remained hypoxic.  He subsequently called EMS and was brought to our facility for evaluation.  He is currently on a nonrebreather.  He denies any fevers.          Review of Systems   Respiratory: Positive for shortness of breath.    All other systems reviewed and are negative.      Past Medical History:   Diagnosis Date   • Alpha-1-antitrypsin deficiency (CMS/HCC)    • Asthma, extrinsic smoke, pollen   • Chronic bronchitis (CMS/HCC)    • COPD (chronic obstructive pulmonary disease) (CMS/HCC)    • Cough    • Emphysema of lung (CMS/HCC) COPD diagnosed approximately 14 years ago   • Lung nodule I dont know    I dont know what this is   • Recurrent pneumonia 9/9/2020   • Wears glasses        Allergies   Allergen Reactions   • Other Other (See Comments)     Opioid Analgesics (recovering addict)       Past Surgical History:   Procedure Laterality Date   • BRONCHOSCOPY N/A 10/30/2019    Procedure: BRONCHOSCOPY WITH ENDOBRONHCIAL VALVE PLACEMENT;  Surgeon: Phan Castellano MD;  Location:  [facility];  Service: Pulmonary   • BRONCHOSCOPY N/A 10/24/2019    Procedure: BRONCHOSCOPY WITH ENDOBRONCHIAL VALVE PLACEMENT;  Surgeon: Devaughn Pressley MD;  Location:  [facility];  Service: Pulmonary   • BRONCHOSCOPY  10/24/19 & 10/29/19?   • BRONCHOSCOPY N/A 9/11/2020    Procedure: BRONCHOSCOPY;  Surgeon: Phan Catsellano MD;  Location:  [facility];  Service: Pulmonary;  Laterality: N/A;   • BRONCHOSCOPY N/A 10/30/2020    Procedure: BRONCHOSCOPY WITH FLUOROSCOPY AND ENDOBRONCHIAL  "VALVE REMOVAL;  Surgeon: Pahn Castellano MD;  Location: [facility];  Service: Pulmonary;  Laterality: N/A;       Family History   Problem Relation Age of Onset   • Heart disease Mother    • Diabetes Mother    • Alpha-1 antitrypsin deficiency Brother    • Emphysema Brother         also diagnosed with Alpha 1 antitripsan deficiency   • Lung cancer Other    • Emphysema Other    • Emphysema Paternal Grandmother         prognosis in  was \"lung Cancer\" , however, it is suspect that she suffered from the degeneration of her lungs due to Alpha 1 Antitripsan       Social History     Socioeconomic History   • Marital status: Single     Spouse name: Not on file   • Number of children: Not on file   • Years of education: Not on file   • Highest education level: Not on file   Tobacco Use   • Smoking status: Former Smoker     Packs/day: 1.50     Years: 25.00     Pack years: 37.50     Types: Cigarettes     Start date: 1981     Quit date:      Years since quittin.9   • Smokeless tobacco: Never Used   Substance and Sexual Activity   • Alcohol use: No     Comment: In recovery since 1994   • Drug use: Yes     Comment: In recovery since 1994   • Sexual activity: Defer     Partners: Female     Birth control/protection: I.U.D., Rhythm           Objective   Physical Exam  Vitals signs and nursing note reviewed.   Constitutional:       Appearance: Normal appearance. He is well-developed. He is not diaphoretic.      Comments: Uncomfortable appearing male in respiratory distress   HENT:      Head: Normocephalic and atraumatic.   Neck:      Musculoskeletal: Normal range of motion.      Vascular: No carotid bruit or JVD.   Cardiovascular:      Rate and Rhythm: Regular rhythm.      Pulses: Normal pulses.      Heart sounds: Normal heart sounds. No murmur. No friction rub. No gallop.       Comments: Tachycardic  Pulmonary:      Effort: Respiratory distress present.      Breath sounds: No wheezing or rales.    "   Comments: Markedly tachypneic with accessory muscle use and retractions noted, speaking in short phrases, equal breath sounds noted bilaterally, poor air movement  Abdominal:      General: Bowel sounds are normal. There is no distension.      Palpations: Abdomen is soft. There is no mass.      Tenderness: There is no abdominal tenderness. There is no guarding.   Musculoskeletal: Normal range of motion.      Right lower leg: No edema.      Left lower leg: No edema.   Skin:     General: Skin is warm and dry.      Coloration: Skin is not pale.      Findings: No erythema or rash.   Neurological:      General: No focal deficit present.      Mental Status: He is alert and oriented to person, place, and time.   Psychiatric:         Thought Content: Thought content normal.         Judgment: Judgment normal.      Comments: Anxious         Critical Care  Performed by: Johnathon Crocker MD  Authorized by: Johnathon Crocker MD     Critical care provider statement:     Critical care time (minutes):  40    Critical care was necessary to treat or prevent imminent or life-threatening deterioration of the following conditions:  Respiratory failure    Critical care was time spent personally by me on the following activities:  Development of treatment plan with patient or surrogate, discussions with consultants, evaluation of patient's response to treatment, examination of patient, obtaining history from patient or surrogate, review of old charts, re-evaluation of patient's condition, pulse oximetry, ordering and review of radiographic studies, ordering and review of laboratory studies and ordering and performing treatments and interventions  Chest Tube Insertion    Date/Time: 12/5/2020 3:41 AM  Performed by: Johnathon Crocker MD  Authorized by: Johnathon Crocker MD     Consent:     Consent obtained:  Verbal    Consent given by:  Patient    Risks discussed:  Damage to surrounding structures, bleeding, incomplete  drainage, infection and pain    Alternatives discussed:  No treatment  Pre-procedure details:     Skin preparation:  ChloraPrep    Preparation: Patient was prepped and draped in the usual sterile fashion    Anesthesia (see MAR for exact dosages):     Anesthesia method:  Local infiltration    Local anesthetic:  Lidocaine 1% WITH epi  Procedure details:     Placement location:  L lateral    Scalpel size:  11    Tube size (Congolese): 14.    Tension pneumothorax: no      Tube connected to:  Water seal    Drainage characteristics:  Air only    Suture material:  0 silk    Dressing:  Petrolatum-impregnated gauze  Post-procedure details:     Post-insertion x-ray findings: tube in good position      Patient tolerance of procedure:  Tolerated well, no immediate complications               ED Course  ED Course as of Dec 05 0551   Sat Dec 05, 2020   1711 54-year-old male with a history of alpha-1 antitrypsin deficiency and severe COPD, oxygen dependent, presents for evaluation of severe respiratory distress.  Of note, he was just released from our facility after being treated for acute respiratory failure and pneumonia on 12/3.  On arrival, the patient is markedly tachypneic with labored breathing.  He is currently on a nonrebreather.  Labs remarkable for mild leukocytosis.  Moderate risk Well's.  I obtained a chest CTA which was negative for pulmonary embolism or obvious pneumonia.  When I viewed the patient's lung fields on CT, there was an obvious left-sided pneumothorax which was not read by the radiologist.  After obtaining informed consent, a pigtail chest tube was placed in the patient's left fifth intercostal space in the midaxillary line under sterile conditions.  No complications.  The patient tolerated the procedure well.  Chest x-ray revealed adequate position of the tube.  I attempted to wean the patient off of his nonrebreather and onto a nasal cannula but he was unable to tolerate it.  I requested a high flow nasal  cannula for respiratory therapy, but we are currently out of these units secondary to our number of inpatient COVID-19 patients.  Given the patient's oxygen requirement, I feel that he warrants admission to the ICU at this point.  Following chest tube placement, the patient does look improved.  I discussed the patient's case with Dr. Rojas, and the patient will be admitted under his care for further evaluation and treatment.  He is aware/agreeable with the plan at this time.    [DD]   0438 I discussed the patient's case with Dr. Del Rosario, the patient will be admitted under his care to the ICU for further evaluation and treatment.  The patient is aware/agreeable with the plan at this time.    [DD]      ED Course User Index  [DD] Johnathon Crocker MD                                   Recent Results (from the past 24 hour(s))   Magnesium    Collection Time: 12/05/20 12:18 AM    Specimen: Blood   Result Value Ref Range    Magnesium 2.3 1.6 - 2.6 mg/dL   Troponin    Collection Time: 12/05/20 12:19 AM    Specimen: Blood   Result Value Ref Range    Troponin T <0.010 0.000 - 0.030 ng/mL   Comprehensive Metabolic Panel    Collection Time: 12/05/20 12:19 AM    Specimen: Blood   Result Value Ref Range    Glucose 165 (H) 65 - 99 mg/dL    BUN 16 6 - 20 mg/dL    Creatinine 0.93 0.76 - 1.27 mg/dL    Sodium 137 136 - 145 mmol/L    Potassium 4.3 3.5 - 5.2 mmol/L    Chloride 99 98 - 107 mmol/L    CO2 25.0 22.0 - 29.0 mmol/L    Calcium 9.8 8.6 - 10.5 mg/dL    Total Protein 7.6 6.0 - 8.5 g/dL    Albumin 4.10 3.50 - 5.20 g/dL    ALT (SGPT) 40 1 - 41 U/L    AST (SGOT) 24 1 - 40 U/L    Alkaline Phosphatase 58 39 - 117 U/L    Total Bilirubin 0.4 0.0 - 1.2 mg/dL    eGFR Non African Amer 85 >60 mL/min/1.73    Globulin 3.5 gm/dL    A/G Ratio 1.2 g/dL    BUN/Creatinine Ratio 17.2 7.0 - 25.0    Anion Gap 13.0 5.0 - 15.0 mmol/L   Lactic Acid, Plasma    Collection Time: 12/05/20 12:19 AM    Specimen: Blood   Result Value Ref Range     Lactate 2.5 (C) 0.5 - 2.0 mmol/L   CBC Auto Differential    Collection Time: 12/05/20 12:19 AM    Specimen: Blood   Result Value Ref Range    WBC 15.24 (H) 3.40 - 10.80 10*3/mm3    RBC 5.31 4.14 - 5.80 10*6/mm3    Hemoglobin 16.2 13.0 - 17.7 g/dL    Hematocrit 50.6 37.5 - 51.0 %    MCV 95.3 79.0 - 97.0 fL    MCH 30.5 26.6 - 33.0 pg    MCHC 32.0 31.5 - 35.7 g/dL    RDW 13.2 12.3 - 15.4 %    RDW-SD 47.0 37.0 - 54.0 fl    MPV 8.7 6.0 - 12.0 fL    Platelets 303 140 - 450 10*3/mm3    Neutrophil % 55.6 42.7 - 76.0 %    Lymphocyte % 28.4 19.6 - 45.3 %    Monocyte % 11.5 5.0 - 12.0 %    Eosinophil % 0.9 0.3 - 6.2 %    Basophil % 0.6 0.0 - 1.5 %    Immature Grans % 3.0 (H) 0.0 - 0.5 %    Neutrophils, Absolute 8.46 (H) 1.70 - 7.00 10*3/mm3    Lymphocytes, Absolute 4.33 (H) 0.70 - 3.10 10*3/mm3    Monocytes, Absolute 1.76 (H) 0.10 - 0.90 10*3/mm3    Eosinophils, Absolute 0.14 0.00 - 0.40 10*3/mm3    Basophils, Absolute 0.09 0.00 - 0.20 10*3/mm3    Immature Grans, Absolute 0.46 (H) 0.00 - 0.05 10*3/mm3    nRBC 0.0 0.0 - 0.2 /100 WBC   Lactic Acid, Reflex Timer (This will reflex a repeat order 3-3:15 hours after ordered.)    Collection Time: 12/05/20 12:19 AM    Specimen: Blood   Result Value Ref Range    Hold Tube Hold for add-ons.    Respiratory Panel PCR w/COVID-19(SARS-CoV-2) [facility] In-House, NP Swab in UTM/VTM, 3-4 HR TAT - Swab, Nasopharynx    Collection Time: 12/05/20 12:20 AM    Specimen: Nasopharynx; Swab   Result Value Ref Range    ADENOVIRUS, PCR Not Detected Not Detected    Coronavirus 229E Not Detected Not Detected    Coronavirus HKU1 Not Detected Not Detected    Coronavirus NL63 Not Detected Not Detected    Coronavirus OC43 Not Detected Not Detected    COVID19 Not Detected Not Detected - Ref. Range    Human Metapneumovirus Not Detected Not Detected    Human Rhinovirus/Enterovirus Not Detected Not Detected    Influenza A PCR Not Detected Not Detected    Influenza A H1 Not Detected Not Detected     Influenza A H1 2009 PCR Not Detected Not Detected    Influenza A H3 Not Detected Not Detected    Influenza B PCR Not Detected Not Detected    Parainfluenza Virus 1 Not Detected Not Detected    Parainfluenza Virus 2 Not Detected Not Detected    Parainfluenza Virus 3 Not Detected Not Detected    Parainfluenza Virus 4 Not Detected Not Detected    RSV, PCR Not Detected Not Detected    Bordetella pertussis pcr Not Detected Not Detected    Bordetella parapertussis PCR Not Detected Not Detected    Chlamydophila pneumoniae PCR Not Detected Not Detected    Mycoplasma pneumo by PCR Not Detected Not Detected   Lactic Acid, Reflex    Collection Time: 12/05/20  4:40 AM    Specimen: Blood   Result Value Ref Range    Lactate 1.6 0.5 - 2.0 mmol/L   Blood Gas, Arterial With Co-Ox    Collection Time: 12/05/20  5:13 AM    Specimen: Arterial Blood   Result Value Ref Range    Site Right Brachial     Fermin's Test N/A     pH, Arterial 7.443 7.350 - 7.450 pH units    pCO2, Arterial 38.8 35.0 - 45.0 mm Hg    pO2, Arterial 78.4 (L) 83.0 - 108.0 mm Hg    HCO3, Arterial 26.5 (H) 20.0 - 26.0 mmol/L    Base Excess, Arterial 2.3 (H) 0.0 - 2.0 mmol/L    Hemoglobin, Blood Gas 15.4 13.5 - 17.5 g/dL    Hematocrit, Blood Gas 47.2 %    Oxyhemoglobin 95.3 94 - 99 %    Methemoglobin 0.40 0.00 - 1.50 %    Carboxyhemoglobin 0.8 0 - 2 %    CO2 Content 27.7 22 - 33 mmol/L    Temperature 37.0 C    Barometric Pressure for Blood Gas      Modality Nasal Cannula     FIO2 44 %    Rate 0 Breaths/minute    PIP 0 cmH2O    IPAP 0     EPAP 0     Note      pH, Temp Corrected 7.443 pH Units    pCO2, Temperature Corrected 38.8 35 - 48 mm Hg    pO2, Temperature Corrected 78.4 (L) 83 - 108 mm Hg     Note: In addition to lab results from this visit, the labs listed above may include labs taken at another facility or during a different encounter within the last 24 hours. Please correlate lab times with ED admission and discharge times for further clarification of the  services performed during this visit.    XR Chest 1 View   Final Result   Small caliber left chest tube is in place. There is a pneumothorax in the left apex still present. The pleural line is 4 cm from the apex of the chest.      Marked emphysematous changes      Signer Name: Duran Robert MD    Signed: 12/5/2020 3:36 AM    Workstation Name: [facility]     [facility]      CT Angiogram Chest   Final Result   Addendum 1 of 1   //////////// ADDENDUM #1 ////////////   Addendum. There is a left-sided pneumothorax present. Its probably about    25-30% in volume. The ER physician is already aware of these findings. A    chest tube has been placed on the left side      Signer Name: Duran Robert MD    Signed: 12/5/2020 3:38 AM    Workstation Name: [facility]     [facility]////////////  ORIGINAL REPORT    ////////////   CTA Chest      INDICATION:    Shortness of breath and hypoxia tonight      TECHNIQUE:    CT angiogram of the chest with IV contrast. 3-D reconstructions were    obtained and reviewed.   Radiation dose reduction techniques included    automated exposure control or exposure modulation based on body size.    Count of known CT and cardiac nuc med studies   performed in previous 12 months: 1.       COMPARISON:    11/24/2020      FINDINGS:    There are pronounced emphysematous changes throughout the lungs with    stable areas of subsegmental atelectasis in the lower lobes bilaterally    and in the right upper lobe.      Thyroid gland is normal. The aorta is normal in size. There is no    dissection. There is adequate opacification of the pulmonary arteries and    there is no CT evidence of pulmonary embolus.               Final        Vitals:    12/05/20 0430 12/05/20 0500 12/05/20 0503 12/05/20 0507   BP: 116/83 93/77     BP Location:       Patient Position:       Pulse:   106    Resp:    19   Temp:       TempSrc:       SpO2: 94%  97% 96%   Weight:       Height:          Medications   ketorolac (TORADOL) injection 15 mg (15 mg Intravenous Given 12/5/20 0324)   iopamidol (ISOVUE-370) 76 % injection 100 mL (75 mL Intravenous Given 12/5/20 1573)   lidocaine-EPINEPHrine (XYLOCAINE W/EPI) 1 %-1:034143 injection 10 mL (10 mL Injection Given 12/5/20 0311)     ECG/EMG Results (last 24 hours)     Procedure Component Value Units Date/Time    ECG 12 Lead [[id number]] Collected: 12/05/20 0013     Updated: 12/05/20 0011        ECG 12 Lead                     MDM    Final diagnoses:   Pneumothorax on left   COPD exacerbation (CMS/HCC)   Acute respiratory failure with hypoxia (CMS/HCC)   Leukocytosis, unspecified type            Johnathon Crocker MD  12/05/20 [id number]

## 2020-12-05 NOTE — PROGRESS NOTES
Situation reviewed.  At present patient appears comfortable and O2 sats are 94% on 6 L.  Small bore chest tube remains in place but there is no air leak.  Chest x-ray post chest tube placement shows incomplete reexpansion of lung as of 0322 today.  Situation discussed with Dr. Kincaid and for now will observe and watch for complete reexpansion of lung.  At the present time patient is having repetitive pneumothoraces despite earlier successful reexpansion's along with small bore chest tube.  Would prefer to avoid aggressive measures such as chemical pleurodesis, or VATS with mechanical pleurodesis as would eliminate the possibility of transplant of the left lung.  We will see if the lung will fully reexpand, and discussed the situation again with Dr. Castellano the first of the week.  May have to remove all remaining valves in the left lower lobe and allow it to reexpand, but not sure that would be of benefit regarding his pneumothorax.    Electronically signed by Devaughn Hernandez MD, 12/05/20, 2:51 PM EST.   Pulmonary / Critical care medicine

## 2020-12-05 NOTE — PLAN OF CARE
Goal Outcome Evaluation:  Plan of Care Reviewed With: patient     Outcome Summary: Neurologically intact. Remains on 6L NC. Chest tube in place. No air leak. VSS. Tolertaing diet. Ambulated in room. Pt remains in pain. Toradol given as ordered.

## 2020-12-05 NOTE — PLAN OF CARE
Goal Outcome Evaluation:  Patient arrived from ED at 0545. Left chest tube in place, bloody drainage in tube. No subcutaneous emphysema, no air leak present. C/o pain 7/10 at rest, pain management discussed. Alert and oriented. 6LNC, O2 96%. Slightly tachypneic upon arrival. Sinus tachycardia. SBP has lowered now that he is more settled. Afebrile. Belongings at bedside with patient.

## 2020-12-05 NOTE — H&P
Chief complaint:  Dyspnea     Subjective     Balwinder Willams is a 54 y.o. male that presents to [facility] ED on 12/5 with complaints of dyspnea found to have a left pneumothorax.     The patient has a history of alpha-1 antitrypsin deficiency on Prolastin and severe COPD s/p endobronchial valve placement in 2019 and recent removal. He is well-known to the pulmonary service and was recently admitted to our facility from 11/8-11/18 for recurrent bronchitis and underwent endobronchial valve removal by Dr. Castellano (1 from the lingula and 2 from the superior segment of the LLL) that was complicated by a post-operative left PTX. Initially a thoracostomy tube was placed with concern for tension PTX with complete re-expansion of the lung. The chest tube was removed, however he developed another asymptomatic PTX which warranted SBCT insertion with subsequent resolution. At that time bronchoscopy wash yielded light growth PSA that was pansensitive treated with Levaquin and Tobramycin nebs. He was discharged home then readmitted on 11/24 until 12/4 for dyspnea and hypoxic respiratory failure and CHASTITY PNA treated empirically with Zosyn, Doxycycline, and Tobramycin nebs as well as IV steroids, Brovana, Duonebs, and budesonide with improvement. Bronch wash from 10/30 grew MAC (negvative AFB) to which a sensitivity panel was sent with anticipation of initiation of therapy (Azithromycin/Rifampin/Ethambutol) but also would require a baseline eye examination and normal LFTs. He is currently undergoing arrangements to [facility] for evaluation of pulmonary transplantation.     Today (12/5) he presents to our ED in with hypoxic respiratory distress noted to have a left-sided PTX with SBCT placement in the ED. CTA negative for pulmonary embolism. Due to ongoing hypoxic despite chest tube placement, ICU admission was requested.  Currently remains dyspneic but more comfortable.  Some left-sided chest pain related to chest tube but not  "severe.    COVID-19 testing on 9/9, 10/28, 11/8, 11/24, and 12/5 negative.    History  Past Medical History:   Diagnosis Date   • Alpha-1-antitrypsin deficiency (CMS/HCC)    • Asthma, extrinsic smoke, pollen   • Chronic bronchitis (CMS/HCC)    • COPD (chronic obstructive pulmonary disease) (CMS/[facility])    • Cough    • Emphysema of lung (CMS/[facility]) COPD diagnosed approximately 14 years ago   • Lung nodule I dont know    I dont know what this is   • Recurrent pneumonia 9/9/2020   • Wears glasses      Past Surgical History:   Procedure Laterality Date   • BRONCHOSCOPY N/A 10/30/2019    Procedure: BRONCHOSCOPY WITH ENDOBRONHCIAL VALVE PLACEMENT;  Surgeon: Phan Castellano MD;  Location:  [facility];  Service: Pulmonary   • BRONCHOSCOPY N/A 10/24/2019    Procedure: BRONCHOSCOPY WITH ENDOBRONCHIAL VALVE PLACEMENT;  Surgeon: Devaughn Pressley MD;  Location:  [facility];  Service: Pulmonary   • BRONCHOSCOPY  10/24/19 & 10/29/19?   • BRONCHOSCOPY N/A 9/11/2020    Procedure: BRONCHOSCOPY;  Surgeon: Phan Castellano MD;  Location:  [facility];  Service: Pulmonary;  Laterality: N/A;   • BRONCHOSCOPY N/A 10/30/2020    Procedure: BRONCHOSCOPY WITH FLUOROSCOPY AND ENDOBRONCHIAL VALVE REMOVAL;  Surgeon: Phan Castellano MD;  Location:  [facility];  Service: Pulmonary;  Laterality: N/A;     Family History   Problem Relation Age of Onset   • Heart disease Mother    • Diabetes Mother    • Alpha-1 antitrypsin deficiency Brother    • Emphysema Brother         also diagnosed with Alpha 1 antitripsan deficiency   • Lung cancer Other    • Emphysema Other    • Emphysema Paternal Grandmother         prognosis in 1975 was \"lung Cancer\" , however, it is suspect that she suffered from the degeneration of her lungs due to Alpha 1 Antitripsan     Social History     Tobacco Use   • Smoking status: Former Smoker     Packs/day: 1.50     Years: 25.00     Pack years: 37.50     Types: Cigarettes     Start date: 1/1/1981 "     Quit date: 2006     Years since quittin.9   • Smokeless tobacco: Never Used   Substance Use Topics   • Alcohol use: No     Comment: In recovery since 1994   • Drug use: Yes     Comment: In recovery since 1994     E-cigarette/Vaping   • E-cigarette/Vaping Use Former User    • Passive Exposure Yes    • Quit Date 1/1/10      E-cigarette/Vaping Substances   • Nicotine Yes    • THC No    • CBD No    • Flavoring No      Medications Prior to Admission   Medication Sig Dispense Refill Last Dose   • arformoterol (BROVANA) 15 MCG/2ML nebulizer solution Take 2 mL by nebulization 2 (Two) Times a Day for 6 days. 60 mL 0    • budesonide (PULMICORT) 0.5 MG/2ML nebulizer solution Take 2 mL by nebulization 2 (Two) Times a Day for 6 doses. 60 mL 0    • doxycycline (MONODOX) 100 MG capsule Take 1 capsule by mouth Every 12 (Twelve) Hours for 3 doses for Pneumonia 3 capsule 0    • fluticasone (Flonase) 50 MCG/ACT nasal spray 2 sprays into the nostril(s) as directed by provider Daily. 9.9 mL 6    • guaiFENesin (MUCINEX) 600 MG 12 hr tablet Take 1,200 mg by mouth Daily As Needed for Cough.      • ibuprofen (ADVIL,MOTRIN) 800 MG tablet Take 800 mg by mouth Every 6 (Six) Hours As Needed for Mild Pain .      • ipratropium-albuterol (DUO-NEB) 0.5-2.5 mg/3 ml nebulizer Take 3 mL by nebulization 4 (Four) Times a Day. 360 mL     • predniSONE (DELTASONE) 20 MG tablet Take 1 tablet by mouth Daily for 7 days, THEN 0.5 tablets Daily for 7 days. 11 tablet 0    • PROLASTIN-C 1000 MG/20ML solution 1 (One) Time Per Week.      • tobramycin PF (YOLIE) 300 MG/5ML nebulizer solution Take 300 mg by nebulization 2 (Two) Times a Day.        Allergies:  Other    Review of Systems   Pertinent items are noted in HPI, all other systems reviewed and negative      Objective     Vital Signs  Temp:  [98.2 °F (36.8 °C)] 98.2 °F (36.8 °C)  Heart Rate:  [106-132] 106  Resp:  [19-42] 19  BP: ()/() 93/77    Physical  "Exam:    Objective:  General Appearance:  Uncomfortable and ill-appearing.    Vital signs: (most recent): Blood pressure 93/77, pulse 106, temperature 98.2 °F (36.8 °C), temperature source Oral, resp. rate 19, height 172.7 cm (68\"), weight 59 kg (130 lb), SpO2 96 %.    HEENT: Normal HEENT exam.  (Nasal cannula O2)    Lungs:  Tachypnea and increased effort.  There are decreased breath sounds.  No rales, wheezes or rhonchi.    Heart: Tachycardia.  Regular rhythm.  S1 normal and S2 normal.  No murmur, gallop or friction rub.   Chest: Symmetric chest wall expansion. (Small bore left chest tube in place with good respiratory variation)  Abdomen: Abdomen is soft and non-distended.  Bowel sounds are normal.   There is no abdominal tenderness.   There is no mass. There is no splenomegaly. There is no hepatomegaly.   Extremities: There is no deformity or dependent edema.    Neurological: Patient is alert and oriented to person, place and time.    Pupils:  Pupils are equal, round, and reactive to light.    Skin:  Warm and dry.              Results Review:    I reviewed the patient's new clinical results.  I reviewed the patient's new imaging results and agree with the interpretation.  I personally viewed and interpreted the patient's EKG/Telemetry data    Assessment/Plan     Assessment:    Active Hospital Problems    Diagnosis   • **Acute on chronic respiratory failure with hypoxia    • Recurrent L PTX    • Stage IV emphysema s/p EBV placement X3 w/ subsequent extraction   • PSA PNA   • Mycobacterium avium complex (CMS/HCC)   • Former smoker (Resolved 2006)   • Alpha-1-antitrypsin deficiency (on replacement)     A.  Labs of 11/20/2014 reports an alpha 1 antitrypsin deficiency of 4.7 with a phenotype with Z bands detected and genotyping revealing the presence of most common deficiency allele type Z and an inferred non-expressing null allele.    B.  On Zemaira     • Dependence on supplemental oxygen (3L NC)      Maricarmen Castaneda, " APRN, AGACNP-BC, FNP-BC   Pulmonary and Critical Care       54-year-old male with stage IV emphysema secondary to alpha-1 antitrypsin deficiency and previous tobacco use.  Most recent FEV1 1.11 L or 32% predicted.  Currently receives Prolastin.  On home oxygen 3 L.  Had endobronchial valve placement in 2019 and he developed recurrent infections and as a result several of the valves were removed on October 30 of this year.  Subsequent to that he had pneumothorax which was recurrent requiring chest tube which was removed on November 18.  He also had Pseudomonas infection and was treated with Zosyn and nebulized tobramycin.  He also was found to have Mycobacterium avium complex positive bronchial washings by cultures but with negative smear from October 30 and September 11 of this year.  Therapy has been considered due to persistent infiltrates but has not been initiated as of yet.    He now presents with recurrent left-sided pneumothorax and had a small bore chest tube placed in the emergency room with improvement.  Oxygen has been weaned to nasal cannula.  He remains dyspneic.  ICU admission was requested.    Plan:    1. ICU admission  2. Chest tube to 20 cm Pleur-evac suction  3. Follow-up chest x-ray  4. Wean O2  5. Would consider him a poor candidate for surgical or sclerotic intervention for his recurrent pneumothorax due to his pending lung transplant evaluation at .  6. Nebulized tobramycin as at home  7. Pulmicort, Brovana, and DuoNeb nebulization as at home  8. Prednisone taper as at home  9. Toradol for pain    I discussed the patients findings and my recommendations with patient and nursing staff.     Level of Risk High due to:  illness with threat to life or bodily function  I have seen and examined patient, performing a face-to-face diagnostic evaluation with plan of care reviewed and developed with APRN and nursing staff. I have addended and modified the above history of present illness, physical  examination, and assessment and plan to reflect my findings and impressions.    Prosper Del Rosario MD  Pulmonary and Critical Care Medicine

## 2020-12-06 ENCOUNTER — APPOINTMENT (OUTPATIENT)
Dept: GENERAL RADIOLOGY | Facility: HOSPITAL | Age: 55
End: 2020-12-06

## 2020-12-06 ENCOUNTER — READMISSION MANAGEMENT (OUTPATIENT)
Dept: CALL CENTER | Facility: HOSPITAL | Age: 55
End: 2020-12-06

## 2020-12-06 LAB
ALBUMIN SERPL-MCNC: 3.5 G/DL (ref 3.5–5.2)
ANION GAP SERPL CALCULATED.3IONS-SCNC: 14 MMOL/L (ref 5–15)
BASOPHILS # BLD AUTO: 0.08 10*3/MM3 (ref 0–0.2)
BASOPHILS NFR BLD AUTO: 0.8 % (ref 0–1.5)
BUN SERPL-MCNC: 26 MG/DL (ref 6–20)
BUN/CREAT SERPL: 28.6 (ref 7–25)
CALCIUM SPEC-SCNC: 9 MG/DL (ref 8.6–10.5)
CHLORIDE SERPL-SCNC: 94 MMOL/L (ref 98–107)
CO2 SERPL-SCNC: 23 MMOL/L (ref 22–29)
CREAT SERPL-MCNC: 0.91 MG/DL (ref 0.76–1.27)
DEPRECATED RDW RBC AUTO: 47.9 FL (ref 37–54)
EOSINOPHIL # BLD AUTO: 0.25 10*3/MM3 (ref 0–0.4)
EOSINOPHIL NFR BLD AUTO: 2.4 % (ref 0.3–6.2)
ERYTHROCYTE [DISTWIDTH] IN BLOOD BY AUTOMATED COUNT: 13.4 % (ref 12.3–15.4)
GFR SERPL CREATININE-BSD FRML MDRD: 87 ML/MIN/1.73
GLUCOSE SERPL-MCNC: 216 MG/DL (ref 65–99)
HCT VFR BLD AUTO: 43.5 % (ref 37.5–51)
HGB BLD-MCNC: 13.4 G/DL (ref 13–17.7)
IMM GRANULOCYTES # BLD AUTO: 0.19 10*3/MM3 (ref 0–0.05)
IMM GRANULOCYTES NFR BLD AUTO: 1.9 % (ref 0–0.5)
LYMPHOCYTES # BLD AUTO: 2.25 10*3/MM3 (ref 0.7–3.1)
LYMPHOCYTES NFR BLD AUTO: 22 % (ref 19.6–45.3)
MAGNESIUM SERPL-MCNC: 2 MG/DL (ref 1.6–2.6)
MCH RBC QN AUTO: 29.7 PG (ref 26.6–33)
MCHC RBC AUTO-ENTMCNC: 30.8 G/DL (ref 31.5–35.7)
MCV RBC AUTO: 96.5 FL (ref 79–97)
MONOCYTES # BLD AUTO: 1.44 10*3/MM3 (ref 0.1–0.9)
MONOCYTES NFR BLD AUTO: 14.1 % (ref 5–12)
NEUTROPHILS NFR BLD AUTO: 58.8 % (ref 42.7–76)
NEUTROPHILS NFR BLD AUTO: 6.03 10*3/MM3 (ref 1.7–7)
NRBC BLD AUTO-RTO: 0 /100 WBC (ref 0–0.2)
PHOSPHATE SERPL-MCNC: 3.6 MG/DL (ref 2.5–4.5)
PLATELET # BLD AUTO: 263 10*3/MM3 (ref 140–450)
PMV BLD AUTO: 9 FL (ref 6–12)
POTASSIUM SERPL-SCNC: 3.9 MMOL/L (ref 3.5–5.2)
RBC # BLD AUTO: 4.51 10*6/MM3 (ref 4.14–5.8)
SODIUM SERPL-SCNC: 131 MMOL/L (ref 136–145)
WBC # BLD AUTO: 10.24 10*3/MM3 (ref 3.4–10.8)

## 2020-12-06 PROCEDURE — 63710000001 PREDNISONE PER 1 MG: Performed by: NURSE PRACTITIONER

## 2020-12-06 PROCEDURE — 94799 UNLISTED PULMONARY SVC/PX: CPT

## 2020-12-06 PROCEDURE — 25010000002 HEPARIN (PORCINE) PER 1000 UNITS: Performed by: NURSE PRACTITIONER

## 2020-12-06 PROCEDURE — 71045 X-RAY EXAM CHEST 1 VIEW: CPT

## 2020-12-06 PROCEDURE — 83735 ASSAY OF MAGNESIUM: CPT | Performed by: INTERNAL MEDICINE

## 2020-12-06 PROCEDURE — 85025 COMPLETE CBC W/AUTO DIFF WBC: CPT | Performed by: INTERNAL MEDICINE

## 2020-12-06 PROCEDURE — 80069 RENAL FUNCTION PANEL: CPT | Performed by: INTERNAL MEDICINE

## 2020-12-06 PROCEDURE — 25010000002 KETOROLAC TROMETHAMINE PER 15 MG: Performed by: EMERGENCY MEDICINE

## 2020-12-06 PROCEDURE — 99233 SBSQ HOSP IP/OBS HIGH 50: CPT | Performed by: INTERNAL MEDICINE

## 2020-12-06 RX ORDER — HEPARIN SODIUM 5000 [USP'U]/ML
5000 INJECTION, SOLUTION INTRAVENOUS; SUBCUTANEOUS EVERY 12 HOURS SCHEDULED
Status: DISCONTINUED | OUTPATIENT
Start: 2020-12-06 | End: 2020-12-11 | Stop reason: HOSPADM

## 2020-12-06 RX ADMIN — KETOROLAC TROMETHAMINE 15 MG: 15 INJECTION, SOLUTION INTRAMUSCULAR; INTRAVENOUS at 01:33

## 2020-12-06 RX ADMIN — TOBRAMYCIN 300 MG: 300 SOLUTION ORAL at 20:35

## 2020-12-06 RX ADMIN — GUAIFENESIN 1200 MG: 600 TABLET, EXTENDED RELEASE ORAL at 09:10

## 2020-12-06 RX ADMIN — IPRATROPIUM BROMIDE AND ALBUTEROL SULFATE 3 ML: 2.5; .5 SOLUTION RESPIRATORY (INHALATION) at 08:05

## 2020-12-06 RX ADMIN — HEPARIN SODIUM 5000 UNITS: 5000 INJECTION INTRAVENOUS; SUBCUTANEOUS at 20:32

## 2020-12-06 RX ADMIN — ARFORMOTEROL TARTRATE 15 MCG: 15 SOLUTION RESPIRATORY (INHALATION) at 08:06

## 2020-12-06 RX ADMIN — KETOROLAC TROMETHAMINE 15 MG: 15 INJECTION, SOLUTION INTRAMUSCULAR; INTRAVENOUS at 09:16

## 2020-12-06 RX ADMIN — ARFORMOTEROL TARTRATE 15 MCG: 15 SOLUTION RESPIRATORY (INHALATION) at 20:14

## 2020-12-06 RX ADMIN — IPRATROPIUM BROMIDE AND ALBUTEROL SULFATE 3 ML: 2.5; .5 SOLUTION RESPIRATORY (INHALATION) at 20:14

## 2020-12-06 RX ADMIN — TOBRAMYCIN 300 MG: 300 SOLUTION ORAL at 08:06

## 2020-12-06 RX ADMIN — BUDESONIDE 0.5 MG: 0.5 INHALANT RESPIRATORY (INHALATION) at 20:15

## 2020-12-06 RX ADMIN — PREDNISONE 20 MG: 20 TABLET ORAL at 09:05

## 2020-12-06 RX ADMIN — HEPARIN SODIUM 5000 UNITS: 5000 INJECTION, SOLUTION INTRAVENOUS; SUBCUTANEOUS at 09:06

## 2020-12-06 RX ADMIN — IPRATROPIUM BROMIDE AND ALBUTEROL SULFATE 3 ML: 2.5; .5 SOLUTION RESPIRATORY (INHALATION) at 13:09

## 2020-12-06 RX ADMIN — BUDESONIDE 0.5 MG: 0.5 INHALANT RESPIRATORY (INHALATION) at 08:06

## 2020-12-06 RX ADMIN — DOXYCYCLINE 100 MG: 100 CAPSULE ORAL at 09:06

## 2020-12-06 RX ADMIN — IPRATROPIUM BROMIDE AND ALBUTEROL SULFATE 3 ML: 2.5; .5 SOLUTION RESPIRATORY (INHALATION) at 17:03

## 2020-12-06 RX ADMIN — DOXYCYCLINE 100 MG: 100 CAPSULE ORAL at 20:32

## 2020-12-06 NOTE — PROGRESS NOTES
Intensivist Note     12/6/2020  Hospital Day: 1  * No surgery found *  ICU Stays Timeline            Hospital Admission: 12/05/20 0008 - Current  ICU stays: 1      In Date/Time Event Department ICU Stay Duration     12/05/20 0008 Admission  [facility]      12/05/20 0546 Transfer In  [facility] ICU 1 day 5 hours 16 minutes             Mr. Balwinder Willams, 54 y.o. male is followed for:    Recurrent left pneumothorax s/p small bore left chest tube 12/5/2020    Acute on chronic respiratory failure with hypoxia     Alpha-1-antitrypsin deficiency (on replacement)    Dependence on supplemental oxygen (3L NC)     Stage IV emphysema s/p EBV placement X3 w/ subsequent extraction    Recent PSA PNA    Mycobacterium avium complex from bronchial washings (CMS/[facility])    Former smoker (Resolved 2006)       SUBJECTIVE     54-year-old white male admitted 12/5/2020 with recurrent left pneumothorax.  Patient has a complicated history and that he has known alpha-1 antitrypsin deficiency on Prolastin with associated severe emphysema.  He had endobronchial valve placement as an 2019 which were recently removed.  On a hospitalization in November.  Was hospitalized 11/8/2020 through 11/18/2020 with recurrent bronchitis and some of his endobronchial valves (1 from the lingula and 2 from the superior segment of left lower lobe) were removed because it was felt they were inducing infection.  This was complicated by postoperative PTX requiring left chest tube placement with expansion of the lung.  Unfortunately when chest tube was removed he developed another asymptomatic PTX and a repeat small bore chest tube was inserted with subsequent resolution.  Bronchoscopy at that time yielded light growth PSA was pansensitive and was treated with Levaquin and tobramycin nebs.  He was discharged home only to be readmitted 11/24/2312/4/24 dyspnea and hypoxemic respiratory failure as well as a left upper lobe pneumonia treated empirically with  "Zosyn, doxycycline, YOLIE nebs, as well as inhaled bronchodilators and IV steroids.  At that time it was noted that bronchial wash from 10/30/2020 was growing MAC and plans were to begin him on azithromycin/rifampin/ethambutol.  After discussions it was also decided to try to make arrangements for the pulmonary transplant clinic at . Unfortunately shortly after discharge the patient returned to the ER 12/5/2020 with hypoxemic respiratory distress with a left-sided pneumothorax and a small bore chest tube was placed in the ED.  Once this was done he stabilized although he continued to require 6 L of nasal oxygen.    Interval history: Overnight the patient has done well.  O2 sats are 94% on 5 L and he feels less dyspneic.  His left chest tube is not leaking the evening of 12/5/2020, but is noted on today's chest x-ray that the chest tube has been pulled back and does not appear to be in the pleural space.  The left apical portion of the pneumothorax appears smaller despite this fact, but he has a left basilar component as well.  The patient himself however feels better than on admission.  Has no purulent cough or hemoptysis and is afebrile with a normal WBC today.  Blood pressure is excellent and is in a sinus rhythm with a heart rate of 88.  No nausea or vomiting, melena, hematochezia, hematemesis, or difficulty voiding.  Only chest pain he has is at the chest tube site.      ROS: Per subjective, all other systems reviewed and were negative.    The patient's relevant PMH, PSH, FH, and SH were reviewed and updated in Epic as appropriate. Allergies and Medications reviewed.    OBJECTIVE     BP 94/66   Pulse 106   Temp 97.8 °F (36.6 °C) (Oral)   Resp 18   Ht 172.7 cm (68\")   Wt 59 kg (130 lb)   SpO2 95%   BMI 19.77 kg/m²      Flow (L/min): 5    Flowsheet Rows      First Filed Value   Admission Height  172.7 cm (68\") Documented at 12/05/2020 0009   Admission Weight  59 kg (130 lb) Documented at 12/05/2020 0009    "     Intake & Output (last day)       12/05 0701 - 12/06 0700 12/06 0701 - 12/07 0700    P.O. 515     Total Intake(mL/kg) 515 (8.7)     Net +515           Urine Unmeasured Occurrence 1 x     Stool Unmeasured Occurrence 1 x           Exam:  General Exam:  Pleasant well-developed bearded white male sitting up in bed in NAD  HEENT: Pupils equal and reactive. Nose and throat clear.  Neck:                          Supple, no JVD, thyromegaly, or adenopathy  Lungs: Clear anteriorly, laterally, and posteriorly but diffusely diminished breath sounds.  Chest:                         Small bore left chest tube bandaged  Cardiovascular: RRR without murmurs or gallops.  HR 88 bpm  Abdomen: Soft nontender without organomegaly or masses.   and rectal: Deferred.  Extremities: No cyanosis clubbing edema.  Neurologic:                 Symmetric strength. No focal deficits.    Chest X-Ray: Left chest tube tip does not appear to be within the pleural space.  Small left apical pneumothorax (smaller than yesterday), but there is a mild left basilar pneumothorax seen as well.    Results from last 7 days   Lab Units 12/06/20  0616 12/05/20 0019 11/30/20  0427   WBC 10*3/mm3 10.24 15.24* 10.75   HEMOGLOBIN g/dL 13.4 16.2 14.0   HEMATOCRIT % 43.5 50.6 43.3   PLATELETS 10*3/mm3 263 303 261     Results from last 7 days   Lab Units 12/05/20 0019 11/30/20  0427   SODIUM mmol/L 137 137   POTASSIUM mmol/L 4.3 3.7   CHLORIDE mmol/L 99 99   CO2 mmol/L 25.0 27.0   BUN mg/dL 16 16   CREATININE mg/dL 0.93 1.18   GLUCOSE mg/dL 165* 176*   CALCIUM mg/dL 9.8 9.3     Results from last 7 days   Lab Units 12/05/20  0018   MAGNESIUM mg/dL 2.3     Results from last 7 days   Lab Units 12/05/20  0019   ALK PHOS U/L 58   BILIRUBIN mg/dL 0.4   ALT (SGPT) U/L 40   AST (SGOT) U/L 24       No results found for: SEDRATE  No results found for: BNP  Lab Results   Component Value Date    TROPONINT <0.010 12/05/2020     Lab Results   Component Value Date    TSH 4.190  12/05/2020     Lab Results   Component Value Date    LACTATE 1.6 12/05/2020     No results found for: CORTISOL    Results from last 7 days   Lab Units 12/05/20  0513   PH, ARTERIAL pH units 7.443   PCO2, ARTERIAL mm Hg 38.8   PO2 ART mm Hg 78.4*   HCO3 ART mmol/L 26.5*   FIO2 % 44         I reviewed the patient's results, images and medication.    Assessment/Plan   ASSESSMENT        Recurrent left pneumothorax s/p small bore left chest tube 12/5/2020    Acute on chronic respiratory failure with hypoxia     Alpha-1-antitrypsin deficiency (on replacement)    Dependence on supplemental oxygen (3L NC)     Stage IV emphysema s/p EBV placement X3 w/ subsequent extraction    Recent PSA PNA    Mycobacterium avium complex from bronchial washings (CMS/HCC)    Former smoker (Resolved 2006)      DISCUSSION: Unfortunately chest tube has been pulled out of the pleural space and will need to be removed.  I saw no air leak yesterday when the chest tube was in place, so I am going to observe to see if the leak is sealed.  If he has any increase in symptoms we will replace chest tube.  I have a consult in to Dr. Casper Kincaid for tomorrow and will get me his opinion on a VATS pleurodesis.  If this is done, patient would probably not be a candidate for transplant on the left.  He still however could have a single lung transplant on the right.    PLAN     1.  Observe with chest tube out  2.  Chest x-ray in 3 hours  3.  If pneumothorax increased will place chest tube    Plan of care and goals reviewed with mulitdisciplinary team at daily rounds.    I discussed the patient's findings and my recommendations with patient and nursing staff    Patient is critically ill due to recurrent left pneumothorax and severe underlying lung disease and has a high risk of life-threatening decline in condition if his pneumothorax enlarges.  He required continuous monitoring and frequent reassessment of condition for adjustment of management in order to  lessen risk.      Time spent Critical care 25 min (It does not include procedure time).    Electronically signed by Devaughn Hernandez MD, 12/06/20, 11:02 AM EST.   Pulmonary / Critical care medicine

## 2020-12-06 NOTE — PROGRESS NOTES
Chest Tube Removal    The procedure was performed at the patient's bedside. After the procedure was explained to the patient, the dressing was removed from the current chest tube. On observing the site, it was noted that the suture had come undone and CT was almost completely out (curve of the pigtail noted at insertion site). While the patient was holding their breath on inhalation the chest tube was removed. A duoderm occlusive dressing was applied over the insertion site.  The patient's oxygen saturation, as measured by pulse oximetry, remained stable throughout the procedure.     Orders for follow up CXR have been placed for 1600 today. Patient has been instructed to abstain from anything that causes exertion (I.e. bearing down, etc). He understands that if he develops any worsening respiratory distress, he is to let someone know immediately.     Kelly Ibarra, JOSE ARMANDO, APRN, [facility]-BC  Pulmonary and Critical Care Medicine    ** ADDENDUM **  Follow-up CXR shows mild increase in left PTX. We will trial patient on 100% NRB mask and recheck CXR at 2100. Discussed with Dr. Hernandez. Possible CT insertion this evening (depending on results) vs CT-guided in the AM.        Patient was scheduled for a right carpal tunnel release but had to cancel due to an insurance issue. Patient has insurance now and would like to know if she has to come back to see Dr Namrata Redmond before she can reschedule the surgery and could she move forward and just schedule the surgery again. Please contact patient at [phone].

## 2020-12-06 NOTE — PLAN OF CARE
Goal Outcome Evaluation:  Plan of Care Reviewed With: patient  Patient has rested well tonight. Pain relief with prn medication and positioning. No output in chest tube chamber. Currently on 5LNC and tolerating. Afebrile. Will continue to monitor.

## 2020-12-07 ENCOUNTER — APPOINTMENT (OUTPATIENT)
Dept: CT IMAGING | Facility: HOSPITAL | Age: 55
End: 2020-12-07

## 2020-12-07 ENCOUNTER — APPOINTMENT (OUTPATIENT)
Dept: GENERAL RADIOLOGY | Facility: HOSPITAL | Age: 55
End: 2020-12-07

## 2020-12-07 PROCEDURE — 25010000002 KETOROLAC TROMETHAMINE PER 15 MG: Performed by: EMERGENCY MEDICINE

## 2020-12-07 PROCEDURE — 25010000003 LIDOCAINE 1 % SOLUTION: Performed by: RADIOLOGY

## 2020-12-07 PROCEDURE — 71045 X-RAY EXAM CHEST 1 VIEW: CPT

## 2020-12-07 PROCEDURE — 63710000001 PREDNISONE PER 1 MG: Performed by: NURSE PRACTITIONER

## 2020-12-07 PROCEDURE — 99233 SBSQ HOSP IP/OBS HIGH 50: CPT | Performed by: INTERNAL MEDICINE

## 2020-12-07 PROCEDURE — 94799 UNLISTED PULMONARY SVC/PX: CPT

## 2020-12-07 PROCEDURE — 0W9B30Z DRAINAGE OF LEFT PLEURAL CAVITY WITH DRAINAGE DEVICE, PERCUTANEOUS APPROACH: ICD-10-PCS | Performed by: RADIOLOGY

## 2020-12-07 PROCEDURE — 25010000002 HEPARIN (PORCINE) PER 1000 UNITS: Performed by: NURSE PRACTITIONER

## 2020-12-07 PROCEDURE — 75989 ABSCESS DRAINAGE UNDER X-RAY: CPT

## 2020-12-07 PROCEDURE — C1729 CATH, DRAINAGE: HCPCS

## 2020-12-07 RX ORDER — LIDOCAINE HYDROCHLORIDE 10 MG/ML
20 INJECTION, SOLUTION INFILTRATION; PERINEURAL ONCE
Status: COMPLETED | OUTPATIENT
Start: 2020-12-07 | End: 2020-12-07

## 2020-12-07 RX ORDER — ACETAMINOPHEN 325 MG/1
650 TABLET ORAL EVERY 6 HOURS PRN
Status: DISCONTINUED | OUTPATIENT
Start: 2020-12-07 | End: 2020-12-11 | Stop reason: HOSPADM

## 2020-12-07 RX ADMIN — GUAIFENESIN 1200 MG: 600 TABLET, EXTENDED RELEASE ORAL at 20:38

## 2020-12-07 RX ADMIN — HEPARIN SODIUM 5000 UNITS: 5000 INJECTION INTRAVENOUS; SUBCUTANEOUS at 20:37

## 2020-12-07 RX ADMIN — IPRATROPIUM BROMIDE AND ALBUTEROL SULFATE 3 ML: 2.5; .5 SOLUTION RESPIRATORY (INHALATION) at 19:31

## 2020-12-07 RX ADMIN — ACETAMINOPHEN 650 MG: 325 TABLET, FILM COATED ORAL at 20:36

## 2020-12-07 RX ADMIN — KETOROLAC TROMETHAMINE 15 MG: 15 INJECTION, SOLUTION INTRAMUSCULAR; INTRAVENOUS at 16:58

## 2020-12-07 RX ADMIN — IPRATROPIUM BROMIDE AND ALBUTEROL SULFATE 3 ML: 2.5; .5 SOLUTION RESPIRATORY (INHALATION) at 16:40

## 2020-12-07 RX ADMIN — DOXYCYCLINE 100 MG: 100 CAPSULE ORAL at 08:04

## 2020-12-07 RX ADMIN — ARFORMOTEROL TARTRATE 15 MCG: 15 SOLUTION RESPIRATORY (INHALATION) at 19:31

## 2020-12-07 RX ADMIN — PREDNISONE 20 MG: 20 TABLET ORAL at 08:04

## 2020-12-07 RX ADMIN — BUDESONIDE 0.5 MG: 0.5 INHALANT RESPIRATORY (INHALATION) at 06:45

## 2020-12-07 RX ADMIN — IPRATROPIUM BROMIDE AND ALBUTEROL SULFATE 3 ML: 2.5; .5 SOLUTION RESPIRATORY (INHALATION) at 13:22

## 2020-12-07 RX ADMIN — DOXYCYCLINE 100 MG: 100 CAPSULE ORAL at 20:36

## 2020-12-07 RX ADMIN — IPRATROPIUM BROMIDE AND ALBUTEROL SULFATE 3 ML: 2.5; .5 SOLUTION RESPIRATORY (INHALATION) at 06:45

## 2020-12-07 RX ADMIN — HEPARIN SODIUM 5000 UNITS: 5000 INJECTION INTRAVENOUS; SUBCUTANEOUS at 10:11

## 2020-12-07 RX ADMIN — TOBRAMYCIN 300 MG: 300 SOLUTION ORAL at 19:31

## 2020-12-07 RX ADMIN — ARFORMOTEROL TARTRATE 15 MCG: 15 SOLUTION RESPIRATORY (INHALATION) at 06:45

## 2020-12-07 RX ADMIN — TOBRAMYCIN 300 MG: 300 SOLUTION ORAL at 06:45

## 2020-12-07 RX ADMIN — LIDOCAINE HYDROCHLORIDE 20 ML: 10 INJECTION, SOLUTION INFILTRATION; PERINEURAL at 09:43

## 2020-12-07 RX ADMIN — BUDESONIDE 0.5 MG: 0.5 INHALANT RESPIRATORY (INHALATION) at 19:31

## 2020-12-07 RX ADMIN — ACETAMINOPHEN 650 MG: 325 TABLET, FILM COATED ORAL at 13:09

## 2020-12-07 RX ADMIN — KETOROLAC TROMETHAMINE 15 MG: 15 INJECTION, SOLUTION INTRAMUSCULAR; INTRAVENOUS at 10:12

## 2020-12-07 NOTE — NURSING NOTE
Patient placed on cardiac monitors. Images taken and reviewed by Dr. Viera. 8.5 fr Chest tube placed in left anterior chest.  Patient tolerated well, vitals remained stable throughout procedure, on non-rebreather mask. ICU RN at bedside throughout procedure, and patient transferred back to ICU with RN.

## 2020-12-07 NOTE — PROGRESS NOTES
Adult Nutrition  Assessment/PES    Patient Name:  Balwinder Willams  YOB: 1965  MRN: [id number]  Admit Date:  12/5/2020    Assessment Date:  12/7/2020    Comments:  Pt provided ONS and larger portion of entree per request; low BMI noted. RD will follow per protocol.    Reason for Assessment     Row Name 12/07/20 1313          Reason for Assessment    Reason For Assessment  per organizational policy;other (see comments);nurse/nurse practitioner consult MDR, pt request ; 30 mins     Diagnosis  pulmonary disease     Identified At Risk by Screening Criteria  no indicators present         Nutrition/Diet History     Row Name 12/07/20 1314          Nutrition/Diet History    Typical Food/Fluid Intake  RN reports pt adm w/ recurrent ptx, severe stage 4 emphsema - Pt is to get CT in IR this morning has been on NRBM     Food Preferences  pt requests larger portion of entree and bedtime; pt w/ recent wt loss hx trying to regain     Supplemental Drinks/Foods/Additives  Boost Plus nightly     Factors Affecting Nutritional Intake  other (see comments);early satiety pt c/o hunger           Labs/Tests/Procedures/Meds     Row Name 12/07/20 1317          Labs/Procedures/Meds    Lab Results Reviewed  reviewed, pertinent        Diagnostic Tests/Procedures    Diagnostic Test/Procedure Reviewed  reviewed, pertinent     Diagnostic Test/Procedures Comments  s/p L CT placement        Medications    Pertinent Medications Reviewed  reviewed, pertinent     Pertinent Medications Comments  prednisone, prolastin         Physical Findings     Row Name 12/07/20 1319          Physical Findings    Overall Physical Appearance  underweight;other (see comments);loss of muscle mass barrel chest     Tubes  chest tube         Estimated/Assessed Needs     Row Name 12/07/20 1320          Calculation Measurements    Weight Used For Calculations  59 kg (130 lb 1.1 oz)        Estimated/Assessed Needs    Additional Documentation  KCAL/KG  (Group);Protein Requirements (Group);Fluid Requirements (Group)        KCAL/KG    KCAL/KG  30 Kcal/Kg (kcal)     30 Kcal/Kg (kcal)  1770        Protein Requirements    Weight Used For Protein Calculations  59 kg (130 lb 1.1 oz)     Est Protein Requirement Amount (gms/kg)  1.5 gm protein     Estimated Protein Requirements (gms/day)  88.5        Fluid Requirements    Fluid Requirements (mL/day)  1800     Estimated Fluid Requirement Method  RDA Method     RDA Method (mL)  1800         Nutrition Prescription Ordered     Row Name 12/07/20 1321          Nutrition Prescription PO    Current PO Diet  Regular         Evaluation of Received Nutrient/Fluid Intake     Row Name 12/07/20 1321          PO Evaluation    % PO Intake  Pt reports eating all of meals but feeling hungry in a few hours               Problem/Interventions:  Problem 1     Row Name 12/07/20 1322          Nutrition Diagnoses Problem 1    Problem 1  Underweight     Etiology (related to)  Medical Diagnosis     Pulmonary/Critical Care  Other (comment) stage 4 emphysema     Signs/Symptoms (evidenced by)  BMI     BMI  19 - 19.9     Resolved?  -- ongoing; ONS provided               Intervention Goal     Row Name 12/07/20 1323          Intervention Goal    General  Meet nutritional needs for age/condition;Reduce/improve symptoms     PO  Meet estimated needs;Maintain intake         Nutrition Intervention     Row Name 12/07/20 1323          Nutrition Intervention    RD/Tech Action  Advise alternate selection;Menu provided;Adjusted portion;Recommend/ordered;Follow Tx progress;Care plan reviewd;Menu adjusted     Recommended/Ordered  Supplement;Snack;Diet         Nutrition Prescription     Row Name 12/07/20 1324          Nutrition Prescription PO    PO Prescription  Begin/change supplement;Begin/change diet     Supplement  Boost Plus     Supplement Frequency  Daily;Snack time     Snack Times  Bedtime     Other Modifiers  Other (comment) larger portion requested     New  PO Prescription Ordered?  Yes         Education/Evaluation     Row Name 12/07/20 1325          Monitor/Evaluation    Monitor  Per protocol           Electronically signed by:  Bria Arcos MS,RD,LD  12/07/20 13:25 EST

## 2020-12-07 NOTE — PLAN OF CARE
Goal Outcome Evaluation:  Plan of Care Reviewed With: patient  Progress: improving  Outcome Summary: Patient case discussed with APRN at the start of the shift.100% NRB applied per APRN order x2hrs and repeat CXR obtained. Results discussed with MD and APRN. 100% NRB continued for the night. CXR obtained this morning. VSS. UO adequate. Will continue to monitor.

## 2020-12-07 NOTE — PROGRESS NOTES
Follow-up chest x-ray reviewed and compared to the a.m. film when it was noted that small bore chest tube had pulled out into the soft tissues.  Pneumothorax is slightly larger on the follow-up film.  I discussed the situation with the patient and indicated the situation.  I told him that we will watch him closely overnight, and if there is any deterioration Dr. Mendoza will place a left anterior chest tube under ultrasound guidance (I discussed the situation with him this evening).  If however remained stable this evening will have Dr. Viera place an anterior chest tube electively under CT guidance in the a.m.    Electronically signed by Devaughn Hernandez MD, 12/06/20, 8:54 PM EST.   Pulmonary / Critical care medicine

## 2020-12-07 NOTE — OUTREACH NOTE
COPD/PN Week 1 Survey      Responses   [facility] patient discharged from?  [place]   Does the patient have one of the following disease processes/diagnoses(primary or secondary)?  COPD/Pneumonia   Week 1 attempt successful?  No   Revoke  Readmitted          Geovanna Orlando RN

## 2020-12-07 NOTE — PROGRESS NOTES
Intensive Care Follow-up     Hospital:  LOS: 2 days   Mr. Balwinder Willams, 54 y.o. male is followed for:   Recurrent pneumothorax            History of present illness:   54-year-old white male admitted 12/5/2020 with recurrent left pneumothorax.  Patient has a complicated history and that he has known alpha-1 antitrypsin deficiency on Prolastin with associated severe emphysema.  He had endobronchial valve placement as an 2019 which were recently removed.  On a hospitalization in November.  Was hospitalized 11/8/2020 through 11/18/2020 with recurrent bronchitis and some of his endobronchial valves (1 from the lingula and 2 from the superior segment of left lower lobe) were removed because it was felt they were inducing infection.  This was complicated by postoperative PTX requiring left chest tube placement with expansion of the lung.  Unfortunately when chest tube was removed he developed another asymptomatic PTX and a repeat small bore chest tube was inserted with subsequent resolution.  Bronchoscopy at that time yielded light growth PSA was pansensitive and was treated with Levaquin and tobramycin nebs.  He was discharged home only to be readmitted 11/24/2312/4/24 dyspnea and hypoxemic respiratory failure as well as a left upper lobe pneumonia treated empirically with Zosyn, doxycycline, YOLIE nebs, as well as inhaled bronchodilators and IV steroids.  At that time it was noted that bronchial wash from 10/30/2020 was growing MAC and plans were to begin him on azithromycin/rifampin/ethambutol.  After discussions it was also decided to try to make arrangements for the pulmonary transplant clinic at . Unfortunately shortly after discharge the patient returned to the ER 12/5/2020 with hypoxemic respiratory distress with a left-sided pneumothorax and a small bore chest tube was placed in the ED. Once this was done he stabilized although he continued to require 6 L of nasal oxygen.    Subjective   Interval  History:  Patient stable overnight he did lose the chest tube yesterday afternoon, ultimately was placed on nonrebreather stable throughout the evening had the chest tube replaced into the left side this morning.  He states that his breathing is fairly stable at this point.  Denies any worsening.  Remains afebrile and normotensive.  Continues on all of his nebs as well as treatment for his history of Pseudomonas.             The patient's past medical, surgical and social history were reviewed and updated in Epic as appropriate.       Objective     Infusions:     Medications:  Alpha1-Proteinase Inhibitor, 4,252 mg, Intravenous, Weekly  arformoterol, 15 mcg, Nebulization, BID - RT  budesonide, 0.5 mg, Nebulization, BID - RT  doxycycline, 100 mg, Oral, Q12H  heparin (porcine), 5,000 Units, Subcutaneous, Q12H  ipratropium-albuterol, 3 mL, Nebulization, 4x Daily - RT  predniSONE, 20 mg, Oral, Daily With Breakfast  tobramycin PF, 300 mg, Nebulization, BID - RT      I reviewed the patient's medications.    Vital Sign Min/Max for last 24 hours  Temp  Min: 97.8 °F (36.6 °C)  Max: 98.3 °F (36.8 °C)   BP  Min: 96/83  Max: 136/94   Pulse  Min: 71  Max: 117   Resp  Min: 18  Max: 22   SpO2  Min: 93 %  Max: 100 %   Flow (L/min)  Min: 5  Max: 15       Input/Output for last 24 hour shift  12/06 0701 - 12/07 0700  In: -   Out: 520 [Urine:520]      GENERAL : NAD, conversant  RESPIRATORY/THORAX : normal respiratory effort and no intercostal retractions, distant breath sounds bilaterally  CARDIOVASCULAR : Normal S1/S2, RRR.  No lower ext edema.  GASTROINTESTINAL : Soft, NT/ND. BS x 4 normoactive. No hepatosplenomegaly.  MUSCULOSKELETAL : No cyanosis, clubbing, or ischemia  NEUROLOGICAL: alert and oriented to person, place and time  PSYCHOLOGICAL : Appropriate affect    Results from last 7 days   Lab Units 12/06/20  0616 12/05/20  0019   WBC 10*3/mm3 10.24 15.24*   HEMOGLOBIN g/dL 13.4 16.2   PLATELETS 10*3/mm3 263 303     Results from  last 7 days   Lab Units 12/06/20  1020 12/05/20  0019 12/05/20  0018   SODIUM mmol/L 131* 137  --    POTASSIUM mmol/L 3.9 4.3  --    CO2 mmol/L 23.0 25.0  --    BUN mg/dL 26* 16  --    CREATININE mg/dL 0.91 0.93  --    MAGNESIUM mg/dL 2.0  --  2.3   PHOSPHORUS mg/dL 3.6  --   --    GLUCOSE mg/dL 216* 165*  --      Estimated Creatinine Clearance: 77.4 mL/min (by C-G formula based on SCr of 0.91 mg/dL).    Results from last 7 days   Lab Units 12/05/20  0513   PH, ARTERIAL pH units 7.443   PCO2, ARTERIAL mm Hg 38.8   PO2 ART mm Hg 78.4*       I reviewed the patient's new clinical results.  I reviewed the patient's new imaging results/reports including actual images and agree with reports.       Imaging Results (Last 24 Hours)     Procedure Component Value Units Date/Time    CT Guided Chest Tube [[id number]] Collected: 12/07/20 1042     Updated: 12/07/20 1116    Narrative:      PROCEDURE: Chest tube placement     Procedural Personnel  Attending physician(s): ETHAN Viera M.D.  Fellow physician(s): None  Resident physician(s): None  Advanced practice provider(s): None     Pre-procedure diagnosis: Recurrent left pneumothorax. ?Patient has a  complicated history and that he has known alpha-1 antitrypsin deficiency  on Prolastin with associated severe emphysema. ?He had endobronchial  valve placement 2019 which were recently removed.   Post-procedure diagnosis: Same  Indication: Therapeutic  Additional clinical history: None     Complications: No immediate complications.       Impression:         Left-sided 8.5 [place] chest tube placement.     Plan:      Per primary team  ______________________________________________________________________     PROCEDURE SUMMARY:  - Percutaneous pleural drainage with insertion of indwelling catheter  under CT guidance  - Additional procedure(s): None     PROCEDURE DETAILS:     Pre-procedure  Consent: Informed consent for the procedure including risks, benefits  and alternatives was obtained  and time-out was performed prior to the  procedure.  Preparation: The site was prepared and draped using maximal sterile  barrier technique including cutaneous antisepsis.     Anesthesia/sedation  Level of anesthesia/sedation: None     Chest tube placement  The patient was positioned supine. Initial imaging was performed. Local  anesthesia was administered. The pleural space was accessed using trocar  technique and a drainage catheter was placed. Position of the drainage  catheter within the pleural space was confirmed.  - Initial imaging findings: Small left side pneumothorax  - Drainage catheter placed: 8.5 Mexican nonlocking pigtail  - External catheter securement:  0 silk suture and stay fix bandage  - Post-drainage imaging findings: Catheter in good position  - Additional findings: None     Contrast  Contrast agent: None  Contrast volume (mL): 0     Radiation Dose  CT dose length product (mGy-cm): 479      Additional Details  Additional description of procedure: None  Equipment details: None  Specimens removed: None  Estimated blood loss (mL): Less than 10  Standardized report: ChestTube     Attestation  I was present and scrubbed for the entire procedure. Imaging reviewed.  Agree with final report as written.     This report was finalized on 12/7/2020 10:44 AM by Marito Viera.       XR Chest 1 View [[id number]] Collected: 12/07/20 0904     Updated: 12/07/20 0905    Narrative:         EXAMINATION: XR CHEST 1 VW-      INDICATION: Pneumothorax; J93.9-Pneumothorax, unspecified; J44.1-Chronic  obstructive pulmonary disease with (acute) exacerbation; J96.01-Acute  respiratory failure with hypoxia; D72.829-Elevated white blood cell  count, unspecified      COMPARISON: 12/06/2020     FINDINGS: Portable chest reveals cardiac and mediastinal silhouettes  within normal limits. Emphysematous and chronic changes seen throughout  the lung fields with small pneumothorax identified on the left. The left  pneumothorax is  slightly smaller in size, compared to the prior  examination. Bullous changes identified probably at the lung bases.       Impression:      Slight decrease seen in size of the left apical  pneumothorax.          XR Chest 1 View [[id number]] Collected: 12/06/20 1642     Updated: 12/07/20 0822    Narrative:      EXAMINATION: XR CHEST 1 VW- 12/06/2020      INDICATION: F/U PTX, discontinued left CT; J93.9-Pneumothorax,  unspecified; J44.1-Chronic obstructive pulmonary disease with (acute)  exacerbation; J96.01-Acute respiratory failure with hypoxia;  D72.829-Elevated white blood cell count, unspecified      COMPARISON: 12/06/2020     FINDINGS: The left chest tube has been removed from the subcutaneous  tissues. The cardiac and mediastinal silhouettes within normal limits.  The right lung remains clear with underlying chronic and emphysematous  changes identified. Bullous disease seen diffusely throughout the lung  fields. The pneumothorax continues to increase in size in the interval.  Some atelectatic changes seen within the left lung base.          Impression:      Mild interval increase seen in size of the apical  pneumothorax on the left. The chest tube has been removed from the  subcutaneous tissues with development of increasing atelectasis in the  left lung base.     D:  12/06/2020  E:  12/07/2020          XR Chest 1 View [[id number]] Collected: 12/06/20 2138     Updated: 12/06/20 2140    Narrative:      PROCEDURE: CR Chest 1 Vw    COMPARISON:  December 6 4:19 PM     INDICATIONS: F/U left apical Pyopneumothorax, unspecified; Chronic obstructive pulmonary disease with (acute) exacerbation; Acute respiratory failure with hypoxia; Elevated white blood cell count, unspecified      TECHNIQUE: Single AP  view of the chest    FINDINGS: Left pneumothorax remains unchanged with approximate 4.5 cm visceral parietal separation and mild collapse of the left lung. There is no shift of the mediastinum. Hyperaeration of lungs  compatible with COPD.. Left basilar infiltrate present  Cardiomediastinal silhouette is within normal limits given projection. Osseous structures stable.      Impression:      Persistent stable left pneumothorax without shift of mediastinum and persistent left basilar infiltrate.         Signer Name: Concha Nguyen MD   Signed: 12/6/2020 9:38 PM   Workstation Name: [id number]    [facility]    XR Chest 1 View [[id number]] Collected: 12/06/20 1232     Updated: 12/06/20 1907    Narrative:         EXAMINATION: XR CHEST 1 VW - 12/06/2020     INDICATION: J93.9-Pneumothorax, unspecified; J44.1-Chronic obstructive  pulmonary disease with (acute) exacerbation; J96.01-Acute respiratory  failure with hypoxia; D72.829-Elevated white blood cell count,  unspecified.      COMPARISON: None.     FINDINGS: Portable chest reveals cardiac and mediastinal silhouettes  within normal limits. Underlying chronic and emphysematous changes  within the lung fields bilaterally. The pneumothorax is slightly larger  on this examination when compared to the prior examination with chest  tube identified in the subcutaneous tissues. Bullous emphysematous  changes seen throughout the lung fields bilaterally. No superimposed  infiltrate.        Impression:      Slight increase seen in size of the left pneumothorax with  left chest tube tip in the subcutaneous tissues. The left chest tube is  not in satisfactory position. Underlying chronic and emphysematous  changes seen bilaterally.     DICTATED:   12/06/2020  EDITED/ls :   12/06/2020           XR Chest 1 View [[id number]] Collected: 12/06/20 0936     Updated: 12/06/20 1735    Narrative:         EXAMINATION: XR CHEST 1 VW - 12/06/2020     INDICATION: J93.9-Pneumothorax, unspecified; J44.1-Chronic obstructive  pulmonary disease with (acute) exacerbation; J96.01-Acute respiratory  failure with hypoxia; D72.829-Elevated white blood cell count,  unspecified. Pneumothorax.      COMPARISON: 12/05/2020     FINDINGS: Portable chest reveals the left chest tube to be partially  removed or retracted with majority of the tube seen in the subcutaneous  tissues. There is a small left apical pneumothorax identified with some  decrease seen in size of the pneumothorax when compared to the prior  examination. Bullous emphysematous changes identified throughout the  lung fields bilaterally.          Impression:      The left chest tube has been pulled with tip seen in the  subcutaneous tissues of the left lateral chest wall. There is a small  left pneumothorax identified with both an apical and basilar component.  The pneumothorax is slightly smaller than when compared to the prior  examination. Emphysematous changes seen diffusely throughout the lung  fields bilaterally.     DICTATED:   12/06/2020  EDITED/ls :   12/06/2020              Assessment/Plan   Impression        Recurrent left pneumothorax s/p small bore left chest tube 12/5/2020    Alpha-1-antitrypsin deficiency (on replacement)    Dependence on supplemental oxygen (3L NC)     Stage IV emphysema s/p EBV placement X3 w/ subsequent extraction    Former smoker (Resolved 2006)    Acute on chronic respiratory failure with hypoxia     Recent PSA PNA    Mycobacterium avium complex from bronchial washings (CMS/[facility], located [facility])       Plan        54-year-old male with a past medical history significant for recurrent pneumonias with Pseudomonas, endobronchial valve placement and removal, chronic hypoxic respiratory failure on 3 L nasal cannula, history of a tension pneumothorax, alpha-1 antitrypsin disease, and stage IV very severe emphysema.  Patient has had multiple admissions recently for recurrent secondary spontaneous pneumothoraces.  Readmitted on 12/5/2020 for a secondary spontaneous pneumothorax with underlying severe emphysema.      · Plans for chest tube placed on the left side today, will go ahead and continue him on nonrebreather to maintain  saturation greater than 100% hopefully we can get the left lung to fully reexpand.  · Keep chest tube to -20 cm of suction, goal oxygen saturation of %  · CT surgery is going to evaluate the patient for possible VATS pleurodesis although it should be noted that this is not a great option given his need for transplant as this could make the transplant process extremely difficult to potentially limit him from having a bilateral lung transplant to having only a single lung transplant which is not ideal in his situation.  Hopefully the lung will reexpand with chest tube placement, and if needed we can always put some valves in place to help get the air leak to stop.  Ultimately he does need to go to a transplant facility so they can work him up.  At this point though he has not been seen by transplant.  · We will continue his current nebulizer therapies  · Continue tobramycin for history of Pseudomonas  · Continue prednisone 20 mg daily  · Continue doxycycline plan to complete 7 days  · Prolastin to be given once weekly, given on today  · Ensure pain control with chest tube in place  · Continue regular diet  · Aggressive pulmonary toilet  · Encourage mobilization  · Subcu heparin for DVT prophylaxis  · A.m. chest x-ray    Plan of care and goals reviewed with mulitdisciplinary/antibiotic stewardship team during rounds.   I discussed the patient's findings and my recommendations with patient and nursing staff     High level of risk due to:  severe exacerbation of chronic illness and illness with threat to life or bodily function.      Sanju Durham, DO  Pulmonary, Critical care and Sleep Medicine

## 2020-12-07 NOTE — POST-PROCEDURE NOTE
Interventional Radiology Operative Note    Date: 12/07/20     Time: 10:40 EST     Pre-op Diagnosis: Recurrent left pneumothorax.  Patient has a complicated history and that he has known alpha-1 antitrypsin deficiency on Prolastin with associated severe emphysema.  He had endobronchial valve placement 2019 which were recently removed.   Post-op Diagnosis: Same    Procedure: CT guided chest tube placement    Surgeon: ETHAN Viera M.D.  Assistants: None    Sedation: None    Estimated Blood Loss (EBL): Trace     Urine Output (UOP): N/A (short procedure)    IVF: N/A (short procedure)    Findings: Small left side pneumothorax    Specimens: None    Complications: No immediate    Disposition: Back to ICU. Stable.

## 2020-12-07 NOTE — PLAN OF CARE
Small-bore chest tube placed per MD Viera this am.   Pain well controlled with toradol and prn acetaminophen, rotated every 3 hours.  No CT drainage noted; on suction.  Will ambulated this pm.   Pulmonary toileting encouraged.

## 2020-12-07 NOTE — PROGRESS NOTES
Discharge Planning Assessment  [facility]     Patient Name: Balwinder Willams  MRN: [id number]  Today's Date: 12/7/2020    Admit Date: 12/5/2020    Discharge Needs Assessment     Row Name 12/07/20 1430       Living Environment    Lives With  significant other    Current Living Arrangements  home/apartment/condo    Primary Care Provided by  self    Provides Primary Care For  no one, unable/limited ability to care for self    Family Caregiver if Needed  significant other    Quality of Family Relationships  helpful;involved;supportive    Able to Return to Prior Arrangements  yes       Resource/Environmental Concerns    Resource/Environmental Concerns  none       Transition Planning    Patient/Family Anticipates Transition to  home with family;home with help/services    Patient/Family Anticipated Services at Transition  home health care    Transportation Anticipated  family or friend will provide       Discharge Needs Assessment    Readmission Within the Last 30 Days  other (see comments) Developed pneumothorax since last admission.    Current Outpatient/Agency/Support Group  infusion therapy, home [facility] for weekly infusions for alpha-1 antitrypsin deficiency    Equipment Currently Used at Home  oxygen;nebulizer    Concerns to be Addressed  discharge planning    Current Discharge Risk  chronically ill        Discharge Plan     Row Name 12/07/20 1036       Plan    Plan  Home    Patient/Family in Agreement with Plan  yes    Plan Comments  Met with patient in the room to initiate discharge planning. Patient is a readmission after developing a left pneumothorax. Patient lives with his significant other in a home in [facility]. He is independent with ADLs and mobility. Patient wears 2L home O2 qhs and 3L with exertion. Confirmed his O2 orders with Devaughn from [facility]. His significant other will bring a portable tank for the ride home. Patient has weekly infusions of alpha-1 antitrypsin by [facility] ph. [phone]. Left VM for Elaine with [facility] to see if they will need any orders at [place]. Patient states his goal is home with the assistance of his significant other, and she will provide his ride. CM will continue to follow.    Final Discharge Disposition Code  01 - home or self-care        Continued Care and Services - Admitted Since 12/5/2020     Durable Medical Equipment     Service Provider Request Status Selected Services Address Phone Fax Patient Preferred    [facility] [place]  Pending - No Request Sent N/A [address] [facility] [address] [phone] --            Selected Continued Care - Prior Encounters Includes selections from prior encounters from 9/6/2020 to 12/7/2020    Discharged on 11/18/2020 Admission date: 11/8/2020 - Discharge disposition: Home or Self Care    Durable Medical Equipment     Service Provider Selected Services Address Phone Fax Patient Preferred    [facility] [place]  Durable Medical Equipment [address] [facility] [address] [phone] --                    Expected Discharge Date and Time     Expected Discharge Date Expected Discharge Time    Dec 11, 2020         Demographic Summary     Row Name 12/07/20 1429       General Information    Admission Type  inpatient    Referral Source  admission list    Reason for Consult  discharge planning    General Information Comments  Confirmed with patient that his PCP is Dr. Obinna Arellano. He has medical coverage through Medicare A & B and [place] Medicaid and rx coverage through Medicaid.        Functional Status     Row Name 12/07/20 1430       Functional Status    Usual Activity Tolerance  moderate       Functional Status, IADL    Medications  independent    Meal Preparation  independent    Housekeeping  independent    Laundry  independent    Shopping  independent        Psychosocial    No documentation.       Abuse/Neglect    No documentation.       Legal    No  documentation.       Substance Abuse    No documentation.       Patient Forms    No documentation.           Ailyn Cary RN

## 2020-12-08 ENCOUNTER — APPOINTMENT (OUTPATIENT)
Dept: GENERAL RADIOLOGY | Facility: HOSPITAL | Age: 55
End: 2020-12-08

## 2020-12-08 PROCEDURE — 25010000002 HEPARIN (PORCINE) PER 1000 UNITS: Performed by: NURSE PRACTITIONER

## 2020-12-08 PROCEDURE — 94799 UNLISTED PULMONARY SVC/PX: CPT

## 2020-12-08 PROCEDURE — 25010000002 KETOROLAC TROMETHAMINE PER 15 MG: Performed by: EMERGENCY MEDICINE

## 2020-12-08 PROCEDURE — 99232 SBSQ HOSP IP/OBS MODERATE 35: CPT | Performed by: INTERNAL MEDICINE

## 2020-12-08 PROCEDURE — 71045 X-RAY EXAM CHEST 1 VIEW: CPT

## 2020-12-08 PROCEDURE — 63710000001 PREDNISONE PER 1 MG: Performed by: NURSE PRACTITIONER

## 2020-12-08 RX ORDER — GUAIFENESIN 600 MG/1
1200 TABLET, EXTENDED RELEASE ORAL EVERY 12 HOURS PRN
Status: DISCONTINUED | OUTPATIENT
Start: 2020-12-08 | End: 2020-12-11 | Stop reason: HOSPADM

## 2020-12-08 RX ADMIN — IPRATROPIUM BROMIDE AND ALBUTEROL SULFATE 3 ML: 2.5; .5 SOLUTION RESPIRATORY (INHALATION) at 16:36

## 2020-12-08 RX ADMIN — BUDESONIDE 0.5 MG: 0.5 INHALANT RESPIRATORY (INHALATION) at 21:03

## 2020-12-08 RX ADMIN — HEPARIN SODIUM 5000 UNITS: 5000 INJECTION INTRAVENOUS; SUBCUTANEOUS at 08:00

## 2020-12-08 RX ADMIN — ACETAMINOPHEN 650 MG: 325 TABLET, FILM COATED ORAL at 10:58

## 2020-12-08 RX ADMIN — KETOROLAC TROMETHAMINE 15 MG: 15 INJECTION, SOLUTION INTRAMUSCULAR; INTRAVENOUS at 13:52

## 2020-12-08 RX ADMIN — ARFORMOTEROL TARTRATE 15 MCG: 15 SOLUTION RESPIRATORY (INHALATION) at 08:31

## 2020-12-08 RX ADMIN — DOXYCYCLINE 100 MG: 100 CAPSULE ORAL at 08:01

## 2020-12-08 RX ADMIN — KETOROLAC TROMETHAMINE 15 MG: 15 INJECTION, SOLUTION INTRAMUSCULAR; INTRAVENOUS at 22:05

## 2020-12-08 RX ADMIN — IPRATROPIUM BROMIDE AND ALBUTEROL SULFATE 3 ML: 2.5; .5 SOLUTION RESPIRATORY (INHALATION) at 12:33

## 2020-12-08 RX ADMIN — GUAIFENESIN 1200 MG: 600 TABLET, EXTENDED RELEASE ORAL at 08:01

## 2020-12-08 RX ADMIN — ACETAMINOPHEN 650 MG: 325 TABLET, FILM COATED ORAL at 18:24

## 2020-12-08 RX ADMIN — DOXYCYCLINE 100 MG: 100 CAPSULE ORAL at 20:27

## 2020-12-08 RX ADMIN — ACETAMINOPHEN 650 MG: 325 TABLET, FILM COATED ORAL at 04:09

## 2020-12-08 RX ADMIN — IPRATROPIUM BROMIDE AND ALBUTEROL SULFATE 3 ML: 2.5; .5 SOLUTION RESPIRATORY (INHALATION) at 08:31

## 2020-12-08 RX ADMIN — IPRATROPIUM BROMIDE AND ALBUTEROL SULFATE 3 ML: 2.5; .5 SOLUTION RESPIRATORY (INHALATION) at 21:03

## 2020-12-08 RX ADMIN — TOBRAMYCIN 300 MG: 300 SOLUTION ORAL at 08:31

## 2020-12-08 RX ADMIN — KETOROLAC TROMETHAMINE 15 MG: 15 INJECTION, SOLUTION INTRAMUSCULAR; INTRAVENOUS at 00:01

## 2020-12-08 RX ADMIN — TOBRAMYCIN 300 MG: 300 SOLUTION ORAL at 21:03

## 2020-12-08 RX ADMIN — HEPARIN SODIUM 5000 UNITS: 5000 INJECTION INTRAVENOUS; SUBCUTANEOUS at 20:27

## 2020-12-08 RX ADMIN — ARFORMOTEROL TARTRATE 15 MCG: 15 SOLUTION RESPIRATORY (INHALATION) at 21:03

## 2020-12-08 RX ADMIN — PREDNISONE 20 MG: 20 TABLET ORAL at 08:01

## 2020-12-08 RX ADMIN — KETOROLAC TROMETHAMINE 15 MG: 15 INJECTION, SOLUTION INTRAMUSCULAR; INTRAVENOUS at 07:07

## 2020-12-08 RX ADMIN — BUDESONIDE 0.5 MG: 0.5 INHALANT RESPIRATORY (INHALATION) at 08:31

## 2020-12-08 RX ADMIN — GUAIFENESIN 1200 MG: 600 TABLET, EXTENDED RELEASE ORAL at 20:27

## 2020-12-08 NOTE — PROGRESS NOTES
"Clinical Nutrition Note      Patient Name: Balwinder Willams  MRN: [id number]  Admission date: 12/5/2020      Multidisciplinary Rounds    Additional information obtained during MDR:  RN reports pt doing well w/ CT,  very little out; pain controlled. MD will transfer pt to floor.    Current diet: Diet Regular    Oral Nutrition Supplement:   Boost plus daily w/ HS nourishment    Pertinent medical data reviewed:  No nutrition risk identified on nursing screen; MST score \"0\"    Intervention:  Plan of care and goals reviewed    Monitor:  RD to follow per protocol      Bria Arcos MS,RD,LD  12/08/20 13:40 EST  Time: 15  mins       "

## 2020-12-08 NOTE — PLAN OF CARE
Goal Outcome Evaluation:  Plan of Care Reviewed With: patient  Progress: no change  Outcome Summary: VSS. Pt remains on 3L NC. Pain controlled with PRN toradol and tylenol. No drainage from chest tube this shift. Pt resting comfortably; will continue to monitor.

## 2020-12-08 NOTE — PROGRESS NOTES
Intensive Care Follow-up     Hospital:  LOS: 3 days   Mr. Balwinder Willams, 54 y.o. male is followed for:   Recurrent pneumothorax            History of present illness:   54-year-old white male admitted 12/5/2020 with recurrent left pneumothorax.  Patient has a complicated history and that he has known alpha-1 antitrypsin deficiency on Prolastin with associated severe emphysema.  He had endobronchial valve placement as an 2019 which were recently removed.  On a hospitalization in November.  Was hospitalized 11/8/2020 through 11/18/2020 with recurrent bronchitis and some of his endobronchial valves (1 from the lingula and 2 from the superior segment of left lower lobe) were removed because it was felt they were inducing infection.  This was complicated by postoperative PTX requiring left chest tube placement with expansion of the lung.  Unfortunately when chest tube was removed he developed another asymptomatic PTX and a repeat small bore chest tube was inserted with subsequent resolution.  Bronchoscopy at that time yielded light growth PSA was pansensitive and was treated with Levaquin and tobramycin nebs.  He was discharged home only to be readmitted 11/24/2312/4/24 dyspnea and hypoxemic respiratory failure as well as a left upper lobe pneumonia treated empirically with Zosyn, doxycycline, YOLIE nebs, as well as inhaled bronchodilators and IV steroids.  At that time it was noted that bronchial wash from 10/30/2020 was growing MAC and plans were to begin him on azithromycin/rifampin/ethambutol.  After discussions it was also decided to try to make arrangements for the pulmonary transplant clinic at . Unfortunately shortly after discharge the patient returned to the ER 12/5/2020 with hypoxemic respiratory distress with a left-sided pneumothorax and a small bore chest tube was placed in the ED. Once this was done he stabilized although he continued to require 6 L of nasal oxygen.      Subjective   Interval  History:  Patient doing well this morning.  Denies any chest pain, shortness of breath, fever, chills.  No leak this morning chest x-ray looks to be improved.  Remains afebrile and normotensive.             The patient's past medical, surgical and social history were reviewed and updated in Epic as appropriate.       Objective     Infusions:     Medications:  Alpha1-Proteinase Inhibitor, 4,252 mg, Intravenous, Weekly  arformoterol, 15 mcg, Nebulization, BID - RT  budesonide, 0.5 mg, Nebulization, BID - RT  doxycycline, 100 mg, Oral, Q12H  heparin (porcine), 5,000 Units, Subcutaneous, Q12H  ipratropium-albuterol, 3 mL, Nebulization, 4x Daily - RT  predniSONE, 20 mg, Oral, Daily With Breakfast  tobramycin PF, 300 mg, Nebulization, BID - RT      I reviewed the patient's medications.    Vital Sign Min/Max for last 24 hours  Temp  Min: 97.6 °F (36.4 °C)  Max: 97.9 °F (36.6 °C)   BP  Min: 84/73  Max: 122/93   Pulse  Min: 68  Max: 105   Resp  Min: 16  Max: 20   SpO2  Min: 90 %  Max: 99 %   Flow (L/min)  Min: 3  Max: 6       Input/Output for last 24 hour shift  12/07 0701 - 12/08 0700  In: 450 [P.O.:450]  Out: 400 [Urine:400]      GENERAL : NAD, conversant  RESPIRATORY/THORAX : normal respiratory effort and no intercostal retractions, distant breath sounds bilaterally  CARDIOVASCULAR : Normal S1/S2, RRR.  No lower ext edema.  GASTROINTESTINAL : Soft, NT/ND. BS x 4 normoactive. No hepatosplenomegaly.  MUSCULOSKELETAL : No cyanosis, clubbing, or ischemia  NEUROLOGICAL: alert and oriented to person, place and time  PSYCHOLOGICAL : Appropriate affect    Results from last 7 days   Lab Units 12/06/20  0616 12/05/20  0019   WBC 10*3/mm3 10.24 15.24*   HEMOGLOBIN g/dL 13.4 16.2   PLATELETS 10*3/mm3 263 303     Results from last 7 days   Lab Units 12/06/20  1020 12/05/20  0019 12/05/20  0018   SODIUM mmol/L 131* 137  --    POTASSIUM mmol/L 3.9 4.3  --    CO2 mmol/L 23.0 25.0  --    BUN mg/dL 26* 16  --    CREATININE mg/dL 0.91 0.93   --    MAGNESIUM mg/dL 2.0  --  2.3   PHOSPHORUS mg/dL 3.6  --   --    GLUCOSE mg/dL 216* 165*  --      Estimated Creatinine Clearance: 77.4 mL/min (by C-G formula based on SCr of 0.91 mg/dL).    Results from last 7 days   Lab Units 12/05/20  0513   PH, ARTERIAL pH units 7.443   PCO2, ARTERIAL mm Hg 38.8   PO2 ART mm Hg 78.4*       I reviewed the patient's new clinical results.  I reviewed the patient's new imaging results/reports including actual images and agree with reports.              Imaging Results (Last 24 Hours)     Procedure Component Value Units Date/Time    XR Chest 1 View [[id number]] Collected: 12/08/20 0815     Updated: 12/08/20 0817    Narrative:         EXAMINATION: XR CHEST 1 VW-      INDICATION: Pneumothorax; J93.9-Pneumothorax, unspecified; J44.1-Chronic  obstructive pulmonary disease with (acute) exacerbation; J96.01-Acute  respiratory failure with hypoxia; D72.829-Elevated white blood cell  count, unspecified      COMPARISON: Chest x-ray 12/07/2020     FINDINGS: Interval placement of percutaneous left chest tube with  resolution of left pneumothorax as there is no discrete pleural line now  evident. Hyperinflated appearance of the bilateral lungs with lucencies  in the lung bases again noted right greater than left. No new  parenchymal process with cardiac silhouette unchanged. Bronchial valves  left hilar region.           Impression:      Interval placement of percutaneous left chest tube at the  left lung base. Previously identified left pneumothorax is no longer  identified          XR Chest 1 View [[id number]] Collected: 12/07/20 0904     Updated: 12/07/20 2102    Narrative:      EXAMINATION: XR CHEST 1 VW- 12/07/2020      INDICATION: J93.9-Pneumothorax, unspecified; J44.1-Chronic obstructive  pulmonary disease with (acute) exacerbation; J96.01-Acute respiratory  failure with hypoxia; D72.829-Elevated white blood cell count,  unspecified      COMPARISON: 12/06/2020     FINDINGS: Portable  chest reveals cardiac and mediastinal silhouettes  within normal limits. Emphysematous and chronic changes seen throughout  the lung fields with small pneumothorax identified on the left. The left  pneumothorax is slightly smaller in size than when compared to the prior  examination. Bullous changes identified predominantly at the lung bases.       Impression:      Slight decrease seen in size of the left apical  pneumothorax.     D:  12/07/2020  E:  12/07/2020     This report was finalized on 12/7/2020 8:59 PM by Dr. Amrita Eden MD.       XR Chest 1 View [[id number]] Collected: 12/06/20 1642     Updated: 12/07/20 2057    Narrative:      EXAMINATION: XR CHEST 1 VW- 12/06/2020      INDICATION: F/U PTX, discontinued left CT; J93.9-Pneumothorax,  unspecified; J44.1-Chronic obstructive pulmonary disease with (acute)  exacerbation; J96.01-Acute respiratory failure with hypoxia;  D72.829-Elevated white blood cell count, unspecified      COMPARISON: 12/06/2020     FINDINGS: The left chest tube has been removed from the subcutaneous  tissues. The cardiac and mediastinal silhouettes within normal limits.  The right lung remains clear with underlying chronic and emphysematous  changes identified. Bullous disease seen diffusely throughout the lung  fields. The pneumothorax continues to increase in size in the interval.  Some atelectatic changes seen within the left lung base.          Impression:      Mild interval increase seen in size of the apical  pneumothorax on the left. The chest tube has been removed from the  subcutaneous tissues with development of increasing atelectasis in the  left lung base.     D:  12/06/2020  E:  12/07/2020     This report was finalized on 12/7/2020 8:54 PM by Dr. Amrita Eden MD.       XR Chest 1 View [[id number]] Collected: 12/06/20 1232     Updated: 12/07/20 2051    Narrative:         EXAMINATION: XR CHEST 1 VW - 12/06/2020     INDICATION: J93.9-Pneumothorax, unspecified;  J44.1-Chronic obstructive  pulmonary disease with (acute) exacerbation; J96.01-Acute respiratory  failure with hypoxia; D72.829-Elevated white blood cell count,  unspecified.      COMPARISON: None.     FINDINGS: Portable chest reveals cardiac and mediastinal silhouettes  within normal limits. Underlying chronic and emphysematous changes  within the lung fields bilaterally. The pneumothorax is slightly larger  on this examination when compared to the prior examination with chest  tube identified in the subcutaneous tissues. Bullous emphysematous  changes seen throughout the lung fields bilaterally. No superimposed  infiltrate.        Impression:      Slight increase seen in size of the left pneumothorax with  left chest tube tip in the subcutaneous tissues. The left chest tube is  not in satisfactory position. Underlying chronic and emphysematous  changes seen bilaterally.     DICTATED:   12/06/2020  EDITED/ls :   12/06/2020      This report was finalized on 12/7/2020 8:47 PM by Dr. Amrita Eden MD.       XR Chest 1 View [[id number]] Collected: 12/06/20 0936     Updated: 12/07/20 2044    Narrative:         EXAMINATION: XR CHEST 1 VW - 12/06/2020     INDICATION: J93.9-Pneumothorax, unspecified; J44.1-Chronic obstructive  pulmonary disease with (acute) exacerbation; J96.01-Acute respiratory  failure with hypoxia; D72.829-Elevated white blood cell count,  unspecified. Pneumothorax.     COMPARISON: 12/05/2020     FINDINGS: Portable chest reveals the left chest tube to be partially  removed or retracted with majority of the tube seen in the subcutaneous  tissues. There is a small left apical pneumothorax identified with some  decrease seen in size of the pneumothorax when compared to the prior  examination. Bullous emphysematous changes identified throughout the  lung fields bilaterally.          Impression:      The left chest tube has been pulled with tip seen in the  subcutaneous tissues of the left lateral chest  wall. There is a small  left pneumothorax identified with both an apical and basilar component.  The pneumothorax is slightly smaller than when compared to the prior  examination. Emphysematous changes seen diffusely throughout the lung  fields bilaterally.     DICTATED:   12/06/2020  EDITED/ls :   12/06/2020      This report was finalized on 12/7/2020 8:41 PM by Dr. Amrita Eden MD.       CT Guided Chest Tube [[id number]] Collected: 12/07/20 1042     Updated: 12/07/20 1116    Narrative:      PROCEDURE: Chest tube placement     Procedural Personnel  Attending physician(s): ETHAN Viera M.D.  Fellow physician(s): None  Resident physician(s): None  Advanced practice provider(s): None     Pre-procedure diagnosis: Recurrent left pneumothorax. ?Patient has a  complicated history and that he has known alpha-1 antitrypsin deficiency  on Prolastin with associated severe emphysema. ?He had endobronchial  valve placement 2019 which were recently removed.   Post-procedure diagnosis: Same  Indication: Therapeutic  Additional clinical history: None     Complications: No immediate complications.       Impression:         Left-sided 8.5 English chest tube placement.     Plan:      Per primary team  ______________________________________________________________________     PROCEDURE SUMMARY:  - Percutaneous pleural drainage with insertion of indwelling catheter  under CT guidance  - Additional procedure(s): None     PROCEDURE DETAILS:     Pre-procedure  Consent: Informed consent for the procedure including risks, benefits  and alternatives was obtained and time-out was performed prior to the  procedure.  Preparation: The site was prepared and draped using maximal sterile  barrier technique including cutaneous antisepsis.     Anesthesia/sedation  Level of anesthesia/sedation: None     Chest tube placement  The patient was positioned supine. Initial imaging was performed. Local  anesthesia was administered. The pleural space was  accessed using trocar  technique and a drainage catheter was placed. Position of the drainage  catheter within the pleural space was confirmed.  - Initial imaging findings: Small left side pneumothorax  - Drainage catheter placed: 8.5 Kazakh nonlocking pigtail  - External catheter securement:  0 silk suture and stay fix bandage  - Post-drainage imaging findings: Catheter in good position  - Additional findings: None     Contrast  Contrast agent: None  Contrast volume (mL): 0     Radiation Dose  CT dose length product (mGy-cm): 479      Additional Details  Additional description of procedure: None  Equipment details: None  Specimens removed: None  Estimated blood loss (mL): Less than 10  Standardized report: ChestTube     Attestation  I was present and scrubbed for the entire procedure. Imaging reviewed.  Agree with final report as written.     This report was finalized on 12/7/2020 10:44 AM by Marito Viera.             Assessment/Plan   Impression        Recurrent left pneumothorax s/p small bore left chest tube 12/5/2020    Alpha-1-antitrypsin deficiency (on replacement)    Dependence on supplemental oxygen (3L NC)     Stage IV emphysema s/p EBV placement X3 w/ subsequent extraction    Former smoker (Resolved 2006)    Acute on chronic respiratory failure with hypoxia     Recent PSA PNA    Mycobacterium avium complex from bronchial washings (CMS/[facility])       Plan        54-year-old male with a past medical history significant for recurrent pneumonias with Pseudomonas, endobronchial valve placement and removal, chronic hypoxic respiratory failure on 3 L nasal cannula, history of a tension pneumothorax, alpha-1 antitrypsin disease, and stage IV very severe emphysema.  Patient has had multiple admissions recently for recurrent secondary spontaneous pneumothoraces.  Readmitted on 12/5/2020 for a secondary spontaneous pneumothorax with underlying severe emphysema.       · Keep chest tube to -20 cm suction for another 24  hours.  · goal oxygen saturation of %  · CT surgery is going to evaluate the patient for possible VATS pleurodesis although it should be noted that this is not a great option given his need for transplant as this could make the transplant process extremely difficult to potentially limit him from having a bilateral lung transplant to having only a single lung transplant which is not ideal in his situation.  Hopefully the lung will reexpand with chest tube placement, and if needed we can always put some valves in place to help get the air leak to stop.  Ultimately he does need to go to a transplant facility so they can work him up.  At this point though he has not been seen by transplant.  · We will continue his current nebulizer therapies  · Continue tobramycin for history of Pseudomonas  · Continue prednisone 20 mg daily  · Continue doxycycline plan to complete 7 days  · Prolastin to be given once weekly  · Ensure pain control with chest tube in place  · Continue regular diet  · Aggressive pulmonary toilet  · Encourage mobilization  · Subcu heparin for DVT prophylaxis  · A.m. chest x-ray/a.m. labs    Plan of care and goals reviewed with mulitdisciplinary/antibiotic stewardship team during rounds.   I discussed the patient's findings and my recommendations with patient and nursing staff     Sanju Durham,   Pulmonary, Critical care and Sleep Medicine

## 2020-12-09 ENCOUNTER — APPOINTMENT (OUTPATIENT)
Dept: GENERAL RADIOLOGY | Facility: HOSPITAL | Age: 55
End: 2020-12-09

## 2020-12-09 LAB
ANION GAP SERPL CALCULATED.3IONS-SCNC: 9 MMOL/L (ref 5–15)
BUN SERPL-MCNC: 15 MG/DL (ref 6–20)
BUN/CREAT SERPL: 17.4 (ref 7–25)
CALCIUM SPEC-SCNC: 8.8 MG/DL (ref 8.6–10.5)
CHLORIDE SERPL-SCNC: 106 MMOL/L (ref 98–107)
CO2 SERPL-SCNC: 23 MMOL/L (ref 22–29)
CREAT SERPL-MCNC: 0.86 MG/DL (ref 0.76–1.27)
GFR SERPL CREATININE-BSD FRML MDRD: 93 ML/MIN/1.73
GLUCOSE SERPL-MCNC: 126 MG/DL (ref 65–99)
MAGNESIUM SERPL-MCNC: 1.8 MG/DL (ref 1.6–2.6)
MYCOBACTERIUM SPEC CULT: ABNORMAL
NIGHT BLUE STAIN TISS: ABNORMAL
PHOSPHATE SERPL-MCNC: 3.7 MG/DL (ref 2.5–4.5)
POTASSIUM SERPL-SCNC: 4.2 MMOL/L (ref 3.5–5.2)
QT INTERVAL: 298 MS
QTC INTERVAL: 441 MS
SODIUM SERPL-SCNC: 138 MMOL/L (ref 136–145)

## 2020-12-09 PROCEDURE — 99232 SBSQ HOSP IP/OBS MODERATE 35: CPT | Performed by: NURSE PRACTITIONER

## 2020-12-09 PROCEDURE — 63710000001 PREDNISONE PER 1 MG: Performed by: INTERNAL MEDICINE

## 2020-12-09 PROCEDURE — 83735 ASSAY OF MAGNESIUM: CPT | Performed by: INTERNAL MEDICINE

## 2020-12-09 PROCEDURE — 25010000002 ALPHA1-PROTEINASE INHIBITOR 1000 MG/20ML SOLUTION

## 2020-12-09 PROCEDURE — 71045 X-RAY EXAM CHEST 1 VIEW: CPT

## 2020-12-09 PROCEDURE — 25010000002 MAGNESIUM SULFATE 2 GM/50ML SOLUTION: Performed by: NURSE PRACTITIONER

## 2020-12-09 PROCEDURE — 94799 UNLISTED PULMONARY SVC/PX: CPT

## 2020-12-09 PROCEDURE — 25010000002 KETOROLAC TROMETHAMINE PER 15 MG: Performed by: INTERNAL MEDICINE

## 2020-12-09 PROCEDURE — 80048 BASIC METABOLIC PNL TOTAL CA: CPT | Performed by: INTERNAL MEDICINE

## 2020-12-09 PROCEDURE — 84100 ASSAY OF PHOSPHORUS: CPT | Performed by: INTERNAL MEDICINE

## 2020-12-09 PROCEDURE — 25010000002 HEPARIN (PORCINE) PER 1000 UNITS: Performed by: INTERNAL MEDICINE

## 2020-12-09 RX ORDER — MAGNESIUM SULFATE HEPTAHYDRATE 40 MG/ML
2 INJECTION, SOLUTION INTRAVENOUS AS NEEDED
Status: DISCONTINUED | OUTPATIENT
Start: 2020-12-09 | End: 2020-12-11 | Stop reason: HOSPADM

## 2020-12-09 RX ORDER — MAGNESIUM SULFATE HEPTAHYDRATE 40 MG/ML
4 INJECTION, SOLUTION INTRAVENOUS AS NEEDED
Status: DISCONTINUED | OUTPATIENT
Start: 2020-12-09 | End: 2020-12-11 | Stop reason: HOSPADM

## 2020-12-09 RX ADMIN — ACETAMINOPHEN 650 MG: 325 TABLET, FILM COATED ORAL at 00:53

## 2020-12-09 RX ADMIN — IPRATROPIUM BROMIDE AND ALBUTEROL SULFATE 3 ML: 2.5; .5 SOLUTION RESPIRATORY (INHALATION) at 19:31

## 2020-12-09 RX ADMIN — IPRATROPIUM BROMIDE AND ALBUTEROL SULFATE 3 ML: 2.5; .5 SOLUTION RESPIRATORY (INHALATION) at 07:42

## 2020-12-09 RX ADMIN — KETOROLAC TROMETHAMINE 15 MG: 15 INJECTION, SOLUTION INTRAMUSCULAR; INTRAVENOUS at 19:03

## 2020-12-09 RX ADMIN — KETOROLAC TROMETHAMINE 15 MG: 15 INJECTION, SOLUTION INTRAMUSCULAR; INTRAVENOUS at 08:11

## 2020-12-09 RX ADMIN — DOXYCYCLINE 100 MG: 100 CAPSULE ORAL at 08:11

## 2020-12-09 RX ADMIN — IPRATROPIUM BROMIDE AND ALBUTEROL SULFATE 3 ML: 2.5; .5 SOLUTION RESPIRATORY (INHALATION) at 16:27

## 2020-12-09 RX ADMIN — ACETAMINOPHEN 650 MG: 325 TABLET, FILM COATED ORAL at 23:02

## 2020-12-09 RX ADMIN — HEPARIN SODIUM 5000 UNITS: 5000 INJECTION INTRAVENOUS; SUBCUTANEOUS at 20:12

## 2020-12-09 RX ADMIN — MAGNESIUM SULFATE 2 G: 2 INJECTION INTRAVENOUS at 12:12

## 2020-12-09 RX ADMIN — PREDNISONE 20 MG: 20 TABLET ORAL at 08:12

## 2020-12-09 RX ADMIN — DOXYCYCLINE 100 MG: 100 CAPSULE ORAL at 20:12

## 2020-12-09 RX ADMIN — TOBRAMYCIN 300 MG: 300 SOLUTION ORAL at 07:59

## 2020-12-09 RX ADMIN — HEPARIN SODIUM 5000 UNITS: 5000 INJECTION INTRAVENOUS; SUBCUTANEOUS at 08:12

## 2020-12-09 RX ADMIN — MAGNESIUM SULFATE 2 G: 2 INJECTION INTRAVENOUS at 13:19

## 2020-12-09 RX ADMIN — ARFORMOTEROL TARTRATE 15 MCG: 15 SOLUTION RESPIRATORY (INHALATION) at 19:31

## 2020-12-09 RX ADMIN — BUDESONIDE 0.5 MG: 0.5 INHALANT RESPIRATORY (INHALATION) at 07:42

## 2020-12-09 RX ADMIN — TOBRAMYCIN 300 MG: 300 SOLUTION ORAL at 19:31

## 2020-12-09 RX ADMIN — BUDESONIDE 0.5 MG: 0.5 INHALANT RESPIRATORY (INHALATION) at 19:31

## 2020-12-09 RX ADMIN — MAGNESIUM SULFATE 2 G: 2 INJECTION INTRAVENOUS at 15:28

## 2020-12-09 RX ADMIN — GUAIFENESIN 1200 MG: 600 TABLET, EXTENDED RELEASE ORAL at 08:12

## 2020-12-09 RX ADMIN — ARFORMOTEROL TARTRATE 15 MCG: 15 SOLUTION RESPIRATORY (INHALATION) at 07:41

## 2020-12-09 RX ADMIN — ALPHA1-PROTEINASE INHIBITOR (HUMAN) 4232 MG: 1000 INJECTION, SOLUTION INTRAVENOUS at 17:59

## 2020-12-09 RX ADMIN — IPRATROPIUM BROMIDE AND ALBUTEROL SULFATE 3 ML: 2.5; .5 SOLUTION RESPIRATORY (INHALATION) at 12:08

## 2020-12-09 NOTE — PROGRESS NOTES
Progressive Care Unit Follow-up      LOS: 4 days     Mr. Balwinder Willams, 54 y.o. male is followed for: Recurrent pneumothorax     Subjective - Interval History     Patient transferred to PCU yesterday. No acute events overnight.     Resting in bed this AM on 4L NC with SpO2 >95%. CT has remained on suction with no output in 24 hrs. No air leak present. CXR this AM with no evidence of PTX.     Mr. Willams says he feels well this AM. He denies any shortness of breath at rest, but does note it on exertion. He denies any pain, nausea, vomiting, diarrhea, or abdominal discomfort. He is anxious about the fact that he has developed recurrent pneumothoraces.     The patient's relevant past medical, surgical and social history were reviewed and updated in Epic as appropriate.     Objective     Infusions:     Medications:  Alpha1-Proteinase Inhibitor, 4,252 mg, Intravenous, Weekly  arformoterol, 15 mcg, Nebulization, BID - RT  budesonide, 0.5 mg, Nebulization, BID - RT  doxycycline, 100 mg, Oral, Q12H  heparin (porcine), 5,000 Units, Subcutaneous, Q12H  ipratropium-albuterol, 3 mL, Nebulization, 4x Daily - RT  predniSONE, 20 mg, Oral, Daily With Breakfast  tobramycin PF, 300 mg, Nebulization, BID - RT      Vital Sign Min/Max for last 24 hours  Temp  Min: 97.9 °F (36.6 °C)  Max: 98.6 °F (37 °C)   BP  Min: 89/67  Max: 127/90   Pulse  Min: 74  Max: 102   Resp  Min: 16  Max: 18   SpO2  Min: 92 %  Max: 97 %   Flow (L/min)  Min: 2  Max: 4      Intake/Output Summary (Last 24 hours) at 12/9/2020 1325  Last data filed at 12/9/2020 1319  Gross per 24 hour   Intake 618.2 ml   Output 1225 ml   Net -606.8 ml           Physical Exam:  GENERAL: Well-developed white male sitting up in the chair and conversant. No acute distress.   HEENT: Atraumatic. Neck supple. Trachea midline. PERRL. EOM WNL. Tongue midline.   LUNGS: Chest rise of normal depth and symmetric. Lungs clear to auscultation throughout all lung fields. No wheezing or rhonchi  noted. Left-sided chest tube to suction, no output or air leak noted.    HEART: S1S2 detected. No JVD. Regular rate and rhythm. NSR. No rub, murmur, or gallop.   ABDOMEN: Soft, round, nondistended, and nontender. Bowel sounds normoactive.    EXTREMITIES: No clubbing, edema, or cyanosis. Peripheral pulses present.    NEURO/PSYCH: A&O x4. Follows commands. Moves all extremities.      Results from last 7 days   Lab Units 12/06/20  0616 12/05/20  0019   WBC 10*3/mm3 10.24 15.24*   HEMOGLOBIN g/dL 13.4 16.2   PLATELETS 10*3/mm3 263 303     Results from last 7 days   Lab Units 12/09/20  0713 12/06/20  1020 12/05/20  0019 12/05/20  0018   SODIUM mmol/L 138 131* 137  --    POTASSIUM mmol/L 4.2 3.9 4.3  --    CO2 mmol/L 23.0 23.0 25.0  --    BUN mg/dL 15 26* 16  --    CREATININE mg/dL 0.86 0.91 0.93  --    MAGNESIUM mg/dL 1.8 2.0  --  2.3   PHOSPHORUS mg/dL 3.7 3.6  --   --    GLUCOSE mg/dL 126* 216* 165*  --      Estimated Creatinine Clearance: 92.1 mL/min (by C-G formula based on SCr of 0.86 mg/dL).    Results from last 7 days   Lab Units 12/05/20  0513   PH, ARTERIAL pH units 7.443   PCO2, ARTERIAL mm Hg 38.8   PO2 ART mm Hg 78.4*     Lab Results   Component Value Date    LACTATE 1.6 12/05/2020      Images:       Xr Chest 1 View    Result Date: 12/9/2020  Left-sided pneumothorax catheter remains in place. No evidence of pneumothorax or other new chest disease.        I reviewed the patient's results and images.     Impression      Active Hospital Problems    Diagnosis   • **Recurrent left pneumothorax s/p small bore left chest tube 12/5/2020   • Recent PSA PNA   • Mycobacterium avium complex from bronchial washings (CMS/HCC)   • Acute on chronic respiratory failure with hypoxia    • Stage IV emphysema s/p EBV placement X3 w/ subsequent extraction   • Former smoker (Resolved 2006)   • Alpha-1-antitrypsin deficiency (on replacement)     A.  Labs of 11/20/2014 reports an alpha 1 antitrypsin deficiency of 4.7 with a phenotype  with Z bands detected and genotyping revealing the presence of most common deficiency allele type Z and an inferred non-expressing null allele.    B.  On Zemaira     • Dependence on supplemental oxygen (3L NC)          Plan        Monitor in PCU  Case discussed with Dr. Del Rosario. We will attempt to clamp CT overnight to see if there is recurrence of PTX; monitor for any S/S of respiratory distress  F/U CXR in the AM   Keep SpO2 between %  Continue Tobramycin nebs  Continue Doxycycline to complete 7-day course  Continue Prednisone   Continue Prolastin once weekly  Push rehab; pulmonary toilet, mobilize, OOB to chair  AM labs    Diet: Regular  DVT Prophylaxis: SQ Heparin  Code Status: Full Code  Dispo: Keep in PCU     Time: 35 minutes   Plan of care and goals reviewed with mulitdisciplinary team at daily rounds   I discussed the patient's findings and my recommendations with patient, nursing staff, primary care team and consulting provider     Kelly Ibarra DNP, APRN, AGAMARTINP-BC  Pulmonary and Critical Care Medicine   12/09/20

## 2020-12-09 NOTE — PLAN OF CARE
Goal Outcome Evaluation:  Plan of Care Reviewed With: patient  Progress: improving  Outcome Summary: pts chest tube clamped pt tolerating well po 96%

## 2020-12-09 NOTE — PLAN OF CARE
Goal Outcome Evaluation:  Plan of Care Reviewed With: patient  Progress: improving  Outcome Summary: VSS. Patient remained on 3L NC. Pain controlled with PRN medication. No drainage from Chest tube this shift. Patient is resting well. Chest tube site has no drainage. Will continue POC

## 2020-12-10 ENCOUNTER — APPOINTMENT (OUTPATIENT)
Dept: GENERAL RADIOLOGY | Facility: HOSPITAL | Age: 55
End: 2020-12-10

## 2020-12-10 LAB
ANION GAP SERPL CALCULATED.3IONS-SCNC: 12 MMOL/L (ref 5–15)
BACTERIA SPEC AEROBE CULT: NORMAL
BACTERIA SPEC AEROBE CULT: NORMAL
BUN SERPL-MCNC: 20 MG/DL (ref 6–20)
BUN/CREAT SERPL: 24.1 (ref 7–25)
CALCIUM SPEC-SCNC: 9.2 MG/DL (ref 8.6–10.5)
CHLORIDE SERPL-SCNC: 105 MMOL/L (ref 98–107)
CO2 SERPL-SCNC: 22 MMOL/L (ref 22–29)
CREAT SERPL-MCNC: 0.83 MG/DL (ref 0.76–1.27)
DEPRECATED RDW RBC AUTO: 48.1 FL (ref 37–54)
ERYTHROCYTE [DISTWIDTH] IN BLOOD BY AUTOMATED COUNT: 13.4 % (ref 12.3–15.4)
GFR SERPL CREATININE-BSD FRML MDRD: 97 ML/MIN/1.73
GLUCOSE SERPL-MCNC: 149 MG/DL (ref 65–99)
HCT VFR BLD AUTO: 45.8 % (ref 37.5–51)
HGB BLD-MCNC: 14.1 G/DL (ref 13–17.7)
MAGNESIUM SERPL-MCNC: 2.1 MG/DL (ref 1.6–2.6)
MCH RBC QN AUTO: 29.6 PG (ref 26.6–33)
MCHC RBC AUTO-ENTMCNC: 30.8 G/DL (ref 31.5–35.7)
MCV RBC AUTO: 96.2 FL (ref 79–97)
PLATELET # BLD AUTO: 304 10*3/MM3 (ref 140–450)
PMV BLD AUTO: 8.9 FL (ref 6–12)
POTASSIUM SERPL-SCNC: 4.9 MMOL/L (ref 3.5–5.2)
RBC # BLD AUTO: 4.76 10*6/MM3 (ref 4.14–5.8)
SODIUM SERPL-SCNC: 139 MMOL/L (ref 136–145)
WBC # BLD AUTO: 9.03 10*3/MM3 (ref 3.4–10.8)

## 2020-12-10 PROCEDURE — 94799 UNLISTED PULMONARY SVC/PX: CPT

## 2020-12-10 PROCEDURE — 25010000002 KETOROLAC TROMETHAMINE PER 15 MG: Performed by: INTERNAL MEDICINE

## 2020-12-10 PROCEDURE — 71045 X-RAY EXAM CHEST 1 VIEW: CPT

## 2020-12-10 PROCEDURE — 83735 ASSAY OF MAGNESIUM: CPT | Performed by: NURSE PRACTITIONER

## 2020-12-10 PROCEDURE — 85027 COMPLETE CBC AUTOMATED: CPT | Performed by: NURSE PRACTITIONER

## 2020-12-10 PROCEDURE — 99233 SBSQ HOSP IP/OBS HIGH 50: CPT | Performed by: NURSE PRACTITIONER

## 2020-12-10 PROCEDURE — 63710000001 PREDNISONE PER 1 MG: Performed by: INTERNAL MEDICINE

## 2020-12-10 PROCEDURE — 80048 BASIC METABOLIC PNL TOTAL CA: CPT | Performed by: NURSE PRACTITIONER

## 2020-12-10 PROCEDURE — 25010000002 HEPARIN (PORCINE) PER 1000 UNITS: Performed by: INTERNAL MEDICINE

## 2020-12-10 RX ORDER — PREDNISONE 10 MG/1
10 TABLET ORAL
Status: DISCONTINUED | OUTPATIENT
Start: 2020-12-11 | End: 2020-12-11 | Stop reason: HOSPADM

## 2020-12-10 RX ORDER — KETOROLAC TROMETHAMINE 15 MG/ML
15 INJECTION, SOLUTION INTRAMUSCULAR; INTRAVENOUS EVERY 6 HOURS PRN
Status: DISCONTINUED | OUTPATIENT
Start: 2020-12-10 | End: 2020-12-11 | Stop reason: HOSPADM

## 2020-12-10 RX ADMIN — IPRATROPIUM BROMIDE AND ALBUTEROL SULFATE 3 ML: 2.5; .5 SOLUTION RESPIRATORY (INHALATION) at 07:43

## 2020-12-10 RX ADMIN — BUDESONIDE 0.5 MG: 0.5 INHALANT RESPIRATORY (INHALATION) at 07:43

## 2020-12-10 RX ADMIN — DOXYCYCLINE 100 MG: 100 CAPSULE ORAL at 09:10

## 2020-12-10 RX ADMIN — HEPARIN SODIUM 5000 UNITS: 5000 INJECTION INTRAVENOUS; SUBCUTANEOUS at 09:10

## 2020-12-10 RX ADMIN — GUAIFENESIN 1200 MG: 600 TABLET, EXTENDED RELEASE ORAL at 20:37

## 2020-12-10 RX ADMIN — PREDNISONE 20 MG: 20 TABLET ORAL at 09:10

## 2020-12-10 RX ADMIN — HEPARIN SODIUM 5000 UNITS: 5000 INJECTION INTRAVENOUS; SUBCUTANEOUS at 20:36

## 2020-12-10 RX ADMIN — BUDESONIDE 0.5 MG: 0.5 INHALANT RESPIRATORY (INHALATION) at 20:03

## 2020-12-10 RX ADMIN — ACETAMINOPHEN 650 MG: 325 TABLET, FILM COATED ORAL at 20:37

## 2020-12-10 RX ADMIN — DOXYCYCLINE 100 MG: 100 CAPSULE ORAL at 20:37

## 2020-12-10 RX ADMIN — ARFORMOTEROL TARTRATE 15 MCG: 15 SOLUTION RESPIRATORY (INHALATION) at 07:44

## 2020-12-10 RX ADMIN — IPRATROPIUM BROMIDE AND ALBUTEROL SULFATE 3 ML: 2.5; .5 SOLUTION RESPIRATORY (INHALATION) at 13:27

## 2020-12-10 RX ADMIN — GUAIFENESIN 1200 MG: 600 TABLET, EXTENDED RELEASE ORAL at 09:55

## 2020-12-10 RX ADMIN — IPRATROPIUM BROMIDE AND ALBUTEROL SULFATE 3 ML: 2.5; .5 SOLUTION RESPIRATORY (INHALATION) at 20:03

## 2020-12-10 RX ADMIN — KETOROLAC TROMETHAMINE 15 MG: 15 INJECTION, SOLUTION INTRAMUSCULAR; INTRAVENOUS at 09:56

## 2020-12-10 RX ADMIN — ARFORMOTEROL TARTRATE 15 MCG: 15 SOLUTION RESPIRATORY (INHALATION) at 20:03

## 2020-12-10 NOTE — PLAN OF CARE
Goal Outcome Evaluation:  Plan of Care Reviewed With: patient  Progress: improving  Outcome Summary: pt states feeling bettter with chest tube out po 98% no distress noted vss

## 2020-12-10 NOTE — PROGRESS NOTES
Adult Nutrition  Assessment/PES    Patient Name:  Balwinder Willams  YOB: 1965  MRN: [id number]  Admit Date:  12/5/2020    Assessment Date:  12/10/2020        Reason for Assessment     Row Name 12/10/20 1201 12/10/20 1146       Reason for Assessment    Reason For Assessment   follow-up protocol   15 mins    Diagnosis  --  -- per MD notes this adm.          Anthropometrics     Row Name 12/10/20 1201 12/10/20 0600                    Admit Weight    Admit Weight  -- ht=68in, iw=083wg per bed wt on 12/9. BMI=22.2 (wnl). Pt is 95% IBW (wnl) of 154lb.  --              Nutrition Prescription Ordered     Row Name 12/10/20 1203 12/10/20 1149       Nutrition Prescription PO    Current PO Diet  Regular     Supplement  Boost Plus (Ensure Enlive, Ensure Plus)     Supplement Frequency  Daily         Evaluation of Received Nutrient/Fluid Intake     Row Name 12/10/20 1206 12/10/20 1150       PO Evaluation    Number of Meals  8     % PO Intake  94               Problem/Interventions:  Problem 1     Row Name 12/10/20 1202          Nutrition Diagnoses Problem 1    Resolved?  Yes   based on wt data of 146lb 12/9         Problem 2     Row Name 12/10/20 1203          Nutrition Diagnoses Problem 2    Problem 2  No Nutrition Diagnosis at this Time             Intervention Goal     Row Name 12/10/20 1207          Intervention Goal    PO  Maintain intake         Nutrition Intervention     Row Name 12/10/20 1208          Nutrition Intervention    RD/Tech Action  Follow Tx progress;Supplement provided           Education/Evaluation     Row Name 12/10/20 1208          Monitor/Evaluation    Monitor  Per protocol           Electronically signed by:  Magaly Cordero, MS,RD,LD  12/10/20 12:11 EST

## 2020-12-10 NOTE — PROGRESS NOTES
Progressive Care Unit Follow Up      Hospital:  LOS: 5 days   Mr. Balwinder Willams, 54 y.o. male is followed for:   Recurrent pneumothorax        History of present illness::   Balwinder Willasm is a 54 y.o. male that presented to [facility] ED on 12/5 with complaints of dyspnea found to have a left pneumothorax.      The patient has a history of alpha-1 antitrypsin deficiency on Prolastin and severe COPD s/p endobronchial valve placement in 2019 and recent removal. He is well-known to the pulmonary service and was recently admitted to our facility from 11/8-11/18 for recurrent bronchitis and underwent endobronchial valve removal by Dr. Castellano (1 from the lingula and 2 from the superior segment of the LLL) that was complicated by a post-operative left PTX. Initially a thoracostomy tube was placed with concern for tension PTX with complete re-expansion of the lung. The chest tube was removed, however he developed another asymptomatic PTX which warranted SBCT insertion with subsequent resolution. At that time bronchoscopy wash yielded light growth PSA that was pansensitive treated with Levaquin and Tobramycin nebs. He was discharged home then readmitted on 11/24 until 12/4 for dyspnea and hypoxic respiratory failure and CHASTITY PNA treated empirically with Zosyn, Doxycycline, and Tobramycin nebs as well as IV steroids, Brovana, Duonebs, and budesonide with improvement. Bronch wash from 10/30 grew MAC (smear negative) to which a sensitivity panel was sent with anticipation of initiation of therapy (Azithromycin/Rifampin/Ethambutol) but also would require a baseline eye examination and normal LFTs. He is currently undergoing arrangements to [facility] for evaluation of pulmonary transplantation.    On 12/5 he was re-admitted to the ICU for hypoxic respiratory failure 2* recurrent left pneumothorax s/p SBCT placement in the ED. CTA negative for PE. Follow-up CXR revealed an enlarging PTX as the CT was misplaced in the soft tissues  therefore was discontinued. The morning of 12/7 he underwent CT-guided 8.5Fr chest tube placement by Dr. Viera with PTX resolution.     He was downgraded to the PCU on 12/9. At that time the CT was clamped to evaluate for ongoing PTX recurrence and respiratory distress.     Subjective   Interval History:  Patient doing well this AM, sitting up in chair, without complaints. Oxygenating well on 3L NC. CT clamped yesterday afternoon without obvious PTX recurrence on morning CXR. Labs overall unrevealing.      The patient's past medical, surgical and social history were reviewed and updated in Epic as appropriate.       Objective     Infusions:     Medications:  arformoterol, 15 mcg, Nebulization, BID - RT  budesonide, 0.5 mg, Nebulization, BID - RT  doxycycline, 100 mg, Oral, Q12H  heparin (porcine), 5,000 Units, Subcutaneous, Q12H  ipratropium-albuterol, 3 mL, Nebulization, 4x Daily - RT  predniSONE, 20 mg, Oral, Daily With Breakfast      I reviewed the patient's medications.    Vital Sign Min/Max for last 24 hours  Temp  Min: 97.9 °F (36.6 °C)  Max: 98.2 °F (36.8 °C)   BP  Min: 104/74  Max: 118/73   Pulse  Min: 78  Max: 107   Resp  Min: 16  Max: 20   SpO2  Min: 90 %  Max: 96 %   Flow (L/min)  Min: 3  Max: 4       Input/Output for last 24 hour shift  12/09 0701 - 12/10 0700  In: 500 [P.O.:250; I.V.:250]  Out: 725 [Urine:725]        ROS:   Constitutional: Negative for fever.  Respiratory: Negative for dyspnea.  Cardiovascular: Negative for chest pain.  Gastrointestinal: Negative for nausea, vomiting and diarrhea.    Physical Exam  Vitals signs and nursing note reviewed.   Constitutional:       General: He is not in acute distress.     Appearance: Normal appearance. He is not ill-appearing, toxic-appearing or diaphoretic.   HENT:      Head: Normocephalic and atraumatic.   Cardiovascular:      Rate and Rhythm: Normal rate and regular rhythm.      Pulses: Normal pulses.      Heart sounds: Normal heart sounds. No murmur.  No friction rub. No gallop.    Pulmonary:      Effort: Pulmonary effort is normal. No respiratory distress.      Comments: Decreased throughout.  L CT CDI, clamped, no air leak   Abdominal:      General: Abdomen is flat. Bowel sounds are normal.      Palpations: Abdomen is soft.   Musculoskeletal: Normal range of motion.   Skin:     General: Skin is warm and dry.      Capillary Refill: Capillary refill takes more than 3 seconds.      Comments: Multiple tattoos    Neurological:      General: No focal deficit present.      Mental Status: He is alert and oriented to person, place, and time. Mental status is at baseline.   Psychiatric:         Mood and Affect: Mood normal.         Behavior: Behavior normal.         Thought Content: Thought content normal.         Judgment: Judgment normal.       Results from last 7 days   Lab Units 12/06/20  0616 12/05/20  0019   WBC 10*3/mm3 10.24 15.24*   HEMOGLOBIN g/dL 13.4 16.2   PLATELETS 10*3/mm3 263 303     Results from last 7 days   Lab Units 12/09/20  0713 12/06/20  1020 12/05/20  0019 12/05/20  0018   SODIUM mmol/L 138 131* 137  --    POTASSIUM mmol/L 4.2 3.9 4.3  --    CO2 mmol/L 23.0 23.0 25.0  --    BUN mg/dL 15 26* 16  --    CREATININE mg/dL 0.86 0.91 0.93  --    MAGNESIUM mg/dL 1.8 2.0  --  2.3   PHOSPHORUS mg/dL 3.7 3.6  --   --    GLUCOSE mg/dL 126* 216* 165*  --      Estimated Creatinine Clearance: 91.9 mL/min (by C-G formula based on SCr of 0.86 mg/dL).    Results from last 7 days   Lab Units 12/05/20 0513   PH, ARTERIAL pH units 7.443   PCO2, ARTERIAL mm Hg 38.8   PO2 ART mm Hg 78.4*       I reviewed the patient's new clinical results.  I reviewed the patient's new imaging results/reports including actual images and agree with reports.         Imaging Results (Last 24 Hours)     Procedure Component Value Units Date/Time    XR Chest 1 View [[id number]] Resulted: 12/10/20 0535     Updated: 12/10/20 0538    XR Chest 1 View [[id number]] Collected: 12/09/20 0918      Updated: 12/09/20 2250    Narrative:      EXAMINATION: XR CHEST 1 VW- 12/09/2020      INDICATION: Pneumothorax; J93.9-Pneumothorax, unspecified; J44.1-Chronic  obstructive pulmonary disease with (acute) exacerbation; J96.01-Acute  respiratory failure with hypoxia; D72.829-Elevated white blood cell  count, unspecified     COMPARISON: 12/08/2020     FINDINGS: Left small bore pigtail catheter is again seen in place.  Hyperlucency of the lungs is typical of centrilobular emphysema. No  pneumothorax or new pulmonary parenchymal disease is identified. The  heart and vasculature appear normal in size. Note is again made of  left-sided endobronchial valves.       Impression:      Left-sided pneumothorax catheter remains in place. No  evidence of pneumothorax or other new chest disease.     D:  12/09/2020  E:  12/09/2020         This report was finalized on 12/9/2020 10:47 PM by Dr. Manoj Ring MD.             Assessment/Plan   Impression        Recurrent L PTX s/p multiple SBCTs     AoC respiratory failure with hypoxia     Alpha-1-antitrypsin deficiency (on replacement)    Stage IV emphysema s/p EBV placement X3 w/ subsequent extraction    Recent PSA PNA    MAC (smear -)     Dependence on supplemental oxygen (3L NC)     Former smoker (Resolved 2006)       Plan        Balwinder Willams is a 54 year old male with PMH of alpha-1 antitrypsin on Prolastin, severe emphysema s/p prior EBV placement with subsequent removal d/t recurrent bronchitis with + PSA (pan-sensitive) and MAC (smear -) on 10/31 bronch wash treated with tobramycin nebs and doxycyline. Initial EBV removal complicated by post-procedural L PTX that has recurred on numerous occasions warranting numerous CT placements. Ultimately he needs transplantation work-up.     Recurrent L PTX  AoC hypoxic respiratory failure w/dependence on supplemental oxygen   Alpha-1 antitrypsin on Prolastin   Stage IV emphysema   Recent PSA PNA   MAC (smear negative)   Former smoker   · Most  recent CT-guided SBCT placement by Dr. Viera on 12/8  · Has been clamped overnight without PTX recurrence on morning CXR   · Will d/c CT today with f/u CXR in 3 hrs and in AM or sooner if patient develops respiratory distress   · Goal oxygen saturation >92%   · CTS previous evaluated pt for possible VATS pleurodesis- not a good option given his need for transplant and would potentially limit him to a single versus bilateral transplant candidate   · Encouraged to call and set-up appointment with transplant team as to not delay further care  · Prior EBV placement by Dr. Castellano complicated by bronchitis w/subsequent EBV removal   · If needed may need to replace EBV if ongoing air leak as a bridge to transplantation   · Bronch wash from 10/30 + pan-sensitive PSA and MAC (smear negative)   · Continue tobramycin nebs and Doxycyline thru today   · Prolastin to be given weekly (patient supplied dose) last dose 12/9   · Aggressive pulmonary toilet, push mobilization, ambulate TID  · Decrease prednisone to 10mg daily starting 12/11 with ongoing taper   · AM CXR    GI Prophylaxis: None   DVT Prophylaxis: Heparin SQ  Steroids: Prednisone   Nutrition: Regular   Code status: FULL CODE    Disposition: Keep in PCU     Plan of care and goals reviewed with mulitdisciplinary/antibiotic stewardship team during rounds.   I discussed the patient's findings and my recommendations with patient and nursing staff     Time: 35 minutes, face to face, with the patient or on the herndon coordinating care with other health care providers.     I spent > 50% percent of this time, counseling and discussing diagnosis, prognosis, diagnostic testing, evaluation, current status, treatment options and management.    Maricarmen Castaneda, APRN, AGACNP-BC, FNP-BC   Pulmonary and Critical Care

## 2020-12-10 NOTE — PLAN OF CARE
Goal Outcome Evaluation:  Plan of Care Reviewed With: patient  Progress: improving  Outcome Summary: VSS. Patient chest tube had no drainage, and has been clamped all shift per MD order. Patient still on 3L NC, sating well. Patient took PRN medication for pain. Took tordal at 1900 and tylenol at 0100. Patient ambulated to the restroom as needed, and slept very well throughout the night. No acute distress throughout the night. Will continue to monitor.

## 2020-12-11 ENCOUNTER — APPOINTMENT (OUTPATIENT)
Dept: GENERAL RADIOLOGY | Facility: HOSPITAL | Age: 55
End: 2020-12-11

## 2020-12-11 VITALS
HEIGHT: 68 IN | WEIGHT: 144 LBS | TEMPERATURE: 97.9 F | BODY MASS INDEX: 21.82 KG/M2 | DIASTOLIC BLOOD PRESSURE: 91 MMHG | OXYGEN SATURATION: 94 % | SYSTOLIC BLOOD PRESSURE: 113 MMHG | RESPIRATION RATE: 18 BRPM | HEART RATE: 112 BPM

## 2020-12-11 LAB — FUNGUS WND CULT: NORMAL

## 2020-12-11 PROCEDURE — 63710000001 PREDNISONE PER 5 MG: Performed by: NURSE PRACTITIONER

## 2020-12-11 PROCEDURE — 99239 HOSP IP/OBS DSCHRG MGMT >30: CPT | Performed by: NURSE PRACTITIONER

## 2020-12-11 PROCEDURE — 71045 X-RAY EXAM CHEST 1 VIEW: CPT

## 2020-12-11 PROCEDURE — 94799 UNLISTED PULMONARY SVC/PX: CPT

## 2020-12-11 PROCEDURE — 25010000002 HEPARIN (PORCINE) PER 1000 UNITS: Performed by: INTERNAL MEDICINE

## 2020-12-11 RX ORDER — PREDNISONE 10 MG/1
10 TABLET ORAL
Qty: 5 TABLET | Refills: 0 | Status: SHIPPED | OUTPATIENT
Start: 2020-12-12 | End: 2020-12-27 | Stop reason: HOSPADM

## 2020-12-11 RX ADMIN — BUDESONIDE 0.5 MG: 0.5 INHALANT RESPIRATORY (INHALATION) at 08:37

## 2020-12-11 RX ADMIN — IPRATROPIUM BROMIDE AND ALBUTEROL SULFATE 3 ML: 2.5; .5 SOLUTION RESPIRATORY (INHALATION) at 11:38

## 2020-12-11 RX ADMIN — ARFORMOTEROL TARTRATE 15 MCG: 15 SOLUTION RESPIRATORY (INHALATION) at 08:37

## 2020-12-11 RX ADMIN — HEPARIN SODIUM 5000 UNITS: 5000 INJECTION INTRAVENOUS; SUBCUTANEOUS at 08:28

## 2020-12-11 RX ADMIN — PREDNISONE 10 MG: 10 TABLET ORAL at 08:28

## 2020-12-11 RX ADMIN — IPRATROPIUM BROMIDE AND ALBUTEROL SULFATE 3 ML: 2.5; .5 SOLUTION RESPIRATORY (INHALATION) at 08:36

## 2020-12-11 RX ADMIN — GUAIFENESIN 1200 MG: 600 TABLET, EXTENDED RELEASE ORAL at 08:33

## 2020-12-11 NOTE — PROGRESS NOTES
Continued Stay Note  [facility]     Patient Name: Balwinder Willams  MRN: [id number]  Today's Date: 12/11/2020    Admit Date: 12/5/2020    Discharge Plan     Row Name 12/11/20 1521       Plan    Plan  CM update    Final Discharge Disposition Code  06 - home with home health care    Final Note  Patient being discharged today. He is current with [facility]- he states he has been in contact with them regarding the next infusion time. CM left a message with them requesting callback so I can fax resumption of care orders and discharge summary. Patient remains independent of ADLs and has support from his s/o. -No other discharge needs identified - beth [phone]        Discharge Codes    No documentation.       Expected Discharge Date and Time     Expected Discharge Date Expected Discharge Time    Dec 11, 2020             Beth Ferro RN

## 2020-12-11 NOTE — PLAN OF CARE
Goal Outcome Evaluation:  Plan of Care Reviewed With: patient  Progress: improving  Outcome Summary: Patient still on 3L NC, ambulating to the restroom and in the room. Patient had mild complaints of discomfort. PRN tylenol was given. Patient had no signs of acute distress. Chest tube site removal looks WNL. no drainage, or swelling. will continue to monitor.

## 2020-12-11 NOTE — DISCHARGE SUMMARY
Discharge Summary    Patient name: Balwinder Willmas  CSN: [id number]  MRN: [id number]  : 1965  Today's date: 2020     Date of Admission: 2020  Date of Discharge:  2020    Admitting Physician:  Dr. Del Rosario  Primary Care Provider: Obinna Arellano MD    Admission Diagnosis:  Recurrent PTX     Discharge Diagnoses:   Active Hospital Problems    Diagnosis   • **Recurrent L PTX s/p multiple SBCTs    • AoC respiratory failure with hypoxia    • Stage IV emphysema s/p EBV placement X3 w/ subsequent extraction   • MAC (smear -)    • Former smoker (Resolved )   • Alpha-1-antitrypsin deficiency (on replacement)     A.  Labs of 2014 reports an alpha 1 antitrypsin deficiency of 4.7 with a phenotype with Z bands detected and genotyping revealing the presence of most common deficiency allele type Z and an inferred non-expressing null allele.    B.  On Zemaira     • Dependence on supplemental oxygen (3L NC)    • Recent PSA PNA     Allergies:  Other    Code Status and Medical Interventions:   Ordered at: 20 0616     Code Status:    CPR     Medical Interventions (Level of Support Prior to Arrest):    Full     Procedures/Testin20 Lt CT guided chest tube placement     History of Present Illness:  Balwinder Willams is a 54 y.o. male that presents to [facility] ED on  with complaints of dyspnea found to have a left pneumothorax.      The patient has a history of alpha-1 antitrypsin deficiency on Prolastin and severe COPD s/p endobronchial valve placement in  and recent removal. He is well-known to the pulmonary service and was recently admitted to our facility from - for recurrent bronchitis and underwent endobronchial valve removal by Dr. Castellano (1 from the lingula and 2 from the superior segment of the LLL) that was complicated by a post-operative left PTX. Initially a thoracostomy tube was placed with concern for tension PTX with complete re-expansion of the lung. The  chest tube was removed, however he developed another asymptomatic PTX which warranted SBCT insertion with subsequent resolution. At that time bronchoscopy wash yielded light growth PSA that was pansensitive treated with Levaquin and Tobramycin nebs. He was discharged home then readmitted on 11/24 until 12/4 for dyspnea and hypoxic respiratory failure and CHASTITY PNA treated empirically with Zosyn, Doxycycline, and Tobramycin nebs as well as IV steroids, Brovana, Duonebs, and budesonide with improvement. Bronch wash from 10/30 grew MAC (negvative AFB) to which a sensitivity panel was sent with anticipation of initiation of therapy (Azithromycin/Rifampin/Ethambutol) but also would require a baseline eye examination and normal LFTs. He is currently undergoing arrangements to [facility] for evaluation of pulmonary transplantation.      Today (12/5) he presents to our ED in with hypoxic respiratory distress noted to have a left-sided PTX with SBCT placement in the ED. CTA negative for pulmonary embolism. Due to ongoing hypoxic despite chest tube placement, ICU admission was requested.  Currently remains dyspneic but more comfortable.  Some left-sided chest pain related to chest tube but not severe.     COVID-19 testing on 9/9, 10/28, 11/8, 11/24, and 12/5 negative.    Hospital Course:  Follow up CXR revealed an enlarging PTX as the CT was dislodged into the soft tissues.  The chest tube was discontinued 12/6.  He was placed on 100% NRB in hopes that PTX would improve or resolve on its own.  However, it remained unchanged and he underwent CT guided chest tube by Dr. Viera on 12/7.  PTX once again resolved.  He was moved to the PCU 12/9.  Chest tube was clamped and left lung remained inflated.  It was discontinued 12/10.  CXR revealed the following day that the left lung had remained inflated and it is felt that he is ready for discharge.  He has made an appt [facility].   He states  "that he wears 3-4L NC nocturnally, but thinks he needs it all the time now.       Vitals:  /69 (BP Location: Right arm, Patient Position: Standing)   Pulse 99   Temp 97.9 °F (36.6 °C) (Oral)   Resp 18   Ht 172.7 cm (68\")   Wt 65.3 kg (144 lb)   SpO2 95%   BMI 21.90 kg/m²  on 3L NC    Physical Exam:  Constitutional:  Appears thin and well-nourished. No distress.  Sitting up in chair  HEENT:  Normocephalic and atraumatic. PERRL  Neck:  Neck supple. No JVD present.   CV: Normal rate, regular rhythm,  intact distal pulses.  No gallop, murmur or rub.  Pulmonary/Chest: Effort normal and breath sounds normal. No respiratory distress. No wheezes, rhonchi or rales.   Abdominal: Soft. +BS. No distension and no mass. There is no tenderness.   Musculoskeletal: Normal muscle tone and strength  Neurological: Alert and oriented to person, place, and time.  No focal deficits  Skin: Skin is warm and dry. No rash noted.   Extremities:  No clubbing, edema or cyanosis  Psychiatric: Normal mood and affect. Behavior is normal.     Labs:  Results from last 7 days   Lab Units 12/10/20  0920   WBC 10*3/mm3 9.03   HEMOGLOBIN g/dL 14.1   HEMATOCRIT % 45.8   PLATELETS 10*3/mm3 304     Results from last 7 days   Lab Units 12/10/20  0920  12/05/20  0019   SODIUM mmol/L 139   < > 137   POTASSIUM mmol/L 4.9   < > 4.3   CHLORIDE mmol/L 105   < > 99   CO2 mmol/L 22.0   < > 25.0   BUN mg/dL 20   < > 16   CREATININE mg/dL 0.83   < > 0.93   CALCIUM mg/dL 9.2   < > 9.8   BILIRUBIN mg/dL  --   --  0.4   ALK PHOS U/L  --   --  58   ALT (SGPT) U/L  --   --  40   AST (SGOT) U/L  --   --  24   GLUCOSE mg/dL 149*   < > 165*    < > = values in this interval not displayed.         Magnesium   Date Value Ref Range Status   12/10/2020 2.1 1.6 - 2.6 mg/dL Final   12/09/2020 1.8 1.6 - 2.6 mg/dL Final     Phosphorus   Date Value Ref Range Status   12/09/2020 3.7 2.5 - 4.5 mg/dL Final              Discharge Medications      Changes to Medications      " Instructions Start Date   predniSONE 10 MG tablet  Commonly known as: DELTASONE  What changed:   · medication strength  · See the new instructions.   10 mg, Oral, Daily With Breakfast   Start Date: December 12, 2020        Continue These Medications      Instructions Start Date   Brovana 15 MCG/2ML nebulizer solution  Generic drug: arformoterol   15 mcg, Nebulization, 2 Times Daily - RT      budesonide 0.5 MG/2ML nebulizer solution  Commonly known as: PULMICORT   0.5 mg, Nebulization, 2 Times Daily - RT      fluticasone 50 MCG/ACT nasal spray  Commonly known as: Flonase   2 sprays, Nasal, Daily      guaiFENesin 600 MG 12 hr tablet  Commonly known as: MUCINEX   1,200 mg, Oral, Daily PRN      ibuprofen 800 MG tablet  Commonly known as: ADVIL,MOTRIN   800 mg, Oral, Every 6 Hours PRN      ipratropium-albuterol 0.5-2.5 mg/3 ml nebulizer  Commonly known as: DUO-NEB   3 mL, Nebulization, 4 Times Daily - RT      Prolastin-C 1000 MG/20ML IV solution  Generic drug: Alpha1-Proteinase Inhibitor   Weekly         Stop These Medications    doxycycline 100 MG capsule  Commonly known as: MONODOX     tobramycin  MG/5ML nebulizer solution  Commonly known as: YOLIE          Diet Instructions     Diet: Regular, Cardiac; Thin      Discharge Diet:  Regular  Cardiac       Fluid Consistency: Thin        Activity Instructions     Activity as Tolerated          Follow-up Appointments  No future appointments.  Additional Instructions for the Follow-ups that You Need to Schedule     Discharge Follow-up with Specified Provider: Dr. Castellano or JESSICA Levine   As directed      To: Dr. Castellano or JESSICA Levine    Follow Up Details: 2-3 weeks         Discharge Follow-up with Specified Provider: [facility]   As directed      To: [facility]    Follow Up Details: already scheduled for 2/9/20             Discharge Instructions:  Ok to discharge home today  Script sent Meds to Beds  Follow up as above  He has  been advised to return to the ED if symptoms of PTX returns.  He is very aware of the symptoms as PTX has been recurrent.     JESSICA Rodriguez, AGACNP-BC, FNP-BC  Pulmonary & Critical Care Medicine    Time: I spent  45  minutes on this discharge activity which included: face-to-face encounter with the patient, reviewing the data in the system, coordination of the care with the nursing staff as well as consultants, documentation, and entering orders.      CC: Obinna Arellano MD

## 2020-12-11 NOTE — PLAN OF CARE
Goal Outcome Evaluation:  Plan of Care Reviewed With: patient  Progress: improving   Patient discharged to return home with explanations and written materials. Left site intact, duoderm clean, dry, intact without subcutaneous air. VSS, lung sounds present on both sides. Patient somewhat anxious, discussed care with nurse, provided additional contact information, including for the [facility] Nurse Hotline.

## 2020-12-11 NOTE — DISCHARGE INSTR - APPOINTMENTS
You have an appointment with Anne Bazzi PA-C  on December 18, 2020 @ 1:30 PM.   Call them if you have any questions. Phone: [phone]  [address] [place] [address]

## 2020-12-12 ENCOUNTER — READMISSION MANAGEMENT (OUTPATIENT)
Dept: CALL CENTER | Facility: HOSPITAL | Age: 55
End: 2020-12-12

## 2020-12-12 NOTE — OUTREACH NOTE
Prep Survey      Responses   [facility] discharged from?  [place]   Is LACE score < 7 ?  No   Eligibility  Readm Mgmt   Discharge diagnosis  Recurrent L PTX s/p multiple SBCTs    Does the patient have one of the following disease processes/diagnoses(primary or secondary)?  Other   Does the patient have Home health ordered?  No   Is there a DME ordered?  No   Comments regarding appointments  Needs f/u scheduled   Prep survey completed?  Yes          Yolette Najera RN

## 2020-12-14 ENCOUNTER — READMISSION MANAGEMENT (OUTPATIENT)
Dept: CALL CENTER | Facility: HOSPITAL | Age: 55
End: 2020-12-14

## 2020-12-14 NOTE — OUTREACH NOTE
Medical Week 1 Survey      Responses   [facility] patient discharged from?  [place]   Does the patient have one of the following disease processes/diagnoses(primary or secondary)?  Other   Week 1 attempt successful?  Yes   Call start time  1645   Call end time  1648   Discharge diagnosis  Recurrent L PTX s/p multiple SBCTs    Meds reviewed with patient/caregiver?  Yes   Is the patient having any side effects they believe may be caused by any medication additions or changes?  No   Does the patient have all medications ordered at discharge?  Yes   Is the patient taking all medications as directed (includes completed medication regime)?  Yes   Does the patient have a primary care provider?   Yes   Does the patient have an appointment with their PCP within 7 days of discharge?  Yes   Has the patient kept scheduled appointments due by today?  N/A   Has home health visited the patient within 72 hours of discharge?  N/A   Psychosocial issues?  No   Did the patient receive a copy of their discharge instructions?  Yes   Nursing interventions  Reviewed instructions with patient   What is the patient's perception of their health status since discharge?  Improving   Is the patient/caregiver able to teach back signs and symptoms related to disease process for when to call PCP?  Yes   Is the patient/caregiver able to teach back signs and symptoms related to disease process for when to call 911?  Yes   Is the patient/caregiver able to teach back the hierarchy of who to call/visit for symptoms/problems? PCP, Specialist, Home health nurse, Urgent Care, ED, 911  Yes   Additional teach back comments  wife statept has no SOB, sites where chest tubes removed, healing well, free of infection   Week 1 call completed?  Yes          Padmini Orr RN

## 2020-12-18 ENCOUNTER — APPOINTMENT (OUTPATIENT)
Dept: GENERAL RADIOLOGY | Facility: HOSPITAL | Age: 55
End: 2020-12-18

## 2020-12-18 ENCOUNTER — HOSPITAL ENCOUNTER (INPATIENT)
Facility: HOSPITAL | Age: 55
LOS: 9 days | Discharge: HOME HEALTH | End: 2020-12-27
Attending: EMERGENCY MEDICINE | Admitting: INTERNAL MEDICINE

## 2020-12-18 DIAGNOSIS — E88.01 ALPHA-1-ANTITRYPSIN DEFICIENCY (HCC): Chronic | ICD-10-CM

## 2020-12-18 DIAGNOSIS — Z99.81 DEPENDENCE ON SUPPLEMENTAL OXYGEN: Chronic | ICD-10-CM

## 2020-12-18 DIAGNOSIS — J93.9 RECURRENT PNEUMOTHORAX: Primary | ICD-10-CM

## 2020-12-18 DIAGNOSIS — J93.9 PNEUMOTHORAX ON LEFT: ICD-10-CM

## 2020-12-18 DIAGNOSIS — R06.03 ACUTE RESPIRATORY DISTRESS: ICD-10-CM

## 2020-12-18 DIAGNOSIS — IMO0002 PERSISTENT AIR LEAK: ICD-10-CM

## 2020-12-18 DIAGNOSIS — J96.21 ACUTE ON CHRONIC RESPIRATORY FAILURE WITH HYPOXIA (HCC): ICD-10-CM

## 2020-12-18 PROBLEM — D72.829 LEUKOCYTOSIS: Status: ACTIVE | Noted: 2020-12-18

## 2020-12-18 LAB
ALBUMIN SERPL-MCNC: 4 G/DL (ref 3.5–5.2)
ALBUMIN/GLOB SERPL: 1.3 G/DL
ALP SERPL-CCNC: 61 U/L (ref 39–117)
ALT SERPL W P-5'-P-CCNC: 53 U/L (ref 1–41)
ANION GAP SERPL CALCULATED.3IONS-SCNC: 12 MMOL/L (ref 5–15)
AST SERPL-CCNC: 29 U/L (ref 1–40)
BASOPHILS # BLD AUTO: 0.05 10*3/MM3 (ref 0–0.2)
BASOPHILS NFR BLD AUTO: 0.2 % (ref 0–1.5)
BILIRUB SERPL-MCNC: 0.2 MG/DL (ref 0–1.2)
BUN SERPL-MCNC: 18 MG/DL (ref 6–20)
BUN/CREAT SERPL: 16.5 (ref 7–25)
CALCIUM SPEC-SCNC: 8.8 MG/DL (ref 8.6–10.5)
CHLORIDE SERPL-SCNC: 101 MMOL/L (ref 98–107)
CO2 SERPL-SCNC: 26 MMOL/L (ref 22–29)
CREAT SERPL-MCNC: 1.09 MG/DL (ref 0.76–1.27)
DEPRECATED RDW RBC AUTO: 44.2 FL (ref 37–54)
EOSINOPHIL # BLD AUTO: 0.02 10*3/MM3 (ref 0–0.4)
EOSINOPHIL NFR BLD AUTO: 0.1 % (ref 0.3–6.2)
ERYTHROCYTE [DISTWIDTH] IN BLOOD BY AUTOMATED COUNT: 13 % (ref 12.3–15.4)
FLUAV RNA RESP QL NAA+PROBE: NOT DETECTED
FLUBV RNA RESP QL NAA+PROBE: NOT DETECTED
GFR SERPL CREATININE-BSD FRML MDRD: 70 ML/MIN/1.73
GLOBULIN UR ELPH-MCNC: 3 GM/DL
GLUCOSE SERPL-MCNC: 176 MG/DL (ref 65–99)
HCT VFR BLD AUTO: 42.5 % (ref 37.5–51)
HGB BLD-MCNC: 13.7 G/DL (ref 13–17.7)
HOLD SPECIMEN: NORMAL
HOLD SPECIMEN: NORMAL
IMM GRANULOCYTES # BLD AUTO: 0.22 10*3/MM3 (ref 0–0.05)
IMM GRANULOCYTES NFR BLD AUTO: 1 % (ref 0–0.5)
LYMPHOCYTES # BLD AUTO: 0.82 10*3/MM3 (ref 0.7–3.1)
LYMPHOCYTES NFR BLD AUTO: 3.8 % (ref 19.6–45.3)
MCH RBC QN AUTO: 30 PG (ref 26.6–33)
MCHC RBC AUTO-ENTMCNC: 32.2 G/DL (ref 31.5–35.7)
MCV RBC AUTO: 93.2 FL (ref 79–97)
MONOCYTES # BLD AUTO: 2.39 10*3/MM3 (ref 0.1–0.9)
MONOCYTES NFR BLD AUTO: 11.2 % (ref 5–12)
NEUTROPHILS NFR BLD AUTO: 17.88 10*3/MM3 (ref 1.7–7)
NEUTROPHILS NFR BLD AUTO: 83.7 % (ref 42.7–76)
NRBC BLD AUTO-RTO: 0 /100 WBC (ref 0–0.2)
NT-PROBNP SERPL-MCNC: 102.5 PG/ML (ref 0–900)
PLATELET # BLD AUTO: 279 10*3/MM3 (ref 140–450)
PMV BLD AUTO: 8.4 FL (ref 6–12)
POTASSIUM SERPL-SCNC: 4.8 MMOL/L (ref 3.5–5.2)
PROCALCITONIN SERPL-MCNC: 0.09 NG/ML (ref 0–0.25)
PROT SERPL-MCNC: 7 G/DL (ref 6–8.5)
RBC # BLD AUTO: 4.56 10*6/MM3 (ref 4.14–5.8)
SARS-COV-2 RNA RESP QL NAA+PROBE: NOT DETECTED
SODIUM SERPL-SCNC: 139 MMOL/L (ref 136–145)
TROPONIN T SERPL-MCNC: <0.01 NG/ML (ref 0–0.03)
WBC # BLD AUTO: 21.38 10*3/MM3 (ref 3.4–10.8)
WHOLE BLOOD HOLD SPECIMEN: NORMAL
WHOLE BLOOD HOLD SPECIMEN: NORMAL

## 2020-12-18 PROCEDURE — 0B9J8ZX DRAINAGE OF LEFT LOWER LUNG LOBE, VIA NATURAL OR ARTIFICIAL OPENING ENDOSCOPIC, DIAGNOSTIC: ICD-10-PCS | Performed by: INTERNAL MEDICINE

## 2020-12-18 PROCEDURE — 0BHB8GZ INSERTION OF ENDOBRONCHIAL VALVE INTO LEFT LOWER LOBE BRONCHUS, VIA NATURAL OR ARTIFICIAL OPENING ENDOSCOPIC: ICD-10-PCS | Performed by: INTERNAL MEDICINE

## 2020-12-18 PROCEDURE — 84145 PROCALCITONIN (PCT): CPT | Performed by: NURSE PRACTITIONER

## 2020-12-18 PROCEDURE — 93005 ELECTROCARDIOGRAM TRACING: CPT | Performed by: EMERGENCY MEDICINE

## 2020-12-18 PROCEDURE — 71045 X-RAY EXAM CHEST 1 VIEW: CPT

## 2020-12-18 PROCEDURE — 83880 ASSAY OF NATRIURETIC PEPTIDE: CPT | Performed by: EMERGENCY MEDICINE

## 2020-12-18 PROCEDURE — 25010000002 KETOROLAC TROMETHAMINE PER 15 MG: Performed by: EMERGENCY MEDICINE

## 2020-12-18 PROCEDURE — 87636 SARSCOV2 & INF A&B AMP PRB: CPT | Performed by: EMERGENCY MEDICINE

## 2020-12-18 PROCEDURE — 80053 COMPREHEN METABOLIC PANEL: CPT | Performed by: EMERGENCY MEDICINE

## 2020-12-18 PROCEDURE — 99285 EMERGENCY DEPT VISIT HI MDM: CPT

## 2020-12-18 PROCEDURE — 84484 ASSAY OF TROPONIN QUANT: CPT | Performed by: EMERGENCY MEDICINE

## 2020-12-18 PROCEDURE — 99223 1ST HOSP IP/OBS HIGH 75: CPT | Performed by: INTERNAL MEDICINE

## 2020-12-18 PROCEDURE — 85025 COMPLETE CBC W/AUTO DIFF WBC: CPT | Performed by: EMERGENCY MEDICINE

## 2020-12-18 RX ORDER — KETOROLAC TROMETHAMINE 15 MG/ML
15 INJECTION, SOLUTION INTRAMUSCULAR; INTRAVENOUS ONCE
Status: COMPLETED | OUTPATIENT
Start: 2020-12-18 | End: 2020-12-18

## 2020-12-18 RX ORDER — SODIUM CHLORIDE 0.9 % (FLUSH) 0.9 %
10 SYRINGE (ML) INJECTION AS NEEDED
Status: DISCONTINUED | OUTPATIENT
Start: 2020-12-18 | End: 2020-12-22

## 2020-12-18 RX ORDER — KETOROLAC TROMETHAMINE 30 MG/ML
30 INJECTION, SOLUTION INTRAMUSCULAR; INTRAVENOUS EVERY 6 HOURS PRN
Status: COMPLETED | OUTPATIENT
Start: 2020-12-18 | End: 2020-12-20

## 2020-12-18 RX ORDER — ACETAMINOPHEN 500 MG
1000 TABLET ORAL ONCE
Status: COMPLETED | OUTPATIENT
Start: 2020-12-18 | End: 2020-12-18

## 2020-12-18 RX ADMIN — KETOROLAC TROMETHAMINE 15 MG: 15 INJECTION, SOLUTION INTRAMUSCULAR; INTRAVENOUS at 21:48

## 2020-12-18 RX ADMIN — KETOROLAC TROMETHAMINE 15 MG: 15 INJECTION, SOLUTION INTRAMUSCULAR; INTRAVENOUS at 17:12

## 2020-12-18 RX ADMIN — ACETAMINOPHEN 1000 MG: 500 TABLET ORAL at 20:10

## 2020-12-19 LAB
ANION GAP SERPL CALCULATED.3IONS-SCNC: 10 MMOL/L (ref 5–15)
BASOPHILS # BLD AUTO: 0.06 10*3/MM3 (ref 0–0.2)
BASOPHILS NFR BLD AUTO: 0.5 % (ref 0–1.5)
BILIRUB UR QL STRIP: NEGATIVE
BUN SERPL-MCNC: 17 MG/DL (ref 6–20)
BUN/CREAT SERPL: 21 (ref 7–25)
CALCIUM SPEC-SCNC: 8.6 MG/DL (ref 8.6–10.5)
CHLORIDE SERPL-SCNC: 102 MMOL/L (ref 98–107)
CLARITY UR: CLEAR
CO2 SERPL-SCNC: 25 MMOL/L (ref 22–29)
COLOR UR: YELLOW
CREAT SERPL-MCNC: 0.81 MG/DL (ref 0.76–1.27)
D-LACTATE SERPL-SCNC: 1.1 MMOL/L (ref 0.5–2)
D-LACTATE SERPL-SCNC: 2.2 MMOL/L (ref 0.5–2)
DEPRECATED RDW RBC AUTO: 45.3 FL (ref 37–54)
EOSINOPHIL # BLD AUTO: 0.21 10*3/MM3 (ref 0–0.4)
EOSINOPHIL NFR BLD AUTO: 1.9 % (ref 0.3–6.2)
ERYTHROCYTE [DISTWIDTH] IN BLOOD BY AUTOMATED COUNT: 13.2 % (ref 12.3–15.4)
GFR SERPL CREATININE-BSD FRML MDRD: 99 ML/MIN/1.73
GLUCOSE SERPL-MCNC: 109 MG/DL (ref 65–99)
GLUCOSE UR STRIP-MCNC: NEGATIVE MG/DL
HCT VFR BLD AUTO: 41.3 % (ref 37.5–51)
HGB BLD-MCNC: 13.4 G/DL (ref 13–17.7)
HGB UR QL STRIP.AUTO: NEGATIVE
IMM GRANULOCYTES # BLD AUTO: 0.09 10*3/MM3 (ref 0–0.05)
IMM GRANULOCYTES NFR BLD AUTO: 0.8 % (ref 0–0.5)
KETONES UR QL STRIP: ABNORMAL
LACTATE HOLD SPECIMEN: NORMAL
LEUKOCYTE ESTERASE UR QL STRIP.AUTO: NEGATIVE
LYMPHOCYTES # BLD AUTO: 2 10*3/MM3 (ref 0.7–3.1)
LYMPHOCYTES NFR BLD AUTO: 18.2 % (ref 19.6–45.3)
MAGNESIUM SERPL-MCNC: 2.1 MG/DL (ref 1.6–2.6)
MCH RBC QN AUTO: 30.5 PG (ref 26.6–33)
MCHC RBC AUTO-ENTMCNC: 32.4 G/DL (ref 31.5–35.7)
MCV RBC AUTO: 93.9 FL (ref 79–97)
MONOCYTES # BLD AUTO: 1.42 10*3/MM3 (ref 0.1–0.9)
MONOCYTES NFR BLD AUTO: 12.9 % (ref 5–12)
NEUTROPHILS NFR BLD AUTO: 65.7 % (ref 42.7–76)
NEUTROPHILS NFR BLD AUTO: 7.22 10*3/MM3 (ref 1.7–7)
NITRITE UR QL STRIP: NEGATIVE
NRBC BLD AUTO-RTO: 0 /100 WBC (ref 0–0.2)
PH UR STRIP.AUTO: <=5 [PH] (ref 5–8)
PLATELET # BLD AUTO: 288 10*3/MM3 (ref 140–450)
PMV BLD AUTO: 9.1 FL (ref 6–12)
POTASSIUM SERPL-SCNC: 3.6 MMOL/L (ref 3.5–5.2)
PROT UR QL STRIP: ABNORMAL
RBC # BLD AUTO: 4.4 10*6/MM3 (ref 4.14–5.8)
SODIUM SERPL-SCNC: 137 MMOL/L (ref 136–145)
SP GR UR STRIP: 1.03 (ref 1–1.03)
UROBILINOGEN UR QL STRIP: ABNORMAL
WBC # BLD AUTO: 11 10*3/MM3 (ref 3.4–10.8)

## 2020-12-19 PROCEDURE — 25010000002 KETOROLAC TROMETHAMINE PER 15 MG: Performed by: NURSE PRACTITIONER

## 2020-12-19 PROCEDURE — 85025 COMPLETE CBC W/AUTO DIFF WBC: CPT | Performed by: NURSE PRACTITIONER

## 2020-12-19 PROCEDURE — 94799 UNLISTED PULMONARY SVC/PX: CPT

## 2020-12-19 PROCEDURE — 83735 ASSAY OF MAGNESIUM: CPT | Performed by: NURSE PRACTITIONER

## 2020-12-19 PROCEDURE — 81003 URINALYSIS AUTO W/O SCOPE: CPT | Performed by: NURSE PRACTITIONER

## 2020-12-19 PROCEDURE — 94640 AIRWAY INHALATION TREATMENT: CPT

## 2020-12-19 PROCEDURE — 99222 1ST HOSP IP/OBS MODERATE 55: CPT | Performed by: INTERNAL MEDICINE

## 2020-12-19 PROCEDURE — 80048 BASIC METABOLIC PNL TOTAL CA: CPT | Performed by: NURSE PRACTITIONER

## 2020-12-19 PROCEDURE — 87040 BLOOD CULTURE FOR BACTERIA: CPT | Performed by: NURSE PRACTITIONER

## 2020-12-19 PROCEDURE — 83605 ASSAY OF LACTIC ACID: CPT | Performed by: NURSE PRACTITIONER

## 2020-12-19 PROCEDURE — 99233 SBSQ HOSP IP/OBS HIGH 50: CPT | Performed by: FAMILY MEDICINE

## 2020-12-19 RX ORDER — GUAIFENESIN 600 MG/1
1200 TABLET, EXTENDED RELEASE ORAL DAILY PRN
Status: DISCONTINUED | OUTPATIENT
Start: 2020-12-19 | End: 2020-12-27 | Stop reason: HOSPADM

## 2020-12-19 RX ORDER — SODIUM CHLORIDE 0.9 % (FLUSH) 0.9 %
10 SYRINGE (ML) INJECTION AS NEEDED
Status: DISCONTINUED | OUTPATIENT
Start: 2020-12-19 | End: 2020-12-27 | Stop reason: HOSPADM

## 2020-12-19 RX ORDER — FLUTICASONE PROPIONATE 50 MCG
2 SPRAY, SUSPENSION (ML) NASAL DAILY
Status: DISCONTINUED | OUTPATIENT
Start: 2020-12-19 | End: 2020-12-27 | Stop reason: HOSPADM

## 2020-12-19 RX ORDER — ONDANSETRON 2 MG/ML
4 INJECTION INTRAMUSCULAR; INTRAVENOUS EVERY 6 HOURS PRN
Status: DISCONTINUED | OUTPATIENT
Start: 2020-12-19 | End: 2020-12-27 | Stop reason: HOSPADM

## 2020-12-19 RX ORDER — IPRATROPIUM BROMIDE AND ALBUTEROL SULFATE 2.5; .5 MG/3ML; MG/3ML
3 SOLUTION RESPIRATORY (INHALATION) EVERY 4 HOURS PRN
Status: DISCONTINUED | OUTPATIENT
Start: 2020-12-19 | End: 2020-12-20

## 2020-12-19 RX ORDER — ONDANSETRON 4 MG/1
4 TABLET, FILM COATED ORAL EVERY 6 HOURS PRN
Status: DISCONTINUED | OUTPATIENT
Start: 2020-12-19 | End: 2020-12-27 | Stop reason: HOSPADM

## 2020-12-19 RX ORDER — ARFORMOTEROL TARTRATE 15 UG/2ML
15 SOLUTION RESPIRATORY (INHALATION)
Status: DISCONTINUED | OUTPATIENT
Start: 2020-12-19 | End: 2020-12-27 | Stop reason: HOSPADM

## 2020-12-19 RX ORDER — SODIUM CHLORIDE 0.9 % (FLUSH) 0.9 %
10 SYRINGE (ML) INJECTION EVERY 12 HOURS SCHEDULED
Status: DISCONTINUED | OUTPATIENT
Start: 2020-12-19 | End: 2020-12-27 | Stop reason: HOSPADM

## 2020-12-19 RX ORDER — ACETAMINOPHEN 325 MG/1
650 TABLET ORAL EVERY 4 HOURS PRN
Status: DISCONTINUED | OUTPATIENT
Start: 2020-12-19 | End: 2020-12-27 | Stop reason: HOSPADM

## 2020-12-19 RX ORDER — BUDESONIDE 0.5 MG/2ML
0.5 INHALANT ORAL
Status: DISCONTINUED | OUTPATIENT
Start: 2020-12-19 | End: 2020-12-27 | Stop reason: HOSPADM

## 2020-12-19 RX ORDER — IPRATROPIUM BROMIDE AND ALBUTEROL SULFATE 2.5; .5 MG/3ML; MG/3ML
3 SOLUTION RESPIRATORY (INHALATION)
Status: DISCONTINUED | OUTPATIENT
Start: 2020-12-19 | End: 2020-12-20

## 2020-12-19 RX ORDER — ACETAMINOPHEN 325 MG/1
650 TABLET ORAL EVERY 6 HOURS PRN
Status: DISCONTINUED | OUTPATIENT
Start: 2020-12-19 | End: 2020-12-19

## 2020-12-19 RX ADMIN — IPRATROPIUM BROMIDE AND ALBUTEROL SULFATE 3 ML: 2.5; .5 SOLUTION RESPIRATORY (INHALATION) at 03:08

## 2020-12-19 RX ADMIN — ACETAMINOPHEN 650 MG: 325 TABLET, FILM COATED ORAL at 00:35

## 2020-12-19 RX ADMIN — ACETAMINOPHEN 650 MG: 325 TABLET, FILM COATED ORAL at 12:52

## 2020-12-19 RX ADMIN — BUDESONIDE 0.5 MG: 0.5 INHALANT RESPIRATORY (INHALATION) at 20:41

## 2020-12-19 RX ADMIN — KETOROLAC TROMETHAMINE 30 MG: 30 INJECTION, SOLUTION INTRAMUSCULAR; INTRAVENOUS at 10:15

## 2020-12-19 RX ADMIN — SODIUM CHLORIDE, PRESERVATIVE FREE 10 ML: 5 INJECTION INTRAVENOUS at 00:36

## 2020-12-19 RX ADMIN — IPRATROPIUM BROMIDE AND ALBUTEROL SULFATE 3 ML: 2.5; .5 SOLUTION RESPIRATORY (INHALATION) at 16:22

## 2020-12-19 RX ADMIN — IPRATROPIUM BROMIDE AND ALBUTEROL SULFATE 3 ML: 2.5; .5 SOLUTION RESPIRATORY (INHALATION) at 20:41

## 2020-12-19 RX ADMIN — KETOROLAC TROMETHAMINE 30 MG: 30 INJECTION, SOLUTION INTRAMUSCULAR; INTRAVENOUS at 22:35

## 2020-12-19 RX ADMIN — KETOROLAC TROMETHAMINE 30 MG: 30 INJECTION, SOLUTION INTRAMUSCULAR; INTRAVENOUS at 03:54

## 2020-12-19 RX ADMIN — BUDESONIDE 0.5 MG: 0.5 INHALANT RESPIRATORY (INHALATION) at 07:16

## 2020-12-19 RX ADMIN — ARFORMOTEROL TARTRATE 15 MCG: 15 SOLUTION RESPIRATORY (INHALATION) at 20:42

## 2020-12-19 RX ADMIN — IPRATROPIUM BROMIDE AND ALBUTEROL SULFATE 3 ML: 2.5; .5 SOLUTION RESPIRATORY (INHALATION) at 13:07

## 2020-12-19 RX ADMIN — KETOROLAC TROMETHAMINE 30 MG: 30 INJECTION, SOLUTION INTRAMUSCULAR; INTRAVENOUS at 16:32

## 2020-12-19 RX ADMIN — SODIUM CHLORIDE, PRESERVATIVE FREE 10 ML: 5 INJECTION INTRAVENOUS at 22:28

## 2020-12-19 RX ADMIN — ARFORMOTEROL TARTRATE 15 MCG: 15 SOLUTION RESPIRATORY (INHALATION) at 10:07

## 2020-12-19 RX ADMIN — IPRATROPIUM BROMIDE AND ALBUTEROL SULFATE 3 ML: 2.5; .5 SOLUTION RESPIRATORY (INHALATION) at 07:17

## 2020-12-20 ENCOUNTER — APPOINTMENT (OUTPATIENT)
Dept: GENERAL RADIOLOGY | Facility: HOSPITAL | Age: 55
End: 2020-12-20

## 2020-12-20 ENCOUNTER — ANESTHESIA EVENT (OUTPATIENT)
Dept: GASTROENTEROLOGY | Facility: HOSPITAL | Age: 55
End: 2020-12-20

## 2020-12-20 ENCOUNTER — READMISSION MANAGEMENT (OUTPATIENT)
Dept: CALL CENTER | Facility: HOSPITAL | Age: 55
End: 2020-12-20

## 2020-12-20 LAB
ANION GAP SERPL CALCULATED.3IONS-SCNC: 8 MMOL/L (ref 5–15)
BASOPHILS # BLD AUTO: 0.05 10*3/MM3 (ref 0–0.2)
BASOPHILS NFR BLD AUTO: 0.3 % (ref 0–1.5)
BUN SERPL-MCNC: 11 MG/DL (ref 6–20)
BUN/CREAT SERPL: 13.8 (ref 7–25)
CALCIUM SPEC-SCNC: 8.2 MG/DL (ref 8.6–10.5)
CHLORIDE SERPL-SCNC: 104 MMOL/L (ref 98–107)
CO2 SERPL-SCNC: 25 MMOL/L (ref 22–29)
CREAT SERPL-MCNC: 0.8 MG/DL (ref 0.76–1.27)
DEPRECATED RDW RBC AUTO: 45 FL (ref 37–54)
EOSINOPHIL # BLD AUTO: 0.27 10*3/MM3 (ref 0–0.4)
EOSINOPHIL NFR BLD AUTO: 1.9 % (ref 0.3–6.2)
ERYTHROCYTE [DISTWIDTH] IN BLOOD BY AUTOMATED COUNT: 13.1 % (ref 12.3–15.4)
GFR SERPL CREATININE-BSD FRML MDRD: 100 ML/MIN/1.73
GLUCOSE SERPL-MCNC: 131 MG/DL (ref 65–99)
HCT VFR BLD AUTO: 41.7 % (ref 37.5–51)
HGB BLD-MCNC: 13.3 G/DL (ref 13–17.7)
IMM GRANULOCYTES # BLD AUTO: 0.07 10*3/MM3 (ref 0–0.05)
IMM GRANULOCYTES NFR BLD AUTO: 0.5 % (ref 0–0.5)
LYMPHOCYTES # BLD AUTO: 1.3 10*3/MM3 (ref 0.7–3.1)
LYMPHOCYTES NFR BLD AUTO: 8.9 % (ref 19.6–45.3)
MCH RBC QN AUTO: 29.6 PG (ref 26.6–33)
MCHC RBC AUTO-ENTMCNC: 31.9 G/DL (ref 31.5–35.7)
MCV RBC AUTO: 92.7 FL (ref 79–97)
MONOCYTES # BLD AUTO: 1.62 10*3/MM3 (ref 0.1–0.9)
MONOCYTES NFR BLD AUTO: 11.1 % (ref 5–12)
NEUTROPHILS NFR BLD AUTO: 11.22 10*3/MM3 (ref 1.7–7)
NEUTROPHILS NFR BLD AUTO: 77.3 % (ref 42.7–76)
NRBC BLD AUTO-RTO: 0 /100 WBC (ref 0–0.2)
NT-PROBNP SERPL-MCNC: 127.7 PG/ML (ref 0–900)
PLATELET # BLD AUTO: 309 10*3/MM3 (ref 140–450)
PMV BLD AUTO: 9.2 FL (ref 6–12)
POTASSIUM SERPL-SCNC: 3.3 MMOL/L (ref 3.5–5.2)
POTASSIUM SERPL-SCNC: 4.4 MMOL/L (ref 3.5–5.2)
QT INTERVAL: 276 MS
QTC INTERVAL: 421 MS
RBC # BLD AUTO: 4.5 10*6/MM3 (ref 4.14–5.8)
SODIUM SERPL-SCNC: 137 MMOL/L (ref 136–145)
TROPONIN T SERPL-MCNC: <0.01 NG/ML (ref 0–0.03)
WBC # BLD AUTO: 14.53 10*3/MM3 (ref 3.4–10.8)

## 2020-12-20 PROCEDURE — 71045 X-RAY EXAM CHEST 1 VIEW: CPT

## 2020-12-20 PROCEDURE — 94799 UNLISTED PULMONARY SVC/PX: CPT

## 2020-12-20 PROCEDURE — 85025 COMPLETE CBC W/AUTO DIFF WBC: CPT | Performed by: FAMILY MEDICINE

## 2020-12-20 PROCEDURE — 93005 ELECTROCARDIOGRAM TRACING: CPT | Performed by: NURSE PRACTITIONER

## 2020-12-20 PROCEDURE — 80048 BASIC METABOLIC PNL TOTAL CA: CPT | Performed by: FAMILY MEDICINE

## 2020-12-20 PROCEDURE — 25010000002 KETOROLAC TROMETHAMINE PER 15 MG: Performed by: NURSE PRACTITIONER

## 2020-12-20 PROCEDURE — 93010 ELECTROCARDIOGRAM REPORT: CPT | Performed by: INTERNAL MEDICINE

## 2020-12-20 PROCEDURE — 84132 ASSAY OF SERUM POTASSIUM: CPT | Performed by: FAMILY MEDICINE

## 2020-12-20 PROCEDURE — 83880 ASSAY OF NATRIURETIC PEPTIDE: CPT | Performed by: NURSE PRACTITIONER

## 2020-12-20 PROCEDURE — 99232 SBSQ HOSP IP/OBS MODERATE 35: CPT | Performed by: FAMILY MEDICINE

## 2020-12-20 PROCEDURE — 84484 ASSAY OF TROPONIN QUANT: CPT | Performed by: NURSE PRACTITIONER

## 2020-12-20 PROCEDURE — 25010000002 KETOROLAC TROMETHAMINE PER 15 MG: Performed by: FAMILY MEDICINE

## 2020-12-20 RX ORDER — POTASSIUM CHLORIDE 750 MG/1
40 CAPSULE, EXTENDED RELEASE ORAL AS NEEDED
Status: DISCONTINUED | OUTPATIENT
Start: 2020-12-20 | End: 2020-12-22 | Stop reason: SDUPTHER

## 2020-12-20 RX ORDER — POTASSIUM CHLORIDE 7.45 MG/ML
10 INJECTION INTRAVENOUS
Status: DISCONTINUED | OUTPATIENT
Start: 2020-12-20 | End: 2020-12-22 | Stop reason: SDUPTHER

## 2020-12-20 RX ORDER — IPRATROPIUM BROMIDE AND ALBUTEROL SULFATE 2.5; .5 MG/3ML; MG/3ML
3 SOLUTION RESPIRATORY (INHALATION) EVERY 6 HOURS PRN
Status: DISCONTINUED | OUTPATIENT
Start: 2020-12-20 | End: 2020-12-27 | Stop reason: HOSPADM

## 2020-12-20 RX ORDER — KETOROLAC TROMETHAMINE 30 MG/ML
30 INJECTION, SOLUTION INTRAMUSCULAR; INTRAVENOUS EVERY 6 HOURS PRN
Status: COMPLETED | OUTPATIENT
Start: 2020-12-20 | End: 2020-12-21

## 2020-12-20 RX ORDER — TRAMADOL HYDROCHLORIDE 50 MG/1
50 TABLET ORAL EVERY 6 HOURS PRN
Status: DISCONTINUED | OUTPATIENT
Start: 2020-12-20 | End: 2020-12-24

## 2020-12-20 RX ORDER — POTASSIUM CHLORIDE 1.5 G/1.77G
40 POWDER, FOR SOLUTION ORAL AS NEEDED
Status: DISCONTINUED | OUTPATIENT
Start: 2020-12-20 | End: 2020-12-22 | Stop reason: SDUPTHER

## 2020-12-20 RX ADMIN — KETOROLAC TROMETHAMINE 30 MG: 30 INJECTION, SOLUTION INTRAMUSCULAR; INTRAVENOUS at 08:32

## 2020-12-20 RX ADMIN — BUDESONIDE 0.5 MG: 0.5 INHALANT RESPIRATORY (INHALATION) at 20:10

## 2020-12-20 RX ADMIN — ARFORMOTEROL TARTRATE 15 MCG: 15 SOLUTION RESPIRATORY (INHALATION) at 20:10

## 2020-12-20 RX ADMIN — POTASSIUM CHLORIDE 40 MEQ: 10 CAPSULE, COATED, EXTENDED RELEASE ORAL at 08:32

## 2020-12-20 RX ADMIN — POTASSIUM CHLORIDE 40 MEQ: 10 CAPSULE, COATED, EXTENDED RELEASE ORAL at 12:37

## 2020-12-20 RX ADMIN — ACETAMINOPHEN 650 MG: 325 TABLET, FILM COATED ORAL at 15:30

## 2020-12-20 RX ADMIN — SODIUM CHLORIDE, PRESERVATIVE FREE 10 ML: 5 INJECTION INTRAVENOUS at 08:37

## 2020-12-20 RX ADMIN — KETOROLAC TROMETHAMINE 30 MG: 30 INJECTION, SOLUTION INTRAMUSCULAR; INTRAVENOUS at 18:12

## 2020-12-20 RX ADMIN — KETOROLAC TROMETHAMINE 30 MG: 30 INJECTION, SOLUTION INTRAMUSCULAR; INTRAVENOUS at 12:19

## 2020-12-20 RX ADMIN — BUDESONIDE 0.5 MG: 0.5 INHALANT RESPIRATORY (INHALATION) at 07:17

## 2020-12-20 RX ADMIN — ARFORMOTEROL TARTRATE 15 MCG: 15 SOLUTION RESPIRATORY (INHALATION) at 07:17

## 2020-12-20 RX ADMIN — SODIUM CHLORIDE, PRESERVATIVE FREE 10 ML: 5 INJECTION INTRAVENOUS at 20:39

## 2020-12-20 RX ADMIN — IPRATROPIUM BROMIDE AND ALBUTEROL SULFATE 3 ML: 2.5; .5 SOLUTION RESPIRATORY (INHALATION) at 11:05

## 2020-12-20 NOTE — OUTREACH NOTE
Medical Week 2 Survey      Responses   [facility] patient discharged from?  [place]   Does the patient have one of the following disease processes/diagnoses(primary or secondary)?  Other   Week 2 attempt successful?  No   Revoke  Readmitted          Allie Jackson RN

## 2020-12-21 ENCOUNTER — APPOINTMENT (OUTPATIENT)
Dept: GENERAL RADIOLOGY | Facility: HOSPITAL | Age: 55
End: 2020-12-21

## 2020-12-21 ENCOUNTER — ANESTHESIA (OUTPATIENT)
Dept: GASTROENTEROLOGY | Facility: HOSPITAL | Age: 55
End: 2020-12-21

## 2020-12-21 LAB
ANION GAP SERPL CALCULATED.3IONS-SCNC: 10 MMOL/L (ref 5–15)
BASOPHILS # BLD AUTO: 0.08 10*3/MM3 (ref 0–0.2)
BASOPHILS NFR BLD AUTO: 0.6 % (ref 0–1.5)
BUN SERPL-MCNC: 13 MG/DL (ref 6–20)
BUN/CREAT SERPL: 14.6 (ref 7–25)
CALCIUM SPEC-SCNC: 8.9 MG/DL (ref 8.6–10.5)
CHLORIDE SERPL-SCNC: 105 MMOL/L (ref 98–107)
CO2 SERPL-SCNC: 23 MMOL/L (ref 22–29)
CREAT SERPL-MCNC: 0.89 MG/DL (ref 0.76–1.27)
DEPRECATED RDW RBC AUTO: 45.8 FL (ref 37–54)
EOSINOPHIL # BLD AUTO: 0.33 10*3/MM3 (ref 0–0.4)
EOSINOPHIL NFR BLD AUTO: 2.6 % (ref 0.3–6.2)
ERYTHROCYTE [DISTWIDTH] IN BLOOD BY AUTOMATED COUNT: 13.1 % (ref 12.3–15.4)
GFR SERPL CREATININE-BSD FRML MDRD: 89 ML/MIN/1.73
GLUCOSE SERPL-MCNC: 132 MG/DL (ref 65–99)
HCT VFR BLD AUTO: 46.4 % (ref 37.5–51)
HGB BLD-MCNC: 14.8 G/DL (ref 13–17.7)
IMM GRANULOCYTES # BLD AUTO: 0.09 10*3/MM3 (ref 0–0.05)
IMM GRANULOCYTES NFR BLD AUTO: 0.7 % (ref 0–0.5)
LYMPHOCYTES # BLD AUTO: 1.75 10*3/MM3 (ref 0.7–3.1)
LYMPHOCYTES NFR BLD AUTO: 13.5 % (ref 19.6–45.3)
MCH RBC QN AUTO: 30.3 PG (ref 26.6–33)
MCHC RBC AUTO-ENTMCNC: 31.9 G/DL (ref 31.5–35.7)
MCV RBC AUTO: 94.9 FL (ref 79–97)
MONOCYTES # BLD AUTO: 1.51 10*3/MM3 (ref 0.1–0.9)
MONOCYTES NFR BLD AUTO: 11.7 % (ref 5–12)
NEUTROPHILS NFR BLD AUTO: 70.9 % (ref 42.7–76)
NEUTROPHILS NFR BLD AUTO: 9.17 10*3/MM3 (ref 1.7–7)
NRBC BLD AUTO-RTO: 0 /100 WBC (ref 0–0.2)
PLATELET # BLD AUTO: 327 10*3/MM3 (ref 140–450)
PMV BLD AUTO: 8.8 FL (ref 6–12)
POTASSIUM SERPL-SCNC: 4.2 MMOL/L (ref 3.5–5.2)
RBC # BLD AUTO: 4.89 10*6/MM3 (ref 4.14–5.8)
SODIUM SERPL-SCNC: 138 MMOL/L (ref 136–145)
WBC # BLD AUTO: 12.93 10*3/MM3 (ref 3.4–10.8)

## 2020-12-21 PROCEDURE — 25010000002 FENTANYL CITRATE (PF) 100 MCG/2ML SOLUTION: Performed by: ANESTHESIOLOGY

## 2020-12-21 PROCEDURE — C1726 CATH, BAL DIL, NON-VASCULAR: HCPCS | Performed by: INTERNAL MEDICINE

## 2020-12-21 PROCEDURE — 71045 X-RAY EXAM CHEST 1 VIEW: CPT

## 2020-12-21 PROCEDURE — 25010000002 PROPOFOL 10 MG/ML EMULSION: Performed by: NURSE ANESTHETIST, CERTIFIED REGISTERED

## 2020-12-21 PROCEDURE — 87102 FUNGUS ISOLATION CULTURE: CPT | Performed by: INTERNAL MEDICINE

## 2020-12-21 PROCEDURE — 25010000002 SUCCINYLCHOLINE PER 20 MG: Performed by: NURSE ANESTHETIST, CERTIFIED REGISTERED

## 2020-12-21 PROCEDURE — 25010000002 PHENYLEPHRINE PER 1 ML: Performed by: NURSE ANESTHETIST, CERTIFIED REGISTERED

## 2020-12-21 PROCEDURE — 94799 UNLISTED PULMONARY SVC/PX: CPT

## 2020-12-21 PROCEDURE — 25010000002 DEXAMETHASONE PER 1 MG: Performed by: NURSE ANESTHETIST, CERTIFIED REGISTERED

## 2020-12-21 PROCEDURE — 25010000002 NEOSTIGMINE 10 MG/10ML SOLUTION: Performed by: NURSE ANESTHETIST, CERTIFIED REGISTERED

## 2020-12-21 PROCEDURE — 99233 SBSQ HOSP IP/OBS HIGH 50: CPT | Performed by: INTERNAL MEDICINE

## 2020-12-21 PROCEDURE — 31647 BRONCHIAL VALVE INIT INSERT: CPT | Performed by: INTERNAL MEDICINE

## 2020-12-21 PROCEDURE — 25010000002 KETOROLAC TROMETHAMINE PER 15 MG: Performed by: FAMILY MEDICINE

## 2020-12-21 PROCEDURE — 87206 SMEAR FLUORESCENT/ACID STAI: CPT | Performed by: INTERNAL MEDICINE

## 2020-12-21 PROCEDURE — 25010000002 ONDANSETRON PER 1 MG: Performed by: NURSE ANESTHETIST, CERTIFIED REGISTERED

## 2020-12-21 PROCEDURE — 85025 COMPLETE CBC W/AUTO DIFF WBC: CPT | Performed by: INTERNAL MEDICINE

## 2020-12-21 PROCEDURE — 99232 SBSQ HOSP IP/OBS MODERATE 35: CPT | Performed by: FAMILY MEDICINE

## 2020-12-21 PROCEDURE — 80048 BASIC METABOLIC PNL TOTAL CA: CPT | Performed by: INTERNAL MEDICINE

## 2020-12-21 PROCEDURE — 87116 MYCOBACTERIA CULTURE: CPT | Performed by: INTERNAL MEDICINE

## 2020-12-21 DEVICE — VLV ENDOBRONC SPIRATION IN CARTRDG 7MM: Type: IMPLANTABLE DEVICE | Site: BRONCHUS | Status: FUNCTIONAL

## 2020-12-21 RX ORDER — FAMOTIDINE 20 MG/1
20 TABLET, FILM COATED ORAL ONCE
Status: DISCONTINUED | OUTPATIENT
Start: 2020-12-21 | End: 2020-12-21 | Stop reason: HOSPADM

## 2020-12-21 RX ORDER — FAMOTIDINE 10 MG/ML
20 INJECTION, SOLUTION INTRAVENOUS ONCE
Status: COMPLETED | OUTPATIENT
Start: 2020-12-21 | End: 2020-12-21

## 2020-12-21 RX ORDER — LIDOCAINE HYDROCHLORIDE 10 MG/ML
0.5 INJECTION, SOLUTION EPIDURAL; INFILTRATION; INTRACAUDAL; PERINEURAL ONCE AS NEEDED
Status: DISCONTINUED | OUTPATIENT
Start: 2020-12-21 | End: 2020-12-21 | Stop reason: HOSPADM

## 2020-12-21 RX ORDER — SODIUM CHLORIDE, SODIUM LACTATE, POTASSIUM CHLORIDE, CALCIUM CHLORIDE 600; 310; 30; 20 MG/100ML; MG/100ML; MG/100ML; MG/100ML
9 INJECTION, SOLUTION INTRAVENOUS CONTINUOUS
Status: DISCONTINUED | OUTPATIENT
Start: 2020-12-21 | End: 2020-12-22

## 2020-12-21 RX ORDER — SUCCINYLCHOLINE CHLORIDE 20 MG/ML
INJECTION INTRAMUSCULAR; INTRAVENOUS AS NEEDED
Status: DISCONTINUED | OUTPATIENT
Start: 2020-12-21 | End: 2020-12-21 | Stop reason: SURG

## 2020-12-21 RX ORDER — NEOSTIGMINE METHYLSULFATE 1 MG/ML
INJECTION, SOLUTION INTRAVENOUS AS NEEDED
Status: DISCONTINUED | OUTPATIENT
Start: 2020-12-21 | End: 2020-12-21 | Stop reason: SURG

## 2020-12-21 RX ORDER — GLYCOPYRROLATE 0.2 MG/ML
INJECTION INTRAMUSCULAR; INTRAVENOUS AS NEEDED
Status: DISCONTINUED | OUTPATIENT
Start: 2020-12-21 | End: 2020-12-21 | Stop reason: SURG

## 2020-12-21 RX ORDER — HYDROMORPHONE HYDROCHLORIDE 1 MG/ML
0.5 INJECTION, SOLUTION INTRAMUSCULAR; INTRAVENOUS; SUBCUTANEOUS
Status: DISCONTINUED | OUTPATIENT
Start: 2020-12-21 | End: 2020-12-21 | Stop reason: HOSPADM

## 2020-12-21 RX ORDER — SODIUM CHLORIDE 0.9 % (FLUSH) 0.9 %
10 SYRINGE (ML) INJECTION EVERY 12 HOURS SCHEDULED
Status: DISCONTINUED | OUTPATIENT
Start: 2020-12-21 | End: 2020-12-21 | Stop reason: HOSPADM

## 2020-12-21 RX ORDER — ROCURONIUM BROMIDE 10 MG/ML
INJECTION, SOLUTION INTRAVENOUS AS NEEDED
Status: DISCONTINUED | OUTPATIENT
Start: 2020-12-21 | End: 2020-12-21 | Stop reason: SURG

## 2020-12-21 RX ORDER — LIDOCAINE HYDROCHLORIDE 10 MG/ML
INJECTION, SOLUTION EPIDURAL; INFILTRATION; INTRACAUDAL; PERINEURAL AS NEEDED
Status: DISCONTINUED | OUTPATIENT
Start: 2020-12-21 | End: 2020-12-21 | Stop reason: SURG

## 2020-12-21 RX ORDER — PROPOFOL 10 MG/ML
VIAL (ML) INTRAVENOUS AS NEEDED
Status: DISCONTINUED | OUTPATIENT
Start: 2020-12-21 | End: 2020-12-21 | Stop reason: SURG

## 2020-12-21 RX ORDER — ONDANSETRON 2 MG/ML
INJECTION INTRAMUSCULAR; INTRAVENOUS AS NEEDED
Status: DISCONTINUED | OUTPATIENT
Start: 2020-12-21 | End: 2020-12-21 | Stop reason: SURG

## 2020-12-21 RX ORDER — DEXAMETHASONE SODIUM PHOSPHATE 4 MG/ML
INJECTION, SOLUTION INTRA-ARTICULAR; INTRALESIONAL; INTRAMUSCULAR; INTRAVENOUS; SOFT TISSUE AS NEEDED
Status: DISCONTINUED | OUTPATIENT
Start: 2020-12-21 | End: 2020-12-21 | Stop reason: SURG

## 2020-12-21 RX ORDER — FENTANYL CITRATE 50 UG/ML
50 INJECTION, SOLUTION INTRAMUSCULAR; INTRAVENOUS
Status: DISCONTINUED | OUTPATIENT
Start: 2020-12-21 | End: 2020-12-21 | Stop reason: HOSPADM

## 2020-12-21 RX ORDER — SODIUM CHLORIDE 0.9 % (FLUSH) 0.9 %
10 SYRINGE (ML) INJECTION AS NEEDED
Status: DISCONTINUED | OUTPATIENT
Start: 2020-12-21 | End: 2020-12-21 | Stop reason: HOSPADM

## 2020-12-21 RX ADMIN — ARFORMOTEROL TARTRATE 15 MCG: 15 SOLUTION RESPIRATORY (INHALATION) at 19:36

## 2020-12-21 RX ADMIN — PHENYLEPHRINE HYDROCHLORIDE 80 MCG: 10 INJECTION INTRAVENOUS at 08:35

## 2020-12-21 RX ADMIN — FAMOTIDINE 20 MG: 10 INJECTION INTRAVENOUS at 07:52

## 2020-12-21 RX ADMIN — ARFORMOTEROL TARTRATE 15 MCG: 15 SOLUTION RESPIRATORY (INHALATION) at 09:50

## 2020-12-21 RX ADMIN — FENTANYL CITRATE 50 MCG: 50 INJECTION, SOLUTION INTRAMUSCULAR; INTRAVENOUS at 08:14

## 2020-12-21 RX ADMIN — FENTANYL CITRATE 50 MCG: 50 INJECTION, SOLUTION INTRAMUSCULAR; INTRAVENOUS at 08:42

## 2020-12-21 RX ADMIN — SODIUM CHLORIDE, POTASSIUM CHLORIDE, SODIUM LACTATE AND CALCIUM CHLORIDE 9 ML/HR: 600; 310; 30; 20 INJECTION, SOLUTION INTRAVENOUS at 07:53

## 2020-12-21 RX ADMIN — SUCCINYLCHOLINE CHLORIDE 140 MG: 20 INJECTION, SOLUTION INTRAMUSCULAR; INTRAVENOUS; PARENTERAL at 08:14

## 2020-12-21 RX ADMIN — IPRATROPIUM BROMIDE AND ALBUTEROL SULFATE 3 ML: 2.5; .5 SOLUTION RESPIRATORY (INHALATION) at 13:07

## 2020-12-21 RX ADMIN — BUDESONIDE 0.5 MG: 0.5 INHALANT RESPIRATORY (INHALATION) at 19:33

## 2020-12-21 RX ADMIN — BUDESONIDE 0.5 MG: 0.5 INHALANT RESPIRATORY (INHALATION) at 09:50

## 2020-12-21 RX ADMIN — KETOROLAC TROMETHAMINE 30 MG: 30 INJECTION, SOLUTION INTRAMUSCULAR; INTRAVENOUS at 18:26

## 2020-12-21 RX ADMIN — DEXAMETHASONE SODIUM PHOSPHATE 8 MG: 4 INJECTION, SOLUTION INTRAMUSCULAR; INTRAVENOUS at 08:14

## 2020-12-21 RX ADMIN — SODIUM CHLORIDE, PRESERVATIVE FREE 10 ML: 5 INJECTION INTRAVENOUS at 21:09

## 2020-12-21 RX ADMIN — GLYCOPYRROLATE 0.2 MG: 0.2 INJECTION INTRAMUSCULAR; INTRAVENOUS at 08:39

## 2020-12-21 RX ADMIN — LIDOCAINE HYDROCHLORIDE 40 MG: 10 INJECTION, SOLUTION EPIDURAL; INFILTRATION; INTRACAUDAL; PERINEURAL at 08:14

## 2020-12-21 RX ADMIN — KETOROLAC TROMETHAMINE 30 MG: 30 INJECTION, SOLUTION INTRAMUSCULAR; INTRAVENOUS at 10:08

## 2020-12-21 RX ADMIN — KETOROLAC TROMETHAMINE 30 MG: 30 INJECTION, SOLUTION INTRAMUSCULAR; INTRAVENOUS at 00:23

## 2020-12-21 RX ADMIN — ROCURONIUM BROMIDE 10 MG: 10 INJECTION INTRAVENOUS at 08:14

## 2020-12-21 RX ADMIN — NEOSTIGMINE 1 MG: 1 INJECTION INTRAVENOUS at 08:39

## 2020-12-21 RX ADMIN — ONDANSETRON 4 MG: 2 INJECTION INTRAMUSCULAR; INTRAVENOUS at 08:33

## 2020-12-21 RX ADMIN — PROPOFOL 160 MG: 10 INJECTION, EMULSION INTRAVENOUS at 08:14

## 2020-12-21 RX ADMIN — ACETAMINOPHEN 650 MG: 325 TABLET, FILM COATED ORAL at 23:50

## 2020-12-21 RX ADMIN — SODIUM CHLORIDE, PRESERVATIVE FREE 10 ML: 5 INJECTION INTRAVENOUS at 10:08

## 2020-12-21 NOTE — ANESTHESIA PREPROCEDURE EVALUATION
Anesthesia Evaluation     Patient summary reviewed and Nursing notes reviewed                Airway   Mallampati: I  TM distance: >3 FB  Neck ROM: full  No difficulty expected  Dental - normal exam     Pulmonary - normal exam   (+) pneumonia , a smoker Former, COPD severe, asthma,home oxygen (3-4 LITERS),     ROS comment: HYPOXIC RESP FAILURE  LEFT PERSISTENT PNEUMOTHORAX  Cardiovascular - negative cardio ROS and normal exam        Neuro/Psych- negative ROS  GI/Hepatic/Renal/Endo    (+)   liver disease (alpha 1 antitrypsin),     Musculoskeletal (-) negative ROS    Abdominal  - normal exam    Bowel sounds: normal.   Substance History - negative use     OB/GYN negative ob/gyn ROS         Other                        Anesthesia Plan    ASA 4     general     intravenous induction     Anesthetic plan, all risks, benefits, and alternatives have been provided, discussed and informed consent has been obtained with: patient.

## 2020-12-21 NOTE — ANESTHESIA PROCEDURE NOTES
Airway  Urgency: elective    Date/Time: 12/21/2020 8:15 AM  Airway not difficult    General Information and Staff    Patient location during procedure: OR  CRNA: Shin Love CRNA    Indications and Patient Condition  Indications for airway management: airway protection    Preoxygenated: yes  MILS not maintained throughout  Mask difficulty assessment: 0 - not attempted    Final Airway Details  Final airway type: endotracheal airway      Successful airway: ETT  Cuffed: yes   Successful intubation technique: direct laryngoscopy  Facilitating devices/methods: intubating stylet  Endotracheal tube insertion site: oral  Blade: Cynthia  Blade size: 3  ETT size (mm): 9.0  Cormack-Lehane Classification: grade IIa - partial view of glottis  Placement verified by: chest auscultation and capnometry   Cuff volume (mL): 8  Measured from: lips  ETT/EBT  to lips (cm): 20  Number of attempts at approach: 1  Assessment: lips, teeth, and gum same as pre-op and atraumatic intubation    Additional Comments  Negative epigastric sounds, Breath sound equal bilaterally with symmetric chest rise and fall

## 2020-12-21 NOTE — ANESTHESIA POSTPROCEDURE EVALUATION
Patient: Balwinder Willams    Procedure Summary     Date: 12/21/20 Room / [facility]:  [facility]    Anesthesia Start: 0811 Anesthesia Stop: 0852    Procedure: BRONCHOSCOPY WITH ENDOBRONChial valve placement (N/A Bronchus) Diagnosis:     Surgeon: Tian Pressley MD Provider: Epifanio Begum MD    Anesthesia Type: general ASA Status: 4          Anesthesia Type: general    Vitals  No vitals data found for the desired time range.          Post Anesthesia Care and Evaluation    Patient location during evaluation: PACU  Patient participation: complete - patient participated  Level of consciousness: sleepy but conscious  Pain management: adequate  Airway patency: patent  Anesthetic complications: No anesthetic complications  PONV Status: none  Cardiovascular status: hemodynamically stable and acceptable  Respiratory status: nonlabored ventilation, acceptable and nasal cannula  Hydration status: acceptable

## 2020-12-22 ENCOUNTER — APPOINTMENT (OUTPATIENT)
Dept: GENERAL RADIOLOGY | Facility: HOSPITAL | Age: 55
End: 2020-12-22

## 2020-12-22 LAB
ARTERIAL PATENCY WRIST A: ABNORMAL
ATMOSPHERIC PRESS: ABNORMAL MM[HG]
BASE EXCESS BLDA CALC-SCNC: -1.9 MMOL/L (ref 0–2)
BDY SITE: ABNORMAL
BODY TEMPERATURE: 37 C
CO2 BLDA-SCNC: 25.3 MMOL/L (ref 22–33)
COHGB MFR BLD: 0.6 % (ref 0–2)
EPAP: 0
HCO3 BLDA-SCNC: 24 MMOL/L (ref 20–26)
HCT VFR BLD CALC: 45.2 %
HGB BLDA-MCNC: 14.7 G/DL (ref 13.5–17.5)
INHALED O2 CONCENTRATION: 60 %
IPAP: 0
MAGNESIUM SERPL-MCNC: 2.3 MG/DL (ref 1.6–2.6)
METHGB BLD QL: 0.6 % (ref 0–1.5)
MODALITY: ABNORMAL
NOTE: ABNORMAL
OXYHGB MFR BLDV: 98.5 % (ref 94–99)
PAW @ PEAK INSP FLOW SETTING VENT: 0 CMH2O
PCO2 BLDA: 43.8 MM HG (ref 35–45)
PCO2 TEMP ADJ BLD: 43.8 MM HG (ref 35–48)
PH BLDA: 7.35 PH UNITS (ref 7.35–7.45)
PH, TEMP CORRECTED: 7.35 PH UNITS
PO2 BLDA: 225 MM HG (ref 83–108)
PO2 TEMP ADJ BLD: 225 MM HG (ref 83–108)
POTASSIUM SERPL-SCNC: 5.1 MMOL/L (ref 3.5–5.2)
TOTAL RATE: 0 BREATHS/MINUTE

## 2020-12-22 PROCEDURE — 94799 UNLISTED PULMONARY SVC/PX: CPT

## 2020-12-22 PROCEDURE — 25010000002 KETOROLAC TROMETHAMINE PER 15 MG: Performed by: NURSE PRACTITIONER

## 2020-12-22 PROCEDURE — 25010000002 LORAZEPAM PER 2 MG: Performed by: NURSE PRACTITIONER

## 2020-12-22 PROCEDURE — 99291 CRITICAL CARE FIRST HOUR: CPT | Performed by: INTERNAL MEDICINE

## 2020-12-22 PROCEDURE — 83735 ASSAY OF MAGNESIUM: CPT | Performed by: NURSE PRACTITIONER

## 2020-12-22 PROCEDURE — 83050 HGB METHEMOGLOBIN QUAN: CPT

## 2020-12-22 PROCEDURE — 84132 ASSAY OF SERUM POTASSIUM: CPT | Performed by: NURSE PRACTITIONER

## 2020-12-22 PROCEDURE — 25010000002 ENOXAPARIN PER 10 MG: Performed by: NURSE PRACTITIONER

## 2020-12-22 PROCEDURE — 93005 ELECTROCARDIOGRAM TRACING: CPT | Performed by: NURSE PRACTITIONER

## 2020-12-22 PROCEDURE — 25010000002 METHYLPREDNISOLONE PER 125 MG: Performed by: INTERNAL MEDICINE

## 2020-12-22 PROCEDURE — 93010 ELECTROCARDIOGRAM REPORT: CPT | Performed by: INTERNAL MEDICINE

## 2020-12-22 PROCEDURE — 25010000002 MAGNESIUM SULFATE IN D5W 1G/100ML (PREMIX) 1-5 GM/100ML-% SOLUTION: Performed by: INTERNAL MEDICINE

## 2020-12-22 PROCEDURE — 71045 X-RAY EXAM CHEST 1 VIEW: CPT

## 2020-12-22 PROCEDURE — 36600 WITHDRAWAL OF ARTERIAL BLOOD: CPT

## 2020-12-22 PROCEDURE — 82805 BLOOD GASES W/O2 SATURATION: CPT

## 2020-12-22 PROCEDURE — 82375 ASSAY CARBOXYHB QUANT: CPT

## 2020-12-22 RX ORDER — MAGNESIUM SULFATE HEPTAHYDRATE 40 MG/ML
4 INJECTION, SOLUTION INTRAVENOUS AS NEEDED
Status: DISCONTINUED | OUTPATIENT
Start: 2020-12-22 | End: 2020-12-27 | Stop reason: HOSPADM

## 2020-12-22 RX ORDER — FAMOTIDINE 10 MG/ML
20 INJECTION, SOLUTION INTRAVENOUS EVERY 12 HOURS SCHEDULED
Status: DISCONTINUED | OUTPATIENT
Start: 2020-12-22 | End: 2020-12-22

## 2020-12-22 RX ORDER — NITROGLYCERIN 20 MG/100ML
5-200 INJECTION INTRAVENOUS
Status: DISCONTINUED | OUTPATIENT
Start: 2020-12-22 | End: 2020-12-24

## 2020-12-22 RX ORDER — METHYLPREDNISOLONE SODIUM SUCCINATE 125 MG/2ML
125 INJECTION, POWDER, LYOPHILIZED, FOR SOLUTION INTRAMUSCULAR; INTRAVENOUS ONCE
Status: COMPLETED | OUTPATIENT
Start: 2020-12-22 | End: 2020-12-22

## 2020-12-22 RX ORDER — MAGNESIUM SULFATE 1 G/100ML
1 INJECTION INTRAVENOUS ONCE
Status: COMPLETED | OUTPATIENT
Start: 2020-12-22 | End: 2020-12-22

## 2020-12-22 RX ORDER — LORAZEPAM 2 MG/ML
1 INJECTION INTRAMUSCULAR ONCE
Status: COMPLETED | OUTPATIENT
Start: 2020-12-22 | End: 2020-12-22

## 2020-12-22 RX ORDER — POTASSIUM CHLORIDE 7.45 MG/ML
10 INJECTION INTRAVENOUS
Status: DISCONTINUED | OUTPATIENT
Start: 2020-12-22 | End: 2020-12-27 | Stop reason: HOSPADM

## 2020-12-22 RX ORDER — MAGNESIUM SULFATE HEPTAHYDRATE 40 MG/ML
2 INJECTION, SOLUTION INTRAVENOUS AS NEEDED
Status: DISCONTINUED | OUTPATIENT
Start: 2020-12-22 | End: 2020-12-27 | Stop reason: HOSPADM

## 2020-12-22 RX ORDER — KETOROLAC TROMETHAMINE 30 MG/ML
30 INJECTION, SOLUTION INTRAMUSCULAR; INTRAVENOUS EVERY 6 HOURS PRN
Status: DISPENSED | OUTPATIENT
Start: 2020-12-22 | End: 2020-12-27

## 2020-12-22 RX ORDER — IPRATROPIUM BROMIDE AND ALBUTEROL SULFATE 2.5; .5 MG/3ML; MG/3ML
3 SOLUTION RESPIRATORY (INHALATION) ONCE
Status: COMPLETED | OUTPATIENT
Start: 2020-12-22 | End: 2020-12-22

## 2020-12-22 RX ADMIN — IPRATROPIUM BROMIDE AND ALBUTEROL SULFATE 3 ML: 2.5; .5 SOLUTION RESPIRATORY (INHALATION) at 02:30

## 2020-12-22 RX ADMIN — KETOROLAC TROMETHAMINE 30 MG: 30 INJECTION, SOLUTION INTRAMUSCULAR; INTRAVENOUS at 02:03

## 2020-12-22 RX ADMIN — KETOROLAC TROMETHAMINE 30 MG: 30 INJECTION, SOLUTION INTRAMUSCULAR; INTRAVENOUS at 08:41

## 2020-12-22 RX ADMIN — IPRATROPIUM BROMIDE AND ALBUTEROL SULFATE 3 ML: 2.5; .5 SOLUTION RESPIRATORY (INHALATION) at 01:10

## 2020-12-22 RX ADMIN — MAGNESIUM SULFATE HEPTAHYDRATE 1 G: 1 INJECTION, SOLUTION INTRAVENOUS at 02:12

## 2020-12-22 RX ADMIN — ENOXAPARIN SODIUM 40 MG: 40 INJECTION SUBCUTANEOUS at 08:38

## 2020-12-22 RX ADMIN — BUDESONIDE 0.5 MG: 0.5 INHALANT RESPIRATORY (INHALATION) at 08:12

## 2020-12-22 RX ADMIN — ARFORMOTEROL TARTRATE 15 MCG: 15 SOLUTION RESPIRATORY (INHALATION) at 08:11

## 2020-12-22 RX ADMIN — METHYLPREDNISOLONE SODIUM SUCCINATE 125 MG: 125 INJECTION, POWDER, FOR SOLUTION INTRAMUSCULAR; INTRAVENOUS at 01:45

## 2020-12-22 RX ADMIN — SODIUM CHLORIDE, PRESERVATIVE FREE 10 ML: 5 INJECTION INTRAVENOUS at 08:43

## 2020-12-22 RX ADMIN — ARFORMOTEROL TARTRATE 15 MCG: 15 SOLUTION RESPIRATORY (INHALATION) at 20:50

## 2020-12-22 RX ADMIN — FAMOTIDINE 20 MG: 10 INJECTION, SOLUTION INTRAVENOUS at 08:38

## 2020-12-22 RX ADMIN — ACETAMINOPHEN 650 MG: 325 TABLET, FILM COATED ORAL at 15:34

## 2020-12-22 RX ADMIN — BUDESONIDE 0.5 MG: 0.5 INHALANT RESPIRATORY (INHALATION) at 20:50

## 2020-12-22 RX ADMIN — FLUTICASONE PROPIONATE 2 SPRAY: 50 SPRAY, METERED NASAL at 08:38

## 2020-12-22 RX ADMIN — KETOROLAC TROMETHAMINE 30 MG: 30 INJECTION, SOLUTION INTRAMUSCULAR; INTRAVENOUS at 18:22

## 2020-12-22 RX ADMIN — LORAZEPAM 1 MG: 2 INJECTION INTRAMUSCULAR; INTRAVENOUS at 21:06

## 2020-12-22 RX ADMIN — IPRATROPIUM BROMIDE AND ALBUTEROL SULFATE 3 ML: 2.5; .5 SOLUTION RESPIRATORY (INHALATION) at 16:14

## 2020-12-23 ENCOUNTER — APPOINTMENT (OUTPATIENT)
Dept: GENERAL RADIOLOGY | Facility: HOSPITAL | Age: 55
End: 2020-12-23

## 2020-12-23 LAB
ALBUMIN SERPL-MCNC: 3.4 G/DL (ref 3.5–5.2)
ANION GAP SERPL CALCULATED.3IONS-SCNC: 9 MMOL/L (ref 5–15)
BASOPHILS # BLD AUTO: 0.07 10*3/MM3 (ref 0–0.2)
BASOPHILS NFR BLD AUTO: 0.5 % (ref 0–1.5)
BUN SERPL-MCNC: 24 MG/DL (ref 6–20)
BUN/CREAT SERPL: 24.2 (ref 7–25)
CALCIUM SPEC-SCNC: 8.9 MG/DL (ref 8.6–10.5)
CHLORIDE SERPL-SCNC: 105 MMOL/L (ref 98–107)
CO2 SERPL-SCNC: 26 MMOL/L (ref 22–29)
CREAT SERPL-MCNC: 0.99 MG/DL (ref 0.76–1.27)
DEPRECATED RDW RBC AUTO: 45.4 FL (ref 37–54)
EOSINOPHIL # BLD AUTO: 0.13 10*3/MM3 (ref 0–0.4)
EOSINOPHIL NFR BLD AUTO: 1 % (ref 0.3–6.2)
ERYTHROCYTE [DISTWIDTH] IN BLOOD BY AUTOMATED COUNT: 13.2 % (ref 12.3–15.4)
GFR SERPL CREATININE-BSD FRML MDRD: 78 ML/MIN/1.73
GIE STN SPEC: NORMAL
GIE STN SPEC: NORMAL
GLUCOSE SERPL-MCNC: 154 MG/DL (ref 65–99)
HCT VFR BLD AUTO: 37.5 % (ref 37.5–51)
HGB BLD-MCNC: 11.9 G/DL (ref 13–17.7)
IMM GRANULOCYTES # BLD AUTO: 0.1 10*3/MM3 (ref 0–0.05)
IMM GRANULOCYTES NFR BLD AUTO: 0.8 % (ref 0–0.5)
LYMPHOCYTES # BLD AUTO: 1.77 10*3/MM3 (ref 0.7–3.1)
LYMPHOCYTES NFR BLD AUTO: 13.4 % (ref 19.6–45.3)
MAGNESIUM SERPL-MCNC: 2.2 MG/DL (ref 1.6–2.6)
MCH RBC QN AUTO: 29.5 PG (ref 26.6–33)
MCHC RBC AUTO-ENTMCNC: 31.7 G/DL (ref 31.5–35.7)
MCV RBC AUTO: 93.1 FL (ref 79–97)
MONOCYTES # BLD AUTO: 1.69 10*3/MM3 (ref 0.1–0.9)
MONOCYTES NFR BLD AUTO: 12.8 % (ref 5–12)
NEUTROPHILS NFR BLD AUTO: 71.5 % (ref 42.7–76)
NEUTROPHILS NFR BLD AUTO: 9.48 10*3/MM3 (ref 1.7–7)
NRBC BLD AUTO-RTO: 0 /100 WBC (ref 0–0.2)
PHOSPHATE SERPL-MCNC: 3.9 MG/DL (ref 2.5–4.5)
PLATELET # BLD AUTO: 331 10*3/MM3 (ref 140–450)
PMV BLD AUTO: 9.2 FL (ref 6–12)
POTASSIUM SERPL-SCNC: 4.6 MMOL/L (ref 3.5–5.2)
RBC # BLD AUTO: 4.03 10*6/MM3 (ref 4.14–5.8)
SODIUM SERPL-SCNC: 140 MMOL/L (ref 136–145)
WBC # BLD AUTO: 13.24 10*3/MM3 (ref 3.4–10.8)

## 2020-12-23 PROCEDURE — 80069 RENAL FUNCTION PANEL: CPT | Performed by: INTERNAL MEDICINE

## 2020-12-23 PROCEDURE — 25010000002 ALPHA1-PROTEINASE INHIBITOR 1000 MG/20ML SOLUTION: Performed by: INTERNAL MEDICINE

## 2020-12-23 PROCEDURE — 25010000002 ENOXAPARIN PER 10 MG: Performed by: NURSE PRACTITIONER

## 2020-12-23 PROCEDURE — 94799 UNLISTED PULMONARY SVC/PX: CPT

## 2020-12-23 PROCEDURE — 83735 ASSAY OF MAGNESIUM: CPT | Performed by: INTERNAL MEDICINE

## 2020-12-23 PROCEDURE — 99233 SBSQ HOSP IP/OBS HIGH 50: CPT | Performed by: INTERNAL MEDICINE

## 2020-12-23 PROCEDURE — 25010000002 KETOROLAC TROMETHAMINE PER 15 MG: Performed by: NURSE PRACTITIONER

## 2020-12-23 PROCEDURE — 85025 COMPLETE CBC W/AUTO DIFF WBC: CPT | Performed by: INTERNAL MEDICINE

## 2020-12-23 PROCEDURE — 71045 X-RAY EXAM CHEST 1 VIEW: CPT

## 2020-12-23 PROCEDURE — 94760 N-INVAS EAR/PLS OXIMETRY 1: CPT

## 2020-12-23 RX ORDER — LORAZEPAM 2 MG/ML
1 INJECTION INTRAMUSCULAR EVERY 6 HOURS PRN
Status: DISCONTINUED | OUTPATIENT
Start: 2020-12-23 | End: 2020-12-24

## 2020-12-23 RX ADMIN — ENOXAPARIN SODIUM 40 MG: 40 INJECTION SUBCUTANEOUS at 09:00

## 2020-12-23 RX ADMIN — ARFORMOTEROL TARTRATE 15 MCG: 15 SOLUTION RESPIRATORY (INHALATION) at 20:05

## 2020-12-23 RX ADMIN — IPRATROPIUM BROMIDE AND ALBUTEROL SULFATE 3 ML: 2.5; .5 SOLUTION RESPIRATORY (INHALATION) at 13:58

## 2020-12-23 RX ADMIN — SODIUM CHLORIDE, PRESERVATIVE FREE 10 ML: 5 INJECTION INTRAVENOUS at 20:30

## 2020-12-23 RX ADMIN — ARFORMOTEROL TARTRATE 15 MCG: 15 SOLUTION RESPIRATORY (INHALATION) at 07:26

## 2020-12-23 RX ADMIN — ALPHA1-PROTEINASE INHIBITOR (HUMAN) 4052 MG: 1000 INJECTION, SOLUTION INTRAVENOUS at 10:57

## 2020-12-23 RX ADMIN — BUDESONIDE 0.5 MG: 0.5 INHALANT RESPIRATORY (INHALATION) at 07:26

## 2020-12-23 RX ADMIN — IPRATROPIUM BROMIDE AND ALBUTEROL SULFATE 3 ML: 2.5; .5 SOLUTION RESPIRATORY (INHALATION) at 23:39

## 2020-12-23 RX ADMIN — KETOROLAC TROMETHAMINE 30 MG: 30 INJECTION, SOLUTION INTRAMUSCULAR; INTRAVENOUS at 06:31

## 2020-12-23 RX ADMIN — ACETAMINOPHEN 650 MG: 325 TABLET, FILM COATED ORAL at 20:56

## 2020-12-23 RX ADMIN — KETOROLAC TROMETHAMINE 30 MG: 30 INJECTION, SOLUTION INTRAMUSCULAR; INTRAVENOUS at 16:17

## 2020-12-23 RX ADMIN — FLUTICASONE PROPIONATE 2 SPRAY: 50 SPRAY, METERED NASAL at 09:03

## 2020-12-23 RX ADMIN — SODIUM CHLORIDE, PRESERVATIVE FREE 10 ML: 5 INJECTION INTRAVENOUS at 09:01

## 2020-12-23 RX ADMIN — BUDESONIDE 0.5 MG: 0.5 INHALANT RESPIRATORY (INHALATION) at 20:05

## 2020-12-24 ENCOUNTER — APPOINTMENT (OUTPATIENT)
Dept: GENERAL RADIOLOGY | Facility: HOSPITAL | Age: 55
End: 2020-12-24

## 2020-12-24 LAB
BACTERIA SPEC AEROBE CULT: NORMAL
BACTERIA SPEC AEROBE CULT: NORMAL
QT INTERVAL: 310 MS
QT INTERVAL: 390 MS
QTC INTERVAL: 436 MS
QTC INTERVAL: 449 MS

## 2020-12-24 PROCEDURE — 25010000002 KETOROLAC TROMETHAMINE PER 15 MG: Performed by: NURSE PRACTITIONER

## 2020-12-24 PROCEDURE — 25010000002 ENOXAPARIN PER 10 MG: Performed by: NURSE PRACTITIONER

## 2020-12-24 PROCEDURE — 99232 SBSQ HOSP IP/OBS MODERATE 35: CPT | Performed by: INTERNAL MEDICINE

## 2020-12-24 PROCEDURE — 94799 UNLISTED PULMONARY SVC/PX: CPT

## 2020-12-24 PROCEDURE — 71045 X-RAY EXAM CHEST 1 VIEW: CPT

## 2020-12-24 RX ORDER — LORAZEPAM 2 MG/ML
1 INJECTION INTRAMUSCULAR EVERY 6 HOURS PRN
Status: DISCONTINUED | OUTPATIENT
Start: 2020-12-24 | End: 2020-12-27 | Stop reason: HOSPADM

## 2020-12-24 RX ORDER — CHOLECALCIFEROL (VITAMIN D3) 125 MCG
5 CAPSULE ORAL NIGHTLY PRN
Status: DISCONTINUED | OUTPATIENT
Start: 2020-12-24 | End: 2020-12-27 | Stop reason: HOSPADM

## 2020-12-24 RX ADMIN — Medication 5 MG: at 18:14

## 2020-12-24 RX ADMIN — ENOXAPARIN SODIUM 40 MG: 40 INJECTION SUBCUTANEOUS at 08:36

## 2020-12-24 RX ADMIN — KETOROLAC TROMETHAMINE 30 MG: 30 INJECTION, SOLUTION INTRAMUSCULAR; INTRAVENOUS at 00:13

## 2020-12-24 RX ADMIN — ARFORMOTEROL TARTRATE 15 MCG: 15 SOLUTION RESPIRATORY (INHALATION) at 07:39

## 2020-12-24 RX ADMIN — BUDESONIDE 0.5 MG: 0.5 INHALANT RESPIRATORY (INHALATION) at 07:39

## 2020-12-24 RX ADMIN — BUDESONIDE 0.5 MG: 0.5 INHALANT RESPIRATORY (INHALATION) at 20:26

## 2020-12-24 RX ADMIN — KETOROLAC TROMETHAMINE 30 MG: 30 INJECTION, SOLUTION INTRAMUSCULAR; INTRAVENOUS at 18:14

## 2020-12-24 RX ADMIN — ARFORMOTEROL TARTRATE 15 MCG: 15 SOLUTION RESPIRATORY (INHALATION) at 20:26

## 2020-12-24 RX ADMIN — FLUTICASONE PROPIONATE 2 SPRAY: 50 SPRAY, METERED NASAL at 08:36

## 2020-12-24 RX ADMIN — SODIUM CHLORIDE, PRESERVATIVE FREE 10 ML: 5 INJECTION INTRAVENOUS at 08:36

## 2020-12-24 RX ADMIN — KETOROLAC TROMETHAMINE 30 MG: 30 INJECTION, SOLUTION INTRAMUSCULAR; INTRAVENOUS at 06:22

## 2020-12-24 RX ADMIN — SODIUM CHLORIDE, PRESERVATIVE FREE 10 ML: 5 INJECTION INTRAVENOUS at 21:00

## 2020-12-25 ENCOUNTER — APPOINTMENT (OUTPATIENT)
Dept: GENERAL RADIOLOGY | Facility: HOSPITAL | Age: 55
End: 2020-12-25

## 2020-12-25 LAB
ANION GAP SERPL CALCULATED.3IONS-SCNC: 8 MMOL/L (ref 5–15)
BASOPHILS # BLD AUTO: 0.05 10*3/MM3 (ref 0–0.2)
BASOPHILS NFR BLD AUTO: 0.5 % (ref 0–1.5)
BUN SERPL-MCNC: 15 MG/DL (ref 6–20)
BUN/CREAT SERPL: 16 (ref 7–25)
CALCIUM SPEC-SCNC: 8.5 MG/DL (ref 8.6–10.5)
CHLORIDE SERPL-SCNC: 103 MMOL/L (ref 98–107)
CO2 SERPL-SCNC: 27 MMOL/L (ref 22–29)
CREAT SERPL-MCNC: 0.94 MG/DL (ref 0.76–1.27)
DEPRECATED RDW RBC AUTO: 45 FL (ref 37–54)
EOSINOPHIL # BLD AUTO: 0.42 10*3/MM3 (ref 0–0.4)
EOSINOPHIL NFR BLD AUTO: 4.2 % (ref 0.3–6.2)
ERYTHROCYTE [DISTWIDTH] IN BLOOD BY AUTOMATED COUNT: 12.9 % (ref 12.3–15.4)
GFR SERPL CREATININE-BSD FRML MDRD: 83 ML/MIN/1.73
GLUCOSE SERPL-MCNC: 120 MG/DL (ref 65–99)
HCT VFR BLD AUTO: 38.6 % (ref 37.5–51)
HGB BLD-MCNC: 12.6 G/DL (ref 13–17.7)
IMM GRANULOCYTES # BLD AUTO: 0.08 10*3/MM3 (ref 0–0.05)
IMM GRANULOCYTES NFR BLD AUTO: 0.8 % (ref 0–0.5)
LYMPHOCYTES # BLD AUTO: 1.66 10*3/MM3 (ref 0.7–3.1)
LYMPHOCYTES NFR BLD AUTO: 16.4 % (ref 19.6–45.3)
MCH RBC QN AUTO: 30.7 PG (ref 26.6–33)
MCHC RBC AUTO-ENTMCNC: 32.6 G/DL (ref 31.5–35.7)
MCV RBC AUTO: 94.1 FL (ref 79–97)
MONOCYTES # BLD AUTO: 1.52 10*3/MM3 (ref 0.1–0.9)
MONOCYTES NFR BLD AUTO: 15 % (ref 5–12)
NEUTROPHILS NFR BLD AUTO: 6.39 10*3/MM3 (ref 1.7–7)
NEUTROPHILS NFR BLD AUTO: 63.1 % (ref 42.7–76)
NRBC BLD AUTO-RTO: 0 /100 WBC (ref 0–0.2)
PLATELET # BLD AUTO: 328 10*3/MM3 (ref 140–450)
PMV BLD AUTO: 9.3 FL (ref 6–12)
POTASSIUM SERPL-SCNC: 3.9 MMOL/L (ref 3.5–5.2)
RBC # BLD AUTO: 4.1 10*6/MM3 (ref 4.14–5.8)
SODIUM SERPL-SCNC: 138 MMOL/L (ref 136–145)
WBC # BLD AUTO: 10.12 10*3/MM3 (ref 3.4–10.8)

## 2020-12-25 PROCEDURE — 85025 COMPLETE CBC W/AUTO DIFF WBC: CPT | Performed by: INTERNAL MEDICINE

## 2020-12-25 PROCEDURE — 80048 BASIC METABOLIC PNL TOTAL CA: CPT | Performed by: INTERNAL MEDICINE

## 2020-12-25 PROCEDURE — 94799 UNLISTED PULMONARY SVC/PX: CPT

## 2020-12-25 PROCEDURE — 99232 SBSQ HOSP IP/OBS MODERATE 35: CPT | Performed by: INTERNAL MEDICINE

## 2020-12-25 PROCEDURE — 94760 N-INVAS EAR/PLS OXIMETRY 1: CPT

## 2020-12-25 PROCEDURE — 71045 X-RAY EXAM CHEST 1 VIEW: CPT

## 2020-12-25 PROCEDURE — 25010000002 KETOROLAC TROMETHAMINE PER 15 MG: Performed by: NURSE PRACTITIONER

## 2020-12-25 PROCEDURE — 25010000002 ENOXAPARIN PER 10 MG: Performed by: NURSE PRACTITIONER

## 2020-12-25 RX ADMIN — ARFORMOTEROL TARTRATE 15 MCG: 15 SOLUTION RESPIRATORY (INHALATION) at 08:13

## 2020-12-25 RX ADMIN — FLUTICASONE PROPIONATE 2 SPRAY: 50 SPRAY, METERED NASAL at 09:27

## 2020-12-25 RX ADMIN — BUDESONIDE 0.5 MG: 0.5 INHALANT RESPIRATORY (INHALATION) at 21:39

## 2020-12-25 RX ADMIN — SODIUM CHLORIDE, PRESERVATIVE FREE 10 ML: 5 INJECTION INTRAVENOUS at 09:27

## 2020-12-25 RX ADMIN — ACETAMINOPHEN 650 MG: 325 TABLET, FILM COATED ORAL at 12:46

## 2020-12-25 RX ADMIN — IPRATROPIUM BROMIDE AND ALBUTEROL SULFATE 3 ML: 2.5; .5 SOLUTION RESPIRATORY (INHALATION) at 13:05

## 2020-12-25 RX ADMIN — ENOXAPARIN SODIUM 40 MG: 40 INJECTION SUBCUTANEOUS at 09:27

## 2020-12-25 RX ADMIN — SODIUM CHLORIDE, PRESERVATIVE FREE 10 ML: 5 INJECTION INTRAVENOUS at 19:46

## 2020-12-25 RX ADMIN — KETOROLAC TROMETHAMINE 30 MG: 30 INJECTION, SOLUTION INTRAMUSCULAR; INTRAVENOUS at 01:54

## 2020-12-25 RX ADMIN — ARFORMOTEROL TARTRATE 15 MCG: 15 SOLUTION RESPIRATORY (INHALATION) at 21:46

## 2020-12-25 RX ADMIN — ACETAMINOPHEN 650 MG: 325 TABLET, FILM COATED ORAL at 19:45

## 2020-12-25 RX ADMIN — BUDESONIDE 0.5 MG: 0.5 INHALANT RESPIRATORY (INHALATION) at 08:14

## 2020-12-26 ENCOUNTER — APPOINTMENT (OUTPATIENT)
Dept: GENERAL RADIOLOGY | Facility: HOSPITAL | Age: 55
End: 2020-12-26

## 2020-12-26 PROCEDURE — 94799 UNLISTED PULMONARY SVC/PX: CPT

## 2020-12-26 PROCEDURE — 99232 SBSQ HOSP IP/OBS MODERATE 35: CPT | Performed by: INTERNAL MEDICINE

## 2020-12-26 PROCEDURE — 25010000002 ENOXAPARIN PER 10 MG: Performed by: INTERNAL MEDICINE

## 2020-12-26 PROCEDURE — 25010000002 KETOROLAC TROMETHAMINE PER 15 MG: Performed by: INTERNAL MEDICINE

## 2020-12-26 PROCEDURE — 71045 X-RAY EXAM CHEST 1 VIEW: CPT

## 2020-12-26 RX ADMIN — ARFORMOTEROL TARTRATE 15 MCG: 15 SOLUTION RESPIRATORY (INHALATION) at 08:07

## 2020-12-26 RX ADMIN — SODIUM CHLORIDE, PRESERVATIVE FREE 10 ML: 5 INJECTION INTRAVENOUS at 19:31

## 2020-12-26 RX ADMIN — BUDESONIDE 0.5 MG: 0.5 INHALANT RESPIRATORY (INHALATION) at 08:07

## 2020-12-26 RX ADMIN — KETOROLAC TROMETHAMINE 30 MG: 30 INJECTION, SOLUTION INTRAMUSCULAR; INTRAVENOUS at 12:48

## 2020-12-26 RX ADMIN — ENOXAPARIN SODIUM 40 MG: 40 INJECTION SUBCUTANEOUS at 09:05

## 2020-12-26 RX ADMIN — ACETAMINOPHEN 650 MG: 325 TABLET, FILM COATED ORAL at 19:13

## 2020-12-26 RX ADMIN — SODIUM CHLORIDE, PRESERVATIVE FREE 10 ML: 5 INJECTION INTRAVENOUS at 09:05

## 2020-12-26 RX ADMIN — KETOROLAC TROMETHAMINE 30 MG: 30 INJECTION, SOLUTION INTRAMUSCULAR; INTRAVENOUS at 22:30

## 2020-12-26 RX ADMIN — BUDESONIDE 0.5 MG: 0.5 INHALANT RESPIRATORY (INHALATION) at 20:43

## 2020-12-26 RX ADMIN — KETOROLAC TROMETHAMINE 30 MG: 30 INJECTION, SOLUTION INTRAMUSCULAR; INTRAVENOUS at 06:06

## 2020-12-26 RX ADMIN — FLUTICASONE PROPIONATE 2 SPRAY: 50 SPRAY, METERED NASAL at 13:04

## 2020-12-26 RX ADMIN — ARFORMOTEROL TARTRATE 15 MCG: 15 SOLUTION RESPIRATORY (INHALATION) at 20:43

## 2020-12-27 ENCOUNTER — APPOINTMENT (OUTPATIENT)
Dept: GENERAL RADIOLOGY | Facility: HOSPITAL | Age: 55
End: 2020-12-27

## 2020-12-27 VITALS
WEIGHT: 122.58 LBS | TEMPERATURE: 98.1 F | BODY MASS INDEX: 18.58 KG/M2 | SYSTOLIC BLOOD PRESSURE: 118 MMHG | RESPIRATION RATE: 16 BRPM | OXYGEN SATURATION: 95 % | HEIGHT: 68 IN | DIASTOLIC BLOOD PRESSURE: 79 MMHG | HEART RATE: 88 BPM

## 2020-12-27 PROCEDURE — 25010000002 ENOXAPARIN PER 10 MG: Performed by: INTERNAL MEDICINE

## 2020-12-27 PROCEDURE — 99239 HOSP IP/OBS DSCHRG MGMT >30: CPT | Performed by: INTERNAL MEDICINE

## 2020-12-27 PROCEDURE — 94799 UNLISTED PULMONARY SVC/PX: CPT

## 2020-12-27 PROCEDURE — 71045 X-RAY EXAM CHEST 1 VIEW: CPT

## 2020-12-27 RX ORDER — ARFORMOTEROL TARTRATE 15 UG/2ML
15 SOLUTION RESPIRATORY (INHALATION)
Qty: 60 ML | Refills: 0 | Status: SHIPPED | OUTPATIENT
Start: 2020-12-27 | End: 2021-01-02

## 2020-12-27 RX ADMIN — BUDESONIDE 0.5 MG: 0.5 INHALANT RESPIRATORY (INHALATION) at 08:49

## 2020-12-27 RX ADMIN — ENOXAPARIN SODIUM 40 MG: 40 INJECTION SUBCUTANEOUS at 08:20

## 2020-12-27 RX ADMIN — FLUTICASONE PROPIONATE 2 SPRAY: 50 SPRAY, METERED NASAL at 08:20

## 2020-12-27 RX ADMIN — ACETAMINOPHEN 650 MG: 325 TABLET, FILM COATED ORAL at 08:20

## 2020-12-27 RX ADMIN — ARFORMOTEROL TARTRATE 15 MCG: 15 SOLUTION RESPIRATORY (INHALATION) at 08:48

## 2020-12-27 RX ADMIN — SODIUM CHLORIDE, PRESERVATIVE FREE 10 ML: 5 INJECTION INTRAVENOUS at 08:20

## 2020-12-28 ENCOUNTER — READMISSION MANAGEMENT (OUTPATIENT)
Dept: CALL CENTER | Facility: HOSPITAL | Age: 55
End: 2020-12-28

## 2020-12-28 NOTE — OUTREACH NOTE
Prep Survey      Responses   [facility] from?  [place]   Is LACE score < 7 ?  No   Emergency Room discharge w/ pulse ox?  No   Eligibility  Readm Mgmt   Discharge diagnosis  Recurrent left pneumothorax,  BRONCHOSCOPY WITH ENDOBRONCHIAL VALVE PLACEMENT   Does the patient have one of the following disease processes/diagnoses(primary or secondary)?  Other   Does the patient have Home health ordered?  Yes   What is the Home health agency?   [facility]   Is there a DME ordered?  No   Prep survey completed?  Yes          Lizbeth Hauser RN

## 2020-12-29 ENCOUNTER — READMISSION MANAGEMENT (OUTPATIENT)
Dept: CALL CENTER | Facility: HOSPITAL | Age: 55
End: 2020-12-29

## 2020-12-29 NOTE — OUTREACH NOTE
Medical Week 1 Survey      Responses   [facility] patient discharged from?  [place]   Does the patient have one of the following disease processes/diagnoses(primary or secondary)?  Other   Week 1 attempt successful?  Yes   Call start time  1808   Call end time  0611   Discharge diagnosis  Recurrent left pneumothorax,  BRONCHOSCOPY WITH ENDOBRONCHIAL VALVE PLACEMENT   Meds reviewed with patient/caregiver?  Yes   Is the patient having any side effects they believe may be caused by any medication additions or changes?  No   Does the patient have all medications ordered at discharge?  Yes   Is the patient taking all medications as directed (includes completed medication regime)?  Yes   Does the patient have a primary care provider?   Yes   Does the patient have an appointment with their PCP within 7 days of discharge?  No   What is preventing the patient from scheduling follow up appointments within 7 days of discharge?  Haven't had time   Nursing Interventions  Advised patient to make appointment   Has the patient kept scheduled appointments due by today?  N/A   Comments  States right now he is more focused on following up with his Pulmonary doctor.  States he will get a PCP appt.   What is the Home health agency?   [facility]   Has home health visited the patient within 72 hours of discharge?  Yes   What DME was ordered?  [employer] O2 nebulizer   Has all DME been delivered?  Yes   Did the patient receive a copy of their discharge instructions?  Yes   Nursing interventions  Reviewed instructions with patient   What is the patient's perception of their health status since discharge?  Improving   Is the patient/caregiver able to teach back signs and symptoms related to disease process for when to call PCP?  Yes   Is the patient/caregiver able to teach back signs and symptoms related to disease process for when to call 911?  Yes   Is the patient/caregiver able to teach back the hierarchy of who to call/visit for  symptoms/problems? PCP, Specialist, Home health nurse, Urgent Care, ED, 911  Yes   Additional teach back comments  States he is doing well at the moment.  Home health was in today and will be back Monday.   Week 1 call completed?  Yes   Wrap up additional comments  Deneis questions or needs at this time          Yaquelin Kaur LPN

## 2020-12-31 ENCOUNTER — APPOINTMENT (OUTPATIENT)
Dept: CT IMAGING | Facility: HOSPITAL | Age: 55
End: 2020-12-31

## 2020-12-31 ENCOUNTER — APPOINTMENT (OUTPATIENT)
Dept: GENERAL RADIOLOGY | Facility: HOSPITAL | Age: 55
End: 2020-12-31

## 2020-12-31 ENCOUNTER — HOSPITAL ENCOUNTER (EMERGENCY)
Facility: HOSPITAL | Age: 55
Discharge: HOME | End: 2021-01-01
Attending: EMERGENCY MEDICINE | Admitting: EMERGENCY MEDICINE

## 2020-12-31 DIAGNOSIS — Z87.09 HISTORY OF COPD: ICD-10-CM

## 2020-12-31 DIAGNOSIS — Z87.09 HISTORY OF PNEUMOTHORAX: ICD-10-CM

## 2020-12-31 DIAGNOSIS — R06.02 SHORTNESS OF BREATH: Primary | ICD-10-CM

## 2020-12-31 LAB
ALBUMIN SERPL-MCNC: 4 G/DL (ref 3.5–5.2)
ALBUMIN/GLOB SERPL: 1.1 G/DL
ALP SERPL-CCNC: 82 U/L (ref 39–117)
ALT SERPL W P-5'-P-CCNC: 33 U/L (ref 1–41)
ANION GAP SERPL CALCULATED.3IONS-SCNC: 13 MMOL/L (ref 5–15)
AST SERPL-CCNC: 26 U/L (ref 1–40)
BASOPHILS # BLD AUTO: 0.11 10*3/MM3 (ref 0–0.2)
BASOPHILS NFR BLD AUTO: 0.8 % (ref 0–1.5)
BILIRUB SERPL-MCNC: 0.2 MG/DL (ref 0–1.2)
BUN SERPL-MCNC: 15 MG/DL (ref 6–20)
BUN/CREAT SERPL: 16.1 (ref 7–25)
CALCIUM SPEC-SCNC: 9.3 MG/DL (ref 8.6–10.5)
CHLORIDE SERPL-SCNC: 100 MMOL/L (ref 98–107)
CO2 SERPL-SCNC: 26 MMOL/L (ref 22–29)
CREAT SERPL-MCNC: 0.93 MG/DL (ref 0.76–1.27)
DEPRECATED RDW RBC AUTO: 43.7 FL (ref 37–54)
EOSINOPHIL # BLD AUTO: 0.35 10*3/MM3 (ref 0–0.4)
EOSINOPHIL NFR BLD AUTO: 2.7 % (ref 0.3–6.2)
ERYTHROCYTE [DISTWIDTH] IN BLOOD BY AUTOMATED COUNT: 12.9 % (ref 12.3–15.4)
GFR SERPL CREATININE-BSD FRML MDRD: 84 ML/MIN/1.73
GLOBULIN UR ELPH-MCNC: 3.5 GM/DL
GLUCOSE SERPL-MCNC: 128 MG/DL (ref 65–99)
HCT VFR BLD AUTO: 42.8 % (ref 37.5–51)
HGB BLD-MCNC: 13.7 G/DL (ref 13–17.7)
IMM GRANULOCYTES # BLD AUTO: 0.11 10*3/MM3 (ref 0–0.05)
IMM GRANULOCYTES NFR BLD AUTO: 0.8 % (ref 0–0.5)
INR PPP: 1.05 (ref 0.85–1.16)
LYMPHOCYTES # BLD AUTO: 2.57 10*3/MM3 (ref 0.7–3.1)
LYMPHOCYTES NFR BLD AUTO: 19.8 % (ref 19.6–45.3)
MCH RBC QN AUTO: 29.6 PG (ref 26.6–33)
MCHC RBC AUTO-ENTMCNC: 32 G/DL (ref 31.5–35.7)
MCV RBC AUTO: 92.4 FL (ref 79–97)
MONOCYTES # BLD AUTO: 1.75 10*3/MM3 (ref 0.1–0.9)
MONOCYTES NFR BLD AUTO: 13.5 % (ref 5–12)
NEUTROPHILS NFR BLD AUTO: 62.4 % (ref 42.7–76)
NEUTROPHILS NFR BLD AUTO: 8.09 10*3/MM3 (ref 1.7–7)
NRBC BLD AUTO-RTO: 0 /100 WBC (ref 0–0.2)
NT-PROBNP SERPL-MCNC: 82.6 PG/ML (ref 0–900)
PLATELET # BLD AUTO: 392 10*3/MM3 (ref 140–450)
PMV BLD AUTO: 8.7 FL (ref 6–12)
POTASSIUM SERPL-SCNC: 4.3 MMOL/L (ref 3.5–5.2)
PROT SERPL-MCNC: 7.5 G/DL (ref 6–8.5)
PROTHROMBIN TIME: 13.4 SECONDS (ref 11.5–14)
RBC # BLD AUTO: 4.63 10*6/MM3 (ref 4.14–5.8)
SODIUM SERPL-SCNC: 139 MMOL/L (ref 136–145)
TROPONIN T SERPL-MCNC: <0.01 NG/ML (ref 0–0.03)
WBC # BLD AUTO: 12.98 10*3/MM3 (ref 3.4–10.8)

## 2020-12-31 PROCEDURE — 96375 TX/PRO/DX INJ NEW DRUG ADDON: CPT

## 2020-12-31 PROCEDURE — 71275 CT ANGIOGRAPHY CHEST: CPT

## 2020-12-31 PROCEDURE — 0 IOPAMIDOL PER 1 ML: Performed by: EMERGENCY MEDICINE

## 2020-12-31 PROCEDURE — 71045 X-RAY EXAM CHEST 1 VIEW: CPT

## 2020-12-31 PROCEDURE — 85610 PROTHROMBIN TIME: CPT | Performed by: EMERGENCY MEDICINE

## 2020-12-31 PROCEDURE — 25010000002 METHYLPREDNISOLONE PER 125 MG: Performed by: EMERGENCY MEDICINE

## 2020-12-31 PROCEDURE — 84484 ASSAY OF TROPONIN QUANT: CPT | Performed by: EMERGENCY MEDICINE

## 2020-12-31 PROCEDURE — 99284 EMERGENCY DEPT VISIT MOD MDM: CPT

## 2020-12-31 PROCEDURE — 96374 THER/PROPH/DIAG INJ IV PUSH: CPT

## 2020-12-31 PROCEDURE — 80053 COMPREHEN METABOLIC PANEL: CPT | Performed by: EMERGENCY MEDICINE

## 2020-12-31 PROCEDURE — 25010000002 LORAZEPAM PER 2 MG: Performed by: EMERGENCY MEDICINE

## 2020-12-31 PROCEDURE — 85025 COMPLETE CBC W/AUTO DIFF WBC: CPT | Performed by: EMERGENCY MEDICINE

## 2020-12-31 PROCEDURE — 83880 ASSAY OF NATRIURETIC PEPTIDE: CPT | Performed by: EMERGENCY MEDICINE

## 2020-12-31 PROCEDURE — 93005 ELECTROCARDIOGRAM TRACING: CPT | Performed by: EMERGENCY MEDICINE

## 2020-12-31 RX ORDER — KETOROLAC TROMETHAMINE 10 MG/1
10 TABLET, FILM COATED ORAL EVERY 6 HOURS PRN
Status: ON HOLD | COMMUNITY
End: 2021-02-04

## 2020-12-31 RX ORDER — LORAZEPAM 2 MG/ML
0.5 INJECTION INTRAMUSCULAR ONCE
Status: COMPLETED | OUTPATIENT
Start: 2020-12-31 | End: 2020-12-31

## 2020-12-31 RX ORDER — METHYLPREDNISOLONE SODIUM SUCCINATE 125 MG/2ML
125 INJECTION, POWDER, LYOPHILIZED, FOR SOLUTION INTRAMUSCULAR; INTRAVENOUS ONCE
Status: COMPLETED | OUTPATIENT
Start: 2020-12-31 | End: 2020-12-31

## 2020-12-31 RX ORDER — SODIUM CHLORIDE 0.9 % (FLUSH) 0.9 %
10 SYRINGE (ML) INJECTION AS NEEDED
Status: DISCONTINUED | OUTPATIENT
Start: 2020-12-31 | End: 2021-01-01 | Stop reason: HOSPADM

## 2020-12-31 RX ADMIN — LORAZEPAM 0.5 MG: 2 INJECTION INTRAMUSCULAR; INTRAVENOUS at 23:19

## 2020-12-31 RX ADMIN — IOPAMIDOL 49 ML: 755 INJECTION, SOLUTION INTRAVENOUS at 23:11

## 2020-12-31 RX ADMIN — METHYLPREDNISOLONE SODIUM SUCCINATE 125 MG: 125 INJECTION, POWDER, FOR SOLUTION INTRAMUSCULAR; INTRAVENOUS at 22:12

## 2021-01-01 VITALS
HEART RATE: 102 BPM | DIASTOLIC BLOOD PRESSURE: 85 MMHG | SYSTOLIC BLOOD PRESSURE: 120 MMHG | TEMPERATURE: 98.6 F | HEIGHT: 68 IN | RESPIRATION RATE: 26 BRPM | WEIGHT: 130 LBS | BODY MASS INDEX: 19.7 KG/M2 | OXYGEN SATURATION: 98 %

## 2021-01-01 DIAGNOSIS — F41.9 ANXIETY DISORDER, UNSPECIFIED TYPE: Primary | ICD-10-CM

## 2021-01-01 RX ORDER — LORAZEPAM 0.5 MG/1
0.5 TABLET ORAL EVERY 8 HOURS PRN
Qty: 20 TABLET | Refills: 0 | Status: SHIPPED | OUTPATIENT
Start: 2021-01-01 | End: 2021-01-19 | Stop reason: SDUPTHER

## 2021-01-01 NOTE — PROGRESS NOTES
"Balwinder called me this morning.  He had an episode of dyspnea yesterday evening prompting him to present to the emergency room.  Chest x-ray and CT scan revealed no infiltrates, blood clots, or or pneumothorax.  Chest tube was well positioned.  He was given a low-dose of Ativan and eventually improved.  He was given a dose of steroids as well.    He felt back to normal after discharge home though this morning has had increased symptoms.  He thinks these are similar to those symptoms that he had in the hospital and at the time we encouraged him to use a benzodiazepine as it seemed like these were anxiety related.  He said he was willing to do that.  I will prescribe him a small number of low-dose Ativan pills to use in the near term.  He and I both do not want to prescribe these over the long-term.  I think these will be useful for a \"Band-Aid\" in the short-term.  Hopefully they will also give him confidence that he has another option other than going straight to the ER.  Of course, he knows if he has continued dyspnea or chest pain that he should still present to the emergency room for evaluation.    He has follow-up in our office on Monday and then the following week at which time we could consider removing his chest tube if all is well.    Prosper Del Rosario MD    "

## 2021-01-01 NOTE — ED PROVIDER NOTES
Subjective   55-year-old male with a history of alpha 1 antitrypsin deficiency and COPD presents for evaluation of severe shortness of breath.  Of note, the patient also has a history of recurrent pneumothoraces.  Of note, saw the patient for dyspnea earlier this month and placed a left-sided chest tube for recurrent pneumothorax.  The patient was admitted to the hospital and was discharged on the 27th with his pigtail thoracostomy tube still in place in his left hemithorax.  He states that tonight at approximately 8 PM he had acute onset dyspnea that prompted a call to EMS.  A nonrebreather was placed and he was brought to our facility to be evaluated.          Review of Systems   Respiratory: Positive for shortness of breath.    Psychiatric/Behavioral: The patient is nervous/anxious.    All other systems reviewed and are negative.      Past Medical History:   Diagnosis Date   • Alpha-1-antitrypsin deficiency (CMS/HCC)    • Asthma, extrinsic smoke, pollen   • Chronic bronchitis (CMS/HCC)    • COPD (chronic obstructive pulmonary disease) (CMS/HCC)    • Cough    • Emphysema of lung (CMS/HCC) COPD diagnosed approximately 14 years ago   • Lung nodule I dont know    I dont know what this is   • Recurrent pneumonia 9/9/2020   • Wears glasses        Allergies   Allergen Reactions   • Other Other (See Comments)     Opioid Analgesics (recovering addict)       Past Surgical History:   Procedure Laterality Date   • BRONCHOSCOPY N/A 10/30/2019    Procedure: BRONCHOSCOPY WITH ENDOBRONHCIAL VALVE PLACEMENT;  Surgeon: Phan Castellano MD;  Location:  [facility];  Service: Pulmonary   • BRONCHOSCOPY N/A 10/24/2019    Procedure: BRONCHOSCOPY WITH ENDOBRONCHIAL VALVE PLACEMENT;  Surgeon: Devaughn Pressley MD;  Location:  [facility];  Service: Pulmonary   • BRONCHOSCOPY  10/24/19 & 10/29/19?   • BRONCHOSCOPY N/A 9/11/2020    Procedure: BRONCHOSCOPY;  Surgeon: Phan Castellano MD;  Location:  [facility];   "Service: Pulmonary;  Laterality: N/A;   • BRONCHOSCOPY N/A 10/30/2020    Procedure: BRONCHOSCOPY WITH FLUOROSCOPY AND ENDOBRONCHIAL VALVE REMOVAL;  Surgeon: Phan Castellano MD;  Location:  [facility];  Service: Pulmonary;  Laterality: N/A;   • BRONCHOSCOPY N/A 12/21/2020    Procedure: BRONCHOSCOPY WITH ENDOBRONCHIAL VALVE PLACEMENT;  Surgeon: Tian Pressley MD;  Location:  [facility];  Service: Pulmonary;  Laterality: N/A;       Family History   Problem Relation Age of Onset   • Heart disease Mother    • Diabetes Mother    • Alpha-1 antitrypsin deficiency Brother    • Emphysema Brother         also diagnosed with Alpha 1 antitripsan deficiency   • Lung cancer Other    • Emphysema Other    • Emphysema Paternal Grandmother         prognosis in 1975 was \"lung Cancer\" , however, it is suspect that she suffered from the degeneration of her lungs due to Alpha 1 Antitripsan       Social History     Socioeconomic History   • Marital status: Single     Spouse name: Not on file   • Number of children: Not on file   • Years of education: Not on file   • Highest education level: Not on file   Tobacco Use   • Smoking status: Former Smoker     Packs/day: 1.50     Years: 25.00     Pack years: 37.50     Types: Cigarettes     Start date: 1/1/1981     Quit date: 2006     Years since quitting: 15.0   • Smokeless tobacco: Never Used   Substance and Sexual Activity   • Alcohol use: No     Comment: In recovery since 8/24/1994   • Drug use: Yes     Comment: In recovery since 8/24/1994   • Sexual activity: Defer     Partners: Female     Birth control/protection: I.U.D., Rhythm           Objective   Physical Exam  Vitals signs and nursing note reviewed.   Constitutional:       General: He is not in acute distress.     Appearance: He is well-developed. He is not diaphoretic.      Comments: Chronically ill-appearing anxious male   HENT:      Head: Normocephalic and atraumatic.   Neck:      Musculoskeletal: Normal " range of motion.      Vascular: No JVD.   Cardiovascular:      Rate and Rhythm: Regular rhythm.      Heart sounds: Normal heart sounds. No murmur. No friction rub. No gallop.       Comments: Tachycardic  Pulmonary:      Effort: Respiratory distress present.      Breath sounds: No wheezing or rales.      Comments: Tachypneic with mildly labored breathing, speaking in full sentences  Abdominal:      General: Bowel sounds are normal. There is no distension.      Palpations: Abdomen is soft. There is no mass.      Tenderness: There is no abdominal tenderness. There is no guarding.   Musculoskeletal: Normal range of motion.      Right lower leg: No edema.      Left lower leg: No edema.   Skin:     General: Skin is warm and dry.      Coloration: Skin is not pale.      Findings: No erythema or rash.      Comments: Thoracostomy tube site to left hemithorax appears clean, dry, and intact with tube in place   Neurological:      General: No focal deficit present.      Mental Status: He is alert and oriented to person, place, and time.   Psychiatric:         Mood and Affect: Mood normal.         Thought Content: Thought content normal.         Judgment: Judgment normal.         Procedures           ED Course  ED Course as of Jan 01 0352   Thu Dec 31, 2020   2205 55-year-old male with history of severe COPD and without pneumothorax presents for evaluation of shortness of breath.  Of note, I saw the patient in the emergency department here earlier this month and placed the chest tube and admitted the patient for a recurrent left-sided pneumothorax.  He was discharged on December 27 and states that tonight at approximately 8 PM he had acute onset dyspnea, prompting a call to EMS.  On arrival to the ED, the patient appears anxious.  His left-sided chest tube appears to be in place and his thoracostomy site appears clean, dry, and intact.  Mild wheezing noted.  Solu-Medrol given.  We will obtain labs and imaging, and we will  reassess following initial interventions.  Moderate risk Well's.      [DD]   2304 Chest x-ray is unrevealing.    [DD]   2304 Labs are unrevealing.  Ativan given for anxiolysis.    [DD]   2310 Chest CTA is unrevealing.    [DD]   2350 Upon reevaluation, the patient looks and feels much improved.  His work of breathing is markedly improved.  His heart rate is currently in the 90s.  I definitely feel that there is an anxiety component to the patient's dyspnea, especially after reviewing his most recent inpatient records.    [DD]   Fri Jan 01, 2021   0038 The patient is markedly improved and feels comfortable going home at this point.  I advised him to follow-up with his pulmonologist within the next week as previously scheduled.  Agreeable with plan and given appropriate strict return precautions.    [DD]      ED Course User Index  [DD] Johnathon Crocker MD                                 Recent Results (from the past 24 hour(s))   Comprehensive Metabolic Panel    Collection Time: 12/31/20 10:02 PM    Specimen: Blood   Result Value Ref Range    Glucose 128 (H) 65 - 99 mg/dL    BUN 15 6 - 20 mg/dL    Creatinine 0.93 0.76 - 1.27 mg/dL    Sodium 139 136 - 145 mmol/L    Potassium 4.3 3.5 - 5.2 mmol/L    Chloride 100 98 - 107 mmol/L    CO2 26.0 22.0 - 29.0 mmol/L    Calcium 9.3 8.6 - 10.5 mg/dL    Total Protein 7.5 6.0 - 8.5 g/dL    Albumin 4.00 3.50 - 5.20 g/dL    ALT (SGPT) 33 1 - 41 U/L    AST (SGOT) 26 1 - 40 U/L    Alkaline Phosphatase 82 39 - 117 U/L    Total Bilirubin 0.2 0.0 - 1.2 mg/dL    eGFR Non African Amer 84 >60 mL/min/1.73    Globulin 3.5 gm/dL    A/G Ratio 1.1 g/dL    BUN/Creatinine Ratio 16.1 7.0 - 25.0    Anion Gap 13.0 5.0 - 15.0 mmol/L   Protime-INR    Collection Time: 12/31/20 10:02 PM    Specimen: Blood   Result Value Ref Range    Protime 13.4 11.5 - 14.0 Seconds    INR 1.05 0.85 - 1.16   BNP    Collection Time: 12/31/20 10:02 PM    Specimen: Blood   Result Value Ref Range    proBNP 82.6 0.0 - 900.0  pg/mL   Troponin    Collection Time: 12/31/20 10:02 PM    Specimen: Blood   Result Value Ref Range    Troponin T <0.010 0.000 - 0.030 ng/mL   CBC Auto Differential    Collection Time: 12/31/20 10:02 PM    Specimen: Blood   Result Value Ref Range    WBC 12.98 (H) 3.40 - 10.80 10*3/mm3    RBC 4.63 4.14 - 5.80 10*6/mm3    Hemoglobin 13.7 13.0 - 17.7 g/dL    Hematocrit 42.8 37.5 - 51.0 %    MCV 92.4 79.0 - 97.0 fL    MCH 29.6 26.6 - 33.0 pg    MCHC 32.0 31.5 - 35.7 g/dL    RDW 12.9 12.3 - 15.4 %    RDW-SD 43.7 37.0 - 54.0 fl    MPV 8.7 6.0 - 12.0 fL    Platelets 392 140 - 450 10*3/mm3    Neutrophil % 62.4 42.7 - 76.0 %    Lymphocyte % 19.8 19.6 - 45.3 %    Monocyte % 13.5 (H) 5.0 - 12.0 %    Eosinophil % 2.7 0.3 - 6.2 %    Basophil % 0.8 0.0 - 1.5 %    Immature Grans % 0.8 (H) 0.0 - 0.5 %    Neutrophils, Absolute 8.09 (H) 1.70 - 7.00 10*3/mm3    Lymphocytes, Absolute 2.57 0.70 - 3.10 10*3/mm3    Monocytes, Absolute 1.75 (H) 0.10 - 0.90 10*3/mm3    Eosinophils, Absolute 0.35 0.00 - 0.40 10*3/mm3    Basophils, Absolute 0.11 0.00 - 0.20 10*3/mm3    Immature Grans, Absolute 0.11 (H) 0.00 - 0.05 10*3/mm3    nRBC 0.0 0.0 - 0.2 /100 WBC     Note: In addition to lab results from this visit, the labs listed above may include labs taken at another facility or during a different encounter within the last 24 hours. Please correlate lab times with ED admission and discharge times for further clarification of the services performed during this visit.    CT Angiogram Chest   Final Result      1. No PE or aortic dissection.   2. Negative for pneumonia.   3. COPD with scattered areas of scarring in both lungs.   4. Left chest tube in place. No pneumothorax identified on either side.      Signer Name: Sammy Chung MD    Signed: 12/31/2020 11:38 PM    Workstation Name: [email or web address]     [facility]      XR Chest 1 View   Final Result   No acute cardiopulmonary findings and no significant change.      Signer Name:  Sammy Chung MD    Signed: 12/31/2020 10:36 PM    Workstation Name: [email or web address]     [facility]        Vitals:    12/31/20 2230 12/31/20 2330 12/31/20 2345 01/01/21 0000   BP: 116/92 137/94  120/85   BP Location:       Patient Position:       Pulse: 99  100 102   Resp:       Temp:       TempSrc:       SpO2: 99%  98% 98%   Weight:       Height:         Medications   methylPREDNISolone sodium succinate (SOLU-Medrol) injection 125 mg (125 mg Intravenous Given 12/31/20 2212)   LORazepam (ATIVAN) injection 0.5 mg (0.5 mg Intravenous Given 12/31/20 2319)   iopamidol (ISOVUE-370) 76 % injection 100 mL (49 mL Intravenous Given 12/31/20 2311)     ECG/EMG Results (last 24 hours)     Procedure Component Value Units Date/Time    ECG 12 Lead [[id number]] Collected: 12/31/20 2205     Updated: 12/31/20 2202        ECG 12 Lead                       [facility]    Final diagnoses:   Shortness of breath   History of COPD   History of pneumothorax            Johnathon Crocker MD  01/01/21 0352

## 2021-01-04 ENCOUNTER — READMISSION MANAGEMENT (OUTPATIENT)
Dept: CALL CENTER | Facility: HOSPITAL | Age: 56
End: 2021-01-04

## 2021-01-04 ENCOUNTER — OFFICE VISIT (OUTPATIENT)
Dept: PULMONOLOGY | Facility: CLINIC | Age: 56
End: 2021-01-04

## 2021-01-04 VITALS
RESPIRATION RATE: 20 BRPM | WEIGHT: 128 LBS | DIASTOLIC BLOOD PRESSURE: 84 MMHG | HEART RATE: 111 BPM | TEMPERATURE: 97.3 F | BODY MASS INDEX: 19.4 KG/M2 | HEIGHT: 68 IN | OXYGEN SATURATION: 96 % | SYSTOLIC BLOOD PRESSURE: 122 MMHG

## 2021-01-04 DIAGNOSIS — E88.01 ALPHA-1-ANTITRYPSIN DEFICIENCY (HCC): Chronic | ICD-10-CM

## 2021-01-04 DIAGNOSIS — J96.21 ACUTE ON CHRONIC RESPIRATORY FAILURE WITH HYPOXIA (HCC): ICD-10-CM

## 2021-01-04 DIAGNOSIS — Z99.81 DEPENDENCE ON SUPPLEMENTAL OXYGEN: Chronic | ICD-10-CM

## 2021-01-04 DIAGNOSIS — J44.9 COPD MIXED TYPE (HCC): Primary | Chronic | ICD-10-CM

## 2021-01-04 DIAGNOSIS — J95.811 POSTPROCEDURAL PNEUMOTHORAX: ICD-10-CM

## 2021-01-04 PROCEDURE — 99214 OFFICE O/P EST MOD 30 MIN: CPT | Performed by: NURSE PRACTITIONER

## 2021-01-04 RX ORDER — ARFORMOTEROL TARTRATE 15 UG/2ML
15 SOLUTION RESPIRATORY (INHALATION)
COMMUNITY
End: 2021-03-16

## 2021-01-04 NOTE — OUTREACH NOTE
Medical Week 2 Survey      Responses   [facility] patient discharged from?  [facility]   Does the patient have one of the following disease processes/diagnoses(primary or secondary)?  Other   Week 2 attempt successful?  Yes   Call start time  0735   Discharge diagnosis  Recurrent left pneumothorax,  BRONCHOSCOPY WITH ENDOBRONCHIAL VALVE PLACEMENT   Call end time  0739   Meds reviewed with patient/caregiver?  Yes   Is the patient taking all medications as directed (includes completed medication regime)?  Yes   Has the patient kept scheduled appointments due by today?  N/A   Comments  Pulmonology appt today 01/04/2021   What is the Home health agency?   [facility]   What is the patient's perception of their health status since discharge?  Improving   Week 2 Call Completed?  Yes          Maria C Coley RN

## 2021-01-04 NOTE — PROGRESS NOTES
[facility] Pulmonary Follow up    CHIEF COMPLAINT    Hospital follow-up    HISTORY OF PRESENT ILLNESS    Balwinder Willams is a 55 y.o.male here today for hospital follow-up.  He was last seen in the office by me in November.Since that appointment he has had 4 hospitalizations for recurrent pneumothoraces on the left.  His last hospitalization was 12/18-12/27.  He was discharged home with a pigtail chest tube with a one-way valve, in hopes of having this discontinued in 2 to 3 weeks.  He did present to [facility] on 12/31 for severe shortness of breath and had a normal chest x-ray with no pneumothorax and a CTA that revealed no acute process.  He was then started on 0.5 mg as needed Ativan per Dr. Del Rosario the following day.  He has been using the Ativan as needed.      He states that he is mostly inactive at home and will walk to the bathroom but takes several breaks to recover.  He does have shortness of breath with any exertion.  He is mostly concerned about his tachycardia.  He states that every time his heart rate is above 110 his shortness of breath worsens.    He remains on 3 L nasal cannula continuously at home.    He occasionally will cough up some clear to thin secretions.  He denies any hemoptysis.    He denies fever, chills, sputum production, hemoptysis, night sweats, weight loss, chest pain or palpitations.  He denies any lower extremity edema or calf tenderness.  He does have some chronic sinus and allergy symptoms.  He will take over-the-counter medication as needed.  He denies any reflux symptoms.    He is up-to-date on his current vaccinations.    Patient Active Problem List   Diagnosis   • Alpha-1-antitrypsin deficiency (on replacement)   • Chronic cough   • Dependence on supplemental oxygen (3L NC)    • Stage IV emphysema s/p EBV placement X3 w/ subsequent extraction   • Former smoker (Resolved 2006)   • Postprocedural pneumothorax   • Persistent air leak   • AoC respiratory failure  with hypoxia    • Severe malnutrition (CMS/HCC)   • Steroid-induced hyperglycemia   • Recurrent L PTX s/p multiple SBCTs    • Recent PSA PNA   • MAC (smear -)    • Leukocytosis       Allergies   Allergen Reactions   • Other Other (See Comments)     Opioid Analgesics (recovering addict)       Current Outpatient Medications:   •  arformoterol (BROVANA) 15 MCG/2ML nebulizer solution, Take 15 mcg by nebulization 2 (Two) Times a Day., Disp: , Rfl:   •  fluticasone (Flonase) 50 MCG/ACT nasal spray, 2 sprays into the nostril(s) as directed by provider Daily., Disp: 9.9 mL, Rfl: 6  •  guaiFENesin (MUCINEX) 600 MG 12 hr tablet, Take 1,200 mg by mouth Daily As Needed for Cough., Disp: , Rfl:   •  ibuprofen (ADVIL,MOTRIN) 800 MG tablet, Take 800 mg by mouth Every 6 (Six) Hours As Needed for Mild Pain ., Disp: , Rfl:   •  ipratropium-albuterol (COMBIVENT RESPIMAT)  MCG/ACT inhaler, Inhale 1 puff 4 (Four) Times a Day As Needed for Wheezing., Disp: 2 inhaler, Rfl: 3  •  ipratropium-albuterol (DUO-NEB) 0.5-2.5 mg/3 ml nebulizer, Take 3 mL by nebulization 4 (Four) Times a Day., Disp: 360 mL, Rfl:   •  ketorolac (TORADOL) 10 MG tablet, Take 10 mg by mouth Every 6 (Six) Hours As Needed for Moderate Pain ., Disp: , Rfl:   •  LORazepam (Ativan) 0.5 MG tablet, Take 1 tablet by mouth Every 8 (Eight) Hours As Needed for Anxiety., Disp: 20 tablet, Rfl: 0  •  PROLASTIN-C 1000 MG/20ML solution, 1 (One) Time Per Week., Disp: , Rfl:   •  Arformoterol Tartrate (BROVANA IN), Brovana, Disp: , Rfl:   •  budesonide (PULMICORT) 0.5 MG/2ML nebulizer solution, Take 2 mL by nebulization 2 (Two) Times a Day for 6 doses., Disp: 60 mL, Rfl: 0  MEDICATION LIST AND ALLERGIES REVIEWED.    Social History     Tobacco Use   • Smoking status: Former Smoker     Packs/day: 1.50     Years: 25.00     Pack years: 37.50     Types: Cigarettes     Start date: 1/1/1981     Quit date: 2006     Years since quitting: 15.0   • Smokeless tobacco: Never Used   Substance  "Use Topics   • Alcohol use: No     Comment: In recovery since 8/24/1994   • Drug use: Yes     Comment: In recovery since 8/24/1994       FAMILY AND SOCIAL HISTORY REVIEWED.    Review of Systems   Constitutional: Negative for activity change, appetite change, fatigue, fever and unexpected weight change.   HENT: Positive for postnasal drip and rhinorrhea. Negative for congestion, sinus pressure, sore throat and voice change.    Eyes: Negative for visual disturbance.   Respiratory: Positive for shortness of breath. Negative for cough, chest tightness and wheezing.    Cardiovascular: Negative for chest pain, palpitations and leg swelling.   Gastrointestinal: Negative for abdominal distention, abdominal pain, nausea and vomiting.   Endocrine: Negative for cold intolerance and heat intolerance.   Genitourinary: Negative for difficulty urinating and urgency.   Musculoskeletal: Negative for arthralgias, back pain and neck pain.   Skin: Negative for color change and pallor.   Allergic/Immunologic: Negative for environmental allergies and food allergies.   Neurological: Negative for dizziness, syncope, weakness and light-headedness.   Hematological: Negative for adenopathy. Does not bruise/bleed easily.   Psychiatric/Behavioral: Negative for agitation and behavioral problems.   .    /84 (BP Location: Right arm, Patient Position: Sitting, Cuff Size: Adult)   Pulse 111   Temp 97.3 °F (36.3 °C)   Resp 20   Ht 172.7 cm (68\")   Wt 58.1 kg (128 lb)   SpO2 96%   PF (!) 3 L/min Comment: Continious at resting  BMI 19.46 kg/m²     Immunization History   Administered Date(s) Administered   • Flu Vaccine Quad PF >36MO 11/15/2019   • Flulaval/Fluarix/Fluzone Quad 11/18/2020   • Influenza, Unspecified 11/18/2019   • Pneumococcal Conjugate 13-Valent (PCV13) 12/10/2015, 11/18/2019   • flucelvax quad pfs =>4 YRS 11/18/2019       Physical Exam  Vitals signs and nursing note reviewed.   Constitutional:       Appearance: He is " well-developed. He is not diaphoretic.   HENT:      Head: Normocephalic and atraumatic.   Eyes:      Pupils: Pupils are equal, round, and reactive to light.   Neck:      Musculoskeletal: Normal range of motion and neck supple.      Thyroid: No thyromegaly.   Cardiovascular:      Rate and Rhythm: Normal rate and regular rhythm.      Heart sounds: Normal heart sounds. No murmur. No friction rub. No gallop.    Pulmonary:      Effort: Pulmonary effort is normal. No respiratory distress.      Breath sounds: Normal breath sounds. No wheezing or rales.   Chest:      Chest wall: No tenderness.   Abdominal:      General: Bowel sounds are normal.      Palpations: Abdomen is soft.      Tenderness: There is no abdominal tenderness.   Musculoskeletal: Normal range of motion.         General: No swelling.   Lymphadenopathy:      Cervical: No cervical adenopathy.   Skin:     General: Skin is warm and dry.      Capillary Refill: Capillary refill takes less than 2 seconds.   Neurological:      Mental Status: He is alert and oriented to person, place, and time.   Psychiatric:         Mood and Affect: Mood normal.         Behavior: Behavior normal.           RESULTS    Xr Chest Pa & Lateral    Result Date: 1/4/2021  Stable chest as above.  D:  01/04/2021 E:  01/04/2021         PROBLEM LIST    Problem List Items Addressed This Visit        Other    Alpha-1-antitrypsin deficiency (on replacement) (Chronic)    Overview     A.  Labs of 11/20/2014 reports an alpha 1 antitrypsin deficiency of 4.7 with a phenotype with Z bands detected and genotyping revealing the presence of most common deficiency allele type Z and an inferred non-expressing null allele.    B.  On Zemaira         Relevant Medications    Arformoterol Tartrate (BROVANA IN)    arformoterol (BROVANA) 15 MCG/2ML nebulizer solution    Dependence on supplemental oxygen (3L NC)  (Chronic)    Stage IV emphysema s/p EBV placement X3 w/ subsequent extraction - Primary (Chronic)     Relevant Medications    Arformoterol Tartrate (BROVANA IN)    arformoterol (BROVANA) 15 MCG/2ML nebulizer solution    Other Relevant Orders    XR Chest PA & Lateral (Completed)    Postprocedural pneumothorax    AoC respiratory failure with hypoxia             DISCUSSION  Mr. Willams was here for a hospital follow-up.  He continues to have his pigtail chest tube in place.  Home health will be changing the dressing today.  We did review his chest x-ray in the office today and it does not appear to have any significant changes from his previous chest x-ray while at [facility] on 12/31.    We did discuss his elevated heart rate in the office today and I do suspect that anxiety is playing a role in his elevated heart rate and I did encourage him to use the Ativan as needed for anxiety.  He states that he will use this occasionally.  He does have difficulty sleeping and he asked if melatonin would be safe to use.  I did advise him that this would be okay to use at night.    I did give him some samples of Perforomist to use instead of Brovana as his elevated heart rate does occur shortly after his nebulizer treatments in the morning in the evenings.  If he notices an improvement in his heart rate we will switch to Perforomist.    He will continue to use 3 L continuously at all times for his chronic respiratory failure.    He will follow-up the week of January 11 for possible removal of the pigtail chest tube per Dr. Castellano or Dr. Del Rosario.  I did advise him to call if his symptoms were to worsen before this date.  I also discussed if his shortness of breath became severe or developed chest pain he needed to present to the ED for further evaluation.  I spent 20-29 minutes with the patient. I spent > 50% percent of this time counseling and discussing diagnosis, prognosis, diagnostic testing, evaluation, current status, treatment options and management.    Neris Cárdenas, APRN  01/04/202115:14  EST  Electronically signed     Please note that portions of this note were completed with a voice recognition program. Efforts were made to edit the dictations, but occasionally words are mistranscribed.      CC: Obinna Arellano MD

## 2021-01-06 LAB
QT INTERVAL: 330 MS
QTC INTERVAL: 452 MS

## 2021-01-11 ENCOUNTER — READMISSION MANAGEMENT (OUTPATIENT)
Dept: CALL CENTER | Facility: HOSPITAL | Age: 56
End: 2021-01-11

## 2021-01-11 NOTE — OUTREACH NOTE
Medical Week 3 Survey      Responses   [facility] [place] patient discharged from?  [facility] [facility]   Does the patient have one of the following disease processes/diagnoses(primary or secondary)?  Other   Week 3 attempt successful?  Yes   Call start time  1253   Call end time  1305   Discharge diagnosis  Recurrent left pneumothorax,  BRONCHOSCOPY WITH ENDOBRONCHIAL VALVE PLACEMENT   Has the patient kept scheduled appointments due by today?  Yes   Comments  chest tube is to be DC'd this week in MD office pt reports.    What is the Home health agency?   [facility]   Has home health visited the patient within 72 hours of discharge?  Yes   What DME was ordered?  [employer]grass O2 nebulizer   DME comments  Using O2 @3 L 24/7. Pulse ox while sitting 93% while moving drops below 80%.    Psychosocial issues?  No   Comments  He is not sleeping well, he reports. Decreased appetite but eating small amts. Remains SOA despite the O2, he reports that he does not get up & move around to avoid raising HR& increasing SOA. He reports the O2 levels do not affect his SOA & HR when we discussed him speaking w/ MD about option of increasing O2 w/exertion,he states that he was told nothing would change this & this is his new normal of minimal activity/movement.    What is the patient's perception of their health status since discharge?  Same   Is the patient/caregiver able to teach back the hierarchy of who to call/visit for symptoms/problems? PCP, Specialist, Home health nurse, Urgent Care, ED, 911  Yes   Week 3 Call Completed?  Yes   Wrap up additional comments  Deneis questions or needs at this time          Tiki Yee RN

## 2021-01-13 ENCOUNTER — OFFICE VISIT (OUTPATIENT)
Dept: PULMONOLOGY | Facility: CLINIC | Age: 56
End: 2021-01-13

## 2021-01-13 VITALS
TEMPERATURE: 96.8 F | HEIGHT: 68 IN | WEIGHT: 122 LBS | SYSTOLIC BLOOD PRESSURE: 128 MMHG | HEART RATE: 97 BPM | OXYGEN SATURATION: 96 % | RESPIRATION RATE: 18 BRPM | BODY MASS INDEX: 18.49 KG/M2 | DIASTOLIC BLOOD PRESSURE: 80 MMHG

## 2021-01-13 DIAGNOSIS — J44.9 COPD MIXED TYPE (HCC): Primary | Chronic | ICD-10-CM

## 2021-01-13 DIAGNOSIS — J93.9 RECURRENT PNEUMOTHORAX: ICD-10-CM

## 2021-01-13 PROCEDURE — 99213 OFFICE O/P EST LOW 20 MIN: CPT | Performed by: NURSE PRACTITIONER

## 2021-01-13 NOTE — PROGRESS NOTES
[facility], [place] Pulmonary Follow up    CHIEF COMPLAINT    Discussion of possible removal of pigtail drain    HISTORY OF PRESENT ILLNESS    Balwinder Willams is a 55 y.o.male here today for follow-up.  He was last seen 1 week ago by me.  He had a pigtail drain placed on 12/25 for recurrent pneumothorax.  He is here today for discussion about possibly removing the pigtail drain.    He states that a couple of days he has noticed air coming out of the valve and has been more short of breath but it has been short-lived.  He has had no respiratory distress since his last appointment.  He states that he is slowly feeling more like himself.    He continues to use Brovana, budesonide twice a day and Combivent 1-2 times per day.  He has noticed that his heart rate has been lower than usual.  He is breathing has been better for the last week.    He remains on 3 L nasal cannula continuously at home.    He denies fever, chills, sputum production, hemoptysis, night sweats, weight loss, chest pain or palpitations.  He denies any lower extremity edema or calf tenderness.  He does have some chronic sinus and allergy symptoms.  He will take over-the-counter medication as needed.  He denies any reflux symptoms.    Patient Active Problem List   Diagnosis   • Alpha-1-antitrypsin deficiency (on replacement)   • Chronic cough   • Dependence on supplemental oxygen (3L NC)    • Stage IV emphysema s/p EBV placement X3 w/ subsequent extraction   • Former smoker (Resolved 2006)   • Postprocedural pneumothorax   • Persistent air leak   • AoC respiratory failure with hypoxia    • Severe malnutrition (CMS/HCC)   • Steroid-induced hyperglycemia   • Recurrent L PTX s/p multiple SBCTs    • Recent PSA PNA   • MAC (smear -)    • Leukocytosis       Allergies   Allergen Reactions   • Other Other (See Comments)     Opioid Analgesics (recovering addict)       Current Outpatient Medications:   •  arformoterol (BROVANA) 15 MCG/2ML nebulizer solution, Take 15 mcg by  nebulization 2 (Two) Times a Day., Disp: , Rfl:   •  fluticasone (Flonase) 50 MCG/ACT nasal spray, 2 sprays into the nostril(s) as directed by provider Daily., Disp: 9.9 mL, Rfl: 6  •  guaiFENesin (MUCINEX) 600 MG 12 hr tablet, Take 1,200 mg by mouth Daily As Needed for Cough., Disp: , Rfl:   •  ibuprofen (ADVIL,MOTRIN) 800 MG tablet, Take 800 mg by mouth Every 6 (Six) Hours As Needed for Mild Pain ., Disp: , Rfl:   •  ipratropium-albuterol (COMBIVENT RESPIMAT)  MCG/ACT inhaler, Inhale 1 puff 4 (Four) Times a Day As Needed for Wheezing., Disp: 2 inhaler, Rfl: 3  •  ipratropium-albuterol (DUO-NEB) 0.5-2.5 mg/3 ml nebulizer, Take 3 mL by nebulization 4 (Four) Times a Day., Disp: 360 mL, Rfl:   •  ketorolac (TORADOL) 10 MG tablet, Take 10 mg by mouth Every 6 (Six) Hours As Needed for Moderate Pain ., Disp: , Rfl:   •  LORazepam (Ativan) 0.5 MG tablet, Take 1 tablet by mouth Every 8 (Eight) Hours As Needed for Anxiety., Disp: 20 tablet, Rfl: 0  •  PROLASTIN-C 1000 MG/20ML solution, 1 (One) Time Per Week., Disp: , Rfl:   •  budesonide (PULMICORT) 0.5 MG/2ML nebulizer solution, Take 2 mL by nebulization 2 (Two) Times a Day for 6 doses., Disp: 60 mL, Rfl: 0  MEDICATION LIST AND ALLERGIES REVIEWED.    Social History     Tobacco Use   • Smoking status: Former Smoker     Packs/day: 1.50     Years: 25.00     Pack years: 37.50     Types: Cigarettes     Start date: 1/1/1981     Quit date: 2006     Years since quitting: 15.0   • Smokeless tobacco: Never Used   Substance Use Topics   • Alcohol use: No     Comment: In recovery since 8/24/1994   • Drug use: Yes     Comment: In recovery since 8/24/1994       FAMILY AND SOCIAL HISTORY REVIEWED.    Review of Systems   Constitutional: Negative for activity change, appetite change, fatigue, fever and unexpected weight change.   HENT: Positive for congestion. Negative for postnasal drip, rhinorrhea, sinus pressure, sore throat and voice change.    Eyes: Negative for visual  "disturbance.   Respiratory: Positive for cough and shortness of breath. Negative for chest tightness and wheezing.    Cardiovascular: Negative for chest pain, palpitations and leg swelling.   Gastrointestinal: Negative for abdominal distention, abdominal pain, nausea and vomiting.   Endocrine: Negative for cold intolerance and heat intolerance.   Genitourinary: Negative for difficulty urinating and urgency.   Musculoskeletal: Negative for arthralgias, back pain and neck pain.   Skin: Negative for color change and pallor.   Allergic/Immunologic: Negative for environmental allergies and food allergies.   Neurological: Negative for dizziness, syncope, weakness and light-headedness.   Hematological: Negative for adenopathy. Does not bruise/bleed easily.   Psychiatric/Behavioral: Negative for agitation and behavioral problems.   .    /80 (BP Location: Left arm, Patient Position: Sitting, Cuff Size: Adult)   Pulse 97   Temp 96.8 °F (36 °C)   Resp 18   Ht 172.7 cm (68\")   Wt 55.3 kg (122 lb)   SpO2 96%   PF (!) 3 L/min Comment: Continious at resting  BMI 18.55 kg/m²     Immunization History   Administered Date(s) Administered   • Flu Vaccine Quad PF >36MO 11/15/2019   • Flulaval/Fluarix/Fluzone Quad 11/18/2020   • Influenza, Unspecified 11/18/2019   • Pneumococcal Conjugate 13-Valent (PCV13) 12/10/2015, 11/18/2019   • flucelvax quad pfs =>4 YRS 11/18/2019       Physical Exam  Vitals signs reviewed.   Constitutional:       Appearance: He is well-developed.   HENT:      Head: Normocephalic and atraumatic.   Eyes:      Pupils: Pupils are equal, round, and reactive to light.   Neck:      Musculoskeletal: Normal range of motion and neck supple.   Cardiovascular:      Rate and Rhythm: Normal rate and regular rhythm.      Heart sounds: Normal heart sounds.   Pulmonary:      Effort: Pulmonary effort is normal.      Breath sounds: Normal breath sounds. No wheezing or rales.   Chest:      Chest wall: No tenderness. "   Abdominal:      General: Bowel sounds are normal.      Palpations: Abdomen is soft.   Musculoskeletal: Normal range of motion.         General: No swelling.   Skin:     General: Skin is warm and dry.      Capillary Refill: Capillary refill takes less than 2 seconds.   Neurological:      Mental Status: He is alert and oriented to person, place, and time.   Psychiatric:         Mood and Affect: Mood normal.         Behavior: Behavior normal.           RESULTS    Chest PA/lateral: Official report pending    PROBLEM LIST    Problem List Items Addressed This Visit        Other    Stage IV emphysema s/p EBV placement X3 w/ subsequent extraction - Primary (Chronic)    Relevant Orders    XR Chest PA & Lateral    Recurrent L PTX s/p multiple SBCTs             DISCUSSION  Mr. Willams was here for follow-up of his pigtail catheter.  He did discuss in the office today with Dr. Hammer to close the stopcock off on his pigtail catheter and leave it clamped for 1 week.  If he has no difficulties in the next week we will discontinue the pigtail drain next week.  I did advise him to open the valve if he develops any severe shortness of breath similar to his previous episodes.  Then he is to call me and let me know.  If he feels worse by the end of the week he will come in for a repeat chest x-ray.  He will call and let me know if he is feeling any worse by Friday.    The plan is to have him come back in on Tuesday 1/19 in hopes to remove the pigtail drain.  If he remains stable with no recurrent pneumothoraces we may also leave the pigtail drain in 1 more week if the patient would feel comfortable with this process.    Mr. Willams is agreeable with this plan and understands to call me at any time if he needs anything.  I spent 10-19 minutes with the patient. I spent > 50% percent of this time counseling and discussing diagnosis, prognosis, diagnostic testing, evaluation, current status, treatment options and  management.    Neris CASTELLANOS Melia, APRN  01/13/202115:03 EST  Electronically signed     Please note that portions of this note were completed with a voice recognition program. Efforts were made to edit the dictations, but occasionally words are mistranscribed.      CC: Obinna Arellano MD

## 2021-01-17 ENCOUNTER — PATIENT MESSAGE (OUTPATIENT)
Dept: PULMONOLOGY | Facility: CLINIC | Age: 56
End: 2021-01-17

## 2021-01-18 ENCOUNTER — READMISSION MANAGEMENT (OUTPATIENT)
Dept: CALL CENTER | Facility: HOSPITAL | Age: 56
End: 2021-01-18

## 2021-01-18 NOTE — OUTREACH NOTE
Medical Week 4 Survey      Responses   [facility] patient discharged from?  [place]   Does the patient have one of the following disease processes/diagnoses(primary or secondary)?  Other   Week 4 attempt successful?  Yes   Call start time  1040   Call end time  1042   Meds reviewed with patient/caregiver?  Yes   Is the patient taking all medications as directed (includes completed medication regime)?  Yes   Has the patient kept scheduled appointments due by today?  Yes   Week 4 Call Completed?  Yes   Would the patient like one additional call?  Yes   Wrap up additional comments  Pt hopes to have chest tube removed tomorrow at his follow up appoinment.          Juliana Woodard RN

## 2021-01-19 ENCOUNTER — OFFICE VISIT (OUTPATIENT)
Dept: PULMONOLOGY | Facility: CLINIC | Age: 56
End: 2021-01-19

## 2021-01-19 VITALS
SYSTOLIC BLOOD PRESSURE: 126 MMHG | HEIGHT: 68 IN | DIASTOLIC BLOOD PRESSURE: 80 MMHG | BODY MASS INDEX: 18.58 KG/M2 | HEART RATE: 88 BPM | OXYGEN SATURATION: 96 % | TEMPERATURE: 98 F | WEIGHT: 122.6 LBS

## 2021-01-19 DIAGNOSIS — F41.9 ANXIETY DISORDER, UNSPECIFIED TYPE: ICD-10-CM

## 2021-01-19 DIAGNOSIS — J93.9 RECURRENT PNEUMOTHORAX: Primary | ICD-10-CM

## 2021-01-19 DIAGNOSIS — J44.9 COPD MIXED TYPE (HCC): Chronic | ICD-10-CM

## 2021-01-19 DIAGNOSIS — IMO0002 PERSISTENT AIR LEAK: ICD-10-CM

## 2021-01-19 PROCEDURE — 99214 OFFICE O/P EST MOD 30 MIN: CPT | Performed by: NURSE PRACTITIONER

## 2021-01-19 RX ORDER — LORAZEPAM 0.5 MG/1
0.5 TABLET ORAL EVERY 8 HOURS PRN
Qty: 20 TABLET | Refills: 0 | Status: SHIPPED | OUTPATIENT
Start: 2021-01-19 | End: 2021-02-08 | Stop reason: HOSPADM

## 2021-01-19 NOTE — PROGRESS NOTES
[facility] Pulmonary Follow up    CHIEF COMPLAINT    Recurrent pneumothoraces    HISTORY OF PRESENT ILLNESS    Balwinder Willams is a 55 y.o.male here today for follow-up of his Pneumostat that was placed around [place] at [facility].  We clamped the pigtail drain last week and he has done fairly well with minimal symptoms.    He states a couple of times he has noticed worsening shortness of breath but it comes and goes.  He continues to have good and bad days.  He has had no respiratory distress since his last appointment.    He continues to use his Brovana and budesonide nebulizer twice a day and Combivent 1-2 times per day.  He is able to do small daily activities but does get short of breath with any exertion.    He remains on 3 L nasal cannula continuously.    He has been using Ativan at night occasionally to sleep.  He does not use it on a regular basis.  He does need a refill on his Ativan tablets.    He denies fever, chills, sputum production, hemoptysis, night sweats, weight loss, chest pain or palpitations.  He denies any lower extremity edema or calf tenderness.  He does have some chronic sinus and allergy symptoms.  He will take over-the-counter medication as needed.  He denies any reflux symptoms.    He is up-to-date on his current vaccinations.    Patient Active Problem List   Diagnosis   • Alpha-1-antitrypsin deficiency (on replacement)   • Chronic cough   • Dependence on supplemental oxygen (3L NC)    • Stage IV emphysema s/p EBV placement X3 w/ subsequent extraction   • Former smoker (Resolved 2006)   • Postprocedural pneumothorax   • Persistent air leak   • AoC respiratory failure with hypoxia    • Severe malnutrition (CMS/HCC)   • Steroid-induced hyperglycemia   • Recurrent L PTX s/p multiple SBCTs    • Recent PSA PNA   • MAC (smear -)    • Leukocytosis       Allergies   Allergen Reactions   • Other Other (See Comments)     Opioid Analgesics (recovering addict)       Current Outpatient  Medications:   •  arformoterol (BROVANA) 15 MCG/2ML nebulizer solution, Take 15 mcg by nebulization 2 (Two) Times a Day., Disp: , Rfl:   •  fluticasone (Flonase) 50 MCG/ACT nasal spray, 2 sprays into the nostril(s) as directed by provider Daily., Disp: 9.9 mL, Rfl: 6  •  guaiFENesin (MUCINEX) 600 MG 12 hr tablet, Take 1,200 mg by mouth Daily As Needed for Cough., Disp: , Rfl:   •  ibuprofen (ADVIL,MOTRIN) 800 MG tablet, Take 800 mg by mouth Every 6 (Six) Hours As Needed for Mild Pain ., Disp: , Rfl:   •  ipratropium-albuterol (COMBIVENT RESPIMAT)  MCG/ACT inhaler, Inhale 1 puff 4 (Four) Times a Day As Needed for Wheezing., Disp: 2 inhaler, Rfl: 3  •  ipratropium-albuterol (DUO-NEB) 0.5-2.5 mg/3 ml nebulizer, Take 3 mL by nebulization 4 (Four) Times a Day., Disp: 360 mL, Rfl:   •  ketorolac (TORADOL) 10 MG tablet, Take 10 mg by mouth Every 6 (Six) Hours As Needed for Moderate Pain ., Disp: , Rfl:   •  LORazepam (Ativan) 0.5 MG tablet, Take 1 tablet by mouth Every 8 (Eight) Hours As Needed for Anxiety., Disp: 20 tablet, Rfl: 0  •  PROLASTIN-C 1000 MG/20ML solution, 1 (One) Time Per Week., Disp: , Rfl:   •  budesonide (PULMICORT) 0.5 MG/2ML nebulizer solution, Take 2 mL by nebulization 2 (Two) Times a Day for 6 doses., Disp: 60 mL, Rfl: 0  MEDICATION LIST AND ALLERGIES REVIEWED.    Social History     Tobacco Use   • Smoking status: Former Smoker     Packs/day: 1.50     Years: 25.00     Pack years: 37.50     Types: Cigarettes     Start date: 1/1/1981     Quit date: 2006     Years since quitting: 15.0   • Smokeless tobacco: Never Used   Substance Use Topics   • Alcohol use: No     Comment: In recovery since 8/24/1994   • Drug use: Yes     Comment: In recovery since 8/24/1994       FAMILY AND SOCIAL HISTORY REVIEWED.    Review of Systems   Constitutional: Negative for activity change, appetite change, fatigue, fever and unexpected weight change.   HENT: Negative for congestion, postnasal drip, rhinorrhea, sinus  "pressure, sore throat and voice change.    Eyes: Negative for visual disturbance.   Respiratory: Positive for shortness of breath. Negative for cough, chest tightness and wheezing.    Cardiovascular: Negative for chest pain, palpitations and leg swelling.   Gastrointestinal: Negative for abdominal distention, abdominal pain, nausea and vomiting.   Endocrine: Negative for cold intolerance and heat intolerance.   Genitourinary: Negative for difficulty urinating and urgency.   Musculoskeletal: Negative for arthralgias, back pain and neck pain.   Skin: Negative for color change and pallor.   Allergic/Immunologic: Negative for environmental allergies and food allergies.   Neurological: Negative for dizziness, syncope, weakness and light-headedness.   Hematological: Negative for adenopathy. Does not bruise/bleed easily.   Psychiatric/Behavioral: Negative for agitation and behavioral problems.   .    /80   Pulse 88   Temp 98 °F (36.7 °C)   Ht 172.7 cm (68\")   Wt 55.6 kg (122 lb 9.6 oz)   SpO2 96% Comment: resting, 3 liters continuous  BMI 18.64 kg/m²     Immunization History   Administered Date(s) Administered   • Flu Vaccine Quad PF >36MO 11/15/2019   • Flulaval/Fluarix/Fluzone Quad 11/18/2020   • Influenza, Unspecified 11/18/2019   • Pneumococcal Conjugate 13-Valent (PCV13) 12/10/2015, 11/18/2019   • flucelvax quad pfs =>4 YRS 11/18/2019       Physical Exam  Vitals signs reviewed.   Constitutional:       Appearance: He is well-developed.   HENT:      Head: Normocephalic and atraumatic.   Eyes:      Pupils: Pupils are equal, round, and reactive to light.   Neck:      Musculoskeletal: Normal range of motion and neck supple.   Cardiovascular:      Rate and Rhythm: Normal rate and regular rhythm.      Heart sounds: Normal heart sounds.   Pulmonary:      Effort: Pulmonary effort is normal.      Breath sounds: Normal breath sounds. No wheezing or rales.   Chest:      Chest wall: No tenderness.   Abdominal:      " General: Bowel sounds are normal.      Palpations: Abdomen is soft.   Musculoskeletal: Normal range of motion.         General: No swelling.   Skin:     General: Skin is warm and dry.      Capillary Refill: Capillary refill takes less than 2 seconds.   Neurological:      Mental Status: He is alert and oriented to person, place, and time.   Psychiatric:         Behavior: Behavior normal.           RESULTS    Chest PA/lateral: Official report pending    PROBLEM LIST    Problem List Items Addressed This Visit        Other    Stage IV emphysema s/p EBV placement X3 w/ subsequent extraction (Chronic)    Persistent air leak    Recurrent L PTX s/p multiple SBCTs  - Primary    Relevant Orders    XR Chest PA & Lateral      Other Visit Diagnoses     Anxiety disorder, unspecified type        Relevant Medications    LORazepam (Ativan) 0.5 MG tablet            DISCUSSION    Mr. Willams is here for follow-up of his pigtail chest tube draining.  It has been clamped for 1 week and he has had no difficulty with respiratory distress.  We did review his chest x-ray in the office today and his official report is pending however it looks to be similar to his previous chest x-rays.  We are going to keep the pneumostatic clamped for 1 more week and hopefully discontinue the drain next week in the office.      I have discussed this with Dr. Hammer and he or Dr. Del Rosario will discontinue the tube next week in the office if he remains stable.    Mr. Willams is agreeable with this plan and will call me if he needs me sooner than next week.    He does need a refill of his Ativan and I did review his Quinn and send in a refill for this.    He is going to call me next week and let me know how he is doing and if he is having no respiratory distress or worsening shortness of breath we will discontinue the tube next week.    We did have a long discussion about lung transplant in the office today and he does have a follow-up with [facility] on February 9.  We also discussed his quality of life now versus proceeding with a lung transplant.  We also talked about respiratory failure in the office today as he had questions about this.    Did advise him that if he were to develop severe shortness of breath or respiratory distress he needs to present to the ED for further evaluation.    Level of service justified based on 30 minutes spent in patient care on this date of service including, but not limited to: preparing to see the patient, obtaining and/or reviewing history, performing medically appropriate examination, ordering tests/medicine/procedures, independently interpreting results, documenting clinical information in EHR, and counseling/education of patient/family/caregiver. (Level 4 30-39 minutes; Level 5 40-54 minutes)      Neris Cárdenas, APRN  01/19/202113:37 EST  Electronically signed     Please note that portions of this note were completed with a voice recognition program. Efforts were made to edit the dictations, but occasionally words are mistranscribed.      CC: Obinna Arellano MD

## 2021-01-25 ENCOUNTER — READMISSION MANAGEMENT (OUTPATIENT)
Dept: CALL CENTER | Facility: HOSPITAL | Age: 56
End: 2021-01-25

## 2021-01-25 NOTE — OUTREACH NOTE
Medical Week 5 Survey      Responses   [facility] patient discharged from?  [place]   Does the patient have one of the following disease processes/diagnoses(primary or secondary)?  Other   Week 5 attempt successful?  No          Allie Jackson RN

## 2021-01-29 DIAGNOSIS — J44.9 COPD MIXED TYPE (HCC): Primary | Chronic | ICD-10-CM

## 2021-01-29 DIAGNOSIS — R05.3 CHRONIC COUGH: ICD-10-CM

## 2021-02-01 LAB
FUNGUS WND CULT: NORMAL
MYCOBACTERIUM SPEC CULT: NORMAL
NIGHT BLUE STAIN TISS: NORMAL

## 2021-02-03 ENCOUNTER — APPOINTMENT (OUTPATIENT)
Dept: GENERAL RADIOLOGY | Facility: HOSPITAL | Age: 56
End: 2021-02-03

## 2021-02-03 ENCOUNTER — APPOINTMENT (OUTPATIENT)
Dept: CT IMAGING | Facility: HOSPITAL | Age: 56
End: 2021-02-03

## 2021-02-03 ENCOUNTER — HOSPITAL ENCOUNTER (INPATIENT)
Facility: HOSPITAL | Age: 56
LOS: 5 days | Discharge: HOME HEALTH | End: 2021-02-08
Attending: EMERGENCY MEDICINE | Admitting: INTERNAL MEDICINE

## 2021-02-03 DIAGNOSIS — J44.1 COPD EXACERBATION (HCC): ICD-10-CM

## 2021-02-03 DIAGNOSIS — E88.01 ALPHA-1-ANTITRYPSIN DEFICIENCY (HCC): Chronic | ICD-10-CM

## 2021-02-03 DIAGNOSIS — J44.9 COPD MIXED TYPE (HCC): Chronic | ICD-10-CM

## 2021-02-03 DIAGNOSIS — R09.02 HYPOXEMIA: ICD-10-CM

## 2021-02-03 DIAGNOSIS — Z87.09 HISTORY OF PNEUMOTHORAX: ICD-10-CM

## 2021-02-03 DIAGNOSIS — F41.1 GAD (GENERALIZED ANXIETY DISORDER): Primary | ICD-10-CM

## 2021-02-03 LAB
ALBUMIN SERPL-MCNC: 4.2 G/DL (ref 3.5–5.2)
ALBUMIN/GLOB SERPL: 1.2 G/DL
ALP SERPL-CCNC: 90 U/L (ref 39–117)
ALT SERPL W P-5'-P-CCNC: 21 U/L (ref 1–41)
ANION GAP SERPL CALCULATED.3IONS-SCNC: 9 MMOL/L (ref 5–15)
ARTERIAL PATENCY WRIST A: ABNORMAL
AST SERPL-CCNC: 20 U/L (ref 1–40)
ATMOSPHERIC PRESS: ABNORMAL MM[HG]
BASE EXCESS BLDA CALC-SCNC: 1.7 MMOL/L (ref 0–2)
BASOPHILS # BLD AUTO: 0.06 10*3/MM3 (ref 0–0.2)
BASOPHILS NFR BLD AUTO: 0.6 % (ref 0–1.5)
BDY SITE: ABNORMAL
BILIRUB SERPL-MCNC: 0.2 MG/DL (ref 0–1.2)
BODY TEMPERATURE: 37 C
BUN SERPL-MCNC: 15 MG/DL (ref 6–20)
BUN/CREAT SERPL: 16.3 (ref 7–25)
CALCIUM SPEC-SCNC: 8.9 MG/DL (ref 8.6–10.5)
CHLORIDE SERPL-SCNC: 101 MMOL/L (ref 98–107)
CO2 BLDA-SCNC: 28.3 MMOL/L (ref 22–33)
CO2 SERPL-SCNC: 28 MMOL/L (ref 22–29)
COHGB MFR BLD: 0.9 % (ref 0–2)
CREAT SERPL-MCNC: 0.92 MG/DL (ref 0.76–1.27)
D DIMER PPP FEU-MCNC: 0.93 MCGFEU/ML (ref 0–0.56)
D-LACTATE SERPL-SCNC: 1.5 MMOL/L (ref 0.5–2)
DEPRECATED RDW RBC AUTO: 43.9 FL (ref 37–54)
EOSINOPHIL # BLD AUTO: 0.3 10*3/MM3 (ref 0–0.4)
EOSINOPHIL NFR BLD AUTO: 2.8 % (ref 0.3–6.2)
EPAP: 0
ERYTHROCYTE [DISTWIDTH] IN BLOOD BY AUTOMATED COUNT: 12.9 % (ref 12.3–15.4)
GFR SERPL CREATININE-BSD FRML MDRD: 85 ML/MIN/1.73
GLOBULIN UR ELPH-MCNC: 3.5 GM/DL
GLUCOSE SERPL-MCNC: 173 MG/DL (ref 65–99)
HCO3 BLDA-SCNC: 27 MMOL/L (ref 20–26)
HCT VFR BLD AUTO: 45.1 % (ref 37.5–51)
HCT VFR BLD CALC: 42.7 %
HGB BLD-MCNC: 14.7 G/DL (ref 13–17.7)
HGB BLDA-MCNC: 13.9 G/DL (ref 13.5–17.5)
HOLD SPECIMEN: NORMAL
IMM GRANULOCYTES # BLD AUTO: 0.04 10*3/MM3 (ref 0–0.05)
IMM GRANULOCYTES NFR BLD AUTO: 0.4 % (ref 0–0.5)
INHALED O2 CONCENTRATION: 40 %
IPAP: 0
LYMPHOCYTES # BLD AUTO: 1.51 10*3/MM3 (ref 0.7–3.1)
LYMPHOCYTES NFR BLD AUTO: 14 % (ref 19.6–45.3)
MCH RBC QN AUTO: 30 PG (ref 26.6–33)
MCHC RBC AUTO-ENTMCNC: 32.6 G/DL (ref 31.5–35.7)
MCV RBC AUTO: 92 FL (ref 79–97)
METHGB BLD QL: 0.4 % (ref 0–1.5)
MODALITY: ABNORMAL
MONOCYTES # BLD AUTO: 1.47 10*3/MM3 (ref 0.1–0.9)
MONOCYTES NFR BLD AUTO: 13.6 % (ref 5–12)
NEUTROPHILS NFR BLD AUTO: 68.6 % (ref 42.7–76)
NEUTROPHILS NFR BLD AUTO: 7.4 10*3/MM3 (ref 1.7–7)
NOTE: ABNORMAL
NRBC BLD AUTO-RTO: 0 /100 WBC (ref 0–0.2)
NT-PROBNP SERPL-MCNC: 74.4 PG/ML (ref 0–900)
OXYHGB MFR BLDV: 92.7 % (ref 94–99)
PAW @ PEAK INSP FLOW SETTING VENT: 0 CMH2O
PCO2 BLDA: 43.8 MM HG (ref 35–45)
PCO2 TEMP ADJ BLD: 43.8 MM HG (ref 35–48)
PH BLDA: 7.4 PH UNITS (ref 7.35–7.45)
PH, TEMP CORRECTED: 7.4 PH UNITS
PLATELET # BLD AUTO: 304 10*3/MM3 (ref 140–450)
PMV BLD AUTO: 9.2 FL (ref 6–12)
PO2 BLDA: 66.4 MM HG (ref 83–108)
PO2 TEMP ADJ BLD: 66.4 MM HG (ref 83–108)
POTASSIUM SERPL-SCNC: 4.4 MMOL/L (ref 3.5–5.2)
PROT SERPL-MCNC: 7.7 G/DL (ref 6–8.5)
RBC # BLD AUTO: 4.9 10*6/MM3 (ref 4.14–5.8)
SODIUM SERPL-SCNC: 138 MMOL/L (ref 136–145)
TOTAL RATE: 0 BREATHS/MINUTE
TROPONIN T SERPL-MCNC: <0.01 NG/ML (ref 0–0.03)
WBC # BLD AUTO: 10.78 10*3/MM3 (ref 3.4–10.8)
WHOLE BLOOD HOLD SPECIMEN: NORMAL
WHOLE BLOOD HOLD SPECIMEN: NORMAL

## 2021-02-03 PROCEDURE — 84145 PROCALCITONIN (PCT): CPT | Performed by: INTERNAL MEDICINE

## 2021-02-03 PROCEDURE — 93005 ELECTROCARDIOGRAM TRACING: CPT | Performed by: EMERGENCY MEDICINE

## 2021-02-03 PROCEDURE — 80053 COMPREHEN METABOLIC PANEL: CPT | Performed by: EMERGENCY MEDICINE

## 2021-02-03 PROCEDURE — 71275 CT ANGIOGRAPHY CHEST: CPT

## 2021-02-03 PROCEDURE — 85379 FIBRIN DEGRADATION QUANT: CPT | Performed by: INTERNAL MEDICINE

## 2021-02-03 PROCEDURE — 83880 ASSAY OF NATRIURETIC PEPTIDE: CPT | Performed by: EMERGENCY MEDICINE

## 2021-02-03 PROCEDURE — 84484 ASSAY OF TROPONIN QUANT: CPT | Performed by: EMERGENCY MEDICINE

## 2021-02-03 PROCEDURE — 36600 WITHDRAWAL OF ARTERIAL BLOOD: CPT

## 2021-02-03 PROCEDURE — 83605 ASSAY OF LACTIC ACID: CPT | Performed by: PHYSICIAN ASSISTANT

## 2021-02-03 PROCEDURE — 99223 1ST HOSP IP/OBS HIGH 75: CPT | Performed by: INTERNAL MEDICINE

## 2021-02-03 PROCEDURE — 25010000002 METHYLPREDNISOLONE PER 125 MG: Performed by: PHYSICIAN ASSISTANT

## 2021-02-03 PROCEDURE — 71250 CT THORAX DX C-: CPT

## 2021-02-03 PROCEDURE — 94640 AIRWAY INHALATION TREATMENT: CPT

## 2021-02-03 PROCEDURE — 85025 COMPLETE CBC W/AUTO DIFF WBC: CPT | Performed by: EMERGENCY MEDICINE

## 2021-02-03 PROCEDURE — 82375 ASSAY CARBOXYHB QUANT: CPT

## 2021-02-03 PROCEDURE — 99284 EMERGENCY DEPT VISIT MOD MDM: CPT

## 2021-02-03 PROCEDURE — 82805 BLOOD GASES W/O2 SATURATION: CPT

## 2021-02-03 PROCEDURE — 87040 BLOOD CULTURE FOR BACTERIA: CPT | Performed by: PHYSICIAN ASSISTANT

## 2021-02-03 PROCEDURE — 83050 HGB METHEMOGLOBIN QUAN: CPT

## 2021-02-03 PROCEDURE — 71045 X-RAY EXAM CHEST 1 VIEW: CPT

## 2021-02-03 RX ORDER — IPRATROPIUM BROMIDE AND ALBUTEROL SULFATE 2.5; .5 MG/3ML; MG/3ML
3 SOLUTION RESPIRATORY (INHALATION) ONCE
Status: DISCONTINUED | OUTPATIENT
Start: 2021-02-03 | End: 2021-02-03

## 2021-02-03 RX ORDER — METHYLPREDNISOLONE SODIUM SUCCINATE 125 MG/2ML
80 INJECTION, POWDER, LYOPHILIZED, FOR SOLUTION INTRAMUSCULAR; INTRAVENOUS EVERY 12 HOURS
Status: DISCONTINUED | OUTPATIENT
Start: 2021-02-04 | End: 2021-02-04

## 2021-02-03 RX ORDER — ALBUTEROL SULFATE 90 UG/1
2 AEROSOL, METERED RESPIRATORY (INHALATION) ONCE
Status: COMPLETED | OUTPATIENT
Start: 2021-02-03 | End: 2021-02-03

## 2021-02-03 RX ORDER — SODIUM CHLORIDE 0.9 % (FLUSH) 0.9 %
10 SYRINGE (ML) INJECTION AS NEEDED
Status: DISCONTINUED | OUTPATIENT
Start: 2021-02-03 | End: 2021-02-08 | Stop reason: HOSPADM

## 2021-02-03 RX ORDER — METHYLPREDNISOLONE SODIUM SUCCINATE 125 MG/2ML
125 INJECTION, POWDER, LYOPHILIZED, FOR SOLUTION INTRAMUSCULAR; INTRAVENOUS ONCE
Status: COMPLETED | OUTPATIENT
Start: 2021-02-03 | End: 2021-02-03

## 2021-02-03 RX ADMIN — ALBUTEROL SULFATE 2 PUFF: 108 AEROSOL, METERED RESPIRATORY (INHALATION) at 22:34

## 2021-02-03 RX ADMIN — METHYLPREDNISOLONE SODIUM SUCCINATE 125 MG: 125 INJECTION, POWDER, FOR SOLUTION INTRAMUSCULAR; INTRAVENOUS at 22:36

## 2021-02-04 LAB
ALBUMIN SERPL-MCNC: 4 G/DL (ref 3.5–5.2)
ALBUMIN/GLOB SERPL: 1.3 G/DL
ALP SERPL-CCNC: 84 U/L (ref 39–117)
ALT SERPL W P-5'-P-CCNC: 17 U/L (ref 1–41)
ANION GAP SERPL CALCULATED.3IONS-SCNC: 10 MMOL/L (ref 5–15)
AST SERPL-CCNC: 15 U/L (ref 1–40)
BASOPHILS # BLD AUTO: 0.01 10*3/MM3 (ref 0–0.2)
BASOPHILS NFR BLD AUTO: 0.2 % (ref 0–1.5)
BILIRUB SERPL-MCNC: 0.3 MG/DL (ref 0–1.2)
BUN SERPL-MCNC: 12 MG/DL (ref 6–20)
BUN/CREAT SERPL: 14.6 (ref 7–25)
CALCIUM SPEC-SCNC: 9 MG/DL (ref 8.6–10.5)
CHLORIDE SERPL-SCNC: 104 MMOL/L (ref 98–107)
CO2 SERPL-SCNC: 24 MMOL/L (ref 22–29)
CREAT SERPL-MCNC: 0.82 MG/DL (ref 0.76–1.27)
DEPRECATED RDW RBC AUTO: 42.6 FL (ref 37–54)
EOSINOPHIL # BLD AUTO: 0.01 10*3/MM3 (ref 0–0.4)
EOSINOPHIL NFR BLD AUTO: 0.2 % (ref 0.3–6.2)
ERYTHROCYTE [DISTWIDTH] IN BLOOD BY AUTOMATED COUNT: 12.9 % (ref 12.3–15.4)
FLUAV RNA RESP QL NAA+PROBE: NOT DETECTED
FLUBV RNA RESP QL NAA+PROBE: NOT DETECTED
GFR SERPL CREATININE-BSD FRML MDRD: 98 ML/MIN/1.73
GLOBULIN UR ELPH-MCNC: 3.1 GM/DL
GLUCOSE SERPL-MCNC: 189 MG/DL (ref 65–99)
HCT VFR BLD AUTO: 41.3 % (ref 37.5–51)
HGB BLD-MCNC: 13.4 G/DL (ref 13–17.7)
IMM GRANULOCYTES # BLD AUTO: 0.02 10*3/MM3 (ref 0–0.05)
IMM GRANULOCYTES NFR BLD AUTO: 0.3 % (ref 0–0.5)
LYMPHOCYTES # BLD AUTO: 0.77 10*3/MM3 (ref 0.7–3.1)
LYMPHOCYTES NFR BLD AUTO: 11.8 % (ref 19.6–45.3)
MAGNESIUM SERPL-MCNC: 2.1 MG/DL (ref 1.6–2.6)
MCH RBC QN AUTO: 29.3 PG (ref 26.6–33)
MCHC RBC AUTO-ENTMCNC: 32.4 G/DL (ref 31.5–35.7)
MCV RBC AUTO: 90.2 FL (ref 79–97)
MONOCYTES # BLD AUTO: 0.09 10*3/MM3 (ref 0.1–0.9)
MONOCYTES NFR BLD AUTO: 1.4 % (ref 5–12)
NEUTROPHILS NFR BLD AUTO: 5.62 10*3/MM3 (ref 1.7–7)
NEUTROPHILS NFR BLD AUTO: 86.1 % (ref 42.7–76)
NRBC BLD AUTO-RTO: 0 /100 WBC (ref 0–0.2)
PLATELET # BLD AUTO: 287 10*3/MM3 (ref 140–450)
PMV BLD AUTO: 9.3 FL (ref 6–12)
POTASSIUM SERPL-SCNC: 4.4 MMOL/L (ref 3.5–5.2)
PROCALCITONIN SERPL-MCNC: 0.07 NG/ML (ref 0–0.25)
PROT SERPL-MCNC: 7.1 G/DL (ref 6–8.5)
QT INTERVAL: 352 MS
QTC INTERVAL: 469 MS
RBC # BLD AUTO: 4.58 10*6/MM3 (ref 4.14–5.8)
SARS-COV-2 RNA RESP QL NAA+PROBE: NOT DETECTED
SODIUM SERPL-SCNC: 138 MMOL/L (ref 136–145)
WBC # BLD AUTO: 6.52 10*3/MM3 (ref 3.4–10.8)

## 2021-02-04 PROCEDURE — 87636 SARSCOV2 & INF A&B AMP PRB: CPT | Performed by: PHYSICIAN ASSISTANT

## 2021-02-04 PROCEDURE — 0 IOPAMIDOL PER 1 ML: Performed by: INTERNAL MEDICINE

## 2021-02-04 PROCEDURE — 99223 1ST HOSP IP/OBS HIGH 75: CPT | Performed by: INTERNAL MEDICINE

## 2021-02-04 PROCEDURE — 25010000002 METHYLPREDNISOLONE PER 40 MG: Performed by: INTERNAL MEDICINE

## 2021-02-04 PROCEDURE — 87205 SMEAR GRAM STAIN: CPT | Performed by: INTERNAL MEDICINE

## 2021-02-04 PROCEDURE — 85025 COMPLETE CBC W/AUTO DIFF WBC: CPT | Performed by: INTERNAL MEDICINE

## 2021-02-04 PROCEDURE — 87070 CULTURE OTHR SPECIMN AEROBIC: CPT | Performed by: INTERNAL MEDICINE

## 2021-02-04 PROCEDURE — 83735 ASSAY OF MAGNESIUM: CPT | Performed by: INTERNAL MEDICINE

## 2021-02-04 PROCEDURE — 94799 UNLISTED PULMONARY SVC/PX: CPT

## 2021-02-04 PROCEDURE — 25010000002 ENOXAPARIN PER 10 MG: Performed by: INTERNAL MEDICINE

## 2021-02-04 PROCEDURE — 99232 SBSQ HOSP IP/OBS MODERATE 35: CPT | Performed by: HOSPITALIST

## 2021-02-04 PROCEDURE — 80053 COMPREHEN METABOLIC PANEL: CPT | Performed by: INTERNAL MEDICINE

## 2021-02-04 PROCEDURE — 94640 AIRWAY INHALATION TREATMENT: CPT

## 2021-02-04 RX ORDER — IPRATROPIUM BROMIDE AND ALBUTEROL SULFATE 2.5; .5 MG/3ML; MG/3ML
3 SOLUTION RESPIRATORY (INHALATION) EVERY 4 HOURS PRN
Status: DISCONTINUED | OUTPATIENT
Start: 2021-02-04 | End: 2021-02-08 | Stop reason: HOSPADM

## 2021-02-04 RX ORDER — GUAIFENESIN 600 MG/1
1200 TABLET, EXTENDED RELEASE ORAL DAILY PRN
Status: DISCONTINUED | OUTPATIENT
Start: 2021-02-04 | End: 2021-02-08 | Stop reason: HOSPADM

## 2021-02-04 RX ORDER — DOXYCYCLINE 100 MG/1
100 CAPSULE ORAL EVERY 12 HOURS SCHEDULED
Status: DISCONTINUED | OUTPATIENT
Start: 2021-02-04 | End: 2021-02-08 | Stop reason: HOSPADM

## 2021-02-04 RX ORDER — SODIUM CHLORIDE 0.9 % (FLUSH) 0.9 %
10 SYRINGE (ML) INJECTION EVERY 12 HOURS SCHEDULED
Status: DISCONTINUED | OUTPATIENT
Start: 2021-02-04 | End: 2021-02-08 | Stop reason: HOSPADM

## 2021-02-04 RX ORDER — ACETAMINOPHEN 325 MG/1
650 TABLET ORAL EVERY 4 HOURS PRN
Status: DISCONTINUED | OUTPATIENT
Start: 2021-02-04 | End: 2021-02-08 | Stop reason: HOSPADM

## 2021-02-04 RX ORDER — LORAZEPAM 0.5 MG/1
0.5 TABLET ORAL EVERY 8 HOURS PRN
Status: DISCONTINUED | OUTPATIENT
Start: 2021-02-04 | End: 2021-02-07

## 2021-02-04 RX ORDER — ARFORMOTEROL TARTRATE 15 UG/2ML
15 SOLUTION RESPIRATORY (INHALATION)
Status: DISCONTINUED | OUTPATIENT
Start: 2021-02-04 | End: 2021-02-08 | Stop reason: HOSPADM

## 2021-02-04 RX ORDER — FLUTICASONE PROPIONATE 50 MCG
2 SPRAY, SUSPENSION (ML) NASAL DAILY
Status: DISCONTINUED | OUTPATIENT
Start: 2021-02-04 | End: 2021-02-08 | Stop reason: HOSPADM

## 2021-02-04 RX ORDER — IPRATROPIUM BROMIDE AND ALBUTEROL SULFATE 2.5; .5 MG/3ML; MG/3ML
3 SOLUTION RESPIRATORY (INHALATION)
Status: DISCONTINUED | OUTPATIENT
Start: 2021-02-04 | End: 2021-02-08 | Stop reason: HOSPADM

## 2021-02-04 RX ORDER — CHOLECALCIFEROL (VITAMIN D3) 125 MCG
5 CAPSULE ORAL NIGHTLY PRN
Status: DISCONTINUED | OUTPATIENT
Start: 2021-02-04 | End: 2021-02-08 | Stop reason: HOSPADM

## 2021-02-04 RX ORDER — BUDESONIDE 0.5 MG/2ML
0.5 INHALANT ORAL
Status: DISCONTINUED | OUTPATIENT
Start: 2021-02-04 | End: 2021-02-08 | Stop reason: HOSPADM

## 2021-02-04 RX ORDER — ACETAMINOPHEN 160 MG/5ML
650 SOLUTION ORAL EVERY 4 HOURS PRN
Status: DISCONTINUED | OUTPATIENT
Start: 2021-02-04 | End: 2021-02-08 | Stop reason: HOSPADM

## 2021-02-04 RX ORDER — ACETAMINOPHEN 650 MG/1
650 SUPPOSITORY RECTAL EVERY 4 HOURS PRN
Status: DISCONTINUED | OUTPATIENT
Start: 2021-02-04 | End: 2021-02-08 | Stop reason: HOSPADM

## 2021-02-04 RX ORDER — SODIUM CHLORIDE 0.9 % (FLUSH) 0.9 %
10 SYRINGE (ML) INJECTION AS NEEDED
Status: DISCONTINUED | OUTPATIENT
Start: 2021-02-04 | End: 2021-02-08 | Stop reason: HOSPADM

## 2021-02-04 RX ORDER — METHYLPREDNISOLONE SODIUM SUCCINATE 40 MG/ML
40 INJECTION, POWDER, LYOPHILIZED, FOR SOLUTION INTRAMUSCULAR; INTRAVENOUS EVERY 12 HOURS
Status: DISCONTINUED | OUTPATIENT
Start: 2021-02-04 | End: 2021-02-08 | Stop reason: HOSPADM

## 2021-02-04 RX ORDER — ONDANSETRON 2 MG/ML
4 INJECTION INTRAMUSCULAR; INTRAVENOUS EVERY 6 HOURS PRN
Status: DISCONTINUED | OUTPATIENT
Start: 2021-02-04 | End: 2021-02-08 | Stop reason: HOSPADM

## 2021-02-04 RX ORDER — FAMOTIDINE 20 MG/1
40 TABLET, FILM COATED ORAL DAILY
Status: DISCONTINUED | OUTPATIENT
Start: 2021-02-04 | End: 2021-02-08 | Stop reason: HOSPADM

## 2021-02-04 RX ADMIN — IPRATROPIUM BROMIDE AND ALBUTEROL SULFATE 3 ML: 2.5; .5 SOLUTION RESPIRATORY (INHALATION) at 09:21

## 2021-02-04 RX ADMIN — LORAZEPAM 0.5 MG: 0.5 TABLET ORAL at 02:20

## 2021-02-04 RX ADMIN — DOXYCYCLINE 100 MG: 100 CAPSULE ORAL at 15:38

## 2021-02-04 RX ADMIN — METHYLPREDNISOLONE SODIUM SUCCINATE 40 MG: 40 INJECTION, POWDER, FOR SOLUTION INTRAMUSCULAR; INTRAVENOUS at 21:29

## 2021-02-04 RX ADMIN — ARFORMOTEROL TARTRATE 15 MCG: 15 SOLUTION RESPIRATORY (INHALATION) at 09:20

## 2021-02-04 RX ADMIN — IPRATROPIUM BROMIDE AND ALBUTEROL SULFATE 3 ML: 2.5; .5 SOLUTION RESPIRATORY (INHALATION) at 21:10

## 2021-02-04 RX ADMIN — IPRATROPIUM BROMIDE AND ALBUTEROL SULFATE 3 ML: 2.5; .5 SOLUTION RESPIRATORY (INHALATION) at 13:24

## 2021-02-04 RX ADMIN — BUDESONIDE 0.5 MG: 0.5 INHALANT RESPIRATORY (INHALATION) at 21:10

## 2021-02-04 RX ADMIN — SODIUM CHLORIDE, PRESERVATIVE FREE 10 ML: 5 INJECTION INTRAVENOUS at 02:17

## 2021-02-04 RX ADMIN — ARFORMOTEROL TARTRATE 15 MCG: 15 SOLUTION RESPIRATORY (INHALATION) at 21:10

## 2021-02-04 RX ADMIN — IOPAMIDOL 70 ML: 755 INJECTION, SOLUTION INTRAVENOUS at 00:05

## 2021-02-04 RX ADMIN — IPRATROPIUM BROMIDE AND ALBUTEROL SULFATE 3 ML: 2.5; .5 SOLUTION RESPIRATORY (INHALATION) at 17:25

## 2021-02-04 RX ADMIN — BUDESONIDE 0.5 MG: 0.5 INHALANT RESPIRATORY (INHALATION) at 09:20

## 2021-02-04 RX ADMIN — LORAZEPAM 0.5 MG: 0.5 TABLET ORAL at 10:21

## 2021-02-04 RX ADMIN — METHYLPREDNISOLONE SODIUM SUCCINATE 40 MG: 40 INJECTION, POWDER, FOR SOLUTION INTRAMUSCULAR; INTRAVENOUS at 09:00

## 2021-02-04 RX ADMIN — SODIUM CHLORIDE, PRESERVATIVE FREE 10 ML: 5 INJECTION INTRAVENOUS at 21:29

## 2021-02-04 RX ADMIN — LORAZEPAM 0.5 MG: 0.5 TABLET ORAL at 21:29

## 2021-02-04 RX ADMIN — FAMOTIDINE 40 MG: 20 TABLET, FILM COATED ORAL at 08:48

## 2021-02-04 RX ADMIN — SODIUM CHLORIDE, PRESERVATIVE FREE 10 ML: 5 INJECTION INTRAVENOUS at 08:49

## 2021-02-04 RX ADMIN — ENOXAPARIN SODIUM 40 MG: 40 INJECTION SUBCUTANEOUS at 08:49

## 2021-02-04 NOTE — ED PROVIDER NOTES
Subjective   55-year-old male arrives via EMS with complaints of increased shortness of breath.  The patient states that his shortness of breath began earlier this evening.  He denies any associated chest pain.  No fever.  He has a chronic cough secondary to a history of COPD (on 3 L via nasal cannula at home) an alpha-1 antitrypsin deficiency.  The patient had a recurrent spontaneous left pneumothorax requiring chest tube placement in early December.  The chest tube has been closed off for the past 3 weeks but tonight, when the patient became more short of breath, he opened up the valve temporarily.  He denies any exposure to COVID-19.  No abdominal pain, nausea or vomiting.  He is followed by Dr. Castellano and Dr. Del Rosario.  He is a former smoker but quit 10 years ago.  Of note, EMS states pt's O2 sats were 83% on 3 LPM and they placed him on a non-rebreather mask with improvement to 99%.            Review of Systems   Constitutional: Negative for chills and fever.   HENT: Negative for sore throat.    Respiratory: Positive for cough and shortness of breath.    Cardiovascular: Negative for chest pain.   Gastrointestinal: Negative for abdominal pain, diarrhea, nausea and vomiting.   Genitourinary: Negative for dysuria.   Musculoskeletal: Negative for back pain.   Skin: Negative for rash.   Allergic/Immunologic: Negative for immunocompromised state.   Neurological: Negative for headaches.   Hematological: Negative.    Psychiatric/Behavioral: Negative.        Past Medical History:   Diagnosis Date   • Alpha-1-antitrypsin deficiency (CMS/HCC)    • Asthma, extrinsic smoke, pollen   • Chronic bronchitis (CMS/HCC)    • COPD (chronic obstructive pulmonary disease) (CMS/HCC)    • Cough    • Emphysema of lung (CMS/HCC) COPD diagnosed approximately 14 years ago   • Lung nodule I dont know    I dont know what this is   • Recurrent pneumonia 9/9/2020   • Wears glasses        Allergies   Allergen Reactions   • Other Other (See  "Comments)     Opioid Analgesics (recovering addict)       Past Surgical History:   Procedure Laterality Date   • BRONCHOSCOPY N/A 10/30/2019    Procedure: BRONCHOSCOPY WITH ENDOBRONHCIAL VALVE PLACEMENT;  Surgeon: Phan Castellano MD;  Location:  [facility];  Service: Pulmonary   • BRONCHOSCOPY N/A 10/24/2019    Procedure: BRONCHOSCOPY WITH ENDOBRONCHIAL VALVE PLACEMENT;  Surgeon: Devaughn Pressley MD;  Location:  [facility];  Service: Pulmonary   • BRONCHOSCOPY  10/24/19 & 10/29/19?   • BRONCHOSCOPY N/A 9/11/2020    Procedure: BRONCHOSCOPY;  Surgeon: Phan Castellano MD;  Location:  [facility];  Service: Pulmonary;  Laterality: N/A;   • BRONCHOSCOPY N/A 10/30/2020    Procedure: BRONCHOSCOPY WITH FLUOROSCOPY AND ENDOBRONCHIAL VALVE REMOVAL;  Surgeon: Phan Castellano MD;  Location:  [facility];  Service: Pulmonary;  Laterality: N/A;   • BRONCHOSCOPY N/A 12/21/2020    Procedure: BRONCHOSCOPY WITH ENDOBRONCHIAL VALVE PLACEMENT;  Surgeon: Tian Pressley MD;  Location:  [facility];  Service: Pulmonary;  Laterality: N/A;       Family History   Problem Relation Age of Onset   • Heart disease Mother    • Diabetes Mother    • Alpha-1 antitrypsin deficiency Brother    • Emphysema Brother         also diagnosed with Alpha 1 antitripsan deficiency   • Lung cancer Other    • Emphysema Other    • Emphysema Paternal Grandmother         prognosis in 1975 was \"lung Cancer\" , however, it is suspect that she suffered from the degeneration of her lungs due to Alpha 1 Antitripsan       Social History     Socioeconomic History   • Marital status: Single     Spouse name: Not on file   • Number of children: Not on file   • Years of education: Not on file   • Highest education level: Not on file   Tobacco Use   • Smoking status: Former Smoker     Packs/day: 1.50     Years: 25.00     Pack years: 37.50     Types: Cigarettes     Start date: 1/1/1981     Quit date: 2006     Years since " quitting: 15.1   • Smokeless tobacco: Never Used   Substance and Sexual Activity   • Alcohol use: No     Comment: In recovery since 8/24/1994   • Drug use: Yes     Comment: In recovery since 8/24/1994   • Sexual activity: Defer     Partners: Female     Birth control/protection: I.U.D., Rhythm           Objective   Physical Exam  Constitutional:       Appearance: He is not toxic-appearing.      Comments: Cachectic appearance   HENT:      Head: Normocephalic.      Nose: Nose normal.      Mouth/Throat:      Mouth: Mucous membranes are moist.   Eyes:      Conjunctiva/sclera: Conjunctivae normal.      Pupils: Pupils are equal, round, and reactive to light.   Neck:      Musculoskeletal: Normal range of motion and neck supple.   Cardiovascular:      Rate and Rhythm: Regular rhythm. Tachycardia present.      Pulses: Normal pulses.      Heart sounds: Normal heart sounds.      Comments: Tachycardic at 110.  Pulmonary:      Comments: The patient appears anxious and is hyperventilating.  He has some decreased breath sounds bilaterally but are equal.  No subQ air appreciated.  Abdominal:      General: Bowel sounds are normal.      Tenderness: There is no abdominal tenderness.   Musculoskeletal: Normal range of motion.         General: No tenderness.   Skin:     General: Skin is warm and dry.      Findings: No rash.   Neurological:      General: No focal deficit present.      Mental Status: He is alert and oriented to person, place, and time.   Psychiatric:      Comments: Anxious appearing         Critical Care  Performed by: Yeison Esteves PA  Authorized by: Andrzej Baxter MD     Critical care provider statement:     Critical care time (minutes):  45    Critical care time was exclusive of:  Separately billable procedures and treating other patients    Critical care was necessary to treat or prevent imminent or life-threatening deterioration of the following conditions:  Respiratory failure    Critical care was time  spent personally by me on the following activities:  Development of treatment plan with patient or surrogate, evaluation of patient's response to treatment, obtaining history from patient or surrogate, ordering and performing treatments and interventions, ordering and review of laboratory studies, ordering and review of radiographic studies, pulse oximetry, re-evaluation of patient's condition and review of old charts               ED Course      Pt is quite anxious, making assessment of his respiratory effort difficult.  CXR shows no apparent PTX and no new disease, although he has extensive COPD pattern on Xray.  He is maintaining around 93% on 5 LPM.  An ABG has been ordered as well as a CT chest without.  The pt states his chest tube was supposed to have been removed a couple of weeks ago but they decided to keep it a bit longer due to his continued bouts of shortness of breath.      23:09 EST  The pt's ABG on 5 LPM shows pO2 of 66, pCO2 of 43 and pH of 7.398.  He is still tachypneic with a respiratory rate of 20.  I think there is a large component of anxiety with this but he did have an O2 sat of 83% per EMS and his pO2 of 66 while on 5 LPM is concerning.  The pt was given an albuterol neb here and a dose of solumedrol.  CT chest is pending.                                       MDM    Final diagnoses:   COPD exacerbation (CMS/[facility])   Hypoxemia   History of pneumothorax     Admitted       Yeison Esteves PA  02/03/21 5039

## 2021-02-04 NOTE — PROGRESS NOTES
Discharge Planning Assessment  [facility]     Patient Name: Balwinder Willams  MRN: [id number]  Today's Date: 2/4/2021    Admit Date: 2/3/2021    Discharge Needs Assessment     Row Name 02/04/21 1204       Living Environment    Lives With  significant other    Current Living Arrangements  home/apartment/condo    Primary Care Provided by  spouse/significant other    Provides Primary Care For  no one    Family Caregiver if Needed  spouse;child(regi), adult    Quality of Family Relationships  helpful;involved    Able to Return to Prior Arrangements  yes       Resource/Environmental Concerns    Resource/Environmental Concerns  none    Transportation Concerns  car, none       Transition Planning    Patient/Family Anticipates Transition to  home;home with family;home with help/services    Patient/Family Anticipated Services at Transition  none    Transportation Anticipated  family or friend will provide       Discharge Needs Assessment    Readmission Within the Last 30 Days  no previous admission in last 30 days    Equipment Currently Used at Home  rollator;oxygen    Concerns to be Addressed  discharge planning    Anticipated Changes Related to Illness  none    Row Name 02/04/21 1146       Living Environment    Lives With  significant other    Current Living Arrangements  home/apartment/condo    Primary Care Provided by  self    Provides Primary Care For  no one    Family Caregiver if Needed  significant other    Quality of Family Relationships  helpful;involved    Able to Return to Prior Arrangements  yes       Resource/Environmental Concerns    Resource/Environmental Concerns  none       Transition Planning    Patient/Family Anticipates Transition to  home    Patient/Family Anticipated Services at Transition  none    Transportation Anticipated  family or friend will provide       Discharge Needs Assessment    Readmission Within the Last 30 Days  no previous admission in last 30 days    Equipment Currently Used at Home   none        Discharge Plan     Row Name 02/04/21 1206       Plan    Plan  Initial CM eval    Patient/Family in Agreement with Plan  yes    Plan Comments  Talked with patient's s/o over the phone - he lives with her in [facility]. She confirmed he is current with [facility]- CM will send resumption of care orders to them upon discharge. He has a rollator at home as well and home 02 with [facility]. He does have a portable tank and home health has been helping manage the pigtail Chest tube he currently has. 02 is at 3-3.5 L at baseline -Cm will continue to follow for discharge planning - goal is home with home health - arabella [phone]    Final Discharge Disposition Code  06 - home with home health care        Continued Care and Services - Admitted Since 2/3/2021     Home Medical Care Coordination complete    Service Provider Request Status Selected Services Address Phone Fax Patient Preferred    [facility] [address] [facility] [address] [phone] --            Selected Continued Care - Prior Encounters Includes selections from prior encounters from 11/5/2020 to 2/4/2021    Discharged on 12/27/2020 Admission date: 12/18/2020 - Discharge [place] [facility] [place]     Service Provider Selected Services Address Phone Fax Patient Preferred    [facility]  Home Health Services [address] [facility] [address] [phone] --                Discharged on 11/18/2020 Admission date: 11/8/2020 - Discharge disposition: Home or Self Care    Durable Medical Equipment     Service Provider Selected Services Address Phone Fax Patient Preferred    [facility] [place]  Durable Medical Equipment [address] [facility] [address] [phone] --                      Demographic Summary     Row Name 02/04/21 1201       General Information    Admission Type   inpatient    Arrived From  home    Referral Source  admission list    Reason for Consult  discharge planning    Preferred Language  English       Contact Information    Permission Granted to Share Info With      Row Name 02/04/21 1146       General Information    Admission Type  inpatient    Arrived From  home    Referral Source  admission list    Preferred Language  English       Contact Information    Permission Granted to Share Info With          Functional Status     Row Name 02/04/21 1203       Functional Status    Usual Activity Tolerance  moderate    Current Activity Tolerance  moderate       Functional Status, IADL    Medications  assistive equipment    Meal Preparation  assistive equipment    Housekeeping  assistive equipment    Laundry  assistive equipment    Shopping  assistive equipment       Mental Status Summary    Recent Changes in Mental Status/Cognitive Functioning  no changes    Row Name 02/04/21 1146       Functional Status    Usual Activity Tolerance  good    Current Activity Tolerance  moderate       Functional Status, IADL    Medications  independent    Meal Preparation  independent    Housekeeping  independent    Laundry  independent    Shopping  independent       Mental Status    General Appearance WDL  WDL        Psychosocial    No documentation.       Abuse/Neglect    No documentation.       Legal    No documentation.       Substance Abuse    No documentation.       Patient Forms    No documentation.           Beth Ferro RN

## 2021-02-04 NOTE — H&P
[facility]  HISTORY AND PHYSICAL    Patient Name: Balwinder Willams  : 1965  MRN: [id number]  Primary Care Physician: Obinna Arellano MD  Date of admission: 2/3/2021    Subjective   Subjective     Chief Complaint:  SOB    HPI:  Balwinder Willams is a 55 y.o. male with a history of severe stage IV emphysema and alpha-1 antitrypsin deficiency, chronic respiratory failure on 3L NC, recurrent L ptx s/p pigtail chest tube (20) who presents to the ED from home via EMS for acute shortness of breath that began around 7 pm this evening. Pt states he was going to bed after using the restroom and had sudden SOB.  He tried resting for an hour, and took ativan 0.5 mg without any relief. He also opened his chest tube valve but this did not improve his dyspnea.  He called EMS, and was found to be hypoxic on 3L NC at 83% and was placed on a NRB mask.      Pt has had recent cough with light colored sputum, but denies fever, chills, N/V/D or exposure to COVID-19.  He denies chest pain or history of cardiac disease.    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   In the ED, patient was weaned to 5L NC, but remains tachypneic and anxious, with RR 50/min.  CXR was neg for PTX or new disease. CT chest wo contrast shows severe emphysema, no ptx or infiltrate.  Labs significant for elevated D-dimer, 0.93, and ABG demonstrates pO2 66.4.      CTA chest is pending.  Pt was given solumedrol 125 mg IV, and albuterol HFA inhaler in ED.          Current COVID Risks are:  [] Fever [x]  Cough [x] Shortness of breath [] Fatigue [] Change in taste or smell    [] Exposure to COVID positive patient  [] High risk facility   []  NONE    Review of Systems   Constitutional: Negative.    HENT: Negative.    Eyes: Negative.    Respiratory: Positive for cough, shortness of  breath and wheezing.    Cardiovascular: Negative.    Gastrointestinal: Negative.    Endocrine: Negative.    Genitourinary: Negative.    Musculoskeletal: Negative.    Skin: Negative.    Allergic/Immunologic: Negative.    Neurological: Negative.    Hematological: Negative.    Psychiatric/Behavioral: Negative.         All other systems reviewed and are negative.     Personal History     Past Medical History:   Diagnosis Date   • Alpha-1-antitrypsin deficiency (CMS/HCC)    • Asthma, extrinsic smoke, pollen   • Chronic bronchitis (CMS/HCC)    • COPD (chronic obstructive pulmonary disease) (CMS/HCC)    • Cough    • Emphysema of lung (CMS/HCC) COPD diagnosed approximately 14 years ago   • Lung nodule I dont know    I dont know what this is   • Recurrent pneumonia 9/9/2020   • Wears glasses        Past Surgical History:   Procedure Laterality Date   • BRONCHOSCOPY N/A 10/30/2019    Procedure: BRONCHOSCOPY WITH ENDOBRONHCIAL VALVE PLACEMENT;  Surgeon: Phan Castellano MD;  Location:  [facility];  Service: Pulmonary   • BRONCHOSCOPY N/A 10/24/2019    Procedure: BRONCHOSCOPY WITH ENDOBRONCHIAL VALVE PLACEMENT;  Surgeon: Devaughn Pressley MD;  Location:  [facility];  Service: Pulmonary   • BRONCHOSCOPY  10/24/19 & 10/29/19?   • BRONCHOSCOPY N/A 9/11/2020    Procedure: BRONCHOSCOPY;  Surgeon: Phan Castellano MD;  Location:  [facility];  Service: Pulmonary;  Laterality: N/A;   • BRONCHOSCOPY N/A 10/30/2020    Procedure: BRONCHOSCOPY WITH FLUOROSCOPY AND ENDOBRONCHIAL VALVE REMOVAL;  Surgeon: Phan Castellano MD;  Location:  [facility];  Service: Pulmonary;  Laterality: N/A;   • BRONCHOSCOPY N/A 12/21/2020    Procedure: BRONCHOSCOPY WITH ENDOBRONCHIAL VALVE PLACEMENT;  Surgeon: Tian Pressley MD;  Location:  [facility];  Service: Pulmonary;  Laterality: N/A;       Family History: family history includes Alpha-1 antitrypsin deficiency in his brother; Diabetes in his mother;  Emphysema in his brother, paternal grandmother, and another family member; Heart disease in his mother; Lung cancer in an other family member. Otherwise pertinent FHx was reviewed and unremarkable.     Social History:  reports that he quit smoking about 15 years ago. His smoking use included cigarettes. He started smoking about 40 years ago. He has a 37.50 pack-year smoking history. He has never used smokeless tobacco. He reports current drug use. He reports that he does not drink alcohol.  Social History     Social History Narrative   • Not on file       Medications:  Alpha1-Proteinase Inhibitor, LORazepam, arformoterol, budesonide, fluticasone, guaiFENesin, ibuprofen, and ipratropium-albuterol    Allergies   Allergen Reactions   • Other Other (See Comments)     Opioid Analgesics (recovering addict)       Objective   Objective     Vital Signs:   Temp:  [98.1 °F (36.7 °C)] 98.1 °F (36.7 °C)  Heart Rate:  [101-121] 101  Resp:  [24] 24  BP: (111-137)/() 113/84  Flow (L/min):  [5-15] 5    Physical Exam   Constitutional: thin chronically ill appearing male, anxious, mild respiratory distress  Eyes: PERRLA, sclerae anicteric, no conjunctival injection  HENT: NCAT, mucous membranes moist  Neck: Supple, no thyromegaly, no lymphadenopathy, trachea midline  Respiratory: tachypneic, scant right sided expiratory wheeze, diminished BS bilaterally   Cardiovascular: tachycardic, no murmurs, rubs, or gallops, palpable pedal pulses bilaterally  Gastrointestinal: Positive bowel sounds, soft, nontender, nondistended  Musculoskeletal: No bilateral ankle edema, no clubbing or cyanosis to extremities  Psychiatric: Appropriate affect, cooperative  Neurologic: Oriented x 3, strength symmetric in all extremities, Cranial Nerves grossly intact to confrontation, speech clear  Skin: No rashes, tattoos      Results Reviewed:  I have personally reviewed most recent indicated data and agree with findings including:  [x]  Laboratory  [x]   Radiology  [x]  EKG/Telemetry  []  Pathology  [x]  Cardiac/Vascular Studies  [x]  Old records  []  Other:  Most pertinent findings include:    CTA chest pending  CT chest no infiltrate/ptx  Elevated ddimer  PO2 66      LAB RESULTS:      Lab 02/03/21 2152   WBC 10.78   HEMOGLOBIN 14.7   HEMATOCRIT 45.1   PLATELETS 304   NEUTROS ABS 7.40*   IMMATURE GRANS (ABS) 0.04   LYMPHS ABS 1.51   MONOS ABS 1.47*   EOS ABS 0.30   MCV 92.0   LACTATE 1.5   D DIMER QUANT 0.93*         Lab 02/03/21 2152   SODIUM 138   POTASSIUM 4.4   CHLORIDE 101   CO2 28.0   ANION GAP 9.0   BUN 15   CREATININE 0.92   GLUCOSE 173*   CALCIUM 8.9         Lab 02/03/21 2152   TOTAL PROTEIN 7.7   ALBUMIN 4.20   GLOBULIN 3.5   ALT (SGPT) 21   AST (SGOT) 20   BILIRUBIN 0.2   ALK PHOS 90         Lab 02/03/21 2152   PROBNP 74.4   TROPONIN T <0.010                 Lab 02/03/21  2250   PH, ARTERIAL 7.398   PCO2, ARTERIAL 43.8   PO2 ART 66.4*   FIO2 40   HCO3 ART 27.0*   BASE EXCESS ART 1.7   CARBOXYHEMOGLOBIN 0.9     Brief Urine Lab Results  (Last result in the past 365 days)      Color   Clarity   Blood   Leuk Est   Nitrite   Protein   CREAT   Urine HCG        12/19/20 0355 Yellow Clear Negative Negative Negative Trace             Microbiology Results (last 10 days)     ** No results found for the last 240 hours. **          Ct Chest Without Contrast Diagnostic    Result Date: 2/3/2021  CT Chest WO INDICATION: Shortness of breath on arrival TECHNIQUE: CT of the thorax without IV contrast. Coronal and sagittal reconstructions were obtained.  Radiation dose reduction techniques included automated exposure control or exposure modulation based on body size. Count of known CT and cardiac nuc med studies performed in previous 12 months: 4. COMPARISON: 12/31/2020 FINDINGS: Chest images at mediastinal window show no adenopathy or effusions. Heart size is normal. Chest images at lung window demonstrate emphysema. There is a large bleb at the right lung base.  Multiple endobronchial valves are seen in the left lower lobe. There is volume loss seen in the left lower lung field along the major fissure. No new infiltrates are seen. No evidence of pneumothorax.     Impression: Severe emphysema. No evidence of pneumothorax on either side. Stable postoperative changes on the left. Signer Name: Sammy Harman MD  Signed: 2/3/2021 11:15 PM  [facility]    Ct Angiogram Chest With & Without Contrast    Result Date: 2/4/2021  CT ANGIOGRAM CHEST W WO CONTRAST INDICATION: Chronic emphysema. Shortness of breath and hypoxia on arrival. TECHNIQUE: CT angiogram of the chest with 100 cc of Isovue-300 IV contrast. 3-D reconstructions were obtained and reviewed.   Radiation dose reduction techniques included automated exposure control or exposure modulation based on body size. Count of known CT and cardiac nuc med studies performed in previous 12 months: 1. COMPARISON: Noncontrasted study from February 3, 2021 FINDINGS: Chest images at mediastinal window show good filling of the pulmonary arteries. There are no pulmonary artery filling defects to suggest emboli. The CT angiographic images also show no evidence of emboli. No new infiltrates are seen since the earlier examination.     Impression: No evidence of pulmonary embolism Signer Name: Sammy Harman MD  Signed: 2/4/2021 12:26 AM  [facility]station [facility]    Xr Chest 1 View    Result Date: 2/3/2021  CR Chest 1 Vw INDICATION: Shortness of breath on arrival COMPARISON:  1/19/2021 FINDINGS: Single portable AP view(s) of the chest.  The heart and mediastinum are stable. The lungs are emphysematous. Endobronchial occluders are seen in the left once again. There is a pigtail pleural catheter on the left it is in satisfactory position. No evidence of recurrent pneumothorax. No new focal infiltrates are seen.     Impression: Stable postoperative  changes on the left. No evidence of pneumothorax. No new infiltrates are seen. Underlying emphysema is again noted. Signer Name: Sammy Harman MD  Signed: 2/3/2021 10:42 PM  Workstation Name: [id number]  [facility]      Results for orders placed during the hospital encounter of 07/17/19   Adult Transthoracic Echo Complete W/ Cont if Necessary Per Protocol    Narrative · Left ventricular systolic function is normal. Estimated EF = 65%.  · No significant valvular abnormalities.          Assessment/Plan   Assessment & Plan       Alpha-1-antitrypsin deficiency (on replacement)    Stage IV emphysema s/p EBV placement X3 w/ subsequent extraction    Acute on chronic respiratory failure with hypoxemia (CMS/HCC)    COPD exacerbation (CMS/HCC)      1. Acute on chronic hypoxemic respiratory failure - baseline 3L NC  - currently on 5L NC, still very tachypneic, anxious, SOB  - ABG noted above  - Nasal cannula, cont pulse ox  - lactic wnl, procal pending, neg chest CTA.  - low threshold to start antibiotics  - sputum culture pending  - Precautions can be discontinued with a single negative COVID-19 test    2. Stage IV emphysema   - with COPD exacerbation   - due to alpha 1 antytryspin deficiency (on replacement therapy weekly)  - s/p EBV placement and recurrent PTX, now with prolonged indwelling left sided chest tube  - CT chest neg for recurrent PTX  - scheduled/prn duonebs, continue IV solumedrol 80 mg Q12 H  - resume home regimen of brovana and budesonide  - consult pulmonary medicine for AM - known to Dr Castellano  - pt confirms full code status, if decompensates overnight, will need to escalate care    3. History of recurrent Pseudomonas respiratory infections  - CT chest negative  - may require repeat bronchoscopy and antibiotics (zosyn, tobramycin nebs) per prior treatment plans, defer to pulmonary    4. History of MAC + bronchial washings  - MAC + bronchial washings by cultures but with negative  smear  - therapy has not been initiated yet     DVT prophylaxis: lovenox SQ      CODE STATUS:   Full code  Code Status and Medical Interventions:   Ordered at: 02/03/21 2333     Code Status:    CPR     Medical Interventions (Level of Support Prior to Arrest):    Full         This note has been completed as part of a split-shared workflow.     Signature: Electronically signed by IGLESIA Casiano, 02/03/21, 11:27 PM EST        Attending   Admission Attestation       I have seen and examined the patient, performing an independent face-to-face diagnostic evaluation with plan of care reviewed and developed with the advanced practice clinician (APC).      Brief Summary Statement:   Balwinder Willams is a 55 y.o. male With a PMH significant for stage IV emphysema due to alpha-1 antitrypsin deficiency on 3 L nasal cannula at home, history of recurrent left pneumothorax with pneumostat valve who presents to the ED due to shortness of breath.  Patient states that this evening he acutely became short of breath.  He took Ativan, and rested for around 90 minutes.  His symptoms did not improve so he came to the ED for further evaluation.  He reports a cough productive of small amount of sputum which is at baseline.  He denies fever.  Patient is  scheduled to be evaluated for possible lung transplant at [facility].    Remainder of detailed HPI is as noted by APC and has been reviewed and/or edited by me for completeness.    Attending Physical Exam:  Constitutional: Awake, alert  Eyes: PERRLA, sclerae anicteric, no conjunctival injection  HENT: NCAT, mucous membranes moist  Neck: Supple, no thyromegaly, no lymphadenopathy, trachea midline  Respiratory: Poor air movement, tachypneic with use of accessory muscles  Cardiovascular: RRR, no murmurs, rubs, or gallops, palpable pedal pulses bilaterally  Gastrointestinal: Positive bowel sounds, soft, nontender, nondistended  Musculoskeletal: No bilateral ankle edema, no  clubbing or cyanosis to extremities  Psychiatric: Appropriate affect, cooperative  Neurologic: Oriented x 3, strength symmetric in all extremities, Cranial Nerves grossly intact to confrontation, speech clear  Skin: No rashes      Brief Assessment/Plan :  See detailed assessment and plan developed with APC which I have reviewed and/or edited for completeness.        Admission Status: I believe that this patient meets INPATIENT status due to acute on chronic respiratory failure.  I feel patient’s risk for adverse outcomes and need for care warrant INPATIENT evaluation and I predict the patient’s care encounter to likely last beyond 2 midnights.        Quiana Malik,   02/04/21

## 2021-02-04 NOTE — PROGRESS NOTES
Pulmonary Consult Note     Chief Complaint:  COPD exacerbation (CMS/HCC)    History of Present Illness     55-year-old male with severe stage IV emphysema and alpha-1 antitrypsin deficiency on home O2 and with pending transplant evaluation.  He had endobronchial valves placed in October 2019 with subsequent removal of several of the valves in October 2020 for Pseudomonas lower respiratory tract infection.  Since then he has had recurrent pneumothoraces and had one valve replaced in one of the basilar segments of the left lower lobe in December 2020.  Ultimately due to recurrent pneumothoraces he ended up having a chest tube left in place with a valve, and has been slowly monitored in the pulmonary office.  Without recurrence of pneumothorax. Also has had MAC isolated from several bronchoscopy specimens though has always been smear negative.  Left chest tube placed for pneumothorax this admission.  He has plans to see transplant at the [facility] on 2/9/2021.  He presented back to the hospital on 2/4/2021 with acute shortness of breath, oxygen increased to 5 L nasal cannula.  CTA chest performed showing no recurrent pneumothorax.  He was started on steroids for a COPD exacerbation.    Problem List, Surgical History, Family, Social History, and ROS     Patient Active Problem List    Diagnosis   • *COPD exacerbation (CMS/HCC) [J44.1]   • Leukocytosis [D72.829]   • Recurrent L PTX s/p multiple SBCTs  [J93.9]   • Recent PSA PNA [J15.1]   • MAC (smear -)  [A31.0]   • Steroid-induced hyperglycemia [R73.9, T38.0X5A]   • Severe malnutrition (CMS/HCC) [E43]   • Acute on chronic respiratory failure with hypoxemia (CMS/HCC) [J96.21]   • Persistent air leak [J93.82]   • Postprocedural pneumothorax [J95.811]   • Stage IV emphysema s/p EBV placement X3 w/ subsequent extraction [J44.9]   • Former smoker (Resolved 2006) [Z87.891]   • Alpha-1-antitrypsin deficiency (on replacement) [E88.01]   • Chronic cough [R05]   •  Dependence on supplemental oxygen (3L NC)  [Z99.81]     Past Surgical History:   Procedure Laterality Date   • BRONCHOSCOPY N/A 10/30/2019    Procedure: BRONCHOSCOPY WITH ENDOBRONHCIAL VALVE PLACEMENT;  Surgeon: Phan Castellano MD;  Location:  [facility];  Service: Pulmonary   • BRONCHOSCOPY N/A 10/24/2019    Procedure: BRONCHOSCOPY WITH ENDOBRONCHIAL VALVE PLACEMENT;  Surgeon: Devaughn Pressley MD;  Location:  [facility];  Service: Pulmonary   • BRONCHOSCOPY  10/24/19 & 10/29/19?   • BRONCHOSCOPY N/A 9/11/2020    Procedure: BRONCHOSCOPY;  Surgeon: Phan Castellano MD;  Location:  [facility];  Service: Pulmonary;  Laterality: N/A;   • BRONCHOSCOPY N/A 10/30/2020    Procedure: BRONCHOSCOPY WITH FLUOROSCOPY AND ENDOBRONCHIAL VALVE REMOVAL;  Surgeon: Phan Castellano MD;  Location:  [facility];  Service: Pulmonary;  Laterality: N/A;   • BRONCHOSCOPY N/A 12/21/2020    Procedure: BRONCHOSCOPY WITH ENDOBRONCHIAL VALVE PLACEMENT;  Surgeon: Tian Pressley MD;  Location:  [facility];  Service: Pulmonary;  Laterality: N/A;       Allergies   Allergen Reactions   • Other Other (See Comments)     Opioid Analgesics (recovering addict)     No current facility-administered medications on file prior to encounter.      Current Outpatient Medications on File Prior to Encounter   Medication Sig   • arformoterol (BROVANA) 15 MCG/2ML nebulizer solution Take 15 mcg by nebulization 2 (Two) Times a Day.   • budesonide (PULMICORT) 0.5 MG/2ML nebulizer solution Take 2 mL by nebulization 2 (Two) Times a Day for 6 doses.   • fluticasone (Flonase) 50 MCG/ACT nasal spray 2 sprays into the nostril(s) as directed by provider Daily.   • guaiFENesin (MUCINEX) 600 MG 12 hr tablet Take 1,200 mg by mouth Daily As Needed for Cough.   • ibuprofen (ADVIL,MOTRIN) 800 MG tablet Take 800 mg by mouth Every 6 (Six) Hours As Needed for Mild Pain .   • ipratropium-albuterol (COMBIVENT RESPIMAT)   "MCG/ACT inhaler Inhale 1 puff 4 (Four) Times a Day As Needed for Wheezing.   • ipratropium-albuterol (DUO-NEB) 0.5-2.5 mg/3 ml nebulizer Take 3 mL by nebulization 4 (Four) Times a Day.   • LORazepam (Ativan) 0.5 MG tablet Take 1 tablet by mouth Every 8 (Eight) Hours As Needed for Anxiety.   • PROLASTIN-C 1000 MG/20ML solution 1 (One) Time Per Week.   • [DISCONTINUED] ketorolac (TORADOL) 10 MG tablet Take 10 mg by mouth Every 6 (Six) Hours As Needed for Moderate Pain .     MEDICATION LIST AND ALLERGIES REVIEWED.    Family History   Problem Relation Age of Onset   • Heart disease Mother    • Diabetes Mother    • Alpha-1 antitrypsin deficiency Brother    • Emphysema Brother         also diagnosed with Alpha 1 antitripsan deficiency   • Lung cancer Other    • Emphysema Other    • Emphysema Paternal Grandmother         prognosis in 1975 was \"lung Cancer\" , however, it is suspect that she suffered from the degeneration of her lungs due to Alpha 1 Antitripsan     Social History     Tobacco Use   • Smoking status: Former Smoker     Packs/day: 1.50     Years: 25.00     Pack years: 37.50     Types: Cigarettes     Start date: 1/1/1981     Quit date: 2006     Years since quitting: 15.1   • Smokeless tobacco: Never Used   Substance Use Topics   • Alcohol use: No     Comment: In recovery since 8/24/1994   • Drug use: Yes     Comment: In recovery since 8/24/1994     Social History     Social History Narrative   • Not on file     FAMILY AND SOCIAL HISTORY REVIEWED.    Review of Systems   Constitutional: Negative for activity change, appetite change, chills and fever.   HENT: Negative for congestion, sore throat and voice change.    Eyes: Negative for photophobia and visual disturbance.   Respiratory: Positive for chest tightness, shortness of breath and wheezing. Negative for cough.    Cardiovascular: Negative for chest pain, palpitations and leg swelling.   Gastrointestinal: Negative for abdominal distention and abdominal pain. " "  Genitourinary: Negative for difficulty urinating and flank pain.   Musculoskeletal: Negative for myalgias and neck stiffness.   Skin: Negative for color change and rash.   Neurological: Negative for dizziness, seizures and headaches.   Hematological: Negative for adenopathy.   Psychiatric/Behavioral: Negative for agitation, hallucinations and sleep disturbance.     ALL OTHER SYSTEMS REVIEWED AND ARE NEGATIVE.    Physical Exam and Clinical Information   /94 (BP Location: Right arm, Patient Position: Lying)   Pulse 91   Temp 97.9 °F (36.6 °C) (Oral)   Resp 18   Ht 172.7 cm (67.99\")   Wt 58.5 kg (129 lb)   SpO2 96%   BMI 19.62 kg/m²   Physical Exam  Vitals signs and nursing note reviewed.   Constitutional:       General: He is not in acute distress.     Appearance: He is well-developed and normal weight. He is ill-appearing. He is not toxic-appearing.   HENT:      Head: Normocephalic and atraumatic.      Right Ear: External ear normal.      Left Ear: External ear normal.      Nose: Nose normal.      Mouth/Throat:      Mouth: Mucous membranes are moist.      Pharynx: Oropharynx is clear. No oropharyngeal exudate or posterior oropharyngeal erythema.   Eyes:      Conjunctiva/sclera: Conjunctivae normal.      Pupils: Pupils are equal, round, and reactive to light.   Neck:      Musculoskeletal: Normal range of motion and neck supple. No neck rigidity.   Cardiovascular:      Rate and Rhythm: Normal rate and regular rhythm.      Pulses: Normal pulses.      Heart sounds: Normal heart sounds. No murmur. No friction rub. No gallop.    Pulmonary:      Effort: Pulmonary effort is normal. No respiratory distress.      Breath sounds: Wheezing present. No rhonchi or rales.   Abdominal:      General: Bowel sounds are normal. There is no distension.      Palpations: Abdomen is soft.      Tenderness: There is no abdominal tenderness. There is no rebound.   Musculoskeletal: Normal range of motion.      Right lower leg: No " edema.      Left lower leg: No edema.   Skin:     General: Skin is warm and dry.      Capillary Refill: Capillary refill takes less than 2 seconds.   Neurological:      General: No focal deficit present.      Mental Status: He is alert and oriented to person, place, and time.   Psychiatric:         Mood and Affect: Mood normal.         Behavior: Behavior normal.         Thought Content: Thought content normal.         Judgment: Judgment normal.         Results from last 7 days   Lab Units 02/04/21  0647 02/03/21  2152   WBC 10*3/mm3 6.52 10.78   HEMOGLOBIN g/dL 13.4 14.7   PLATELETS 10*3/mm3 287 304     Results from last 7 days   Lab Units 02/04/21  0647 02/03/21  2152   SODIUM mmol/L 138 138   POTASSIUM mmol/L 4.4 4.4   CO2 mmol/L 24.0 28.0   BUN mg/dL 12 15   CREATININE mg/dL 0.82 0.92   MAGNESIUM mg/dL 2.1  --    GLUCOSE mg/dL 189* 173*     Estimated Creatinine Clearance: 84.2 mL/min (by C-G formula based on SCr of 0.82 mg/dL).      Results from last 7 days   Lab Units 02/03/21  2250   PH, ARTERIAL pH units 7.398   PCO2, ARTERIAL mm Hg 43.8   PO2 ART mm Hg 66.4*     Lab Results   Component Value Date    LACTATE 1.5 02/03/2021          I reviewed the patient's results/ Images and I agree with the reports     Impression     COPD exacerbation (CMS/[facility])    Alpha-1-antitrypsin deficiency (on replacement)    Stage IV emphysema s/p EBV placement X3 w/ subsequent extraction    Acute on chronic respiratory failure with hypoxemia (CMS/[facility])      Plan/Recommendations     55-year-old male with past medical history significant for alpha-1 antitrypsin deficiency with stage IV emphysema status post endobronchial valve placement x3 with then subsequent extraction.  Who has had recurrent pneumothoraces with multiple chest tube placements and currently has an indwelling pigtail with replaced valve in place.  Who was admitted back to the hospital with an acute COPD exacerbation and acute on chronic hypoxic/hypercapnic respiratory  failure.    · Current continue Solu-Medrol 40 mg every 12 hours  · Doxycycline x5 days  · Continue nebulizer therapy  · Reviewed the patient's CT scan of the chest which shows no new areas of pneumonia or pneumothoraces.  · We will leave the chest tube in for now he knows to keep it clamped.  I would remove it but given that he is very close to seeing transplant I would not want to cause him any potential setbacks in the next 5 days.  · I explained to the patient is extremely poor and that we try to optimize him as much as possible so that he can be discharged Monday afternoon or Tuesday morning so that he can go to his transplant appointment on 2/9/2021.  He has verbalized understanding of this.  · I do want the patient to remain mobile I have asked him to continue to walk around the room and have enough oxygen extension tubing to go to the restroom and back.  · P.o. diet  · Pulmonary will continue to follow      TRU Durham DO  Pulmonary and Critical Care Medicine  02/04/21 12:04 EST     CC: Obinna Arellano MD

## 2021-02-05 PROCEDURE — 94799 UNLISTED PULMONARY SVC/PX: CPT

## 2021-02-05 PROCEDURE — 25010000002 METHYLPREDNISOLONE PER 40 MG: Performed by: INTERNAL MEDICINE

## 2021-02-05 PROCEDURE — 25010000002 MORPHINE PER 10 MG: Performed by: INTERNAL MEDICINE

## 2021-02-05 PROCEDURE — 99232 SBSQ HOSP IP/OBS MODERATE 35: CPT | Performed by: INTERNAL MEDICINE

## 2021-02-05 PROCEDURE — 25010000002 ENOXAPARIN PER 10 MG: Performed by: INTERNAL MEDICINE

## 2021-02-05 RX ORDER — MORPHINE SULFATE 2 MG/ML
2 INJECTION, SOLUTION INTRAMUSCULAR; INTRAVENOUS ONCE
Status: COMPLETED | OUTPATIENT
Start: 2021-02-05 | End: 2021-02-05

## 2021-02-05 RX ADMIN — METHYLPREDNISOLONE SODIUM SUCCINATE 40 MG: 40 INJECTION, POWDER, FOR SOLUTION INTRAMUSCULAR; INTRAVENOUS at 20:21

## 2021-02-05 RX ADMIN — DOXYCYCLINE 100 MG: 100 CAPSULE ORAL at 20:20

## 2021-02-05 RX ADMIN — DOXYCYCLINE 100 MG: 100 CAPSULE ORAL at 08:24

## 2021-02-05 RX ADMIN — LORAZEPAM 0.5 MG: 0.5 TABLET ORAL at 18:26

## 2021-02-05 RX ADMIN — IPRATROPIUM BROMIDE AND ALBUTEROL SULFATE 3 ML: 2.5; .5 SOLUTION RESPIRATORY (INHALATION) at 08:58

## 2021-02-05 RX ADMIN — METHYLPREDNISOLONE SODIUM SUCCINATE 40 MG: 40 INJECTION, POWDER, FOR SOLUTION INTRAMUSCULAR; INTRAVENOUS at 09:29

## 2021-02-05 RX ADMIN — BUDESONIDE 0.5 MG: 0.5 INHALANT RESPIRATORY (INHALATION) at 19:52

## 2021-02-05 RX ADMIN — IPRATROPIUM BROMIDE AND ALBUTEROL SULFATE 3 ML: 2.5; .5 SOLUTION RESPIRATORY (INHALATION) at 17:12

## 2021-02-05 RX ADMIN — ARFORMOTEROL TARTRATE 15 MCG: 15 SOLUTION RESPIRATORY (INHALATION) at 19:52

## 2021-02-05 RX ADMIN — SODIUM CHLORIDE, PRESERVATIVE FREE 10 ML: 5 INJECTION INTRAVENOUS at 08:25

## 2021-02-05 RX ADMIN — ARFORMOTEROL TARTRATE 15 MCG: 15 SOLUTION RESPIRATORY (INHALATION) at 08:58

## 2021-02-05 RX ADMIN — IPRATROPIUM BROMIDE AND ALBUTEROL SULFATE 3 ML: 2.5; .5 SOLUTION RESPIRATORY (INHALATION) at 13:00

## 2021-02-05 RX ADMIN — IPRATROPIUM BROMIDE AND ALBUTEROL SULFATE 3 ML: 2.5; .5 SOLUTION RESPIRATORY (INHALATION) at 19:52

## 2021-02-05 RX ADMIN — SODIUM CHLORIDE, PRESERVATIVE FREE 10 ML: 5 INJECTION INTRAVENOUS at 20:21

## 2021-02-05 RX ADMIN — BUDESONIDE 0.5 MG: 0.5 INHALANT RESPIRATORY (INHALATION) at 08:58

## 2021-02-05 RX ADMIN — Medication 5 MG: at 22:21

## 2021-02-05 RX ADMIN — MORPHINE SULFATE 2 MG: 2 INJECTION, SOLUTION INTRAMUSCULAR; INTRAVENOUS at 18:45

## 2021-02-05 RX ADMIN — FAMOTIDINE 40 MG: 20 TABLET, FILM COATED ORAL at 08:24

## 2021-02-05 RX ADMIN — ENOXAPARIN SODIUM 40 MG: 40 INJECTION SUBCUTANEOUS at 08:24

## 2021-02-05 NOTE — PLAN OF CARE
Problem: Adult Inpatient Plan of Care  Goal: Plan of Care Review  Flowsheets (Taken 2/5/2021 1503)  Outcome Summary: Oxygen weaned to 4LNC, almost back to baseline, with no patient complaints. VS remained stable and patient was able to rest comfortably.   Goal Outcome Evaluation:        Outcome Summary: Oxygen weaned to 4LNC, almost back to baseline, with no patient complaints. VS remained stable and patient was able to rest comfortably.

## 2021-02-05 NOTE — PROGRESS NOTES
Continued Stay Note  [facility]     Patient Name: Balwinder Willams  MRN: [id number]  Today's Date: 2/5/2021    Admit Date: 2/3/2021    Discharge Plan     Row Name 02/05/21 1239       Plan    Plan  Home with [facility]    Patient/Family in Agreement with Plan  yes    Plan Comments  Spoke with patient by phone to discuss the discharge plan. Per discussion in [facility] and review of chart, patient will discharge on Monday. Per patient, he has an appointment at [facility] for an  initial evaluation for lung translant. Patient has [facility] following him for SN and requests that they begin f/u on Wednesday. He declined resuming HH PT. [facility]. He is aware patient will DC on Monday, and CM left him a  re patient's request to f/u Wednesday. [facility] orders are in [facility]. Patient denies other discharge needs at this time. He has a portable O2 tank for the ride home. CM will continue to follow.    Final Discharge Disposition Code  06 - home with home health care        Discharge Codes    No documentation.       Expected Discharge Date and Time     Expected Discharge Date Expected Discharge Time    Feb 8, 2021             Ailyn Cary RN

## 2021-02-05 NOTE — PROGRESS NOTES
[facility] Medicine Services  PROGRESS NOTE    Patient Name: Balwinder Willams  : 1965  MRN: [id number]    Date of Admission: 2/3/2021  Primary Care Physician: Obinna Arellano MD    Subjective   Subjective     CC:   Shortness of breath    HPI:   Says he was seen by Pulmonary Medicine and need some time. Denies exposure to COVID-19 and denies current shortness of breath.  He has complex respiratory history including endobronchial valves and recurrent pneumothoraces.  He has left chest tube    ROS:    Gen- No fevers, chills  CV- No chest pain, palpitations  Resp- No cough, dyspnea  GI- No N/V/D, abd pain    Objective   Objective     Vital Signs:   Temp:  [97.8 °F (36.6 °C)-98.5 °F (36.9 °C)] 98.5 °F (36.9 °C)  Heart Rate:  [] 86  Resp:  [18-24] 20  BP: ()/() 103/71        Physical Exam:    Constitutional: No acute distress, awake, alert  HENT: NCAT, no JVD  Respiratory: poor inspiratory effort, no wheezing, no crackles, + Left Chest tube  Cardiovascular: RRR, s1 and s2  Gastrointestinal: Positive bowel sounds, soft, nontender, nondistended  Musculoskeletal: No bilateral ankle edema  Psychiatric: Appropriate affect, cooperative  Neurologic: Oriented x 3, strength symmetric in all extremities, Cranial Nerves grossly intact to confrontation, speech clear  Skin: tattooed skin    Results Reviewed:  Results from last 7 days   Lab Units 21  0647 21  2152   WBC 10*3/mm3 6.52 10.78   HEMOGLOBIN g/dL 13.4 14.7   HEMATOCRIT % 41.3 45.1   PLATELETS 10*3/mm3 287 304   PROCALCITONIN ng/mL  --  0.07     Results from last 7 days   Lab Units 21  0647 21  2152   SODIUM mmol/L 138 138   POTASSIUM mmol/L 4.4 4.4   CHLORIDE mmol/L 104 101   CO2 mmol/L 24.0 28.0   BUN mg/dL 12 15   CREATININE mg/dL 0.82 0.92   GLUCOSE mg/dL 189* 173*   CALCIUM mg/dL 9.0 8.9   ALT (SGPT) U/L 17 21   AST (SGOT) U/L 15 20   TROPONIN T ng/mL  --  <0.010   PROBNP pg/mL  --  74.4          Estimated Creatinine Clearance: 84.2 mL/min (by C-G formula based on SCr of 0.82 mg/dL).    Microbiology Results Abnormal     Procedure Component Value - Date/Time    Respiratory Culture - Sputum, Cough [[id number]] Collected: 02/04/21 0253    Lab Status: Preliminary result Specimen: Sputum from Cough Updated: 02/04/21 0710     Gram Stain Few (2+) WBCs per low power field      Occasional Epithelial cells per low power field      Few (2+) Normal respiratory samantha    COVID PRE-OP / PRE-PROCEDURE SCREENING ORDER (NO ISOLATION) - Swab, Nasopharynx [[id number]]  (Normal) Collected: 02/04/21 0059    Lab Status: Final result Specimen: Swab from Nasopharynx Updated: 02/04/21 0336    Narrative:      The following orders were created for panel order COVID PRE-OP / PRE-PROCEDURE SCREENING ORDER (NO ISOLATION) - Swab, Nasopharynx.  Procedure                               Abnormality         Status                     ---------                               -----------         ------                     COVID-19 and FLU A/B PCR...[[id number]]  Normal              Final result                 Please view results for these tests on the individual orders.    COVID-19 and FLU A/B PCR - Swab, Nasopharynx [[id number]]  (Normal) Collected: 02/04/21 0059    Lab Status: Final result Specimen: Swab from Nasopharynx Updated: 02/04/21 0336     COVID19 Not Detected     Influenza A PCR Not Detected     Influenza B PCR Not Detected    Narrative:      Fact sheet for providers: https://www.fda.gov/media/235206/download    Fact sheet for patients: https://www.fda.gov/media/405855/download    Test performed by PCR.          Imaging Results (Last 24 Hours)     Procedure Component Value Units Date/Time    CT Angiogram Chest With & Without Contrast [[id number]] Collected: 02/04/21 0026     Updated: 02/04/21 0028    Narrative:      CT ANGIOGRAM CHEST W WO CONTRAST    INDICATION:   Chronic emphysema. Shortness of breath and hypoxia on  arrival.    TECHNIQUE:   CT angiogram of the chest with 100 cc of Isovue-300 IV contrast. 3-D reconstructions were obtained and reviewed.   Radiation dose reduction techniques included automated exposure control or exposure modulation based on body size. Count of known CT and  cardiac nuc med studies performed in previous 12 months: 1.     COMPARISON:   Noncontrasted study from February 3, 2021    FINDINGS:   Chest images at mediastinal window show good filling of the pulmonary arteries. There are no pulmonary artery filling defects to suggest emboli. The CT angiographic images also show no evidence of emboli.    No new infiltrates are seen since the earlier examination.      Impression:      No evidence of pulmonary embolism    Signer Name: Sammy Harman MD   Signed: 2/4/2021 12:26 AM   Workstation Name: [id number]    [facility]    CT Chest Without Contrast Diagnostic [[id number]] Collected: 02/03/21 2315     Updated: 02/03/21 2318    Narrative:      CT Chest WO    INDICATION:   Shortness of breath on arrival    TECHNIQUE:   CT of the thorax without IV contrast. Coronal and sagittal reconstructions were obtained.  Radiation dose reduction techniques included automated exposure control or exposure modulation based on body size. Count of known CT and cardiac nuc med studies  performed in previous 12 months: 4.     COMPARISON:   12/31/2020    FINDINGS:  Chest images at mediastinal window show no adenopathy or effusions. Heart size is normal.    Chest images at lung window demonstrate emphysema. There is a large bleb at the right lung base. Multiple endobronchial valves are seen in the left lower lobe. There is volume loss seen in the left lower lung field along the major fissure. No new  infiltrates are seen. No evidence of pneumothorax.      Impression:      Severe emphysema. No evidence of pneumothorax on either side. Stable postoperative changes on the left.    Signer Name: Sammy Harman  MD   Signed: 2/3/2021 11:15 PM   Workstation Name: [facility]    [facility]    XR Chest 1 View [[id number]] Collected: 02/03/21 2242     Updated: 02/03/21 2244    Narrative:      CR Chest 1 Vw    INDICATION:   Shortness of breath on arrival     COMPARISON:    1/19/2021    FINDINGS:  Single portable AP view(s) of the chest.  The heart and mediastinum are stable. The lungs are emphysematous. Endobronchial occluders are seen in the left once again. There is a pigtail pleural catheter on the left it is in satisfactory position. No  evidence of recurrent pneumothorax. No new focal infiltrates are seen.      Impression:      Stable postoperative changes on the left. No evidence of pneumothorax. No new infiltrates are seen. Underlying emphysema is again noted.    Signer Name: Sammy Harman MD   Signed: 2/3/2021 10:42 PM   Workstation Name: [facility]    [facility]          Results for orders placed during the hospital encounter of 07/17/19   Adult Transthoracic Echo Complete W/ Cont if Necessary Per Protocol    Narrative · Left ventricular systolic function is normal. Estimated EF = 65%.  · No significant valvular abnormalities.          I have reviewed the medications:  Scheduled Meds:arformoterol, 15 mcg, Nebulization, BID - RT  budesonide, 0.5 mg, Nebulization, BID - RT  doxycycline, 100 mg, Oral, Q12H  enoxaparin, 40 mg, Subcutaneous, Q24H  famotidine, 40 mg, Oral, Daily  fluticasone, 2 spray, Nasal, Daily  ipratropium-albuterol, 3 mL, Nebulization, 4x Daily - RT  methylPREDNISolone sodium succinate, 40 mg, Intravenous, Q12H  pharmacy consult - MTM, , Does not apply, Daily  sodium chloride, 10 mL, Intravenous, Q12H      Continuous Infusions:   PRN Meds:.•  acetaminophen **OR** acetaminophen **OR** acetaminophen  •  guaiFENesin  •  ipratropium-albuterol  •  LORazepam  •  melatonin  •  ondansetron  •  sodium chloride  •  sodium chloride    Assessment/Plan   Assessment &  Plan     Active Hospital Problems    Diagnosis  POA   • **COPD exacerbation (CMS/[facility]) [J44.1]  Yes   • Acute on chronic respiratory failure with hypoxemia (CMS/[facility]) [J96.21]  Yes   • Stage IV emphysema s/p EBV placement X3 w/ subsequent extraction [J44.9]  Yes   • Alpha-1-antitrypsin deficiency (on replacement) [E88.01]  Yes      Resolved Hospital Problems   No resolved problems to display.        Brief Hospital Course to date:  Balwinder Willams is a 55 y.o. male with history of alpha-1 antitrypsin deficiency, stage IV emphysema, Endo bronchial valve placement and extraction, acute on chronic respiratory failure, COPD presented to the ER with shortness of breath.  Complex respiratory history and reports that he is on evaluation list for transplant    Acute on chronic hypoxic respiratory failure  -He was on oxygen just at night  -more recently he has been on oxygen 24/7  -Currently he is on 5 L/min  -Seen by pulmonary medicine today and will need some time in the hospital  -Reports he has an appointment for transplant medicine next week  -He has a chest tube in his left chest  History of nocturnal hypoxia  -Initially was qualified for oxygen at night  Stage IV emphysema  -Possible COPD exacerbation  -He has history of underlying alpha-1 antitrypsin deficiency  -He has a history of recurrent pneumothoraces  -He now has a longstanding indwelling left-sided chest tube  -CT chest was negative for recurrent pneumothorax  History of recurrent Pseudomonas respiratory infections  -CT chest was negative  -For treatment plans to pulmonary medicine  History of MAC plus bronchial washings  -Per pulmonary medicine    Has a chest tube clamped  And needs to ambulate  Seen by pulmonary medicine today -sounds like you need some time in the hospital    DVT Prophylaxis: Lovenox subcu    Disposition: I expect the patient to be discharged next week on Monday    CODE STATUS:   Code Status and Medical Interventions:   Ordered at: 02/03/21  2333     Code Status:    CPR     Medical Interventions (Level of Support Prior to Arrest):    Full       David Chavez MD  02/04/21

## 2021-02-05 NOTE — PROGRESS NOTES
Pulmonary Consult Follow-up     Hospital:  LOS: 2 days   Mr. Balwinder Willams, 55 y.o. male is followed for:   COPD exacerbation (CMS/Formerly [facility])            History of present illness:   55-year-old male with severe stage IV emphysema and alpha-1 antitrypsin deficiency on home O2 and with pending transplant evaluation.  He had endobronchial valves placed in October 2019 with subsequent removal of several of the valves in October 2020 for Pseudomonas lower respiratory tract infection.  Since then he has had recurrent pneumothoraces and had one valve replaced in one of the basilar segments of the left lower lobe in December 2020.  Ultimately due to recurrent pneumothoraces he ended up having a chest tube left in place with a valve, and has been slowly monitored in the pulmonary office.  Without recurrence of pneumothorax. Also has had MAC isolated from several bronchoscopy specimens though has always been smear negative.  Left chest tube placed for pneumothorax this admission.  He has plans to see transplant at the [facility] on 2/9/2021.  He presented back to the hospital on 2/4/2021 with acute shortness of breath, oxygen increased to 5 L nasal cannula.  CTA chest performed showing no recurrent pneumothorax.  He was started on steroids for a COPD exacerbation.      Subjective   Interval History:  Patient is doing well while this morning feels a little bit better.  Still ambulating around the room.  Denies any fever, chills, nausea, vomiting.           Review of Systems   Constitutional: Negative for chills and fever.   Respiratory: Positive for cough and shortness of breath. Negative for chest tightness and wheezing.    Cardiovascular: Negative for chest pain and leg swelling.   Gastrointestinal: Negative for abdominal pain and vomiting.       The patient's past medical, surgical and social history were reviewed and updated in Epic as appropriate.       Objective     Infusions:     Medications:  arformoterol, 15  mcg, Nebulization, BID - RT  budesonide, 0.5 mg, Nebulization, BID - RT  doxycycline, 100 mg, Oral, Q12H  enoxaparin, 40 mg, Subcutaneous, Q24H  famotidine, 40 mg, Oral, Daily  fluticasone, 2 spray, Nasal, Daily  ipratropium-albuterol, 3 mL, Nebulization, 4x Daily - RT  methylPREDNISolone sodium succinate, 40 mg, Intravenous, Q12H  pharmacy consult - [place], , Does not apply, Daily  sodium chloride, 10 mL, Intravenous, Q12H      I reviewed the patient's medications.    Vital Sign Min/Max for last 24 hours  Temp  Min: 98.1 °F (36.7 °C)  Max: 98.5 °F (36.9 °C)   BP  Min: 92/59  Max: 117/94   Pulse  Min: 67  Max: 113   Resp  Min: 16  Max: 20   SpO2  Min: 95 %  Max: 98 %   Flow (L/min)  Min: 4  Max: 5       Input/Output for last 24 hour shift  02/04 0701 - 02/05 0700  In: 460 [P.O.:360]  Out: 850 [Urine:850]   Physical Exam  Vitals signs and nursing note reviewed.   Constitutional:       General: He is not in acute distress.     Appearance: He is well-developed and normal weight. He is not ill-appearing or toxic-appearing.   HENT:      Head: Normocephalic and atraumatic.      Right Ear: External ear normal.      Left Ear: External ear normal.      Nose: Nose normal.      Mouth/Throat:      Mouth: Mucous membranes are moist.      Pharynx: Oropharynx is clear.   Eyes:      Extraocular Movements: Extraocular movements intact.      Conjunctiva/sclera: Conjunctivae normal.   Neck:      Musculoskeletal: Normal range of motion and neck supple.   Cardiovascular:      Rate and Rhythm: Normal rate and regular rhythm.      Pulses: Normal pulses.      Heart sounds: Normal heart sounds. No murmur. No gallop.    Pulmonary:      Effort: Pulmonary effort is normal.      Breath sounds: Wheezing present. No rhonchi or rales.   Abdominal:      General: Bowel sounds are normal. There is no distension.      Palpations: Abdomen is soft.      Tenderness: There is no abdominal tenderness.   Musculoskeletal: Normal range of motion.      Right  lower leg: No edema.      Left lower leg: No edema.   Skin:     General: Skin is warm and dry.      Capillary Refill: Capillary refill takes less than 2 seconds.   Neurological:      General: No focal deficit present.      Mental Status: He is alert and oriented to person, place, and time.   Psychiatric:         Mood and Affect: Mood normal.         Thought Content: Thought content normal.         Judgment: Judgment normal.         Results from last 7 days   Lab Units 02/04/21  0647 02/03/21  2152   WBC 10*3/mm3 6.52 10.78   HEMOGLOBIN g/dL 13.4 14.7   PLATELETS 10*3/mm3 287 304     Results from last 7 days   Lab Units 02/04/21  0647 02/03/21  2152   SODIUM mmol/L 138 138   POTASSIUM mmol/L 4.4 4.4   CO2 mmol/L 24.0 28.0   BUN mg/dL 12 15   CREATININE mg/dL 0.82 0.92   MAGNESIUM mg/dL 2.1  --    GLUCOSE mg/dL 189* 173*     Estimated Creatinine Clearance: 84.2 mL/min (by C-G formula based on SCr of 0.82 mg/dL).    Results from last 7 days   Lab Units 02/03/21  2250   PH, ARTERIAL pH units 7.398   PCO2, ARTERIAL mm Hg 43.8   PO2 ART mm Hg 66.4*       I reviewed the patient's new clinical results.  I reviewed the patient's new imaging results/reports including actual images and agree with reports.       Assessment/Plan   Impression        COPD exacerbation (CMS/[facility])    Alpha-1-antitrypsin deficiency (on replacement)    Stage IV emphysema s/p EBV placement X3 w/ subsequent extraction    Acute on chronic respiratory failure with hypoxemia (CMS/[facility])       Plan        55-year-old male with past medical history significant for alpha-1 antitrypsin deficiency with stage IV emphysema status post endobronchial valve placement x3 with then subsequent extraction.  Who has had recurrent pneumothoraces with multiple chest tube placements and currently has an indwelling pigtail with replaced valve in place.  Who was admitted back to the hospital with an acute COPD exacerbation and acute on chronic hypoxic/hypercapnic respiratory  failure.     · Current continue Solu-Medrol 40 mg every 12 hours  · Doxycycline x5 days (2/8/21)  · Continue nebulizer therapy  · We will leave the chest tube in for now he knows to keep it clamped.  I would remove it but given that he is very close to seeing transplant I would not want to cause him any potential setbacks in the next 5 days.  · Patient is agreeable to the plan of staying here till Monday at which point time we will discharge him so he can go to his transplant appointment on Tuesday.  · Continue to mobilize the patient  · P.o. diet  · Pulmonary will continue to follow    Thank You for the Consult    Sanju Durham DO  Pulmonary, Critical care and Sleep Medicine

## 2021-02-05 NOTE — PROGRESS NOTES
[facility] Medicine Services  PROGRESS NOTE    Patient Name: Balwinder Willams  : 1965  MRN: [id number]    Date of Admission: 2/3/2021  Primary Care Physician: Obinna Arellano MD    Subjective   Subjective     CC: Follow-up SOA    HPI: No acute events overnight, patient rested well, denies significant SOA, from 4 L NC    ROS:  Gen- No fevers, chills  CV- No chest pain, palpitations  Resp- No cough, +dyspnea  GI- No N/V/D, abd pain    All other systems reviewed and negative    Objective   Objective     Vital Signs:   Temp:  [98.1 °F (36.7 °C)-98.5 °F (36.9 °C)] 98.1 °F (36.7 °C)  Heart Rate:  [] 102  Resp:  [16-20] 16  BP: ()/(56-74) 97/60        Physical Exam:  Constitutional: Chronically ill-appearing male, in no acute distress, awake, alert, seated in chair  HENT: NCAT, mucous membranes moist  Respiratory: Moderately labored respirations, diffuse coarse breath sounds, left chest tube in place, clamped, on 4 L NC  Cardiovascular: RRR, no murmurs, rubs, or gallops  Gastrointestinal: Positive bowel sounds, soft, nontender, nondistended  Musculoskeletal: No bilateral ankle edema  Psychiatric: Appropriate affect, cooperative  Neurologic: Oriented x 3, nonfocal  Skin: No rashes    Results Reviewed:  Results from last 7 days   Lab Units 21  0647 21  2152   WBC 10*3/mm3 6.52 10.78   HEMOGLOBIN g/dL 13.4 14.7   HEMATOCRIT % 41.3 45.1   PLATELETS 10*3/mm3 287 304   PROCALCITONIN ng/mL  --  0.07     Results from last 7 days   Lab Units 21  0647 21  2152   SODIUM mmol/L 138 138   POTASSIUM mmol/L 4.4 4.4   CHLORIDE mmol/L 104 101   CO2 mmol/L 24.0 28.0   BUN mg/dL 12 15   CREATININE mg/dL 0.82 0.92   GLUCOSE mg/dL 189* 173*   CALCIUM mg/dL 9.0 8.9   ALT (SGPT) U/L 17 21   AST (SGOT) U/L 15 20   TROPONIN T ng/mL  --  <0.010   PROBNP pg/mL  --  74.4     Estimated Creatinine Clearance: 84.2 mL/min (by C-G formula based on SCr of 0.82  mg/dL).    Microbiology Results Abnormal     Procedure Component Value - Date/Time    Respiratory Culture - Sputum, Cough [[id number]] Collected: 02/04/21 0253    Lab Status: Preliminary result Specimen: Sputum from Cough Updated: 02/05/21 1116     Respiratory Culture Heavy growth (4+) Normal Respiratory Samantha: NO S.aureus/MRSA or Pseudomonas aeruginosa     Gram Stain Few (2+) WBCs per low power field      Occasional Epithelial cells per low power field      Few (2+) Normal respiratory samantha    Blood Culture - Blood, Arm, Right [[id number]] Collected: 02/03/21 2221    Lab Status: Preliminary result Specimen: Blood from Arm, Right Updated: 02/04/21 2331     Blood Culture No growth at 24 hours    Blood Culture - Blood, Arm, Left [[id number]] Collected: 02/03/21 2221    Lab Status: Preliminary result Specimen: Blood from Arm, Left Updated: 02/04/21 2331     Blood Culture No growth at 24 hours    COVID PRE-OP / PRE-PROCEDURE SCREENING ORDER (NO ISOLATION) - Swab, Nasopharynx [[id number]]  (Normal) Collected: 02/04/21 0059    Lab Status: Final result Specimen: Swab from Nasopharynx Updated: 02/04/21 0336    Narrative:      The following orders were created for panel order COVID PRE-OP / PRE-PROCEDURE SCREENING ORDER (NO ISOLATION) - Swab, Nasopharynx.  Procedure                               Abnormality         Status                     ---------                               -----------         ------                     COVID-19 and FLU A/B PCR...[[id number]]  Normal              Final result                 Please view results for these tests on the individual orders.    COVID-19 and FLU A/B PCR - Swab, Nasopharynx [[id number]]  (Normal) Collected: 02/04/21 0059    Lab Status: Final result Specimen: Swab from Nasopharynx Updated: 02/04/21 0336     COVID19 Not Detected     Influenza A PCR Not Detected     Influenza B PCR Not Detected    Narrative:      Fact sheet for providers:  https://www.fda.gov/media/002072/download    Fact sheet for patients: https://www.fda.gov/media/861131/download    Test performed by PCR.          Imaging Results (Last 24 Hours)     ** No results found for the last 24 hours. **          Results for orders placed during the hospital encounter of 07/17/19   Adult Transthoracic Echo Complete W/ Cont if Necessary Per Protocol    Narrative · Left ventricular systolic function is normal. Estimated EF = 65%.  · No significant valvular abnormalities.          I have reviewed the medications:  Scheduled Meds:arformoterol, 15 mcg, Nebulization, BID - RT  budesonide, 0.5 mg, Nebulization, BID - RT  doxycycline, 100 mg, Oral, Q12H  enoxaparin, 40 mg, Subcutaneous, Q24H  famotidine, 40 mg, Oral, Daily  fluticasone, 2 spray, Nasal, Daily  ipratropium-albuterol, 3 mL, Nebulization, 4x Daily - RT  methylPREDNISolone sodium succinate, 40 mg, Intravenous, Q12H  pharmacy consult - MTM, , Does not apply, Daily  sodium chloride, 10 mL, Intravenous, Q12H      Continuous Infusions:   PRN Meds:.•  acetaminophen **OR** acetaminophen **OR** acetaminophen  •  guaiFENesin  •  ipratropium-albuterol  •  LORazepam  •  melatonin  •  ondansetron  •  sodium chloride  •  sodium chloride    Assessment/Plan   Assessment & Plan     Active Hospital Problems    Diagnosis  POA   • **COPD exacerbation (CMS/[facility]) [J44.1]  Yes   • Acute on chronic respiratory failure with hypoxemia (CMS/[facility]) [J96.21]  Yes   • Stage IV emphysema s/p EBV placement X3 w/ subsequent extraction [J44.9]  Yes   • Alpha-1-antitrypsin deficiency (on replacement) [E88.01]  Yes      Resolved Hospital Problems   No resolved problems to display.        Brief Hospital Course to date:  Balwinder Willams is a 55 y.o. male with past medical history of alpha-1 antitrypsin deficiency, stage IV emphysema, with recurrent pneumothoraces s/p EBV X3, COPD who presented with progressively worsening shortness of air, admitted with acute COPD  exacerbation    Acute on chronic hypoxic and hypercapnic respiratory failure, secondary to COPD exacerbation, in the setting of stage IV emphysema  -Pulmonary currently following, recommend to continue steroids Solu-Medrol 40 mg bid, doxycycline for 5 days, Halley pressley Brovana  -He is currently on 4 L NC, continue to wean as able  -Left chest tube remains in place, clamped, plan to keep this through his hospitalization and discharge  -Patient has a follow-up appointment with [facility] transplant on 2/9/2021, will continue his current  treatment as above with plans to discharge possibly Monday 2/8/21 so that he can keep this appointment.  -Encouraged to ambulate    DVT Prophylaxis: Lovenox    Disposition: Anticipate discharge home 2/8/2021, CM following    CODE STATUS:   Code Status and Medical Interventions:   Ordered at: 02/03/21 2693     Code Status:    CPR     Medical Interventions (Level of Support Prior to Arrest):    Full       Apollo Lopez MD  02/05/21

## 2021-02-06 LAB
BACTERIA SPEC RESP CULT: NORMAL
GRAM STN SPEC: NORMAL

## 2021-02-06 PROCEDURE — 94799 UNLISTED PULMONARY SVC/PX: CPT

## 2021-02-06 PROCEDURE — 99232 SBSQ HOSP IP/OBS MODERATE 35: CPT | Performed by: INTERNAL MEDICINE

## 2021-02-06 PROCEDURE — 25010000002 ENOXAPARIN PER 10 MG: Performed by: INTERNAL MEDICINE

## 2021-02-06 PROCEDURE — 25010000002 METHYLPREDNISOLONE PER 40 MG: Performed by: INTERNAL MEDICINE

## 2021-02-06 RX ADMIN — SODIUM CHLORIDE, PRESERVATIVE FREE 10 ML: 5 INJECTION INTRAVENOUS at 09:42

## 2021-02-06 RX ADMIN — Medication 5 MG: at 20:58

## 2021-02-06 RX ADMIN — IPRATROPIUM BROMIDE AND ALBUTEROL SULFATE 3 ML: 2.5; .5 SOLUTION RESPIRATORY (INHALATION) at 09:09

## 2021-02-06 RX ADMIN — SODIUM CHLORIDE, PRESERVATIVE FREE 10 ML: 5 INJECTION INTRAVENOUS at 20:58

## 2021-02-06 RX ADMIN — BUDESONIDE 0.5 MG: 0.5 INHALANT RESPIRATORY (INHALATION) at 20:05

## 2021-02-06 RX ADMIN — METHYLPREDNISOLONE SODIUM SUCCINATE 40 MG: 40 INJECTION, POWDER, FOR SOLUTION INTRAMUSCULAR; INTRAVENOUS at 09:41

## 2021-02-06 RX ADMIN — ARFORMOTEROL TARTRATE 15 MCG: 15 SOLUTION RESPIRATORY (INHALATION) at 20:05

## 2021-02-06 RX ADMIN — FAMOTIDINE 40 MG: 20 TABLET, FILM COATED ORAL at 09:41

## 2021-02-06 RX ADMIN — ENOXAPARIN SODIUM 40 MG: 40 INJECTION SUBCUTANEOUS at 09:41

## 2021-02-06 RX ADMIN — LORAZEPAM 0.5 MG: 0.5 TABLET ORAL at 20:58

## 2021-02-06 RX ADMIN — IPRATROPIUM BROMIDE AND ALBUTEROL SULFATE 3 ML: 2.5; .5 SOLUTION RESPIRATORY (INHALATION) at 12:52

## 2021-02-06 RX ADMIN — LORAZEPAM 0.5 MG: 0.5 TABLET ORAL at 11:46

## 2021-02-06 RX ADMIN — DOXYCYCLINE 100 MG: 100 CAPSULE ORAL at 09:41

## 2021-02-06 RX ADMIN — ARFORMOTEROL TARTRATE 15 MCG: 15 SOLUTION RESPIRATORY (INHALATION) at 09:09

## 2021-02-06 RX ADMIN — DOXYCYCLINE 100 MG: 100 CAPSULE ORAL at 20:58

## 2021-02-06 RX ADMIN — IPRATROPIUM BROMIDE AND ALBUTEROL SULFATE 3 ML: 2.5; .5 SOLUTION RESPIRATORY (INHALATION) at 16:59

## 2021-02-06 RX ADMIN — LORAZEPAM 0.5 MG: 0.5 TABLET ORAL at 01:57

## 2021-02-06 RX ADMIN — BUDESONIDE 0.5 MG: 0.5 INHALANT RESPIRATORY (INHALATION) at 09:09

## 2021-02-06 RX ADMIN — METHYLPREDNISOLONE SODIUM SUCCINATE 40 MG: 40 INJECTION, POWDER, FOR SOLUTION INTRAMUSCULAR; INTRAVENOUS at 20:58

## 2021-02-06 NOTE — PLAN OF CARE
Problem: Adult Inpatient Plan of Care  Goal: Plan of Care Review  Outcome: Ongoing, Progressing  Flowsheets  Taken 2/6/2021 1710 by Gayatri Cotter, RN  Progress: no change  Outcome Summary: VSS. On 3.5 liters O2. Ativan prn for anxiety. No complaints.  Taken 2/6/2021 0633 by Mariangel Ontiveros, RN  Plan of Care Reviewed With: patient   Goal Outcome Evaluation:  Plan of Care Reviewed With: patient  Progress: no change  Outcome Summary: VSS. On 3.5 liters O2. Ativan prn for anxiety. No complaints.

## 2021-02-06 NOTE — PLAN OF CARE
Problem: Adult Inpatient Plan of Care  Goal: Plan of Care Review  Outcome: Ongoing, Progressing  Flowsheets (Taken 2/5/2021 1918)  Progress: improving  Plan of Care Reviewed With: patient  Outcome Summary: VSS. On 4 liters. Sitting in chair and ambulating in room today. This evening got very anxious, tachypenic, tachycardic, Increase in BP, complaints that hes SOA. Gave ativan and called doctor. Gave morphine and patient quickly calmed down. Stated he felt better and could breath better. BP, breathing, and HR returned to normal.   Goal Outcome Evaluation:  Plan of Care Reviewed With: patient  Progress: improving  Outcome Summary: VSS. On 4 liters. Sitting in chair and ambulating in room today. This evening got very anxious, tachypenic, tachycardic, Increase in BP, complaints that hes SOA. Gave ativan and called doctor. Gave morphine and patient quickly calmed down. Stated he felt better and could breath better. BP, breathing, and HR returned to normal.

## 2021-02-06 NOTE — PROGRESS NOTES
[facility] Medicine Services  PROGRESS NOTE    Patient Name: Balwinder Willams  : 1965  MRN: [id number]    Date of Admission: 2/3/2021  Primary Care Physician: Obinna Arellano MD    Subjective   Subjective     CC: Follow-up SOA    HPI: No acute events overnight, patient said that he rested well, however yesterday afternoon he did have a sensation of dyspnea with some leg tremors, these did resolve with a dose of morphine    ROS:  Gen- No fevers, chills  CV- No chest pain, palpitations  Resp- No cough, +dyspnea  GI- No N/V/D, abd pain     All other systems reviewed and currently negative    Objective   Objective     Vital Signs:   Temp:  [97.7 °F (36.5 °C)-98.5 °F (36.9 °C)] 98.1 °F (36.7 °C)  Heart Rate:  [] 75  Resp:  [16-26] 18  BP: (100-150)/() 104/73        Physical Exam:  Constitutional: No acute distress, awake, alert  HENT: NCAT, mucous membranes moist  Respiratory: Moderate labored respirations, diffuse coarse breath sounds on 4 L NC, left chest tube in place  Cardiovascular: RRR, no murmurs, rubs, or gallops  Gastrointestinal: Positive bowel sounds, soft, nontender, nondistended  Musculoskeletal: No bilateral ankle edema  Psychiatric: Appropriate affect, cooperative  Neurologic: Oriented x 3, nonfocal  Skin: No rashes    Results Reviewed:  Results from last 7 days   Lab Units 21  0647 21  2152   WBC 10*3/mm3 6.52 10.78   HEMOGLOBIN g/dL 13.4 14.7   HEMATOCRIT % 41.3 45.1   PLATELETS 10*3/mm3 287 304   PROCALCITONIN ng/mL  --  0.07     Results from last 7 days   Lab Units 21  0647 21  2152   SODIUM mmol/L 138 138   POTASSIUM mmol/L 4.4 4.4   CHLORIDE mmol/L 104 101   CO2 mmol/L 24.0 28.0   BUN mg/dL 12 15   CREATININE mg/dL 0.82 0.92   GLUCOSE mg/dL 189* 173*   CALCIUM mg/dL 9.0 8.9   ALT (SGPT) U/L 17 21   AST (SGOT) U/L 15 20   TROPONIN T ng/mL  --  <0.010   PROBNP pg/mL  --  74.4     Estimated Creatinine Clearance: 84.2 mL/min (by  C-G formula based on SCr of 0.82 mg/dL).    Microbiology Results Abnormal     Procedure Component Value - Date/Time    Blood Culture - Blood, Arm, Right [[id number]] Collected: 02/03/21 2221    Lab Status: Preliminary result Specimen: Blood from Arm, Right Updated: 02/05/21 2331     Blood Culture No growth at 2 days    Blood Culture - Blood, Arm, Left [[id number]] Collected: 02/03/21 2221    Lab Status: Preliminary result Specimen: Blood from Arm, Left Updated: 02/05/21 2331     Blood Culture No growth at 2 days    Respiratory Culture - Sputum, Cough [[id number]] Collected: 02/04/21 0253    Lab Status: Preliminary result Specimen: Sputum from Cough Updated: 02/05/21 1116     Respiratory Culture Heavy growth (4+) Normal Respiratory Samantha: NO S.aureus/MRSA or Pseudomonas aeruginosa     Gram Stain Few (2+) WBCs per low power field      Occasional Epithelial cells per low power field      Few (2+) Normal respiratory samantha    COVID PRE-OP / PRE-PROCEDURE SCREENING ORDER (NO ISOLATION) - Swab, Nasopharynx [[id number]]  (Normal) Collected: 02/04/21 0059    Lab Status: Final result Specimen: Swab from Nasopharynx Updated: 02/04/21 0336    Narrative:      The following orders were created for panel order COVID PRE-OP / PRE-PROCEDURE SCREENING ORDER (NO ISOLATION) - Swab, Nasopharynx.  Procedure                               Abnormality         Status                     ---------                               -----------         ------                     COVID-19 and FLU A/B PCR...[[id number]]  Normal              Final result                 Please view results for these tests on the individual orders.    COVID-19 and FLU A/B PCR - Swab, Nasopharynx [[id number]]  (Normal) Collected: 02/04/21 0059    Lab Status: Final result Specimen: Swab from Nasopharynx Updated: 02/04/21 0336     COVID19 Not Detected     Influenza A PCR Not Detected     Influenza B PCR Not Detected    Narrative:      Fact sheet for providers:  https://www.fda.gov/media/596578/download    Fact sheet for patients: https://www.fda.gov/media/943706/download    Test performed by PCR.          Imaging Results (Last 24 Hours)     ** No results found for the last 24 hours. **          Results for orders placed during the hospital encounter of 07/17/19   Adult Transthoracic Echo Complete W/ Cont if Necessary Per Protocol    Narrative · Left ventricular systolic function is normal. Estimated EF = 65%.  · No significant valvular abnormalities.          I have reviewed the medications:  Scheduled Meds:arformoterol, 15 mcg, Nebulization, BID - RT  budesonide, 0.5 mg, Nebulization, BID - RT  doxycycline, 100 mg, Oral, Q12H  enoxaparin, 40 mg, Subcutaneous, Q24H  famotidine, 40 mg, Oral, Daily  fluticasone, 2 spray, Nasal, Daily  ipratropium-albuterol, 3 mL, Nebulization, 4x Daily - RT  methylPREDNISolone sodium succinate, 40 mg, Intravenous, Q12H  pharmacy consult - [facility]TM, , Does not apply, Daily  sodium chloride, 10 mL, Intravenous, Q12H      Continuous Infusions:   PRN Meds:.•  acetaminophen **OR** acetaminophen **OR** acetaminophen  •  guaiFENesin  •  ipratropium-albuterol  •  LORazepam  •  melatonin  •  ondansetron  •  sodium chloride  •  sodium chloride    Assessment/Plan   Assessment & Plan     Active Hospital Problems    Diagnosis  POA   • **COPD exacerbation (CMS/[facility]) [J44.1]  Yes   • Acute on chronic respiratory failure with hypoxemia (CMS/[facility]) [J96.21]  Yes   • Stage IV emphysema s/p EBV placement X3 w/ subsequent extraction [J44.9]  Yes   • Alpha-1-antitrypsin deficiency (on replacement) [E88.01]  Yes      Resolved Hospital Problems   No resolved problems to display.        Brief Hospital Course to date:  Balwinder Willams is a 55 y.o. male with past medical history of alpha-1 antitrypsin deficiency, stage IV emphysema, with recurrent pneumothoraces s/p EBV X3, COPD who presented with progressively worsening shortness of air, admitted with acute COPD  exacerbation     Acute on chronic hypoxic and hypercapnic respiratory failure, secondary to COPD exacerbation, in the setting of stage IV emphysema  -Pulmonary currently following, recommend to continue steroids Solu-Medrol 40 mg bid, doxycycline for 5 days end date 2/8/2021, duo nebs, Pulmicort, Brovana  -He is currently on 4-5 L NC, continue to wean as able  -Left chest tube remains in place, clamped, plan to keep this through his hospitalization and discharge  -Patient has a follow-up appointment with [facility] transplant on 2/9/2021, will continue his current  treatment as above with plans to discharge possibly Monday 2/8/21 so that he can keep this appointment.  -Encouraged to ambulate     DVT Prophylaxis: Lovenox     Disposition: Anticipate discharge home 2/8/2021, CM following.    CODE STATUS:   Code Status and Medical Interventions:   Ordered at: 02/03/21 2333     Code Status:    CPR     Medical Interventions (Level of Support Prior to Arrest):    Full       Apollo Lopez MD  02/06/21

## 2021-02-06 NOTE — PLAN OF CARE
Goal Outcome Evaluation:  Plan of Care Reviewed With: patient  Progress: no change  Outcome Summary: VSS. Pt on 4.5L NC. Denies any complaints or needs. Will continue to monitor.

## 2021-02-07 PROCEDURE — 25010000002 ENOXAPARIN PER 10 MG: Performed by: INTERNAL MEDICINE

## 2021-02-07 PROCEDURE — 99232 SBSQ HOSP IP/OBS MODERATE 35: CPT | Performed by: INTERNAL MEDICINE

## 2021-02-07 PROCEDURE — 25010000002 METHYLPREDNISOLONE PER 40 MG: Performed by: INTERNAL MEDICINE

## 2021-02-07 PROCEDURE — 94799 UNLISTED PULMONARY SVC/PX: CPT

## 2021-02-07 RX ORDER — LORAZEPAM 1 MG/1
1 TABLET ORAL EVERY 6 HOURS PRN
Status: DISCONTINUED | OUTPATIENT
Start: 2021-02-07 | End: 2021-02-08 | Stop reason: HOSPADM

## 2021-02-07 RX ADMIN — METHYLPREDNISOLONE SODIUM SUCCINATE 40 MG: 40 INJECTION, POWDER, FOR SOLUTION INTRAMUSCULAR; INTRAVENOUS at 19:59

## 2021-02-07 RX ADMIN — Medication 5 MG: at 19:59

## 2021-02-07 RX ADMIN — FAMOTIDINE 40 MG: 20 TABLET, FILM COATED ORAL at 09:55

## 2021-02-07 RX ADMIN — IPRATROPIUM BROMIDE AND ALBUTEROL SULFATE 3 ML: 2.5; .5 SOLUTION RESPIRATORY (INHALATION) at 19:22

## 2021-02-07 RX ADMIN — METHYLPREDNISOLONE SODIUM SUCCINATE 40 MG: 40 INJECTION, POWDER, FOR SOLUTION INTRAMUSCULAR; INTRAVENOUS at 09:55

## 2021-02-07 RX ADMIN — ARFORMOTEROL TARTRATE 15 MCG: 15 SOLUTION RESPIRATORY (INHALATION) at 09:21

## 2021-02-07 RX ADMIN — BUDESONIDE 0.5 MG: 0.5 INHALANT RESPIRATORY (INHALATION) at 19:22

## 2021-02-07 RX ADMIN — IPRATROPIUM BROMIDE AND ALBUTEROL SULFATE 3 ML: 2.5; .5 SOLUTION RESPIRATORY (INHALATION) at 16:15

## 2021-02-07 RX ADMIN — DOXYCYCLINE 100 MG: 100 CAPSULE ORAL at 09:55

## 2021-02-07 RX ADMIN — ARFORMOTEROL TARTRATE 15 MCG: 15 SOLUTION RESPIRATORY (INHALATION) at 19:22

## 2021-02-07 RX ADMIN — IPRATROPIUM BROMIDE AND ALBUTEROL SULFATE 3 ML: 2.5; .5 SOLUTION RESPIRATORY (INHALATION) at 09:20

## 2021-02-07 RX ADMIN — LORAZEPAM 1 MG: 1 TABLET ORAL at 17:55

## 2021-02-07 RX ADMIN — DOXYCYCLINE 100 MG: 100 CAPSULE ORAL at 19:59

## 2021-02-07 RX ADMIN — BUDESONIDE 0.5 MG: 0.5 INHALANT RESPIRATORY (INHALATION) at 09:21

## 2021-02-07 RX ADMIN — SODIUM CHLORIDE, PRESERVATIVE FREE 10 ML: 5 INJECTION INTRAVENOUS at 09:56

## 2021-02-07 RX ADMIN — ENOXAPARIN SODIUM 40 MG: 40 INJECTION SUBCUTANEOUS at 09:55

## 2021-02-07 RX ADMIN — LORAZEPAM 0.5 MG: 0.5 TABLET ORAL at 09:54

## 2021-02-07 RX ADMIN — SODIUM CHLORIDE, PRESERVATIVE FREE 10 ML: 5 INJECTION INTRAVENOUS at 20:00

## 2021-02-07 RX ADMIN — IPRATROPIUM BROMIDE AND ALBUTEROL SULFATE 3 ML: 2.5; .5 SOLUTION RESPIRATORY (INHALATION) at 12:45

## 2021-02-07 NOTE — PLAN OF CARE
Goal Outcome Evaluation:        Outcome Summary: VSS.  3.5 L NC.  NSR.  Ativan given for anxiety.  No acute overnight events.  Will continue to  monitor.

## 2021-02-07 NOTE — PLAN OF CARE
Goal Outcome Evaluation:   VSS on 3.5L. Patient anxious, prn ativan given. No other complaints.

## 2021-02-07 NOTE — PROGRESS NOTES
[facility] Medicine Services  PROGRESS NOTE    Patient Name: Balwinder Willams  : 1965  MRN: [id number]    Date of Admission: 2/3/2021  Primary Care Physician: Obinna Arellano MD    Subjective   Subjective     CC: Follow-up SOA    HPI: No acute events overnight, patient that he feels fair, did get some rest, continues to have some dyspnea with exertion.    ROS:  Gen- No fevers, chills  CV- No chest pain, palpitations  Resp- No cough, +dyspnea  GI- No N/V/D, abd pain    All other systems reviewed and currently negative.    Objective   Objective     Vital Signs:   Temp:  [96.4 °F (35.8 °C)-98.6 °F (37 °C)] 98.5 °F (36.9 °C)  Heart Rate:  [] 79  Resp:  [16-24] 18  BP: (107-129)/(70-94) 107/70        Physical Exam:  Constitutional: No acute distress, awake, alert  HENT: NCAT, mucous membranes moist  Respiratory: Moderately labored respirations, diffuse coarse breath sounds on 3.5 L, left chest tube in place, clamped cardiovascular: RRR, no murmurs, rubs, or gallops  Gastrointestinal: Positive bowel sounds, soft, nontender, nondistended  Musculoskeletal: No bilateral ankle edema  Psychiatric: Appropriate affect, cooperative  Neurologic: Oriented x 3, nonfocal  Skin: No rashes    Results Reviewed:  Results from last 7 days   Lab Units 21  0647 21  2152   WBC 10*3/mm3 6.52 10.78   HEMOGLOBIN g/dL 13.4 14.7   HEMATOCRIT % 41.3 45.1   PLATELETS 10*3/mm3 287 304   PROCALCITONIN ng/mL  --  0.07     Results from last 7 days   Lab Units 21  0647 21  2152   SODIUM mmol/L 138 138   POTASSIUM mmol/L 4.4 4.4   CHLORIDE mmol/L 104 101   CO2 mmol/L 24.0 28.0   BUN mg/dL 12 15   CREATININE mg/dL 0.82 0.92   GLUCOSE mg/dL 189* 173*   CALCIUM mg/dL 9.0 8.9   ALT (SGPT) U/L 17 21   AST (SGOT) U/L 15 20   TROPONIN T ng/mL  --  <0.010   PROBNP pg/mL  --  74.4     Estimated Creatinine Clearance: 84.2 mL/min (by C-G formula based on SCr of 0.82 mg/dL).    Microbiology Results  Abnormal     Procedure Component Value - Date/Time    Blood Culture - Blood, Arm, Right [[id number]] Collected: 02/03/21 2221    Lab Status: Preliminary result Specimen: Blood from Arm, Right Updated: 02/06/21 2331     Blood Culture No growth at 3 days    Blood Culture - Blood, Arm, Left [[id number]] Collected: 02/03/21 2221    Lab Status: Preliminary result Specimen: Blood from Arm, Left Updated: 02/06/21 2331     Blood Culture No growth at 3 days    Respiratory Culture - Sputum, Cough [[id number]] Collected: 02/04/21 0253    Lab Status: Final result Specimen: Sputum from Cough Updated: 02/06/21 1041     Respiratory Culture Heavy growth (4+) Normal Respiratory Samantha: NO S.aureus/MRSA or Pseudomonas aeruginosa     Gram Stain Few (2+) WBCs per low power field      Occasional Epithelial cells per low power field      Few (2+) Normal respiratory samantha    COVID PRE-OP / PRE-PROCEDURE SCREENING ORDER (NO ISOLATION) - Swab, Nasopharynx [[id number]]  (Normal) Collected: 02/04/21 0059    Lab Status: Final result Specimen: Swab from Nasopharynx Updated: 02/04/21 0336    Narrative:      The following orders were created for panel order COVID PRE-OP / PRE-PROCEDURE SCREENING ORDER (NO ISOLATION) - Swab, Nasopharynx.  Procedure                               Abnormality         Status                     ---------                               -----------         ------                     COVID-19 and FLU A/B PCR...[[id number]]  Normal              Final result                 Please view results for these tests on the individual orders.    COVID-19 and FLU A/B PCR - Swab, Nasopharynx [[id number]]  (Normal) Collected: 02/04/21 0059    Lab Status: Final result Specimen: Swab from Nasopharynx Updated: 02/04/21 0336     COVID19 Not Detected     Influenza A PCR Not Detected     Influenza B PCR Not Detected    Narrative:      Fact sheet for providers: https://www.fda.gov/media/517525/download    Fact sheet for patients:  https://www.fda.gov/media/276262/download    Test performed by PCR.          Imaging Results (Last 24 Hours)     ** No results found for the last 24 hours. **          Results for orders placed during the hospital encounter of 07/17/19   Adult Transthoracic Echo Complete W/ Cont if Necessary Per Protocol    Narrative · Left ventricular systolic function is normal. Estimated EF = 65%.  · No significant valvular abnormalities.          I have reviewed the medications:  Scheduled Meds:arformoterol, 15 mcg, Nebulization, BID - RT  budesonide, 0.5 mg, Nebulization, BID - RT  doxycycline, 100 mg, Oral, Q12H  enoxaparin, 40 mg, Subcutaneous, Q24H  famotidine, 40 mg, Oral, Daily  fluticasone, 2 spray, Nasal, Daily  ipratropium-albuterol, 3 mL, Nebulization, 4x Daily - RT  methylPREDNISolone sodium succinate, 40 mg, Intravenous, Q12H  pharmacy consult - MTM, , Does not apply, Daily  sodium chloride, 10 mL, Intravenous, Q12H      Continuous Infusions:   PRN Meds:.•  acetaminophen **OR** acetaminophen **OR** acetaminophen  •  guaiFENesin  •  ipratropium-albuterol  •  LORazepam  •  melatonin  •  ondansetron  •  sodium chloride  •  sodium chloride    Assessment/Plan   Assessment & Plan     Active Hospital Problems    Diagnosis  POA   • **COPD exacerbation (CMS/[facility]) [J44.1]  Yes   • Acute on chronic respiratory failure with hypoxemia (CMS/[facility]) [J96.21]  Yes   • Stage IV emphysema s/p EBV placement X3 w/ subsequent extraction [J44.9]  Yes   • Alpha-1-antitrypsin deficiency (on replacement) [E88.01]  Yes      Resolved Hospital Problems   No resolved problems to display.        Brief Hospital Course to date:  Balwinder Willams is a 55 y.o. male with past medical history of alpha-1 antitrypsin deficiency, stage IV emphysema, with recurrent pneumothoraces s/p EBV X3, COPD who presented with progressively worsening shortness of air, admitted with acute COPD exacerbation     Acute on chronic hypoxic and hypercapnic respiratory failure,  secondary to COPD exacerbation, in the setting of stage IV emphysema  -Pulmonary currently following, recommend to continue steroids Solu-Medrol 40 mg bid, doxycycline for 5 days end date 2/8/2021, duo nebs, Pulmicort, Brovana  -He is currently on 3.5 L NC, continue to wean as tolerated  -Left chest tube remains in place, clamped, plan to keep this through his hospitalization and discharge  -Patient has a follow-up appointment with [facility] on 2/9/2021, will continue his current  treatment as above with plans to discharge possibly Monday 2/8/21 so that he can keep this appointment.  -Encouraged to ambulate     DVT Prophylaxis: Lovenox     Disposition: Anticipate discharge home 2/8/2021, CM following.    CODE STATUS:   Code Status and Medical Interventions:   Ordered at: 02/03/21 8148     Code Status:    CPR     Medical Interventions (Level of Support Prior to Arrest):    Full       Apollo Lopez MD  02/07/21

## 2021-02-07 NOTE — DISCHARGE SUMMARY
[facility] Medicine Services  DISCHARGE SUMMARY    Patient Name: Balwinder Willams  : 1965  MRN: [id number]    Date of Admission: 2/3/2021  9:36 PM  Date of Discharge: 2021  Primary Care Physician: Obinna Arellano MD    Consults     Date and Time Order Name Status Description    2021 0100 Inpatient Pulmonology Consult Completed           Hospital Course     Presenting Problem:   COPD exacerbation (CMS/[facility]) [J44.1]  COPD exacerbation (CMS/[facility]) [J44.1]    Active Hospital Problems    Diagnosis  POA   • **COPD exacerbation (CMS/[facility]) [J44.1]  Yes   • Acute on chronic respiratory failure with hypoxemia (CMS/[facility]) [J96.21]  Yes   • Stage IV emphysema s/p EBV placement X3 w/ subsequent extraction [J44.9]  Yes   • Alpha-1-antitrypsin deficiency (on replacement) [E88.01]  Yes      Resolved Hospital Problems   No resolved problems to display.        Hospital Course:  Balwinder Willams is a 55 y.o. male with past medical history of alpha-1 antitrypsin deficiency, stage IV emphysema, with recurrent pneumothoraces s/p EBV X3, (endobronchial valves were placed in 2019, with subsequent removal of several valves in 2020 for lower respiratory infection with Pseudomonas, since then he has had recurrent pneumothoraces and had one valve replaced in one of the basilar segments of the left lower lobe in 2020.  Due to these recurrent pneumothoraces, he did end up with placement of left chest tube with a valve that remains in place).  He presented back to [facility] 2/3/2021 with progressively worsening shortness of air and was admitted admitted with acute COPD exacerbation.     Acute on chronic hypoxic and hypercapnic respiratory failure, secondary to COPD exacerbation, in the setting of stage IV emphysema  -Pulmonary was consulted, recommended to continue steroids he is s/p Solu-Medrol 40 mg bid, we will d/c with prednisone 40mg QD and to complete his 5-day course of  doxycycline, continue home duo nebs, Pulmicort, Brovana   -He is currently on 3.5 L NC, left chest tube remains in place, clamped.  -He will follow-up  with [facility] on 2/9/2021    Discharge Follow Up Recommendations for outpatient labs/diagnostics:  Follow-up with [facility] as scheduled for 2/9/2021    Day of Discharge     HPI:No acute events overnight, patient rested well, still having soa, currently on 3.5L    Review of Systems  Gen- No fevers, chills  CV- No chest pain, palpitations  Resp- No cough, +dyspnea  GI- No N/V/D, abd pain    All other systems reviewed and are negative.    Vital Signs:   Temp:  [96.8 °F (36 °C)-97.8 °F (36.6 °C)] 97.3 °F (36.3 °C)  Heart Rate:  [72-98] 94  Resp:  [16-20] 16  BP: (106-126)/(69-93) 115/73     Physical Exam:  Constitutional: Chronically ill male, in no acute distress, awake, alert  HENT: NCAT, mucous membranes moist  Respiratory: Moderately labored respirations, diffuse coarse breath sounds,, on 3.5L NC, left chesttube clamped   Cardiovascular: RRR, no murmurs, rubs, or gallops  Gastrointestinal: Positive bowel sounds, soft, nontender, nondistended  Musculoskeletal: No bilateral ankle edema  Psychiatric: Appropriate affect, cooperative, anxious  Neurologic: Oriented x 3, non-focal  Skin: No rashes    Pertinent  and/or Most Recent Results     LAB RESULTS:      Lab 02/04/21  0647 02/03/21  2152   WBC 6.52 10.78   HEMOGLOBIN 13.4 14.7   HEMATOCRIT 41.3 45.1   PLATELETS 287 304   NEUTROS ABS 5.62 7.40*   IMMATURE GRANS (ABS) 0.02 0.04   LYMPHS ABS 0.77 1.51   MONOS ABS 0.09* 1.47*   EOS ABS 0.01 0.30   MCV 90.2 92.0   PROCALCITONIN  --  0.07   LACTATE  --  1.5   D DIMER QUANT  --  0.93*         Lab 02/04/21  0647 02/03/21  2152   SODIUM 138 138   POTASSIUM 4.4 4.4   CHLORIDE 104 101   CO2 24.0 28.0   ANION GAP 10.0 9.0   BUN 12 15   CREATININE 0.82 0.92   GLUCOSE 189* 173*   CALCIUM 9.0 8.9   MAGNESIUM 2.1  --          Lab 02/04/21  0647 02/03/21  9422   TOTAL PROTEIN  7.1 7.7   ALBUMIN 4.00 4.20   GLOBULIN 3.1 3.5   ALT (SGPT) 17 21   AST (SGOT) 15 20   BILIRUBIN 0.3 0.2   ALK PHOS 84 90         Lab 02/03/21  2152   PROBNP 74.4   TROPONIN T <0.010                 Lab 02/03/21  2250   PH, ARTERIAL 7.398   PCO2, ARTERIAL 43.8   PO2 ART 66.4*   FIO2 40   HCO3 ART 27.0*   BASE EXCESS ART 1.7   CARBOXYHEMOGLOBIN 0.9     Brief Urine Lab Results  (Last result in the past 365 days)      Color   Clarity   Blood   Leuk Est   Nitrite   Protein   CREAT   Urine HCG        12/19/20 0355 Yellow Clear Negative Negative Negative Trace             Microbiology Results (last 10 days)     Procedure Component Value - Date/Time    Respiratory Culture - Sputum, Cough [[id number]] Collected: 02/04/21 0253    Lab Status: Final result Specimen: Sputum from Cough Updated: 02/06/21 1041     Respiratory Culture Heavy growth (4+) Normal Respiratory Samantha: NO S.aureus/MRSA or Pseudomonas aeruginosa     Gram Stain Few (2+) WBCs per low power field      Occasional Epithelial cells per low power field      Few (2+) Normal respiratory samantha    COVID PRE-OP / PRE-PROCEDURE SCREENING ORDER (NO ISOLATION) - Swab, Nasopharynx [[id number]]  (Normal) Collected: 02/04/21 0059    Lab Status: Final result Specimen: Swab from Nasopharynx Updated: 02/04/21 0336    Narrative:      The following orders were created for panel order COVID PRE-OP / PRE-PROCEDURE SCREENING ORDER (NO ISOLATION) - Swab, Nasopharynx.  Procedure                               Abnormality         Status                     ---------                               -----------         ------                     COVID-19 and FLU A/B PCR...[[id number]]  Normal              Final result                 Please view results for these tests on the individual orders.    COVID-19 and FLU A/B PCR - Swab, Nasopharynx [[id number]]  (Normal) Collected: 02/04/21 0059    Lab Status: Final result Specimen: Swab from Nasopharynx Updated: 02/04/21 0336     COVID19 Not Detected      Influenza A PCR Not Detected     Influenza B PCR Not Detected    Narrative:      Fact sheet for providers: https://www.fda.gov/media/992022/download    Fact sheet for patients: https://www.fda.gov/media/451060/download    Test performed by PCR.    Blood Culture - Blood, Arm, Right [[id number]] Collected: 02/03/21 2221    Lab Status: Preliminary result Specimen: Blood from Arm, Right Updated: 02/07/21 2331     Blood Culture No growth at 4 days    Blood Culture - Blood, Arm, Left [[id number]] Collected: 02/03/21 2221    Lab Status: Preliminary result Specimen: Blood from Arm, Left Updated: 02/07/21 2331     Blood Culture No growth at 4 days          Ct Chest Without Contrast Diagnostic    Result Date: 2/3/2021  CT Chest WO INDICATION: Shortness of breath on arrival TECHNIQUE: CT of the thorax without IV contrast. Coronal and sagittal reconstructions were obtained.  Radiation dose reduction techniques included automated exposure control or exposure modulation based on body size. Count of known CT and cardiac nuc med studies performed in previous 12 months: 4. COMPARISON: 12/31/2020 FINDINGS: Chest images at mediastinal window show no adenopathy or effusions. Heart size is normal. Chest images at lung window demonstrate emphysema. There is a large bleb at the right lung base. Multiple endobronchial valves are seen in the left lower lobe. There is volume loss seen in the left lower lung field along the major fissure. No new infiltrates are seen. No evidence of pneumothorax.     Severe emphysema. No evidence of pneumothorax on either side. Stable postoperative changes on the left. Signer Name: Sammy Harman MD  Signed: 2/3/2021 11:15 PM  Workstation Name: [id number]  [facility]    Ct Angiogram Chest With & Without Contrast    Result Date: 2/4/2021  CT ANGIOGRAM CHEST W WO CONTRAST INDICATION: Chronic emphysema. Shortness of breath and hypoxia on arrival. TECHNIQUE: CT angiogram of the chest  with 100 cc of Isovue-300 IV contrast. 3-D reconstructions were obtained and reviewed.   Radiation dose reduction techniques included automated exposure control or exposure modulation based on body size. Count of known CT and cardiac nuc med studies performed in previous 12 months: 1. COMPARISON: Noncontrasted study from February 3, 2021 FINDINGS: Chest images at mediastinal window show good filling of the pulmonary arteries. There are no pulmonary artery filling defects to suggest emboli. The CT angiographic images also show no evidence of emboli. No new infiltrates are seen since the earlier examination.     No evidence of pulmonary embolism Signer Name: Sammy Harman MD  Signed: 2/4/2021 12:26 AM  Workstation Name: [facility] [employer]  [facility]    Xr Chest 1 View    Result Date: 2/3/2021  CR Chest 1 Vw INDICATION: Shortness of breath on arrival COMPARISON:  1/19/2021 FINDINGS: Single portable AP view(s) of the chest.  The heart and mediastinum are stable. The lungs are emphysematous. Endobronchial occluders are seen in the left once again. There is a pigtail pleural catheter on the left it is in satisfactory position. No evidence of recurrent pneumothorax. No new focal infiltrates are seen.     Stable postoperative changes on the left. No evidence of pneumothorax. No new infiltrates are seen. Underlying emphysema is again noted. Signer Name: Sammy Harman MD  Signed: 2/3/2021 10:42 PM  Workstation Name: [employer] [facility]                Results for orders placed during the hospital encounter of 07/17/19   Adult Transthoracic Echo Complete W/ Cont if Necessary Per Protocol    Narrative · Left ventricular systolic function is normal. Estimated EF = 65%.  · No significant valvular abnormalities.          Plan for Follow-up of Pending Labs/Results: PCP  Pending Labs     Order Current Status    Blood Culture - Blood, Arm, Left Preliminary result    Blood Culture -  Blood, Arm, Right Preliminary result        Discharge Details        Discharge Medications      New Medications      Instructions Start Date   doxycycline 100 MG capsule  Commonly known as: MONODOX   100 mg, Oral, Every 12 Hours Scheduled      predniSONE 20 MG tablet  Commonly known as: DELTASONE   40 mg, Oral, Daily         Changes to Medications      Instructions Start Date   fluticasone 50 MCG/ACT nasal spray  Commonly known as: Flonase  What changed:   · when to take this  · reasons to take this   2 sprays, Nasal, Daily      ipratropium-albuterol 0.5-2.5 mg/3 ml nebulizer  Commonly known as: DUO-NEB  What changed:   · when to take this  · reasons to take this   3 mL, Nebulization, 4 Times Daily - RT      ipratropium-albuterol  MCG/ACT inhaler  Commonly known as: COMBIVENT RESPIMAT  What changed: Another medication with the same name was changed. Make sure you understand how and when to take each.   1 puff, Inhalation, 4 Times Daily PRN      LORazepam 1 MG tablet  Commonly known as: ATIVAN  What changed:   · medication strength  · how much to take  · when to take this   1 mg, Oral, Every 6 Hours PRN         Continue These Medications      Instructions Start Date   arformoterol 15 MCG/2ML nebulizer solution  Commonly known as: BROVANA   15 mcg, Nebulization, 2 Times Daily - RT      budesonide 0.5 MG/2ML nebulizer solution  Commonly known as: PULMICORT   0.5 mg, Nebulization, 2 Times Daily - RT      guaiFENesin ER 1200 MG tablet sustained-release 12 hour   1,200 mg, Oral, Daily PRN      ibuprofen 800 MG tablet  Commonly known as: ADVIL,MOTRIN   800 mg, Oral, Every 6 Hours PRN      Prolastin-C 1000 MG/20ML IV solution  Generic drug: Alpha1-Proteinase Inhibitor   60 mg/kg, Intravenous, Weekly             Allergies   Allergen Reactions   • Other Other (See Comments)     Opioid Analgesics (recovering addict)       Discharge Disposition:Home      Diet:  Hospital:  Diet Order   Procedures   • Diet Regular          Activity: As tolerated      Restrictions or Other Recommendations:None       CODE STATUS:    Code Status and Medical Interventions:   Ordered at: 02/03/21 2333     Code Status:    CPR     Medical Interventions (Level of Support Prior to Arrest):    Full       No future appointments.    Additional Instructions for the Follow-ups that You Need to Schedule     Ambulatory Referral to Home Health   As directed      Face to Face Visit Date: 2/5/2021    Follow-up provider for Plan of Care?: I treated the patient in an acute care facility and will not continue treatment after discharge.    Follow-up provider: GALE ARIZMENDI [[id number]]    Reason/Clinical Findings: COPD, emphysema, lung thoracic pain    Describe mobility limitations that make leaving home difficult: Impaired functional mobility, balance, gait, and endurance    Nursing/Therapeutic Services Requested: Skilled Nursing    Skilled nursing orders: Other (Resume skilled nursing per previous orders)    Frequency: 1 Week 1               Apollo Lopez MD  02/08/21    Time Spent on Discharge:  I spent  25 minutes on this discharge activity which included: face-to-face encounter with the patient, reviewing the data in the system, coordination of the care with the nursing staff as well as consultants, documentation, and entering orders.

## 2021-02-08 VITALS
RESPIRATION RATE: 18 BRPM | TEMPERATURE: 97.2 F | WEIGHT: 129 LBS | OXYGEN SATURATION: 94 % | DIASTOLIC BLOOD PRESSURE: 64 MMHG | HEART RATE: 89 BPM | HEIGHT: 68 IN | SYSTOLIC BLOOD PRESSURE: 103 MMHG | BODY MASS INDEX: 19.55 KG/M2

## 2021-02-08 LAB
BACTERIA SPEC AEROBE CULT: NORMAL
BACTERIA SPEC AEROBE CULT: NORMAL

## 2021-02-08 PROCEDURE — 94799 UNLISTED PULMONARY SVC/PX: CPT

## 2021-02-08 PROCEDURE — 99238 HOSP IP/OBS DSCHRG MGMT 30/<: CPT | Performed by: INTERNAL MEDICINE

## 2021-02-08 PROCEDURE — 25010000002 ENOXAPARIN PER 10 MG: Performed by: INTERNAL MEDICINE

## 2021-02-08 PROCEDURE — 25010000002 METHYLPREDNISOLONE PER 40 MG: Performed by: INTERNAL MEDICINE

## 2021-02-08 RX ORDER — DOXYCYCLINE 100 MG/1
100 CAPSULE ORAL EVERY 12 HOURS SCHEDULED
Qty: 3 CAPSULE | Refills: 0 | Status: SHIPPED | OUTPATIENT
Start: 2021-02-08 | End: 2021-02-10

## 2021-02-08 RX ORDER — PREDNISONE 20 MG/1
40 TABLET ORAL DAILY
Qty: 10 TABLET | Refills: 0 | Status: SHIPPED | OUTPATIENT
Start: 2021-02-08 | End: 2021-02-13

## 2021-02-08 RX ORDER — LORAZEPAM 1 MG/1
1 TABLET ORAL EVERY 6 HOURS PRN
Qty: 12 TABLET | Refills: 0 | Status: SHIPPED | OUTPATIENT
Start: 2021-02-08 | End: 2021-02-11

## 2021-02-08 RX ADMIN — BUDESONIDE 0.5 MG: 0.5 INHALANT RESPIRATORY (INHALATION) at 08:00

## 2021-02-08 RX ADMIN — DOXYCYCLINE 100 MG: 100 CAPSULE ORAL at 08:54

## 2021-02-08 RX ADMIN — METHYLPREDNISOLONE SODIUM SUCCINATE 40 MG: 40 INJECTION, POWDER, FOR SOLUTION INTRAMUSCULAR; INTRAVENOUS at 09:02

## 2021-02-08 RX ADMIN — LORAZEPAM 1 MG: 1 TABLET ORAL at 11:41

## 2021-02-08 RX ADMIN — ENOXAPARIN SODIUM 40 MG: 40 INJECTION SUBCUTANEOUS at 08:54

## 2021-02-08 RX ADMIN — LORAZEPAM 1 MG: 1 TABLET ORAL at 05:00

## 2021-02-08 RX ADMIN — IPRATROPIUM BROMIDE AND ALBUTEROL SULFATE 3 ML: 2.5; .5 SOLUTION RESPIRATORY (INHALATION) at 08:00

## 2021-02-08 RX ADMIN — SODIUM CHLORIDE, PRESERVATIVE FREE 10 ML: 5 INJECTION INTRAVENOUS at 08:54

## 2021-02-08 RX ADMIN — ARFORMOTEROL TARTRATE 15 MCG: 15 SOLUTION RESPIRATORY (INHALATION) at 08:00

## 2021-02-08 RX ADMIN — FAMOTIDINE 40 MG: 20 TABLET, FILM COATED ORAL at 08:54

## 2021-02-08 NOTE — PROGRESS NOTES
"PULMONARY PROGRESS NOTE    Patient Care Team:  Obinna Arellano MD as PCP - General (Adolescent Medicine)    Reason for Consult   COPD exacerbation  Alpha 1 antitrypsin deficiency  Chronic hypoxic respiratory failure  History of recurrent pneumothorax    Subjective     55-year-old gentleman with severe stage IV emphysema, alpha-1 antitrypsin deficiency on chronic home oxygen in undergoing transplant evaluation.  He had endobronchial valve placement in October 2019 with subsequent removal because of a Pseudomonas lower respiratory tract infection.  He then developed recurrent pneumothoraces and valves were replaced in the left lower lobe in December 2020.  He did ultimately require chest tube on the left with an Heimlich valve placement.  MAC was isolated from several bronchoscopy specimens but has always been smear negative.  Left chest tube was placed on this admission for pneumothorax.  He has an appointment with the [facility] on February 9 for transplant evaluation.  He presented to [facility] on February 4 with acute shortness of air increasing oxygen requirement.  CTA of the chest revealed no pneumothorax.  He was placed on steroids, bronchodilators.    Interval History:   Afebrile.  3.5 L with a saturation of 94%.  Voiding without a Spangler catheter    Review of Systems:     ROS negative except for:      Objective     Vital Signs  Blood pressure 115/73, pulse 94, temperature 97.3 °F (36.3 °C), temperature source Oral, resp. rate 16, height 172.7 cm (67.99\"), weight 58.5 kg (129 lb), SpO2 94 %.    Physical Exam:  GENERAL:  HEENT:  NECK:  CHEST:  HEART:  ABDOMEN:  EXTREMITIES:  NEURO:     Results Review:    Lab Results (last 24 hours)     Procedure Component Value Units Date/Time    Blood Culture - Blood, Arm, Right [[id number]] Collected: 02/03/21 2221    Specimen: Blood from Arm, Right Updated: 02/07/21 2331     Blood Culture No growth at 4 days    Blood Culture - Blood, Arm, Left " [[id number]] Collected: 02/03/21 2221    Specimen: Blood from Arm, Left Updated: 02/07/21 2331     Blood Culture No growth at 4 days        Imaging Results (Last 24 Hours)     ** No results found for the last 24 hours. **       Sputum culture normal samantha, no MRSA or Pseudomonas    Chest x-ray on admission with emphysematous change in, left pleural pigtail catheter in place, no pneumothorax or infiltrate    arformoterol, 15 mcg, Nebulization, BID - RT  budesonide, 0.5 mg, Nebulization, BID - RT  doxycycline, 100 mg, Oral, Q12H  enoxaparin, 40 mg, Subcutaneous, Q24H  famotidine, 40 mg, Oral, Daily  fluticasone, 2 spray, Nasal, Daily  ipratropium-albuterol, 3 mL, Nebulization, 4x Daily - RT  methylPREDNISolone sodium succinate, 40 mg, Intravenous, Q12H  pharmacy consult - MTM, , Does not apply, Daily  sodium chloride, 10 mL, Intravenous, Q12H          COPD exacerbation (CMS/[facility])    Alpha-1-antitrypsin deficiency (on replacement)    Stage IV emphysema s/p EBV placement X3 w/ subsequent extraction    Acute on chronic respiratory failure with hypoxemia (CMS/[facility])      Assessment/Plan     55-year-old gentleman with alpha-1 antitrypsin deficiency, stage IV emphysema here with an exacerbation.  He has had recurrent left pneumothoraces and currently has a left pigtail catheter in place with Heimlich valve.  There is no pneumothorax on chest x-ray.  He has had previous endobronchial valve placement.  He is now admitted with an exacerbation.  Oxygenation has improved and he is currently requiring 3.5 L nasal cannula.  Sputum did not grow a specific organism.    Budesonide nebulized twice daily  Brovana nebulized twice daily  Transition steroids to p.o.  DuoNeb 4 times daily  Pepcid for ulcer prophylaxis  Lovenox for DVT prophylaxis  Oral doxycycline        Aileen Casey MD  02/08/21  09:05 EST  Patient left before I could evaluate him and therefore I cannot complete this note and will not bill for this visit

## 2021-02-08 NOTE — PROGRESS NOTES
Case Management Discharge Note      Final Note: Patient will return home with [facility] to follow for home health. CM has notified them of discharge for today. He already has a rollator through [facility] and home 02 with [facility] and a portable tank. Daughter plans to transport. No other needs identified - arabella [phone]  Resumption of care orders have been place already for home health and CM will notify them patient wishes for their first visit to be on Wed this week as he has appt at  on Tues.        Selected Continued Care - Admitted Since 2/3/2021     Destination    No services have been selected for the patient.              Durable Medical Equipment    No services have been selected for the patient.              Dialysis/Infusion    No services have been selected for the patient.              Home Medical Care Coordination complete    Service Provider Selected Services Address Phone Fax Patient Preferred    [facility],[place]  [facility] [facility] [address] [phone] --          Therapy    No services have been selected for the patient.              Community Resources    No services have been selected for the patient.                Selected Continued Care - Prior Encounters Includes selections from prior encounters from 11/5/2020 to 2/8/2021    Discharged on 12/27/2020 Admission date: 12/18/2020 - Discharge [place] [facility]    [place]     Service Provider Selected Services Address Phone Fax Patient Preferred    [facility] [address] [facility] [address] [phone] --                Discharged on 11/18/2020 Admission date: 11/8/2020 - Discharge disposition: Home or Self Care    Durable Medical Equipment     Service Provider Selected Services Address Phone Fax Patient Preferred    [facility] [place]  Durable Medical Equipment [address], [facility] [address] [phone] --                         Final Discharge Disposition Code: 06 - home with home health care

## 2021-02-08 NOTE — PLAN OF CARE
Goal Outcome Evaluation:  Plan of Care Reviewed With: patient  Progress: no change  Outcome Summary: VSS. Pt on 3.5L NC. Denies any complaints or needs. Will continue to monitor.

## 2021-02-09 ENCOUNTER — OFFICE VISIT (OUTPATIENT)
Dept: PULMONOLOGY | Facility: CLINIC | Age: 56
End: 2021-02-09

## 2021-02-09 ENCOUNTER — READMISSION MANAGEMENT (OUTPATIENT)
Dept: CALL CENTER | Facility: HOSPITAL | Age: 56
End: 2021-02-09

## 2021-02-09 VITALS
DIASTOLIC BLOOD PRESSURE: 70 MMHG | TEMPERATURE: 97.1 F | HEIGHT: 68 IN | WEIGHT: 117 LBS | OXYGEN SATURATION: 95 % | BODY MASS INDEX: 17.73 KG/M2 | HEART RATE: 94 BPM | SYSTOLIC BLOOD PRESSURE: 102 MMHG

## 2021-02-09 DIAGNOSIS — Z99.81 DEPENDENCE ON SUPPLEMENTAL OXYGEN: Chronic | ICD-10-CM

## 2021-02-09 DIAGNOSIS — E88.01 ALPHA-1-ANTITRYPSIN DEFICIENCY (HCC): Chronic | ICD-10-CM

## 2021-02-09 DIAGNOSIS — J96.21 ACUTE ON CHRONIC RESPIRATORY FAILURE WITH HYPOXEMIA (HCC): ICD-10-CM

## 2021-02-09 DIAGNOSIS — J44.9 COPD MIXED TYPE (HCC): Primary | Chronic | ICD-10-CM

## 2021-02-09 PROCEDURE — 99214 OFFICE O/P EST MOD 30 MIN: CPT | Performed by: NURSE PRACTITIONER

## 2021-02-09 NOTE — OUTREACH NOTE
Prep Survey      Responses   [facility] facility patient discharged from?  [place]   Is LACE score < 7 ?  No   Emergency Room discharge w/ pulse ox?  No   Eligibility  Readm Mgmt   Discharge diagnosis  COPD   Does the patient have one of the following disease processes/diagnoses(primary or secondary)?  COPD/Pneumonia   Does the patient have Home health ordered?  Yes   What is the Home health agency?   [facility]   Is there a DME ordered?  No   Comments regarding appointments  call for apmts   Prep survey completed?  Yes          Adelaida Orlando RN

## 2021-02-09 NOTE — PROGRESS NOTES
[facility], [place] Pulmonary Follow up    CHIEF COMPLAINT    D/C chest tube drain    HISTORY OF PRESENT ILLNESS    Balwinder Willams is a 55 y.o.male here today for removal of his chest tube.  He was last seen in the office by me on 1/19.  He was admitted to [facility] on 2/3 for a COPD exacerbation.  He was treated with antibiotics and steroids.  He was also sent home on a prednisone taper.    He continues to use Pulmicort and Brovana nebulizers twice a day.  He also uses his Combivent as needed for shortness of breath.  He is anxious to get his chest tube removed.    He remains on 3 L continuously at home oxygen.  He does have shortness of breath with exertion but does recover fairly quickly at rest.    He did follow-up with  transplant center earlier today and starts the process in the next month for transplant.    He denies fever, chills, sputum production, hemoptysis, night sweats, weight loss, chest pain or palpitations.  Denies any lower extremity edema or calf tenderness.  He denies any sinus or allergy symptoms.  He denies reflux symptoms.    He remains on Ativan as needed for his anxiety and states that they increased his dosage while hospitalized this last time.  He states the increased dose has helped tremendously.    Patient Active Problem List   Diagnosis   • Alpha-1-antitrypsin deficiency (on replacement)   • Chronic cough   • Dependence on supplemental oxygen (3L NC)    • Stage IV emphysema s/p EBV placement X3 w/ subsequent extraction   • Former smoker (Resolved 2006)   • Postprocedural pneumothorax   • Persistent air leak   • Acute on chronic respiratory failure with hypoxemia (CMS/HCC)   • Severe malnutrition (CMS/HCC)   • Steroid-induced hyperglycemia   • Recurrent L PTX s/p multiple SBCTs    • Recent PSA PNA   • MAC (smear -)    • Leukocytosis   • COPD exacerbation (CMS/HCC)       Allergies   Allergen Reactions   • Other Other (See Comments)     Opioid Analgesics (recovering addict)        Current Outpatient Medications:   •  arformoterol (BROVANA) 15 MCG/2ML nebulizer solution, Take 15 mcg by nebulization 2 (Two) Times a Day., Disp: , Rfl:   •  doxycycline (MONODOX) 100 MG capsule, Take 1 capsule by mouth Every 12 (Twelve) Hours for 3 doses. Indications: other, Disp: 3 capsule, Rfl: 0  •  fluticasone (Flonase) 50 MCG/ACT nasal spray, 2 sprays into the nostril(s) as directed by provider Daily. (Patient taking differently: 2 sprays into the nostril(s) as directed by provider Daily As Needed.), Disp: 9.9 mL, Rfl: 6  •  guaiFENesin ER 1200 MG tablet sustained-release 12 hour, Take 1,200 mg by mouth Daily As Needed for Cough., Disp: , Rfl:   •  ibuprofen (ADVIL,MOTRIN) 800 MG tablet, Take 800 mg by mouth Every 6 (Six) Hours As Needed for Mild Pain ., Disp: , Rfl:   •  ipratropium-albuterol (COMBIVENT RESPIMAT)  MCG/ACT inhaler, Inhale 1 puff 4 (Four) Times a Day As Needed for Wheezing., Disp: 2 each, Rfl: 6  •  ipratropium-albuterol (DUO-NEB) 0.5-2.5 mg/3 ml nebulizer, Take 3 mL by nebulization 4 (Four) Times a Day. (Patient taking differently: Take 3 mL by nebulization 4 (Four) Times a Day As Needed.), Disp: 360 mL, Rfl:   •  LORazepam (ATIVAN) 1 MG tablet, Take 1 tablet by mouth Every 6 (Six) Hours As Needed for Anxiety for up to 3 days., Disp: 12 tablet, Rfl: 0  •  predniSONE (DELTASONE) 20 MG tablet, Take 2 tablets by mouth Daily for 5 days., Disp: 10 tablet, Rfl: 0  •  PROLASTIN-C 1000 MG/20ML solution, Infuse 60 mg/kg into a venous catheter 1 (One) Time Per Week., Disp: , Rfl:   •  budesonide (PULMICORT) 0.5 MG/2ML nebulizer solution, Take 2 mL by nebulization 2 (Two) Times a Day for 6 doses., Disp: 60 mL, Rfl: 0  MEDICATION LIST AND ALLERGIES REVIEWED.    Social History     Tobacco Use   • Smoking status: Former Smoker     Packs/day: 1.50     Years: 25.00     Pack years: 37.50     Types: Cigarettes     Start date: 1/1/1981     Quit date: 2006     Years since quitting: 15.1   • Smokeless  "tobacco: Never Used   Substance Use Topics   • Alcohol use: No     Comment: In recovery since 8/24/1994   • Drug use: Yes     Comment: In recovery since 8/24/1994       FAMILY AND SOCIAL HISTORY REVIEWED.    Review of Systems   Constitutional: Negative for activity change, appetite change, fatigue, fever and unexpected weight change.   HENT: Negative for congestion, postnasal drip, rhinorrhea, sinus pressure, sore throat and voice change.    Eyes: Negative for visual disturbance.   Respiratory: Positive for shortness of breath. Negative for cough, chest tightness and wheezing.    Cardiovascular: Negative for chest pain, palpitations and leg swelling.   Gastrointestinal: Negative for abdominal distention, abdominal pain, nausea and vomiting.   Endocrine: Negative for cold intolerance and heat intolerance.   Genitourinary: Negative for difficulty urinating and urgency.   Musculoskeletal: Negative for arthralgias, back pain and neck pain.   Skin: Negative for color change and pallor.   Allergic/Immunologic: Negative for environmental allergies and food allergies.   Neurological: Negative for dizziness, syncope, weakness and light-headedness.   Hematological: Negative for adenopathy. Does not bruise/bleed easily.   Psychiatric/Behavioral: Negative for agitation and behavioral problems.   .    /70   Pulse 94   Temp 97.1 °F (36.2 °C)   Ht 172.7 cm (67.99\")   Wt 53.1 kg (117 lb)   SpO2 95% Comment: 3  BMI 17.80 kg/m²     Immunization History   Administered Date(s) Administered   • Flu Vaccine Quad PF >36MO 11/15/2019   • Flulaval/Fluarix/Fluzone Quad 11/18/2020   • Influenza, Unspecified 11/18/2019   • Pneumococcal Conjugate 13-Valent (PCV13) 12/10/2015, 11/18/2019   • flucelvax quad pfs =>4 YRS 11/18/2019       Physical Exam  Vitals signs and nursing note reviewed.   Constitutional:       Appearance: He is well-developed. He is not diaphoretic.   HENT:      Head: Normocephalic and atraumatic.   Eyes:      " Pupils: Pupils are equal, round, and reactive to light.   Neck:      Musculoskeletal: Normal range of motion and neck supple.      Thyroid: No thyromegaly.   Cardiovascular:      Rate and Rhythm: Normal rate and regular rhythm.      Heart sounds: Normal heart sounds. No murmur. No friction rub. No gallop.    Pulmonary:      Effort: Pulmonary effort is normal. No respiratory distress.      Breath sounds: Normal breath sounds. No wheezing or rales.   Chest:      Chest wall: No tenderness.   Abdominal:      General: Bowel sounds are normal.      Palpations: Abdomen is soft.      Tenderness: There is no abdominal tenderness.   Musculoskeletal: Normal range of motion.   Lymphadenopathy:      Cervical: No cervical adenopathy.   Skin:     General: Skin is warm and dry.      Capillary Refill: Capillary refill takes less than 2 seconds.   Neurological:      Mental Status: He is alert and oriented to person, place, and time.   Psychiatric:         Behavior: Behavior normal.           RESULTS    Chest PA/lateral: Official report pending    PROBLEM LIST    Problem List Items Addressed This Visit        Pulmonary and Pneumonias    Alpha-1-antitrypsin deficiency (on replacement) (Chronic)    Overview     A.  Labs of 11/20/2014 reports an alpha 1 antitrypsin deficiency of 4.7 with a phenotype with Z bands detected and genotyping revealing the presence of most common deficiency allele type Z and an inferred non-expressing null allele.    B.  On Zemaira         Relevant Medications    ipratropium-albuterol (COMBIVENT RESPIMAT)  MCG/ACT inhaler    Other Relevant Orders    XR Chest PA & Lateral    Dependence on supplemental oxygen (3L NC)  (Chronic)    Relevant Orders    XR Chest PA & Lateral    Stage IV emphysema s/p EBV placement X3 w/ subsequent extraction - Primary (Chronic)    Relevant Medications    ipratropium-albuterol (COMBIVENT RESPIMAT)  MCG/ACT inhaler    Other Relevant Orders    XR Chest PA & Lateral    Acute  on chronic respiratory failure with hypoxemia (CMS/[facility])    Relevant Orders    XR Chest PA & Lateral            DISCUSSION    Mr. Willams was here for removal of his chest tube today in the office.  Dr. Castellano removed this chest tube without difficulty in the office today.  A DuoDERM and 4 x 4 were placed over the area.  There were no immediate complications.  We did do a chest x-ray 15 minutes after the procedure was completed and Dr. Marie looked at the x-ray.  He did not have a pneumothorax and tolerated this well.    He will continue his Pulmicort and Brovana nebulizers twice a day.  I did send in a refill of his Combivent to use as needed for shortness of breath.    He will continue to use 3 L continuously oxygen.    Dr. Castellano also wrote him a prescription for Ativan as he was almost out of his medication at home.    He will follow-up in the office in 4 to 6 weeks.  I did advise him to call at any time if he were to need assistance.  I did also discussed that if he were to develop any severe shortness of breath he needs to present to the ED for further evaluation.    Level of service justified based on 40 minutes spent in patient care on this date of service including, but not limited to: preparing to see the patient, obtaining and/or reviewing history, performing medically appropriate examination, ordering tests/medicine/procedures, independently interpreting results, documenting clinical information in EHR, and counseling/education of patient/family/caregiver. (Level 4 30-39 minutes; Level 5 40-54 minutes)      JESSICA Hyatt  02/09/202113:42 EST  Electronically signed     Please note that portions of this note were completed with a voice recognition program. Efforts were made to edit the dictations, but occasionally words are mistranscribed.      CC: Obinna Arellano MD

## 2021-02-10 ENCOUNTER — READMISSION MANAGEMENT (OUTPATIENT)
Dept: CALL CENTER | Facility: HOSPITAL | Age: 56
End: 2021-02-10

## 2021-02-10 NOTE — OUTREACH NOTE
COPD/PN Week 1 Survey      Responses   [facility] patient discharged from?  [place]   Does the patient have one of the following disease processes/diagnoses(primary or secondary)?  COPD/Pneumonia   Was the primary reason for admission:  COPD exacerbation   Week 1 attempt successful?  Yes   Call start time  1015   Call end time  1019   Discharge diagnosis  COPD   Meds reviewed with patient/caregiver?  Yes   Is the patient having any side effects they believe may be caused by any medication additions or changes?  No   Does the patient have all medications ordered at discharge?  Yes   Is the patient taking all medications as directed (includes completed medication regime)?  Yes   What is the Home health agency?   [facility]   What DME was ordered?  [facility] O2 nebulizer   Has all DME been delivered?  Yes   DME comments  .   Psychosocial issues?  No   Comments  Pt reports that his SOA is the same if not worse.    Did the patient receive a copy of their discharge instructions?  Yes   Nursing interventions  Reviewed instructions with patient   What is the patient's perception of their health status since discharge?  Same   If the patient is a current smoker, are they able to teach back resources for cessation?  Not a smoker   Patient reports what zone on this call?  Yellow Zone   Yellow Zone  Increased shortness of air, Unable to complete daily activities   Yellow interventions  Use oxygen as ordered, Continue to use daily medications, Use quick relief inhaler as ordered   Week 1 call completed?  Yes   Wrap up additional comments  Short call, difficult to get any info from pt.           Tiki Yee, RN

## 2021-02-13 DIAGNOSIS — J43.2 CENTRILOBULAR EMPHYSEMA (HCC): ICD-10-CM

## 2021-02-15 RX ORDER — BUDESONIDE AND FORMOTEROL FUMARATE DIHYDRATE 160; 4.5 UG/1; UG/1
AEROSOL RESPIRATORY (INHALATION)
Qty: 30.6 G | OUTPATIENT
Start: 2021-02-15

## 2021-02-17 ENCOUNTER — READMISSION MANAGEMENT (OUTPATIENT)
Dept: CALL CENTER | Facility: HOSPITAL | Age: 56
End: 2021-02-17

## 2021-02-17 NOTE — OUTREACH NOTE
COPD/PN Week 2 Survey      Responses   [facility] patient discharged from?  [place]   Does the patient have one of the following disease processes/diagnoses(primary or secondary)?  COPD/Pneumonia   Was the primary reason for admission:  COPD exacerbation   Week 2 attempt successful?  No   Unsuccessful attempts  Attempt 1          Nell Troncoso RN

## 2021-02-19 ENCOUNTER — READMISSION MANAGEMENT (OUTPATIENT)
Dept: CALL CENTER | Facility: HOSPITAL | Age: 56
End: 2021-02-19

## 2021-02-19 NOTE — OUTREACH NOTE
COPD/PN Week 2 Survey      Responses   [facility] patient discharged from?  [place]   Does the patient have one of the following disease processes/diagnoses(primary or secondary)?  COPD/Pneumonia   Was the primary reason for admission:  COPD exacerbation   Week 2 attempt successful?  Yes   Call start time  1352   Call end time  1359   Discharge diagnosis  COPD   Meds reviewed with patient/caregiver?  Yes   Is the patient having any side effects they believe may be caused by any medication additions or changes?  No   Does the patient have all medications ordered at discharge?  Yes   Is the patient taking all medications as directed (includes completed medication regime)?  Yes   Does the patient have a primary care provider?   Yes   Does the patient have an appointment with their PCP or specialist within 7 days of discharge?  Yes   Has the patient kept scheduled appointments due by today?  Yes   What is the Home health agency?   [facility]   Has home health visited the patient within 72 hours of discharge?  Yes   What DME was ordered?  [facility] O2 nebulizer   Has all DME been delivered?  Yes   Pulse Ox monitoring  Intermittent   Pulse Ox device source  Patient   O2 Sat comments  SAts varies btwen 92-95%   O2 Sat: education provided  Sat levels, Monitoring frequency, When to seek care   Psychosocial issues?  No   Comments  Chest tube is out and waiting to hear from transplant team.   Did the patient receive a copy of their discharge instructions?  Yes   Nursing interventions  Reviewed instructions with patient   What is the patient's perception of their health status since discharge?  Same   Nursing Interventions  Nurse provided patient education   If the patient is a current smoker, are they able to teach back resources for cessation?  Not a smoker   Is the patient/caregiver able to teach back the hierarchy of who to call/visit for symptoms/problems? PCP, Specialist, Home health nurse, Urgent Care, ED, 911   Yes   Additional teach back comments  Says he is maintaining at this point.    Is the patient able to teach back COPD zones?  Yes   Patient reports what zone on this call?  Green Zone   Green Zone  Reports doing well   Green Zone interventions:  Take daily medications   Week 2 call completed?  Yes   Wrap up additional comments  He is waiting on the transplant team.          Allie Jackson RN

## 2021-02-25 ENCOUNTER — READMISSION MANAGEMENT (OUTPATIENT)
Dept: CALL CENTER | Facility: HOSPITAL | Age: 56
End: 2021-02-25

## 2021-02-25 NOTE — OUTREACH NOTE
COPD/PN Week 3 Survey      Responses   [facility] [place] patient discharged from?  [place]   Does the patient have one of the following disease processes/diagnoses(primary or secondary)?  COPD/Pneumonia   Week 3 attempt successful?  Yes   Call start time  0839   Call end time  0849   Meds reviewed with patient/caregiver?  Yes   Is the patient taking all medications as directed (includes completed medication regime)?  Yes   Has the patient kept scheduled appointments due by today?  Yes   Pulse Ox monitoring  Intermittent   O2 Sat comments  sats staying 90's but still sob   Comments  Testing in 2 weeks for transplant, [facility], to see if eligible   What is the patient's perception of their health status since discharge?  Same   Additional teach back comments  He is getting a little worse, he is good setting, but with any activity he is more sob, Infusion once week,    Is the patient able to teach back COPD zones?  Yes   Nursing interventions  Education provided on various zones   Patient reports what zone on this call?  Yellow Zone   Yellow Zone  Increased shortness of air, Unable to complete daily activities, Increased or thicker phlegm/mucus, Using quick relief inhaler/nebulizer more often, Medication is not helping relieve symptoms, Poor Appetite   Yellow interventions  Continue to use daily medications, Use quick relief inhaler as ordered, Use oxygen as ordered, Use other meds such as steroids or antibiotics as ordered, Get plenty of rest, Call provider immediatly if symptoms do not improve   Week 3 call completed?  Yes   Wrap up additional comments  He is progressively feeling worse, he says,nothing acute, not in distress, just day by day feeling more sob. told him to call his PCP,this morning, he said he will when he gets off phone.           Michelle Parker RN

## 2021-02-26 ENCOUNTER — APPOINTMENT (OUTPATIENT)
Dept: GENERAL RADIOLOGY | Facility: HOSPITAL | Age: 56
End: 2021-02-26

## 2021-02-26 ENCOUNTER — HOSPITAL ENCOUNTER (EMERGENCY)
Facility: HOSPITAL | Age: 56
Discharge: HOME | End: 2021-02-26
Attending: EMERGENCY MEDICINE | Admitting: EMERGENCY MEDICINE

## 2021-02-26 VITALS
WEIGHT: 120 LBS | HEIGHT: 68 IN | SYSTOLIC BLOOD PRESSURE: 105 MMHG | OXYGEN SATURATION: 94 % | HEART RATE: 98 BPM | RESPIRATION RATE: 24 BRPM | DIASTOLIC BLOOD PRESSURE: 72 MMHG | BODY MASS INDEX: 18.19 KG/M2 | TEMPERATURE: 98.1 F

## 2021-02-26 DIAGNOSIS — J96.11 CHRONIC RESPIRATORY FAILURE WITH HYPOXIA, ON HOME OXYGEN THERAPY (HCC): ICD-10-CM

## 2021-02-26 DIAGNOSIS — J44.1 COPD EXACERBATION (HCC): Primary | ICD-10-CM

## 2021-02-26 DIAGNOSIS — Z99.81 CHRONIC RESPIRATORY FAILURE WITH HYPOXIA, ON HOME OXYGEN THERAPY (HCC): ICD-10-CM

## 2021-02-26 LAB
ALBUMIN SERPL-MCNC: 4.1 G/DL (ref 3.5–5.2)
ALBUMIN/GLOB SERPL: 1.4 G/DL
ALP SERPL-CCNC: 83 U/L (ref 39–117)
ALT SERPL W P-5'-P-CCNC: 22 U/L (ref 1–41)
ANION GAP SERPL CALCULATED.3IONS-SCNC: 7 MMOL/L (ref 5–15)
ARTERIAL PATENCY WRIST A: ABNORMAL
AST SERPL-CCNC: 21 U/L (ref 1–40)
ATMOSPHERIC PRESS: ABNORMAL MM[HG]
BASE EXCESS BLDA CALC-SCNC: 1.7 MMOL/L (ref 0–2)
BASOPHILS # BLD AUTO: 0.05 10*3/MM3 (ref 0–0.2)
BASOPHILS NFR BLD AUTO: 0.6 % (ref 0–1.5)
BDY SITE: ABNORMAL
BILIRUB SERPL-MCNC: 0.3 MG/DL (ref 0–1.2)
BODY TEMPERATURE: 37 C
BUN SERPL-MCNC: 13 MG/DL (ref 6–20)
BUN/CREAT SERPL: 14.4 (ref 7–25)
CALCIUM SPEC-SCNC: 9.1 MG/DL (ref 8.6–10.5)
CHLORIDE SERPL-SCNC: 104 MMOL/L (ref 98–107)
CO2 BLDA-SCNC: 28.5 MMOL/L (ref 22–33)
CO2 SERPL-SCNC: 29 MMOL/L (ref 22–29)
COHGB MFR BLD: 0.7 % (ref 0–2)
CREAT SERPL-MCNC: 0.9 MG/DL (ref 0.76–1.27)
DEPRECATED RDW RBC AUTO: 44.5 FL (ref 37–54)
EOSINOPHIL # BLD AUTO: 0.31 10*3/MM3 (ref 0–0.4)
EOSINOPHIL NFR BLD AUTO: 3.9 % (ref 0.3–6.2)
EPAP: 0
ERYTHROCYTE [DISTWIDTH] IN BLOOD BY AUTOMATED COUNT: 13.1 % (ref 12.3–15.4)
FLUAV RNA RESP QL NAA+PROBE: NOT DETECTED
FLUBV RNA RESP QL NAA+PROBE: NOT DETECTED
GFR SERPL CREATININE-BSD FRML MDRD: 88 ML/MIN/1.73
GLOBULIN UR ELPH-MCNC: 3 GM/DL
GLUCOSE SERPL-MCNC: 92 MG/DL (ref 65–99)
HCO3 BLDA-SCNC: 27.2 MMOL/L (ref 20–26)
HCT VFR BLD AUTO: 45.1 % (ref 37.5–51)
HCT VFR BLD CALC: 43.8 %
HGB BLD-MCNC: 14.6 G/DL (ref 13–17.7)
HGB BLDA-MCNC: 14.3 G/DL (ref 13.5–17.5)
HOLD SPECIMEN: NORMAL
IMM GRANULOCYTES # BLD AUTO: 0.02 10*3/MM3 (ref 0–0.05)
IMM GRANULOCYTES NFR BLD AUTO: 0.3 % (ref 0–0.5)
INHALED O2 CONCENTRATION: 90 %
IPAP: 0
LYMPHOCYTES # BLD AUTO: 1.62 10*3/MM3 (ref 0.7–3.1)
LYMPHOCYTES NFR BLD AUTO: 20.3 % (ref 19.6–45.3)
MCH RBC QN AUTO: 29.8 PG (ref 26.6–33)
MCHC RBC AUTO-ENTMCNC: 32.4 G/DL (ref 31.5–35.7)
MCV RBC AUTO: 92 FL (ref 79–97)
METHGB BLD QL: ABNORMAL
MODALITY: ABNORMAL
MONOCYTES # BLD AUTO: 1.1 10*3/MM3 (ref 0.1–0.9)
MONOCYTES NFR BLD AUTO: 13.8 % (ref 5–12)
NEUTROPHILS NFR BLD AUTO: 4.9 10*3/MM3 (ref 1.7–7)
NEUTROPHILS NFR BLD AUTO: 61.1 % (ref 42.7–76)
NOTE: ABNORMAL
NRBC BLD AUTO-RTO: 0 /100 WBC (ref 0–0.2)
NT-PROBNP SERPL-MCNC: 57.5 PG/ML (ref 0–900)
OXYHGB MFR BLDV: 97.1 % (ref 94–99)
PAW @ PEAK INSP FLOW SETTING VENT: 0 CMH2O
PCO2 BLDA: 44.8 MM HG (ref 35–45)
PCO2 TEMP ADJ BLD: 44.8 MM HG (ref 35–48)
PH BLDA: 7.39 PH UNITS (ref 7.35–7.45)
PH, TEMP CORRECTED: 7.39 PH UNITS
PLATELET # BLD AUTO: 214 10*3/MM3 (ref 140–450)
PMV BLD AUTO: 9.1 FL (ref 6–12)
PO2 BLDA: 91.9 MM HG (ref 83–108)
PO2 TEMP ADJ BLD: 91.9 MM HG (ref 83–108)
POTASSIUM SERPL-SCNC: 4.3 MMOL/L (ref 3.5–5.2)
PROT SERPL-MCNC: 7.1 G/DL (ref 6–8.5)
QT INTERVAL: 328 MS
QTC INTERVAL: 441 MS
RBC # BLD AUTO: 4.9 10*6/MM3 (ref 4.14–5.8)
SARS-COV-2 RNA RESP QL NAA+PROBE: NOT DETECTED
SODIUM SERPL-SCNC: 140 MMOL/L (ref 136–145)
TOTAL RATE: 0 BREATHS/MINUTE
TROPONIN T SERPL-MCNC: <0.01 NG/ML (ref 0–0.03)
WBC # BLD AUTO: 8 10*3/MM3 (ref 3.4–10.8)
WHOLE BLOOD HOLD SPECIMEN: NORMAL
WHOLE BLOOD HOLD SPECIMEN: NORMAL

## 2021-02-26 PROCEDURE — 82805 BLOOD GASES W/O2 SATURATION: CPT

## 2021-02-26 PROCEDURE — 94799 UNLISTED PULMONARY SVC/PX: CPT

## 2021-02-26 PROCEDURE — 96374 THER/PROPH/DIAG INJ IV PUSH: CPT

## 2021-02-26 PROCEDURE — 83050 HGB METHEMOGLOBIN QUAN: CPT

## 2021-02-26 PROCEDURE — 83880 ASSAY OF NATRIURETIC PEPTIDE: CPT | Performed by: EMERGENCY MEDICINE

## 2021-02-26 PROCEDURE — 87636 SARSCOV2 & INF A&B AMP PRB: CPT | Performed by: PHYSICIAN ASSISTANT

## 2021-02-26 PROCEDURE — 96375 TX/PRO/DX INJ NEW DRUG ADDON: CPT

## 2021-02-26 PROCEDURE — 36600 WITHDRAWAL OF ARTERIAL BLOOD: CPT

## 2021-02-26 PROCEDURE — 82375 ASSAY CARBOXYHB QUANT: CPT

## 2021-02-26 PROCEDURE — 71045 X-RAY EXAM CHEST 1 VIEW: CPT

## 2021-02-26 PROCEDURE — 84484 ASSAY OF TROPONIN QUANT: CPT | Performed by: EMERGENCY MEDICINE

## 2021-02-26 PROCEDURE — 99284 EMERGENCY DEPT VISIT MOD MDM: CPT

## 2021-02-26 PROCEDURE — 80053 COMPREHEN METABOLIC PANEL: CPT | Performed by: EMERGENCY MEDICINE

## 2021-02-26 PROCEDURE — 94640 AIRWAY INHALATION TREATMENT: CPT

## 2021-02-26 PROCEDURE — 93005 ELECTROCARDIOGRAM TRACING: CPT | Performed by: EMERGENCY MEDICINE

## 2021-02-26 PROCEDURE — 85025 COMPLETE CBC W/AUTO DIFF WBC: CPT | Performed by: EMERGENCY MEDICINE

## 2021-02-26 PROCEDURE — 25010000002 METHYLPREDNISOLONE PER 125 MG: Performed by: PHYSICIAN ASSISTANT

## 2021-02-26 RX ORDER — PREDNISONE 20 MG/1
60 TABLET ORAL DAILY
Qty: 15 TABLET | Refills: 0 | Status: SHIPPED | OUTPATIENT
Start: 2021-02-26 | End: 2021-03-03

## 2021-02-26 RX ORDER — METHYLPREDNISOLONE SODIUM SUCCINATE 125 MG/2ML
125 INJECTION, POWDER, LYOPHILIZED, FOR SOLUTION INTRAMUSCULAR; INTRAVENOUS ONCE
Status: COMPLETED | OUTPATIENT
Start: 2021-02-26 | End: 2021-02-26

## 2021-02-26 RX ORDER — ALBUTEROL SULFATE 2.5 MG/3ML
10 SOLUTION RESPIRATORY (INHALATION)
Status: COMPLETED | OUTPATIENT
Start: 2021-02-26 | End: 2021-02-26

## 2021-02-26 RX ORDER — SODIUM CHLORIDE 0.9 % (FLUSH) 0.9 %
10 SYRINGE (ML) INJECTION AS NEEDED
Status: DISCONTINUED | OUTPATIENT
Start: 2021-02-26 | End: 2021-02-26 | Stop reason: HOSPADM

## 2021-02-26 RX ORDER — KETAMINE HYDROCHLORIDE 100 MG/ML
0.3 INJECTION INTRAMUSCULAR; INTRAVENOUS ONCE
Status: DISCONTINUED | OUTPATIENT
Start: 2021-02-26 | End: 2021-02-26 | Stop reason: SDUPTHER

## 2021-02-26 RX ORDER — KETAMINE HCL IN NACL, ISO-OSM 100MG/10ML
0.3 SYRINGE (ML) INJECTION ONCE
Status: COMPLETED | OUTPATIENT
Start: 2021-02-26 | End: 2021-02-26

## 2021-02-26 RX ADMIN — ALBUTEROL SULFATE 10 MG: 2.5 SOLUTION RESPIRATORY (INHALATION) at 12:32

## 2021-02-26 RX ADMIN — Medication 16.3 MG: at 13:08

## 2021-02-26 RX ADMIN — METHYLPREDNISOLONE SODIUM SUCCINATE 125 MG: 125 INJECTION, POWDER, FOR SOLUTION INTRAMUSCULAR; INTRAVENOUS at 12:49

## 2021-03-02 ENCOUNTER — TELEPHONE (OUTPATIENT)
Dept: PULMONOLOGY | Facility: CLINIC | Age: 56
End: 2021-03-02

## 2021-03-02 NOTE — TELEPHONE ENCOUNTER
"Mr. Willams called stating that he had been in the ED and was having a COPD exacerbation.  They did give him a dose of ketamine while in the ED.  He wanted me to place a note in his records stating that the ketamine was not a medication that he would prefer to use in the future.  He does have a history of substance abuse and would like to avoid all medications that give him a \" head buzz,\" he states that he is agreeable to have morphine while in the ED if he is having severe shortness of breath.  I did advise him that I would place this in the records for review and for him to call me if he were to worsen before his next appointment on March 16.      "

## 2021-03-04 ENCOUNTER — DOCUMENTATION (OUTPATIENT)
Dept: PULMONOLOGY | Facility: CLINIC | Age: 56
End: 2021-03-04

## 2021-03-04 DIAGNOSIS — F41.9 ANXIETY DISORDER, UNSPECIFIED TYPE: Primary | ICD-10-CM

## 2021-03-04 RX ORDER — LORAZEPAM 1 MG/1
1 TABLET ORAL EVERY 8 HOURS PRN
Qty: 45 TABLET | Refills: 0 | Status: SHIPPED | OUTPATIENT
Start: 2021-03-04 | End: 2021-03-26 | Stop reason: HOSPADM

## 2021-03-04 NOTE — PROGRESS NOTES
Mr. Willams called to refill his Ativan prescription, I did call this in electronically and reviewed his Quinn as well.  He was agreeable to pick it up at the pharmacy later today.

## 2021-03-05 LAB — REF LAB TEST RESULTS: NORMAL

## 2021-03-09 ENCOUNTER — READMISSION MANAGEMENT (OUTPATIENT)
Dept: CALL CENTER | Facility: HOSPITAL | Age: 56
End: 2021-03-09

## 2021-03-12 DIAGNOSIS — J44.9 COPD MIXED TYPE (HCC): Chronic | ICD-10-CM

## 2021-03-16 ENCOUNTER — OFFICE VISIT (OUTPATIENT)
Dept: PULMONOLOGY | Facility: CLINIC | Age: 56
End: 2021-03-16

## 2021-03-16 VITALS
HEART RATE: 92 BPM | BODY MASS INDEX: 18.25 KG/M2 | DIASTOLIC BLOOD PRESSURE: 80 MMHG | OXYGEN SATURATION: 98 % | TEMPERATURE: 97.4 F | SYSTOLIC BLOOD PRESSURE: 110 MMHG | HEIGHT: 68 IN

## 2021-03-16 DIAGNOSIS — E88.01 ALPHA-1-ANTITRYPSIN DEFICIENCY (HCC): Chronic | ICD-10-CM

## 2021-03-16 DIAGNOSIS — J93.9 RECURRENT PNEUMOTHORAX: ICD-10-CM

## 2021-03-16 DIAGNOSIS — J44.9 COPD MIXED TYPE (HCC): Primary | ICD-10-CM

## 2021-03-16 PROCEDURE — 99214 OFFICE O/P EST MOD 30 MIN: CPT | Performed by: NURSE PRACTITIONER

## 2021-03-16 RX ORDER — BUDESONIDE AND FORMOTEROL FUMARATE DIHYDRATE 160; 4.5 UG/1; UG/1
2 AEROSOL RESPIRATORY (INHALATION) 2 TIMES DAILY
Qty: 10.2 G | Refills: 6 | Status: SHIPPED | OUTPATIENT
Start: 2021-03-16 | End: 2021-03-26 | Stop reason: HOSPADM

## 2021-03-17 NOTE — PROGRESS NOTES
[facility] Pulmonary Follow up    CHIEF COMPLAINT    Alpha-1 antitrypsin deficiency, stage IV emphysema    HISTORY OF PRESENT ILLNESS    Balwinder Willams is a 55 y.o.male here today for follow-up. He was last seen in the office by me in February. He was in the ED at the end of February for a COPD exacerbation, he did not have a pneumothorax per chest x-ray. He was sent home with a prednisone taper and his symptoms improved.    He has had multiple hospitalizations at [facility] over the last 6 months for postobstructive pneumonia from the EBV valves that were placed in October 2019. He had a pneumostatic placed that was removed on 2/9. He has had no pneumothoraces since the removal of this chest tube.    He has completed work-up through [facility] transplant team and is hopeful to be placed on the transplant list in the near future.    He continues to use Brovana and Pulmicort nebulizers. He does feel that the Symbicort inhaler worked better for him and would like to discuss switching back to this. He has not been using his DuoNeb's and has been using Combivent instead may be once or twice a day if needed.    He continues to have shortness of breath with exertion but does take frequent breaks to recover. He remains on 3 L continuously.    He denies fever, chills, sputum production, hemoptysis, night sweats, weight loss, chest pain or palpitations. He denies any lower extremity edema or calf tenderness. He denies any sinus or allergy symptoms. He denies reflux symptoms.    He continues to receive Prolastin infusions weekly.    He is up-to-date on his current vaccinations.    Patient Active Problem List   Diagnosis   • Alpha-1-antitrypsin deficiency (on replacement)   • Chronic cough   • Dependence on supplemental oxygen (3L NC)    • Stage IV emphysema s/p EBV placement X3 w/ subsequent extraction   • Former smoker (Resolved 2006)   • Postprocedural pneumothorax   • Persistent air leak   • Acute on chronic  respiratory failure with hypoxemia (CMS/HCC)   • Severe malnutrition (CMS/HCC)   • Steroid-induced hyperglycemia   • Recurrent L PTX s/p multiple SBCTs    • Recent PSA PNA   • MAC (smear -)    • Leukocytosis   • COPD exacerbation (CMS/HCC)       Allergies   Allergen Reactions   • Ketamine Headache     Pt. Would prefer not to receive ketamine in future.   • Other Other (See Comments)     Opioid Analgesics (recovering addict)       Current Outpatient Medications:   •  fluticasone (Flonase) 50 MCG/ACT nasal spray, 2 sprays into the nostril(s) as directed by provider Daily. (Patient taking differently: 2 sprays into the nostril(s) as directed by provider Daily As Needed.), Disp: 9.9 mL, Rfl: 6  •  guaiFENesin ER 1200 MG tablet sustained-release 12 hour, Take 1,200 mg by mouth Daily As Needed for Cough., Disp: , Rfl:   •  ibuprofen (ADVIL,MOTRIN) 800 MG tablet, Take 800 mg by mouth Every 6 (Six) Hours As Needed for Mild Pain ., Disp: , Rfl:   •  ipratropium-albuterol (COMBIVENT RESPIMAT)  MCG/ACT inhaler, Inhale 1 puff 4 (Four) Times a Day As Needed for Wheezing., Disp: 2 each, Rfl: 6  •  ipratropium-albuterol (DUO-NEB) 0.5-2.5 mg/3 ml nebulizer, Take 3 mL by nebulization 4 (Four) Times a Day. (Patient taking differently: Take 3 mL by nebulization 4 (Four) Times a Day As Needed.), Disp: 360 mL, Rfl:   •  LORazepam (Ativan) 1 MG tablet, Take 1 tablet by mouth Every 8 (Eight) Hours As Needed for Anxiety., Disp: 45 tablet, Rfl: 0  •  PROLASTIN-C 1000 MG/20ML solution, Infuse 60 mg/kg into a venous catheter 1 (One) Time Per Week., Disp: , Rfl:   •  budesonide-formoterol (SYMBICORT) 160-4.5 MCG/ACT inhaler, Inhale 2 puffs 2 (Two) Times a Day., Disp: 10.2 g, Rfl: 6  MEDICATION LIST AND ALLERGIES REVIEWED.    Social History     Tobacco Use   • Smoking status: Former Smoker     Packs/day: 1.50     Years: 25.00     Pack years: 37.50     Types: Cigarettes     Start date: 1/1/1981     Quit date: 2006     Years since quitting:  "15.2   • Smokeless tobacco: Never Used   Vaping Use   • Vaping Use: Former   • Quit date: 1/1/2010   • Substances: Nicotine   • Passive vaping exposure Yes   Substance Use Topics   • Alcohol use: No     Comment: In recovery since 8/24/1994   • Drug use: Yes     Comment: In recovery since 8/24/1994       FAMILY AND SOCIAL HISTORY REVIEWED.    Review of Systems   Constitutional: Negative for activity change, appetite change, fatigue, fever and unexpected weight change.   HENT: Negative for congestion, postnasal drip, rhinorrhea, sinus pressure, sore throat and voice change.    Eyes: Negative for visual disturbance.   Respiratory: Positive for shortness of breath. Negative for cough, chest tightness and wheezing.    Cardiovascular: Negative for chest pain, palpitations and leg swelling.   Gastrointestinal: Negative for abdominal distention, abdominal pain, nausea and vomiting.   Endocrine: Negative for cold intolerance and heat intolerance.   Genitourinary: Negative for difficulty urinating and urgency.   Musculoskeletal: Negative for arthralgias, back pain and neck pain.   Skin: Negative for color change and pallor.   Allergic/Immunologic: Negative for environmental allergies and food allergies.   Neurological: Negative for dizziness, syncope, weakness and light-headedness.   Hematological: Negative for adenopathy. Does not bruise/bleed easily.   Psychiatric/Behavioral: Negative for agitation and behavioral problems.   .    /80   Pulse 92   Temp 97.4 °F (36.3 °C)   Ht 172.7 cm (68\")   SpO2 98% Comment: resting, 4 liters cont  BMI 18.25 kg/m²     Immunization History   Administered Date(s) Administered   • COVID-19 (PFIZER) 03/04/2021   • Flu Vaccine Quad PF >36MO 11/15/2019, 12/01/2020   • Flulaval/Fluarix/Fluzone Quad 11/18/2020   • Influenza, Unspecified 11/18/2019   • Pneumococcal Conjugate 13-Valent (PCV13) 12/10/2015, 11/18/2019   • flucelvax quad pfs =>4 YRS 11/18/2019       Physical Exam  Vitals and " nursing note reviewed.   Constitutional:       Appearance: He is well-developed. He is not diaphoretic.   HENT:      Head: Normocephalic and atraumatic.   Eyes:      Pupils: Pupils are equal, round, and reactive to light.   Neck:      Thyroid: No thyromegaly.   Cardiovascular:      Rate and Rhythm: Normal rate and regular rhythm.      Heart sounds: Normal heart sounds. No murmur heard.   No friction rub. No gallop.    Pulmonary:      Effort: Pulmonary effort is normal. No respiratory distress.      Breath sounds: Normal breath sounds. No wheezing or rales.   Chest:      Chest wall: No tenderness.   Abdominal:      General: Bowel sounds are normal.      Palpations: Abdomen is soft.      Tenderness: There is no abdominal tenderness.   Musculoskeletal:         General: No swelling. Normal range of motion.      Cervical back: Normal range of motion and neck supple.   Lymphadenopathy:      Cervical: No cervical adenopathy.   Skin:     General: Skin is warm and dry.      Capillary Refill: Capillary refill takes less than 2 seconds.   Neurological:      Mental Status: He is alert and oriented to person, place, and time.   Psychiatric:         Mood and Affect: Mood normal.         Behavior: Behavior normal.           RESULTS      PROBLEM LIST    Problem List Items Addressed This Visit        Pulmonary and Pneumonias    Alpha-1-antitrypsin deficiency (on replacement) (Chronic)    Overview     A.  Labs of 11/20/2014 reports an alpha 1 antitrypsin deficiency of 4.7 with a phenotype with Z bands detected and genotyping revealing the presence of most common deficiency allele type Z and an inferred non-expressing null allele.    B.  On Zemaira         Relevant Medications    budesonide-formoterol (SYMBICORT) 160-4.5 MCG/ACT inhaler    Stage IV emphysema s/p EBV placement X3 w/ subsequent extraction - Primary (Chronic)    Relevant Medications    budesonide-formoterol (SYMBICORT) 160-4.5 MCG/ACT inhaler       Other    Recurrent L  PTX s/p multiple SBCTs     Relevant Medications    budesonide-formoterol (SYMBICORT) 160-4.5 MCG/ACT inhaler            DISCUSSION    Mr. Willams was here for follow-up of his emphysema and recurrent pneumothoraces. He has so far not had a another pneumothorax since February.    I will start him back on Symbicort 2 puffs twice a day instead of Brovana and Pulmicort. I did encourage him to continue using his Combivent as needed for shortness of breath.    He will remain on 3 L continuously for his chronic respiratory failure.    He will continue close follow-up with the transplant team at [facility]. Hopefully he will be, a candidate for transplant.    He will follow-up in a couple of months in the office for close follow-up. I did advise him to call with any additional concerns or questions.    Level of service justified based on 35 minutes spent in patient care on this date of service including, but not limited to: preparing to see the patient, obtaining and/or reviewing history, performing medically appropriate examination, ordering tests/medicine/procedures, independently interpreting results, documenting clinical information in EHR, and counseling/education of patient/family/caregiver. (Level 4 30-39 minutes; Level 5 40-54 minutes)      Neris Cárdenas, APRN  03/16/202108:18 EDT  Electronically signed     Please note that portions of this note were completed with a voice recognition program. Efforts were made to edit the dictations, but occasionally words are mistranscribed.      CC: Dorinda Garcia MD

## 2021-03-22 ENCOUNTER — HOSPITAL ENCOUNTER (INPATIENT)
Facility: HOSPITAL | Age: 56
LOS: 4 days | Discharge: NURSING, REHAB OR LONG-TERM CARE | End: 2021-03-26
Attending: EMERGENCY MEDICINE | Admitting: FAMILY MEDICINE

## 2021-03-22 ENCOUNTER — APPOINTMENT (OUTPATIENT)
Dept: GENERAL RADIOLOGY | Facility: HOSPITAL | Age: 56
End: 2021-03-22

## 2021-03-22 DIAGNOSIS — J44.1 COPD EXACERBATION (HCC): Primary | ICD-10-CM

## 2021-03-22 DIAGNOSIS — J44.9 COPD MIXED TYPE (HCC): ICD-10-CM

## 2021-03-22 DIAGNOSIS — E88.01 ALPHA-1-ANTITRYPSIN DEFICIENCY (HCC): ICD-10-CM

## 2021-03-22 LAB
ALBUMIN SERPL-MCNC: 4.3 G/DL (ref 3.5–5.2)
ALBUMIN/GLOB SERPL: 1.3 G/DL
ALP SERPL-CCNC: 80 U/L (ref 39–117)
ALT SERPL W P-5'-P-CCNC: 31 U/L (ref 1–41)
ANION GAP SERPL CALCULATED.3IONS-SCNC: 11 MMOL/L (ref 5–15)
ARTERIAL PATENCY WRIST A: ABNORMAL
AST SERPL-CCNC: 27 U/L (ref 1–40)
ATMOSPHERIC PRESS: ABNORMAL MM[HG]
BASE EXCESS BLDA CALC-SCNC: 1.9 MMOL/L (ref 0–2)
BASOPHILS # BLD AUTO: 0.07 10*3/MM3 (ref 0–0.2)
BASOPHILS NFR BLD AUTO: 0.8 % (ref 0–1.5)
BDY SITE: ABNORMAL
BILIRUB SERPL-MCNC: 0.3 MG/DL (ref 0–1.2)
BODY TEMPERATURE: 37 C
BUN SERPL-MCNC: 12 MG/DL (ref 6–20)
BUN/CREAT SERPL: 14.6 (ref 7–25)
CALCIUM SPEC-SCNC: 9.2 MG/DL (ref 8.6–10.5)
CHLORIDE SERPL-SCNC: 100 MMOL/L (ref 98–107)
CO2 BLDA-SCNC: 27.8 MMOL/L (ref 22–33)
CO2 SERPL-SCNC: 25 MMOL/L (ref 22–29)
COHGB MFR BLD: 0.9 % (ref 0–2)
CREAT SERPL-MCNC: 0.82 MG/DL (ref 0.76–1.27)
D-LACTATE SERPL-SCNC: 1.2 MMOL/L (ref 0.5–2)
DEPRECATED RDW RBC AUTO: 42.7 FL (ref 37–54)
EOSINOPHIL # BLD AUTO: 0.25 10*3/MM3 (ref 0–0.4)
EOSINOPHIL NFR BLD AUTO: 2.7 % (ref 0.3–6.2)
ERYTHROCYTE [DISTWIDTH] IN BLOOD BY AUTOMATED COUNT: 13 % (ref 12.3–15.4)
GFR SERPL CREATININE-BSD FRML MDRD: 98 ML/MIN/1.73
GLOBULIN UR ELPH-MCNC: 3.2 GM/DL
GLUCOSE SERPL-MCNC: 111 MG/DL (ref 65–99)
HCO3 BLDA-SCNC: 26.6 MMOL/L (ref 20–26)
HCT VFR BLD AUTO: 46 % (ref 37.5–51)
HCT VFR BLD CALC: 44.5 %
HGB BLD-MCNC: 14.9 G/DL (ref 13–17.7)
HGB BLDA-MCNC: 14.5 G/DL (ref 13.5–17.5)
HOLD SPECIMEN: NORMAL
IMM GRANULOCYTES # BLD AUTO: 0.03 10*3/MM3 (ref 0–0.05)
IMM GRANULOCYTES NFR BLD AUTO: 0.3 % (ref 0–0.5)
INHALED O2 CONCENTRATION: 32 %
LYMPHOCYTES # BLD AUTO: 1.48 10*3/MM3 (ref 0.7–3.1)
LYMPHOCYTES NFR BLD AUTO: 16.1 % (ref 19.6–45.3)
MCH RBC QN AUTO: 29 PG (ref 26.6–33)
MCHC RBC AUTO-ENTMCNC: 32.4 G/DL (ref 31.5–35.7)
MCV RBC AUTO: 89.7 FL (ref 79–97)
METHGB BLD QL: 0.3 % (ref 0–1.5)
MODALITY: ABNORMAL
MONOCYTES # BLD AUTO: 1.43 10*3/MM3 (ref 0.1–0.9)
MONOCYTES NFR BLD AUTO: 15.6 % (ref 5–12)
NEUTROPHILS NFR BLD AUTO: 5.93 10*3/MM3 (ref 1.7–7)
NEUTROPHILS NFR BLD AUTO: 64.5 % (ref 42.7–76)
NOTE: ABNORMAL
NRBC BLD AUTO-RTO: 0 /100 WBC (ref 0–0.2)
NT-PROBNP SERPL-MCNC: 75.4 PG/ML (ref 0–900)
OXYHGB MFR BLDV: 94.4 % (ref 94–99)
PCO2 BLDA: 41 MM HG (ref 35–45)
PCO2 TEMP ADJ BLD: 41 MM HG (ref 35–48)
PH BLDA: 7.42 PH UNITS (ref 7.35–7.45)
PH, TEMP CORRECTED: 7.42 PH UNITS
PLATELET # BLD AUTO: 354 10*3/MM3 (ref 140–450)
PMV BLD AUTO: 9.2 FL (ref 6–12)
PO2 BLDA: 74.2 MM HG (ref 83–108)
PO2 TEMP ADJ BLD: 74.2 MM HG (ref 83–108)
POTASSIUM SERPL-SCNC: 4.2 MMOL/L (ref 3.5–5.2)
PROT SERPL-MCNC: 7.5 G/DL (ref 6–8.5)
RBC # BLD AUTO: 5.13 10*6/MM3 (ref 4.14–5.8)
SARS-COV-2 RNA RESP QL NAA+PROBE: NOT DETECTED
SODIUM SERPL-SCNC: 136 MMOL/L (ref 136–145)
TROPONIN T SERPL-MCNC: <0.01 NG/ML (ref 0–0.03)
TROPONIN T SERPL-MCNC: <0.01 NG/ML (ref 0–0.03)
VENTILATOR MODE: ABNORMAL
WBC # BLD AUTO: 9.19 10*3/MM3 (ref 3.4–10.8)
WHOLE BLOOD HOLD SPECIMEN: NORMAL
WHOLE BLOOD HOLD SPECIMEN: NORMAL

## 2021-03-22 PROCEDURE — 93005 ELECTROCARDIOGRAM TRACING: CPT | Performed by: EMERGENCY MEDICINE

## 2021-03-22 PROCEDURE — 84484 ASSAY OF TROPONIN QUANT: CPT | Performed by: NURSE PRACTITIONER

## 2021-03-22 PROCEDURE — 87040 BLOOD CULTURE FOR BACTERIA: CPT | Performed by: EMERGENCY MEDICINE

## 2021-03-22 PROCEDURE — 97162 PT EVAL MOD COMPLEX 30 MIN: CPT

## 2021-03-22 PROCEDURE — 83880 ASSAY OF NATRIURETIC PEPTIDE: CPT | Performed by: EMERGENCY MEDICINE

## 2021-03-22 PROCEDURE — 25010000002 LORAZEPAM PER 2 MG: Performed by: EMERGENCY MEDICINE

## 2021-03-22 PROCEDURE — 84484 ASSAY OF TROPONIN QUANT: CPT | Performed by: EMERGENCY MEDICINE

## 2021-03-22 PROCEDURE — U0003 INFECTIOUS AGENT DETECTION BY NUCLEIC ACID (DNA OR RNA); SEVERE ACUTE RESPIRATORY SYNDROME CORONAVIRUS 2 (SARS-COV-2) (CORONAVIRUS DISEASE [COVID-19]), AMPLIFIED PROBE TECHNIQUE, MAKING USE OF HIGH THROUGHPUT TECHNOLOGIES AS DESCRIBED BY CMS-2020-01-R: HCPCS | Performed by: EMERGENCY MEDICINE

## 2021-03-22 PROCEDURE — 25010000002 AZITHROMYCIN PER 500 MG: Performed by: EMERGENCY MEDICINE

## 2021-03-22 PROCEDURE — 94799 UNLISTED PULMONARY SVC/PX: CPT

## 2021-03-22 PROCEDURE — 25010000002 ENOXAPARIN PER 10 MG: Performed by: NURSE PRACTITIONER

## 2021-03-22 PROCEDURE — 80053 COMPREHEN METABOLIC PANEL: CPT | Performed by: EMERGENCY MEDICINE

## 2021-03-22 PROCEDURE — 82375 ASSAY CARBOXYHB QUANT: CPT

## 2021-03-22 PROCEDURE — U0005 INFEC AGEN DETEC AMPLI PROBE: HCPCS | Performed by: EMERGENCY MEDICINE

## 2021-03-22 PROCEDURE — 93010 ELECTROCARDIOGRAM REPORT: CPT | Performed by: INTERNAL MEDICINE

## 2021-03-22 PROCEDURE — 83605 ASSAY OF LACTIC ACID: CPT | Performed by: EMERGENCY MEDICINE

## 2021-03-22 PROCEDURE — 97116 GAIT TRAINING THERAPY: CPT

## 2021-03-22 PROCEDURE — 94640 AIRWAY INHALATION TREATMENT: CPT

## 2021-03-22 PROCEDURE — 93005 ELECTROCARDIOGRAM TRACING: CPT

## 2021-03-22 PROCEDURE — 36600 WITHDRAWAL OF ARTERIAL BLOOD: CPT

## 2021-03-22 PROCEDURE — 85025 COMPLETE CBC W/AUTO DIFF WBC: CPT | Performed by: EMERGENCY MEDICINE

## 2021-03-22 PROCEDURE — 25010000002 METHYLPREDNISOLONE PER 125 MG: Performed by: EMERGENCY MEDICINE

## 2021-03-22 PROCEDURE — 99223 1ST HOSP IP/OBS HIGH 75: CPT | Performed by: HOSPITALIST

## 2021-03-22 PROCEDURE — 83050 HGB METHEMOGLOBIN QUAN: CPT

## 2021-03-22 PROCEDURE — 71045 X-RAY EXAM CHEST 1 VIEW: CPT

## 2021-03-22 PROCEDURE — 25010000002 CEFTRIAXONE: Performed by: EMERGENCY MEDICINE

## 2021-03-22 PROCEDURE — 99285 EMERGENCY DEPT VISIT HI MDM: CPT

## 2021-03-22 PROCEDURE — 93005 ELECTROCARDIOGRAM TRACING: CPT | Performed by: NURSE PRACTITIONER

## 2021-03-22 PROCEDURE — 82805 BLOOD GASES W/O2 SATURATION: CPT

## 2021-03-22 PROCEDURE — 25010000002 LORAZEPAM PER 2 MG: Performed by: NURSE PRACTITIONER

## 2021-03-22 RX ORDER — ESCITALOPRAM OXALATE 10 MG/1
10 TABLET ORAL DAILY
COMMUNITY

## 2021-03-22 RX ORDER — LORAZEPAM 0.5 MG/1
0.5 TABLET ORAL 3 TIMES DAILY PRN
Status: DISCONTINUED | OUTPATIENT
Start: 2021-03-22 | End: 2021-03-23

## 2021-03-22 RX ORDER — IPRATROPIUM BROMIDE AND ALBUTEROL SULFATE 2.5; .5 MG/3ML; MG/3ML
3 SOLUTION RESPIRATORY (INHALATION)
Status: DISCONTINUED | OUTPATIENT
Start: 2021-03-22 | End: 2021-03-22

## 2021-03-22 RX ORDER — ESCITALOPRAM OXALATE 10 MG/1
10 TABLET ORAL DAILY
Status: DISCONTINUED | OUTPATIENT
Start: 2021-03-23 | End: 2021-03-26 | Stop reason: HOSPADM

## 2021-03-22 RX ORDER — BUDESONIDE AND FORMOTEROL FUMARATE DIHYDRATE 160; 4.5 UG/1; UG/1
2 AEROSOL RESPIRATORY (INHALATION)
Status: DISCONTINUED | OUTPATIENT
Start: 2021-03-22 | End: 2021-03-23

## 2021-03-22 RX ORDER — FLUTICASONE PROPIONATE 50 MCG
2 SPRAY, SUSPENSION (ML) NASAL DAILY PRN
Status: DISCONTINUED | OUTPATIENT
Start: 2021-03-22 | End: 2021-03-26 | Stop reason: HOSPADM

## 2021-03-22 RX ORDER — METHYLPREDNISOLONE SODIUM SUCCINATE 125 MG/2ML
60 INJECTION, POWDER, LYOPHILIZED, FOR SOLUTION INTRAMUSCULAR; INTRAVENOUS EVERY 8 HOURS
Status: DISCONTINUED | OUTPATIENT
Start: 2021-03-23 | End: 2021-03-24

## 2021-03-22 RX ORDER — LORAZEPAM 2 MG/ML
1 INJECTION INTRAMUSCULAR ONCE
Status: COMPLETED | OUTPATIENT
Start: 2021-03-22 | End: 2021-03-22

## 2021-03-22 RX ORDER — LORAZEPAM 0.5 MG/1
0.5 TABLET ORAL EVERY 12 HOURS SCHEDULED
Status: DISCONTINUED | OUTPATIENT
Start: 2021-03-22 | End: 2021-03-22

## 2021-03-22 RX ORDER — CHOLECALCIFEROL (VITAMIN D3) 125 MCG
5 CAPSULE ORAL NIGHTLY
Status: DISCONTINUED | OUTPATIENT
Start: 2021-03-22 | End: 2021-03-26 | Stop reason: HOSPADM

## 2021-03-22 RX ORDER — ALBUTEROL SULFATE 2.5 MG/3ML
2.5 SOLUTION RESPIRATORY (INHALATION) ONCE
Status: COMPLETED | OUTPATIENT
Start: 2021-03-22 | End: 2021-03-22

## 2021-03-22 RX ORDER — IPRATROPIUM BROMIDE AND ALBUTEROL SULFATE 2.5; .5 MG/3ML; MG/3ML
3 SOLUTION RESPIRATORY (INHALATION)
Status: DISCONTINUED | OUTPATIENT
Start: 2021-03-22 | End: 2021-03-26 | Stop reason: HOSPADM

## 2021-03-22 RX ORDER — SODIUM CHLORIDE 0.9 % (FLUSH) 0.9 %
10 SYRINGE (ML) INJECTION EVERY 12 HOURS SCHEDULED
Status: DISCONTINUED | OUTPATIENT
Start: 2021-03-22 | End: 2021-03-26 | Stop reason: HOSPADM

## 2021-03-22 RX ORDER — GUAIFENESIN 600 MG/1
1200 TABLET, EXTENDED RELEASE ORAL EVERY 12 HOURS SCHEDULED
Status: DISCONTINUED | OUTPATIENT
Start: 2021-03-22 | End: 2021-03-26 | Stop reason: HOSPADM

## 2021-03-22 RX ORDER — SODIUM CHLORIDE 0.9 % (FLUSH) 0.9 %
10 SYRINGE (ML) INJECTION AS NEEDED
Status: DISCONTINUED | OUTPATIENT
Start: 2021-03-22 | End: 2021-03-26 | Stop reason: HOSPADM

## 2021-03-22 RX ORDER — METHYLPREDNISOLONE SODIUM SUCCINATE 125 MG/2ML
125 INJECTION, POWDER, LYOPHILIZED, FOR SOLUTION INTRAMUSCULAR; INTRAVENOUS ONCE
Status: COMPLETED | OUTPATIENT
Start: 2021-03-22 | End: 2021-03-22

## 2021-03-22 RX ORDER — LORAZEPAM 2 MG/ML
0.5 INJECTION INTRAMUSCULAR ONCE
Status: COMPLETED | OUTPATIENT
Start: 2021-03-22 | End: 2021-03-22

## 2021-03-22 RX ORDER — IPRATROPIUM BROMIDE AND ALBUTEROL SULFATE 2.5; .5 MG/3ML; MG/3ML
3 SOLUTION RESPIRATORY (INHALATION) EVERY 4 HOURS PRN
Status: DISCONTINUED | OUTPATIENT
Start: 2021-03-22 | End: 2021-03-26 | Stop reason: HOSPADM

## 2021-03-22 RX ADMIN — Medication 5 MG: at 22:01

## 2021-03-22 RX ADMIN — METHYLPREDNISOLONE SODIUM SUCCINATE 125 MG: 125 INJECTION, POWDER, FOR SOLUTION INTRAMUSCULAR; INTRAVENOUS at 13:59

## 2021-03-22 RX ADMIN — LORAZEPAM 0.5 MG: 0.5 TABLET ORAL at 21:59

## 2021-03-22 RX ADMIN — ALBUTEROL SULFATE 2.5 MG: 2.5 SOLUTION RESPIRATORY (INHALATION) at 14:11

## 2021-03-22 RX ADMIN — LORAZEPAM 0.5 MG: 2 INJECTION INTRAMUSCULAR; INTRAVENOUS at 13:59

## 2021-03-22 RX ADMIN — AZITHROMYCIN 500 MG: 500 INJECTION, POWDER, LYOPHILIZED, FOR SOLUTION INTRAVENOUS at 14:46

## 2021-03-22 RX ADMIN — LORAZEPAM 1 MG: 2 INJECTION INTRAMUSCULAR; INTRAVENOUS at 17:10

## 2021-03-22 RX ADMIN — SODIUM CHLORIDE, PRESERVATIVE FREE 10 ML: 5 INJECTION INTRAVENOUS at 22:01

## 2021-03-22 RX ADMIN — GUAIFENESIN 1200 MG: 600 TABLET, EXTENDED RELEASE ORAL at 22:00

## 2021-03-22 RX ADMIN — ENOXAPARIN SODIUM 40 MG: 40 INJECTION SUBCUTANEOUS at 21:59

## 2021-03-22 RX ADMIN — METHYLPREDNISOLONE SODIUM SUCCINATE 60 MG: 125 INJECTION, POWDER, FOR SOLUTION INTRAMUSCULAR; INTRAVENOUS at 23:20

## 2021-03-22 RX ADMIN — CEFTRIAXONE 1 G: 1 INJECTION, POWDER, FOR SOLUTION INTRAMUSCULAR; INTRAVENOUS at 14:16

## 2021-03-22 RX ADMIN — BUDESONIDE AND FORMOTEROL FUMARATE DIHYDRATE 2 PUFF: 160; 4.5 AEROSOL RESPIRATORY (INHALATION) at 20:12

## 2021-03-22 RX ADMIN — IPRATROPIUM BROMIDE AND ALBUTEROL SULFATE 3 ML: 2.5; .5 SOLUTION RESPIRATORY (INHALATION) at 17:35

## 2021-03-22 RX ADMIN — IPRATROPIUM BROMIDE AND ALBUTEROL SULFATE 3 ML: 2.5; .5 SOLUTION RESPIRATORY (INHALATION) at 20:11

## 2021-03-23 LAB
ANION GAP SERPL CALCULATED.3IONS-SCNC: 10 MMOL/L (ref 5–15)
BUN SERPL-MCNC: 14 MG/DL (ref 6–20)
BUN/CREAT SERPL: 18.2 (ref 7–25)
CALCIUM SPEC-SCNC: 8.9 MG/DL (ref 8.6–10.5)
CHLORIDE SERPL-SCNC: 99 MMOL/L (ref 98–107)
CO2 SERPL-SCNC: 25 MMOL/L (ref 22–29)
CREAT SERPL-MCNC: 0.77 MG/DL (ref 0.76–1.27)
GFR SERPL CREATININE-BSD FRML MDRD: 105 ML/MIN/1.73
GLUCOSE BLDC GLUCOMTR-MCNC: 262 MG/DL (ref 70–130)
GLUCOSE BLDC GLUCOMTR-MCNC: 276 MG/DL (ref 70–130)
GLUCOSE SERPL-MCNC: 211 MG/DL (ref 65–99)
POTASSIUM SERPL-SCNC: 4.6 MMOL/L (ref 3.5–5.2)
QT INTERVAL: 314 MS
QT INTERVAL: 324 MS
QTC INTERVAL: 440 MS
QTC INTERVAL: 441 MS
SODIUM SERPL-SCNC: 134 MMOL/L (ref 136–145)

## 2021-03-23 PROCEDURE — 97165 OT EVAL LOW COMPLEX 30 MIN: CPT

## 2021-03-23 PROCEDURE — 82962 GLUCOSE BLOOD TEST: CPT

## 2021-03-23 PROCEDURE — 94799 UNLISTED PULMONARY SVC/PX: CPT

## 2021-03-23 PROCEDURE — 80048 BASIC METABOLIC PNL TOTAL CA: CPT | Performed by: NURSE PRACTITIONER

## 2021-03-23 PROCEDURE — 25010000002 METHYLPREDNISOLONE PER 125 MG: Performed by: NURSE PRACTITIONER

## 2021-03-23 PROCEDURE — 99233 SBSQ HOSP IP/OBS HIGH 50: CPT | Performed by: FAMILY MEDICINE

## 2021-03-23 PROCEDURE — 25010000002 CEFTRIAXONE PER 250 MG: Performed by: NURSE PRACTITIONER

## 2021-03-23 PROCEDURE — 25010000002 AZITHROMYCIN PER 500 MG: Performed by: NURSE PRACTITIONER

## 2021-03-23 PROCEDURE — 63710000001 INSULIN LISPRO (HUMAN) PER 5 UNITS: Performed by: FAMILY MEDICINE

## 2021-03-23 PROCEDURE — 25010000002 LORAZEPAM PER 2 MG: Performed by: FAMILY MEDICINE

## 2021-03-23 PROCEDURE — 99222 1ST HOSP IP/OBS MODERATE 55: CPT | Performed by: INTERNAL MEDICINE

## 2021-03-23 PROCEDURE — 25010000002 ENOXAPARIN PER 10 MG: Performed by: NURSE PRACTITIONER

## 2021-03-23 RX ORDER — LORAZEPAM 0.5 MG/1
0.5 TABLET ORAL
Status: DISCONTINUED | OUTPATIENT
Start: 2021-03-23 | End: 2021-03-25

## 2021-03-23 RX ORDER — LORAZEPAM 2 MG/ML
1 INJECTION INTRAMUSCULAR EVERY 4 HOURS PRN
Status: DISCONTINUED | OUTPATIENT
Start: 2021-03-23 | End: 2021-03-24

## 2021-03-23 RX ORDER — LORAZEPAM 1 MG/1
1 TABLET ORAL NIGHTLY
Status: DISCONTINUED | OUTPATIENT
Start: 2021-03-23 | End: 2021-03-25

## 2021-03-23 RX ORDER — DEXTROSE MONOHYDRATE 25 G/50ML
25 INJECTION, SOLUTION INTRAVENOUS
Status: DISCONTINUED | OUTPATIENT
Start: 2021-03-23 | End: 2021-03-26 | Stop reason: HOSPADM

## 2021-03-23 RX ORDER — NICOTINE POLACRILEX 4 MG
15 LOZENGE BUCCAL
Status: DISCONTINUED | OUTPATIENT
Start: 2021-03-23 | End: 2021-03-26 | Stop reason: HOSPADM

## 2021-03-23 RX ORDER — BUDESONIDE 0.5 MG/2ML
0.5 INHALANT ORAL
Status: DISCONTINUED | OUTPATIENT
Start: 2021-03-23 | End: 2021-03-26 | Stop reason: HOSPADM

## 2021-03-23 RX ORDER — ARFORMOTEROL TARTRATE 15 UG/2ML
15 SOLUTION RESPIRATORY (INHALATION)
Status: DISCONTINUED | OUTPATIENT
Start: 2021-03-23 | End: 2021-03-26 | Stop reason: HOSPADM

## 2021-03-23 RX ADMIN — SODIUM CHLORIDE, PRESERVATIVE FREE 10 ML: 5 INJECTION INTRAVENOUS at 21:44

## 2021-03-23 RX ADMIN — IPRATROPIUM BROMIDE AND ALBUTEROL SULFATE 3 ML: 2.5; .5 SOLUTION RESPIRATORY (INHALATION) at 13:36

## 2021-03-23 RX ADMIN — BUDESONIDE AND FORMOTEROL FUMARATE DIHYDRATE 2 PUFF: 160; 4.5 AEROSOL RESPIRATORY (INHALATION) at 06:53

## 2021-03-23 RX ADMIN — BUDESONIDE 0.5 MG: 0.5 INHALANT ORAL at 19:51

## 2021-03-23 RX ADMIN — GUAIFENESIN 1200 MG: 600 TABLET, EXTENDED RELEASE ORAL at 09:43

## 2021-03-23 RX ADMIN — METHYLPREDNISOLONE SODIUM SUCCINATE 60 MG: 125 INJECTION, POWDER, FOR SOLUTION INTRAMUSCULAR; INTRAVENOUS at 09:41

## 2021-03-23 RX ADMIN — GUAIFENESIN 1200 MG: 600 TABLET, EXTENDED RELEASE ORAL at 21:43

## 2021-03-23 RX ADMIN — AZITHROMYCIN 500 MG: 500 INJECTION, POWDER, LYOPHILIZED, FOR SOLUTION INTRAVENOUS at 09:44

## 2021-03-23 RX ADMIN — IPRATROPIUM BROMIDE AND ALBUTEROL SULFATE 3 ML: 2.5; .5 SOLUTION RESPIRATORY (INHALATION) at 06:53

## 2021-03-23 RX ADMIN — LORAZEPAM 1 MG: 2 INJECTION INTRAMUSCULAR; INTRAVENOUS at 19:29

## 2021-03-23 RX ADMIN — ENOXAPARIN SODIUM 40 MG: 40 INJECTION SUBCUTANEOUS at 21:45

## 2021-03-23 RX ADMIN — ESCITALOPRAM OXALATE 10 MG: 10 TABLET ORAL at 09:43

## 2021-03-23 RX ADMIN — Medication 5 MG: at 21:43

## 2021-03-23 RX ADMIN — SODIUM CHLORIDE, PRESERVATIVE FREE 10 ML: 5 INJECTION INTRAVENOUS at 09:45

## 2021-03-23 RX ADMIN — LORAZEPAM 1 MG: 1 TABLET ORAL at 21:44

## 2021-03-23 RX ADMIN — METHYLPREDNISOLONE SODIUM SUCCINATE 60 MG: 125 INJECTION, POWDER, FOR SOLUTION INTRAMUSCULAR; INTRAVENOUS at 15:31

## 2021-03-23 RX ADMIN — ARFORMOTEROL TARTRATE 15 MCG: 15 SOLUTION RESPIRATORY (INHALATION) at 19:51

## 2021-03-23 RX ADMIN — IPRATROPIUM BROMIDE AND ALBUTEROL SULFATE 3 ML: 2.5; .5 SOLUTION RESPIRATORY (INHALATION) at 17:19

## 2021-03-23 RX ADMIN — ARFORMOTEROL TARTRATE 15 MCG: 15 SOLUTION RESPIRATORY (INHALATION) at 13:36

## 2021-03-23 RX ADMIN — LORAZEPAM 1 MG: 2 INJECTION INTRAMUSCULAR; INTRAVENOUS at 09:41

## 2021-03-23 RX ADMIN — LORAZEPAM 0.5 MG: 0.5 TABLET ORAL at 15:31

## 2021-03-23 RX ADMIN — INSULIN LISPRO 4 UNITS: 100 INJECTION, SOLUTION INTRAVENOUS; SUBCUTANEOUS at 18:20

## 2021-03-23 RX ADMIN — SODIUM CHLORIDE 1 G: 900 INJECTION INTRAVENOUS at 09:44

## 2021-03-23 RX ADMIN — IPRATROPIUM BROMIDE AND ALBUTEROL SULFATE 3 ML: 2.5; .5 SOLUTION RESPIRATORY (INHALATION) at 19:51

## 2021-03-23 RX ADMIN — INSULIN LISPRO 4 UNITS: 100 INJECTION, SOLUTION INTRAVENOUS; SUBCUTANEOUS at 21:43

## 2021-03-24 LAB
GLUCOSE BLDC GLUCOMTR-MCNC: 176 MG/DL (ref 70–130)
GLUCOSE BLDC GLUCOMTR-MCNC: 204 MG/DL (ref 70–130)
GLUCOSE BLDC GLUCOMTR-MCNC: 222 MG/DL (ref 70–130)
GLUCOSE BLDC GLUCOMTR-MCNC: 337 MG/DL (ref 70–130)

## 2021-03-24 PROCEDURE — 94799 UNLISTED PULMONARY SVC/PX: CPT

## 2021-03-24 PROCEDURE — 25010000002 METHYLPREDNISOLONE PER 40 MG: Performed by: FAMILY MEDICINE

## 2021-03-24 PROCEDURE — 82962 GLUCOSE BLOOD TEST: CPT

## 2021-03-24 PROCEDURE — 25010000002 AZITHROMYCIN PER 500 MG: Performed by: NURSE PRACTITIONER

## 2021-03-24 PROCEDURE — 63710000001 INSULIN LISPRO (HUMAN) PER 5 UNITS: Performed by: FAMILY MEDICINE

## 2021-03-24 PROCEDURE — 25010000002 LORAZEPAM PER 2 MG: Performed by: FAMILY MEDICINE

## 2021-03-24 PROCEDURE — 99232 SBSQ HOSP IP/OBS MODERATE 35: CPT | Performed by: FAMILY MEDICINE

## 2021-03-24 PROCEDURE — 25010000002 METHYLPREDNISOLONE PER 125 MG: Performed by: NURSE PRACTITIONER

## 2021-03-24 PROCEDURE — 63710000001 INSULIN DETEMIR PER 5 UNITS: Performed by: FAMILY MEDICINE

## 2021-03-24 PROCEDURE — 97116 GAIT TRAINING THERAPY: CPT

## 2021-03-24 PROCEDURE — 97530 THERAPEUTIC ACTIVITIES: CPT

## 2021-03-24 PROCEDURE — 25010000002 ENOXAPARIN PER 10 MG: Performed by: NURSE PRACTITIONER

## 2021-03-24 PROCEDURE — 25010000002 ALPHA1-PROTEINASE INHIBITOR 1000 MG/20ML SOLUTION

## 2021-03-24 PROCEDURE — 99232 SBSQ HOSP IP/OBS MODERATE 35: CPT | Performed by: INTERNAL MEDICINE

## 2021-03-24 RX ORDER — METHYLPREDNISOLONE SODIUM SUCCINATE 40 MG/ML
40 INJECTION, POWDER, LYOPHILIZED, FOR SOLUTION INTRAMUSCULAR; INTRAVENOUS EVERY 12 HOURS
Status: DISCONTINUED | OUTPATIENT
Start: 2021-03-24 | End: 2021-03-25

## 2021-03-24 RX ADMIN — INSULIN DETEMIR 5 UNITS: 100 INJECTION, SOLUTION SUBCUTANEOUS at 20:47

## 2021-03-24 RX ADMIN — ARFORMOTEROL TARTRATE 15 MCG: 15 SOLUTION RESPIRATORY (INHALATION) at 08:03

## 2021-03-24 RX ADMIN — LORAZEPAM 1 MG: 1 TABLET ORAL at 20:47

## 2021-03-24 RX ADMIN — METHYLPREDNISOLONE SODIUM SUCCINATE 60 MG: 125 INJECTION, POWDER, FOR SOLUTION INTRAMUSCULAR; INTRAVENOUS at 01:02

## 2021-03-24 RX ADMIN — BUDESONIDE 0.5 MG: 0.5 INHALANT ORAL at 08:03

## 2021-03-24 RX ADMIN — INSULIN LISPRO 4 UNITS: 100 INJECTION, SOLUTION INTRAVENOUS; SUBCUTANEOUS at 17:26

## 2021-03-24 RX ADMIN — METHYLPREDNISOLONE SODIUM SUCCINATE 40 MG: 40 INJECTION, POWDER, FOR SOLUTION INTRAMUSCULAR; INTRAVENOUS at 20:46

## 2021-03-24 RX ADMIN — ARFORMOTEROL TARTRATE 15 MCG: 15 SOLUTION RESPIRATORY (INHALATION) at 19:51

## 2021-03-24 RX ADMIN — INSULIN LISPRO 3 UNITS: 100 INJECTION, SOLUTION INTRAVENOUS; SUBCUTANEOUS at 08:31

## 2021-03-24 RX ADMIN — LORAZEPAM 1 MG: 2 INJECTION INTRAMUSCULAR; INTRAVENOUS at 01:01

## 2021-03-24 RX ADMIN — IPRATROPIUM BROMIDE AND ALBUTEROL SULFATE 3 ML: 2.5; .5 SOLUTION RESPIRATORY (INHALATION) at 08:03

## 2021-03-24 RX ADMIN — ALPHA1-PROTEINASE INHIBITOR (HUMAN) 3309 MG: 1000 INJECTION, SOLUTION INTRAVENOUS at 13:29

## 2021-03-24 RX ADMIN — INSULIN LISPRO 7 UNITS: 100 INJECTION, SOLUTION INTRAVENOUS; SUBCUTANEOUS at 12:04

## 2021-03-24 RX ADMIN — METHYLPREDNISOLONE SODIUM SUCCINATE 60 MG: 125 INJECTION, POWDER, FOR SOLUTION INTRAMUSCULAR; INTRAVENOUS at 08:31

## 2021-03-24 RX ADMIN — AZITHROMYCIN 500 MG: 500 INJECTION, POWDER, LYOPHILIZED, FOR SOLUTION INTRAVENOUS at 08:31

## 2021-03-24 RX ADMIN — IPRATROPIUM BROMIDE AND ALBUTEROL SULFATE 3 ML: 2.5; .5 SOLUTION RESPIRATORY (INHALATION) at 15:41

## 2021-03-24 RX ADMIN — SODIUM CHLORIDE, PRESERVATIVE FREE 10 ML: 5 INJECTION INTRAVENOUS at 20:47

## 2021-03-24 RX ADMIN — LORAZEPAM 0.5 MG: 0.5 TABLET ORAL at 08:31

## 2021-03-24 RX ADMIN — IPRATROPIUM BROMIDE AND ALBUTEROL SULFATE 3 ML: 2.5; .5 SOLUTION RESPIRATORY (INHALATION) at 19:51

## 2021-03-24 RX ADMIN — IPRATROPIUM BROMIDE AND ALBUTEROL SULFATE 3 ML: 2.5; .5 SOLUTION RESPIRATORY (INHALATION) at 12:10

## 2021-03-24 RX ADMIN — GUAIFENESIN 1200 MG: 600 TABLET, EXTENDED RELEASE ORAL at 08:31

## 2021-03-24 RX ADMIN — ENOXAPARIN SODIUM 40 MG: 40 INJECTION SUBCUTANEOUS at 20:46

## 2021-03-24 RX ADMIN — ESCITALOPRAM OXALATE 10 MG: 10 TABLET ORAL at 08:31

## 2021-03-24 RX ADMIN — LORAZEPAM 0.5 MG: 0.5 TABLET ORAL at 12:04

## 2021-03-24 RX ADMIN — BUDESONIDE 0.5 MG: 0.5 INHALANT ORAL at 19:51

## 2021-03-24 RX ADMIN — GUAIFENESIN 1200 MG: 600 TABLET, EXTENDED RELEASE ORAL at 20:47

## 2021-03-24 RX ADMIN — Medication 5 MG: at 20:47

## 2021-03-25 LAB
ANION GAP SERPL CALCULATED.3IONS-SCNC: 9 MMOL/L (ref 5–15)
BUN SERPL-MCNC: 23 MG/DL (ref 6–20)
BUN/CREAT SERPL: 28 (ref 7–25)
CALCIUM SPEC-SCNC: 8.7 MG/DL (ref 8.6–10.5)
CHLORIDE SERPL-SCNC: 101 MMOL/L (ref 98–107)
CO2 SERPL-SCNC: 27 MMOL/L (ref 22–29)
CREAT SERPL-MCNC: 0.82 MG/DL (ref 0.76–1.27)
DEPRECATED RDW RBC AUTO: 44.7 FL (ref 37–54)
ERYTHROCYTE [DISTWIDTH] IN BLOOD BY AUTOMATED COUNT: 13.3 % (ref 12.3–15.4)
GFR SERPL CREATININE-BSD FRML MDRD: 98 ML/MIN/1.73
GLUCOSE BLDC GLUCOMTR-MCNC: 144 MG/DL (ref 70–130)
GLUCOSE BLDC GLUCOMTR-MCNC: 181 MG/DL (ref 70–130)
GLUCOSE BLDC GLUCOMTR-MCNC: 187 MG/DL (ref 70–130)
GLUCOSE BLDC GLUCOMTR-MCNC: 287 MG/DL (ref 70–130)
GLUCOSE SERPL-MCNC: 179 MG/DL (ref 65–99)
HBA1C MFR BLD: 6.9 % (ref 4.8–5.6)
HCT VFR BLD AUTO: 37.6 % (ref 37.5–51)
HGB BLD-MCNC: 12 G/DL (ref 13–17.7)
MCH RBC QN AUTO: 29.3 PG (ref 26.6–33)
MCHC RBC AUTO-ENTMCNC: 31.9 G/DL (ref 31.5–35.7)
MCV RBC AUTO: 91.7 FL (ref 79–97)
PLATELET # BLD AUTO: 286 10*3/MM3 (ref 140–450)
PMV BLD AUTO: 9.1 FL (ref 6–12)
POTASSIUM SERPL-SCNC: 4.2 MMOL/L (ref 3.5–5.2)
RBC # BLD AUTO: 4.1 10*6/MM3 (ref 4.14–5.8)
SODIUM SERPL-SCNC: 137 MMOL/L (ref 136–145)
WBC # BLD AUTO: 17.25 10*3/MM3 (ref 3.4–10.8)

## 2021-03-25 PROCEDURE — 94799 UNLISTED PULMONARY SVC/PX: CPT

## 2021-03-25 PROCEDURE — 99232 SBSQ HOSP IP/OBS MODERATE 35: CPT | Performed by: FAMILY MEDICINE

## 2021-03-25 PROCEDURE — 83036 HEMOGLOBIN GLYCOSYLATED A1C: CPT | Performed by: FAMILY MEDICINE

## 2021-03-25 PROCEDURE — 85027 COMPLETE CBC AUTOMATED: CPT | Performed by: FAMILY MEDICINE

## 2021-03-25 PROCEDURE — 25010000002 METHYLPREDNISOLONE PER 40 MG: Performed by: FAMILY MEDICINE

## 2021-03-25 PROCEDURE — 25010000002 ENOXAPARIN PER 10 MG: Performed by: NURSE PRACTITIONER

## 2021-03-25 PROCEDURE — 80048 BASIC METABOLIC PNL TOTAL CA: CPT | Performed by: FAMILY MEDICINE

## 2021-03-25 PROCEDURE — 99232 SBSQ HOSP IP/OBS MODERATE 35: CPT | Performed by: INTERNAL MEDICINE

## 2021-03-25 PROCEDURE — 63710000001 INSULIN LISPRO (HUMAN) PER 5 UNITS: Performed by: FAMILY MEDICINE

## 2021-03-25 PROCEDURE — 82962 GLUCOSE BLOOD TEST: CPT

## 2021-03-25 PROCEDURE — 63710000001 INSULIN DETEMIR PER 5 UNITS: Performed by: FAMILY MEDICINE

## 2021-03-25 RX ORDER — AZITHROMYCIN 250 MG/1
500 TABLET, FILM COATED ORAL
Status: DISCONTINUED | OUTPATIENT
Start: 2021-03-25 | End: 2021-03-26 | Stop reason: HOSPADM

## 2021-03-25 RX ORDER — PREDNISONE 20 MG/1
40 TABLET ORAL
Status: DISCONTINUED | OUTPATIENT
Start: 2021-03-26 | End: 2021-03-25

## 2021-03-25 RX ORDER — PREDNISONE 20 MG/1
40 TABLET ORAL
Status: DISCONTINUED | OUTPATIENT
Start: 2021-03-26 | End: 2021-03-26 | Stop reason: HOSPADM

## 2021-03-25 RX ORDER — LORAZEPAM 1 MG/1
1 TABLET ORAL EVERY 8 HOURS SCHEDULED
Status: DISCONTINUED | OUTPATIENT
Start: 2021-03-25 | End: 2021-03-26 | Stop reason: HOSPADM

## 2021-03-25 RX ORDER — MORPHINE SULFATE 10 MG/5ML
7.5 SOLUTION ORAL ONCE
Status: COMPLETED | OUTPATIENT
Start: 2021-03-25 | End: 2021-03-25

## 2021-03-25 RX ORDER — MORPHINE SULFATE 10 MG/5ML
7.5 SOLUTION ORAL EVERY 4 HOURS PRN
Status: DISCONTINUED | OUTPATIENT
Start: 2021-03-25 | End: 2021-03-26 | Stop reason: HOSPADM

## 2021-03-25 RX ORDER — PREDNISONE 20 MG/1
20 TABLET ORAL
Status: DISCONTINUED | OUTPATIENT
Start: 2021-04-05 | End: 2021-03-26 | Stop reason: HOSPADM

## 2021-03-25 RX ORDER — PREDNISONE 1 MG/1
10 TABLET ORAL
Status: DISCONTINUED | OUTPATIENT
Start: 2021-04-10 | End: 2021-03-26 | Stop reason: HOSPADM

## 2021-03-25 RX ADMIN — MORPHINE SULFATE 7.5 MG: 10 SOLUTION ORAL at 19:04

## 2021-03-25 RX ADMIN — ENOXAPARIN SODIUM 40 MG: 40 INJECTION SUBCUTANEOUS at 20:06

## 2021-03-25 RX ADMIN — LORAZEPAM 0.5 MG: 0.5 TABLET ORAL at 08:42

## 2021-03-25 RX ADMIN — LORAZEPAM 1 MG: 1 TABLET ORAL at 12:43

## 2021-03-25 RX ADMIN — AZITHROMYCIN MONOHYDRATE 500 MG: 250 TABLET ORAL at 14:46

## 2021-03-25 RX ADMIN — IPRATROPIUM BROMIDE AND ALBUTEROL SULFATE 3 ML: 2.5; .5 SOLUTION RESPIRATORY (INHALATION) at 16:31

## 2021-03-25 RX ADMIN — SODIUM CHLORIDE, PRESERVATIVE FREE 10 ML: 5 INJECTION INTRAVENOUS at 20:16

## 2021-03-25 RX ADMIN — ARFORMOTEROL TARTRATE 15 MCG: 15 SOLUTION RESPIRATORY (INHALATION) at 21:32

## 2021-03-25 RX ADMIN — METHYLPREDNISOLONE SODIUM SUCCINATE 40 MG: 40 INJECTION, POWDER, FOR SOLUTION INTRAMUSCULAR; INTRAVENOUS at 08:42

## 2021-03-25 RX ADMIN — IPRATROPIUM BROMIDE AND ALBUTEROL SULFATE 3 ML: 2.5; .5 SOLUTION RESPIRATORY (INHALATION) at 14:32

## 2021-03-25 RX ADMIN — INSULIN LISPRO 2 UNITS: 100 INJECTION, SOLUTION INTRAVENOUS; SUBCUTANEOUS at 08:42

## 2021-03-25 RX ADMIN — ESCITALOPRAM OXALATE 10 MG: 10 TABLET ORAL at 08:42

## 2021-03-25 RX ADMIN — IPRATROPIUM BROMIDE AND ALBUTEROL SULFATE 3 ML: 2.5; .5 SOLUTION RESPIRATORY (INHALATION) at 08:21

## 2021-03-25 RX ADMIN — INSULIN LISPRO 2 UNITS: 100 INJECTION, SOLUTION INTRAVENOUS; SUBCUTANEOUS at 12:44

## 2021-03-25 RX ADMIN — IPRATROPIUM BROMIDE AND ALBUTEROL SULFATE 3 ML: 2.5; .5 SOLUTION RESPIRATORY (INHALATION) at 21:32

## 2021-03-25 RX ADMIN — GUAIFENESIN 1200 MG: 600 TABLET, EXTENDED RELEASE ORAL at 08:41

## 2021-03-25 RX ADMIN — Medication 5 MG: at 20:05

## 2021-03-25 RX ADMIN — MORPHINE SULFATE 7.5 MG: 10 SOLUTION ORAL at 11:57

## 2021-03-25 RX ADMIN — BUDESONIDE 0.5 MG: 0.5 INHALANT ORAL at 21:32

## 2021-03-25 RX ADMIN — LORAZEPAM 1 MG: 1 TABLET ORAL at 21:07

## 2021-03-25 RX ADMIN — ARFORMOTEROL TARTRATE 15 MCG: 15 SOLUTION RESPIRATORY (INHALATION) at 08:21

## 2021-03-25 RX ADMIN — GUAIFENESIN 1200 MG: 600 TABLET, EXTENDED RELEASE ORAL at 20:05

## 2021-03-25 RX ADMIN — INSULIN LISPRO 6 UNITS: 100 INJECTION, SOLUTION INTRAVENOUS; SUBCUTANEOUS at 16:28

## 2021-03-25 RX ADMIN — INSULIN DETEMIR 10 UNITS: 100 INJECTION, SOLUTION SUBCUTANEOUS at 20:20

## 2021-03-25 RX ADMIN — SODIUM CHLORIDE, PRESERVATIVE FREE 10 ML: 5 INJECTION INTRAVENOUS at 12:44

## 2021-03-26 VITALS
DIASTOLIC BLOOD PRESSURE: 81 MMHG | HEART RATE: 101 BPM | RESPIRATION RATE: 20 BRPM | TEMPERATURE: 97.9 F | WEIGHT: 114 LBS | BODY MASS INDEX: 17.89 KG/M2 | SYSTOLIC BLOOD PRESSURE: 116 MMHG | HEIGHT: 67 IN | OXYGEN SATURATION: 86 %

## 2021-03-26 PROBLEM — J96.21 ACUTE ON CHRONIC RESPIRATORY FAILURE WITH HYPOXEMIA (HCC): Status: RESOLVED | Noted: 2020-11-24 | Resolved: 2021-03-26

## 2021-03-26 LAB
GLUCOSE BLDC GLUCOMTR-MCNC: 133 MG/DL (ref 70–130)
GLUCOSE BLDC GLUCOMTR-MCNC: 75 MG/DL (ref 70–130)

## 2021-03-26 PROCEDURE — 82962 GLUCOSE BLOOD TEST: CPT

## 2021-03-26 PROCEDURE — 63710000001 PREDNISONE PER 1 MG: Performed by: INTERNAL MEDICINE

## 2021-03-26 PROCEDURE — 94799 UNLISTED PULMONARY SVC/PX: CPT

## 2021-03-26 PROCEDURE — 99239 HOSP IP/OBS DSCHRG MGMT >30: CPT | Performed by: FAMILY MEDICINE

## 2021-03-26 PROCEDURE — 97535 SELF CARE MNGMENT TRAINING: CPT

## 2021-03-26 PROCEDURE — 99232 SBSQ HOSP IP/OBS MODERATE 35: CPT | Performed by: INTERNAL MEDICINE

## 2021-03-26 RX ORDER — LORAZEPAM 1 MG/1
1 TABLET ORAL EVERY 8 HOURS SCHEDULED
Qty: 9 TABLET | Refills: 0
Start: 2021-03-26 | End: 2021-04-15 | Stop reason: SDUPTHER

## 2021-03-26 RX ORDER — BUDESONIDE 0.5 MG/2ML
0.5 INHALANT ORAL
Qty: 120 ML | Refills: 0
Start: 2021-03-26

## 2021-03-26 RX ORDER — ARFORMOTEROL TARTRATE 15 UG/2ML
15 SOLUTION RESPIRATORY (INHALATION)
Qty: 120 ML | Refills: 0
Start: 2021-03-26

## 2021-03-26 RX ORDER — PREDNISONE 10 MG/1
TABLET ORAL
Qty: 60 TABLET | Refills: 0
Start: 2021-03-26 | End: 2021-07-16

## 2021-03-26 RX ORDER — AZITHROMYCIN 250 MG/1
500 TABLET, FILM COATED ORAL ONCE
Qty: 2 TABLET | Refills: 0
Start: 2021-03-26 | End: 2021-03-26

## 2021-03-26 RX ORDER — MORPHINE SULFATE 10 MG/5ML
7.5 SOLUTION ORAL EVERY 4 HOURS PRN
Qty: 500 ML | Refills: 0
Start: 2021-03-26 | End: 2021-04-15 | Stop reason: SDUPTHER

## 2021-03-26 RX ADMIN — LORAZEPAM 1 MG: 1 TABLET ORAL at 05:46

## 2021-03-26 RX ADMIN — BUDESONIDE 0.5 MG: 0.5 INHALANT ORAL at 07:48

## 2021-03-26 RX ADMIN — MORPHINE SULFATE 7.5 MG: 10 SOLUTION ORAL at 08:26

## 2021-03-26 RX ADMIN — AZITHROMYCIN MONOHYDRATE 500 MG: 250 TABLET ORAL at 08:25

## 2021-03-26 RX ADMIN — SODIUM CHLORIDE, PRESERVATIVE FREE 10 ML: 5 INJECTION INTRAVENOUS at 08:26

## 2021-03-26 RX ADMIN — ARFORMOTEROL TARTRATE 15 MCG: 15 SOLUTION RESPIRATORY (INHALATION) at 07:49

## 2021-03-26 RX ADMIN — GUAIFENESIN 1200 MG: 600 TABLET, EXTENDED RELEASE ORAL at 08:25

## 2021-03-26 RX ADMIN — IPRATROPIUM BROMIDE AND ALBUTEROL SULFATE 3 ML: 2.5; .5 SOLUTION RESPIRATORY (INHALATION) at 07:49

## 2021-03-26 RX ADMIN — LORAZEPAM 1 MG: 1 TABLET ORAL at 13:44

## 2021-03-26 RX ADMIN — IPRATROPIUM BROMIDE AND ALBUTEROL SULFATE 3 ML: 2.5; .5 SOLUTION RESPIRATORY (INHALATION) at 12:32

## 2021-03-26 RX ADMIN — PREDNISONE 40 MG: 20 TABLET ORAL at 08:25

## 2021-03-26 RX ADMIN — MORPHINE SULFATE 7.5 MG: 10 SOLUTION ORAL at 14:14

## 2021-03-26 RX ADMIN — ESCITALOPRAM OXALATE 10 MG: 10 TABLET ORAL at 08:26

## 2021-03-27 LAB
BACTERIA SPEC AEROBE CULT: NORMAL
BACTERIA SPEC AEROBE CULT: NORMAL

## 2021-04-15 ENCOUNTER — TELEMEDICINE (OUTPATIENT)
Dept: PULMONOLOGY | Facility: CLINIC | Age: 56
End: 2021-04-15

## 2021-04-15 DIAGNOSIS — Z99.81 DEPENDENCE ON SUPPLEMENTAL OXYGEN: Primary | Chronic | ICD-10-CM

## 2021-04-15 DIAGNOSIS — J44.9 COPD MIXED TYPE (HCC): ICD-10-CM

## 2021-04-15 DIAGNOSIS — E88.01 ALPHA-1-ANTITRYPSIN DEFICIENCY (HCC): ICD-10-CM

## 2021-04-15 DIAGNOSIS — J93.9 RECURRENT PNEUMOTHORAX: ICD-10-CM

## 2021-04-15 PROCEDURE — 99213 OFFICE O/P EST LOW 20 MIN: CPT | Performed by: NURSE PRACTITIONER

## 2021-04-15 RX ORDER — GUAIFENESIN 1200 MG/1
1200 TABLET, EXTENDED RELEASE ORAL DAILY
Qty: 60 TABLET | Refills: 6 | Status: SHIPPED | OUTPATIENT
Start: 2021-04-15

## 2021-04-15 RX ORDER — MORPHINE SULFATE 10 MG/5ML
7.5 SOLUTION ORAL EVERY 4 HOURS PRN
Qty: 500 ML | Refills: 0
Start: 2021-04-15 | End: 2021-04-15 | Stop reason: SDUPTHER

## 2021-04-15 RX ORDER — MORPHINE SULFATE 10 MG/5ML
7.5 SOLUTION ORAL EVERY 4 HOURS PRN
Qty: 500 ML | Refills: 0 | Status: SHIPPED | OUTPATIENT
Start: 2021-04-15 | End: 2021-05-19 | Stop reason: SDUPTHER

## 2021-04-15 RX ORDER — LORAZEPAM 1 MG/1
1 TABLET ORAL EVERY 8 HOURS SCHEDULED
Qty: 90 TABLET | Refills: 0 | Status: SHIPPED | OUTPATIENT
Start: 2021-04-15 | End: 2021-05-10 | Stop reason: SDUPTHER

## 2021-04-15 NOTE — PROGRESS NOTES
[facility] Pulmonary Follow up    CHIEF COMPLAINT    Hospital follow-up    HISTORY OF PRESENT ILLNESS    Balwinder Willams is a 55 y.o.male here today for a hospital follow-up through telemedicine visit.  He was hospitalized on 3/22-3/26 for COPD exacerbation.  He was then discharged home to [facility] pulmonary rehabilitation.  He has completed this program and was discharged home last week.  He states that he does feel stronger and has felt better than he has in quite some time.    He remains on Pulmicort Brovana nebulizers twice a day.  He does use his Combivent about every 4 hours.  He will occasionally use a DuoNeb if he feels he needs it.  He states he does have shortness of breath with exertion but is able to do his daily activities without any difficulty.    He remains on 3 L continuously.  He is currently taking 10 mg of prednisone daily.  He continues to receive his Prolastin infusions weekly.    He continues on 1 mg of Ativan every 8 hours and uses liquid morphine occasionally during the day.  He may use the morphine once or twice a day.  He does need refills of these prescriptions.  He had been following with palliative care but chose to follow with us after discharge from the hospital.    He denies of fever, chills, sputum production, hemoptysis, night sweats, weight loss, chest pain or palpitations.  He denies any lower extremity edema or calf tenderness.  He denies any sinus or allergy symptoms.  He denies reflux symptoms.    He had followed with [facility] transplant team and completed work-up in early March he was told that he did not meet qualifications for lung transplant at this time.  He did have to enroll in a pulmonary rehabilitation program and stay in maintenance before being considered for a transplant.    He is up-to-date on his current vaccinations.    Patient Active Problem List   Diagnosis   • Alpha-1-antitrypsin deficiency (on replacement)   • Chronic cough   • Dependence on supplemental oxygen  (3L NC)    • Stage IV emphysema s/p EBV placement X3 w/ subsequent extraction   • Former smoker (Resolved 2006)   • Postprocedural pneumothorax   • Persistent air leak   • Severe malnutrition (CMS/HCC)   • Steroid-induced hyperglycemia   • Recurrent L PTX s/p multiple SBCTs    • Recent PSA PNA   • MAC (smear -)    • Leukocytosis   • COPD exacerbation (CMS/HCC)       Allergies   Allergen Reactions   • Ketamine Headache     Pt. Would prefer not to receive ketamine in future.   • Other Other (See Comments)     Opioid Analgesics (recovering addict)       Current Outpatient Medications:   •  arformoterol (BROVANA) 15 MCG/2ML nebulizer solution, Take 2 mL by nebulization 2 (Two) Times a Day., Disp: 120 mL, Rfl: 0  •  budesonide (PULMICORT) 0.5 MG/2ML nebulizer solution, Take 2 mL by nebulization 2 (Two) Times a Day., Disp: 120 mL, Rfl: 0  •  escitalopram (LEXAPRO) 10 MG tablet, Take 10 mg by mouth Daily., Disp: , Rfl:   •  fluticasone (Flonase) 50 MCG/ACT nasal spray, 2 sprays into the nostril(s) as directed by provider Daily. (Patient taking differently: 2 sprays into the nostril(s) as directed by provider Daily As Needed.), Disp: 9.9 mL, Rfl: 6  •  guaiFENesin ER 1200 MG tablet sustained-release 12 hour, Take 1 tablet by mouth Daily., Disp: 60 tablet, Rfl: 6  •  insulin detemir (LEVEMIR) 100 UNIT/ML injection, Inject 5 Units under the skin into the appropriate area as directed Every Night., Disp: 10 mL, Rfl: 0  •  insulin lispro (humaLOG) 100 UNIT/ML injection, Inject 0-9 Units under the skin into the appropriate area as directed 3 (Three) Times a Day Before Meals., Disp: 10 mL, Rfl: 0  •  ipratropium-albuterol (COMBIVENT RESPIMAT)  MCG/ACT inhaler, Inhale 1 puff 4 (Four) Times a Day As Needed for Wheezing., Disp: 2 each, Rfl: 6  •  ipratropium-albuterol (DUO-NEB) 0.5-2.5 mg/3 ml nebulizer, Take 3 mL by nebulization 4 (Four) Times a Day. (Patient taking differently: Take 3 mL by nebulization 4 (Four) Times a Day  As Needed.), Disp: 360 mL, Rfl:   •  LORazepam (ATIVAN) 1 MG tablet, Take 1 tablet by mouth Every 8 (Eight) Hours., Disp: 90 tablet, Rfl: 0  •  Morphine 10 MG/5ML solution, Take 3.8 mL by mouth Every 4 (Four) Hours As Needed for Severe Pain  (air hunger)., Disp: 500 mL, Rfl: 0  •  predniSONE (DELTASONE) 10 MG tablet, 40mg x 5 days, 30mg x 5 days, 20mg x 5 days, then continue 10mg daily, Disp: 60 tablet, Rfl: 0  •  PROLASTIN-C 1000 MG/20ML solution, Infuse 60 mg/kg into a venous catheter 1 (One) Time Per Week., Disp: , Rfl:   MEDICATION LIST AND ALLERGIES REVIEWED.    Social History     Tobacco Use   • Smoking status: Former Smoker     Packs/day: 1.50     Years: 25.00     Pack years: 37.50     Types: Cigarettes     Start date: 1/1/1981     Quit date: 2006     Years since quitting: 15.2   • Smokeless tobacco: Never Used   • Tobacco comment: quit 10 years ago   Vaping Use   • Vaping Use: Former   • Quit date: 1/1/2010   • Substances: Nicotine   • Passive vaping exposure Yes   Substance Use Topics   • Alcohol use: No     Comment: In recovery since 8/24/1994   • Drug use: Yes     Comment: In recovery since 8/24/1994       FAMILY AND SOCIAL HISTORY REVIEWED.    Review of Systems   Constitutional: Negative for activity change, appetite change, fatigue, fever and unexpected weight change.   HENT: Negative for congestion, postnasal drip, rhinorrhea, sinus pressure, sore throat and voice change.    Eyes: Negative for visual disturbance.   Respiratory: Positive for shortness of breath. Negative for cough, chest tightness and wheezing.    Cardiovascular: Negative for chest pain, palpitations and leg swelling.   Gastrointestinal: Negative for abdominal distention, abdominal pain, nausea and vomiting.   Endocrine: Negative for cold intolerance and heat intolerance.   Genitourinary: Negative for difficulty urinating and urgency.   Musculoskeletal: Negative for arthralgias, back pain and neck pain.   Skin: Negative for color change  and pallor.   Allergic/Immunologic: Negative for environmental allergies and food allergies.   Neurological: Negative for dizziness, syncope, weakness and light-headedness.   Hematological: Negative for adenopathy. Does not bruise/bleed easily.   Psychiatric/Behavioral: Negative for agitation and behavioral problems.   .    There were no vitals taken for this visit.    Immunization History   Administered Date(s) Administered   • COVID-19 (PFIZER) 03/04/2021   • Flu Vaccine Quad PF >36MO 11/15/2019, 12/01/2020   • Flulaval/Fluarix/Fluzone Quad 11/18/2020   • Influenza, Unspecified 11/18/2019   • Pneumococcal Conjugate 13-Valent (PCV13) 12/10/2015, 11/18/2019   • flucelvax quad pfs =>4 YRS 11/18/2019       Physical Exam    Unable to do physical exam due to telemedicine visit, patient was able to communicate without any respiratory distress throughout the entire visit.    RESULTS      PROBLEM LIST    Problem List Items Addressed This Visit        Pulmonary and Pneumonias    Alpha-1-antitrypsin deficiency (on replacement) (Chronic)    Overview     A.  Labs of 11/20/2014 reports an alpha 1 antitrypsin deficiency of 4.7 with a phenotype with Z bands detected and genotyping revealing the presence of most common deficiency allele type Z and an inferred non-expressing null allele.    B.  On Zemaira         Relevant Medications    LORazepam (ATIVAN) 1 MG tablet    guaiFENesin ER 1200 MG tablet sustained-release 12 hour    Dependence on supplemental oxygen (3L NC)  - Primary (Chronic)    Stage IV emphysema s/p EBV placement X3 w/ subsequent extraction (Chronic)    Relevant Medications    LORazepam (ATIVAN) 1 MG tablet    guaiFENesin ER 1200 MG tablet sustained-release 12 hour    Other Relevant Orders    Amb[place] [facility]       Other    Recurrent L PTX s/p multiple SBCTs     Relevant Medications    guaiFENesin ER 1200 MG tablet sustained-release 12 hour            DISCUSSION  You  have chosen to receive care through a telehealth visit.  Do you consent to use a video/audio connection for your medical care today? Yes    Mr. Willams was here for a hospital follow-up.  He seems to be doing better now that he was discharged and had completed pulmonary rehabilitation.  I am going to try to get him in our maintenance program in the office in the next couple of weeks for pulmonary rehabilitation.    I will also get records from [facility] transplant team to see when he will follow-up with them.    We will refill his Ativan and morphine prescriptions as well today.  He will continue budesonide and Brovana nebulizers twice a day.  He will continue his Prolastin infusions weekly.    He will continue to use his Combivent and DuoNeb's as needed for shortness of breath.    He will follow up in the office in 6 weeks if he cannot come into the office he may do a video visit at this time.  I did advise him to call me with any additional concerns or questions.  If he were to worsen he knows to call the office at any time.    Level of service justified based on 25 minutes spent in patient care on this date of service including, but not limited to: preparing to see the patient, obtaining and/or reviewing history, performing medically appropriate examination, ordering tests/medicine/procedures, independently interpreting results, documenting clinical information in EHR, and counseling/education of patient/family/caregiver. (Level 4 30-39 minutes; Level 5 40-54 minutes)      Neris Cárdenas, APRN  04/15/235260:18 EDT  Electronically signed     Please note that portions of this note were completed with a voice recognition program. Efforts were made to edit the dictations, but occasionally words are mistranscribed.      CC: Dorinda Garcia MD

## 2021-04-30 DIAGNOSIS — J44.9 COPD MIXED TYPE (HCC): Primary | ICD-10-CM

## 2021-04-30 RX ORDER — PREDNISONE 10 MG/1
10 TABLET ORAL DAILY
Qty: 90 TABLET | Refills: 0 | Status: SHIPPED | OUTPATIENT
Start: 2021-04-30 | End: 2021-08-04 | Stop reason: SDUPTHER

## 2021-05-04 DIAGNOSIS — J44.9 COPD MIXED TYPE (HCC): Primary | ICD-10-CM

## 2021-05-04 RX ORDER — DOXYCYCLINE HYCLATE 100 MG
100 TABLET ORAL 2 TIMES DAILY
Qty: 20 TABLET | Refills: 0 | Status: SHIPPED | OUTPATIENT
Start: 2021-05-04 | End: 2021-06-01 | Stop reason: SDUPTHER

## 2021-05-04 RX ORDER — IPRATROPIUM BROMIDE AND ALBUTEROL SULFATE 2.5; .5 MG/3ML; MG/3ML
3 SOLUTION RESPIRATORY (INHALATION)
Qty: 360 ML | Refills: 11 | Status: SHIPPED | OUTPATIENT
Start: 2021-05-04

## 2021-05-04 RX ORDER — PREDNISONE 10 MG/1
TABLET ORAL
Qty: 31 TABLET | Refills: 0 | Status: SHIPPED | OUTPATIENT
Start: 2021-05-04 | End: 2021-06-01 | Stop reason: SDUPTHER

## 2021-05-04 NOTE — PROGRESS NOTES
Mr. Willams called stating that he was more congested and was unable to cough up any sputum.  He states that his sputum is a light white to tan color.  I will call in an antibiotic and steroid taper for him.  He is currently on 10 mg of prednisone I did advise him to go back to 10 mg after he was completed with prednisone taper.  I did advise him that if he were to worsen he needs to present to the ED or my office for further evaluation.  He is agreeable this plan will call with any additional concerns or questions.

## 2021-05-10 DIAGNOSIS — E88.01 ALPHA-1-ANTITRYPSIN DEFICIENCY (HCC): ICD-10-CM

## 2021-05-10 DIAGNOSIS — J44.9 COPD MIXED TYPE (HCC): ICD-10-CM

## 2021-05-10 RX ORDER — LORAZEPAM 1 MG/1
1 TABLET ORAL EVERY 8 HOURS SCHEDULED
Qty: 90 TABLET | Refills: 0 | Status: SHIPPED | OUTPATIENT
Start: 2021-05-10 | End: 2021-06-08 | Stop reason: SDUPTHER

## 2021-05-19 ENCOUNTER — TELEMEDICINE (OUTPATIENT)
Dept: PULMONOLOGY | Facility: CLINIC | Age: 56
End: 2021-05-19

## 2021-05-19 VITALS — WEIGHT: 126 LBS | BODY MASS INDEX: 19.73 KG/M2

## 2021-05-19 DIAGNOSIS — J44.9 COPD MIXED TYPE (HCC): Chronic | ICD-10-CM

## 2021-05-19 DIAGNOSIS — Z99.81 DEPENDENCE ON SUPPLEMENTAL OXYGEN: Chronic | ICD-10-CM

## 2021-05-19 DIAGNOSIS — E88.01 ALPHA-1-ANTITRYPSIN DEFICIENCY (HCC): Primary | Chronic | ICD-10-CM

## 2021-05-19 DIAGNOSIS — E88.01 ALPHA-1-ANTITRYPSIN DEFICIENCY (HCC): ICD-10-CM

## 2021-05-19 DIAGNOSIS — J44.9 COPD MIXED TYPE (HCC): ICD-10-CM

## 2021-05-19 PROCEDURE — 99213 OFFICE O/P EST LOW 20 MIN: CPT | Performed by: NURSE PRACTITIONER

## 2021-05-19 NOTE — PROGRESS NOTES
[facility] Pulmonary Follow up    CHIEF COMPLAINT    Emphysema/Alpha 1 deficiency    HISTORY OF PRESENT ILLNESS    Balwinder Willams is a 55 y.o.male here today for a telemedicine visit.  He was last seen in the office a month ago as a telemedicine visit.  He states that he has been doing well and has stayed out of the hospital since the end of March.    He follows up with the  transplant team next week and is hopeful to be a candidate for a lung transplant in the near future.    He continues to use Pulmicort, Brovana nebulizers twice a day.  He does use his Combivent around every 4 hours.  He will occasionally uses DuoNebs if needed.  He continues to be very limited by his shortness of breath.  He is unable to do any long-term activities.  He is able to do activities of daily living.    He remains on 3 L continuously.  He has been taking 10 mg of prednisone since his last appointment he has gained some weight.  He continues to receive Prolastin infusions weekly.    He has been using Ativan 1 mg and liquid morphine occasionally during the day for his anxiety.  He states that these have kept him more calm and less anxious.    He denies of fever, chills, sputum production, hemoptysis, night sweats, weight loss, chest pain or palpitations.  He denies any lower extremity edema or calf tenderness.  He denies any sinus or allergy symptoms.  He denies reflux symptoms.    He is up-to-date on his current vaccinations.    Patient Active Problem List   Diagnosis   • Alpha-1-antitrypsin deficiency (on replacement)   • Chronic cough   • Dependence on supplemental oxygen (3L NC)    • Stage IV emphysema s/p EBV placement X3 w/ subsequent extraction   • Former smoker (Resolved 2006)   • Postprocedural pneumothorax   • Persistent air leak   • Severe malnutrition (CMS/HCC)   • Steroid-induced hyperglycemia   • Recurrent L PTX s/p multiple SBCTs    • Recent PSA PNA   • MAC (smear -)    • Leukocytosis   • COPD exacerbation (CMS/HCC)        Allergies   Allergen Reactions   • Ketamine Headache     Pt. Would prefer not to receive ketamine in future.   • Other Other (See Comments)     Opioid Analgesics (recovering addict)       Current Outpatient Medications:   •  arformoterol (BROVANA) 15 MCG/2ML nebulizer solution, Take 2 mL by nebulization 2 (Two) Times a Day., Disp: 120 mL, Rfl: 0  •  budesonide (PULMICORT) 0.5 MG/2ML nebulizer solution, Take 2 mL by nebulization 2 (Two) Times a Day., Disp: 120 mL, Rfl: 0  •  escitalopram (LEXAPRO) 10 MG tablet, Take 10 mg by mouth Daily., Disp: , Rfl:   •  fluticasone (Flonase) 50 MCG/ACT nasal spray, 2 sprays into the nostril(s) as directed by provider Daily. (Patient taking differently: 2 sprays into the nostril(s) as directed by provider Daily As Needed.), Disp: 9.9 mL, Rfl: 6  •  guaiFENesin ER 1200 MG tablet sustained-release 12 hour, Take 1 tablet by mouth Daily., Disp: 60 tablet, Rfl: 6  •  ipratropium-albuterol (COMBIVENT RESPIMAT)  MCG/ACT inhaler, Inhale 1 puff 4 (Four) Times a Day As Needed for Wheezing., Disp: 2 each, Rfl: 6  •  ipratropium-albuterol (DUO-NEB) 0.5-2.5 mg/3 ml nebulizer, Take 3 mL by nebulization 4 (Four) Times a Day., Disp: 360 mL, Rfl: 11  •  LORazepam (ATIVAN) 1 MG tablet, Take 1 tablet by mouth Every 8 (Eight) Hours., Disp: 90 tablet, Rfl: 0  •  Morphine 10 MG/5ML solution, Take 3.8 mL by mouth Every 4 (Four) Hours As Needed for Severe Pain  (air hunger)., Disp: 500 mL, Rfl: 0  •  predniSONE (DELTASONE) 10 MG tablet, Take 1 tablet by mouth Daily., Disp: 90 tablet, Rfl: 0  •  PROLASTIN-C 1000 MG/20ML solution, Infuse 60 mg/kg into a venous catheter 1 (One) Time Per Week., Disp: , Rfl:   •  doxycycline (VIBRAMYICN) 100 MG tablet, Take 1 tablet by mouth 2 (Two) Times a Day., Disp: 20 tablet, Rfl: 0  •  insulin detemir (LEVEMIR) 100 UNIT/ML injection, Inject 5 Units under the skin into the appropriate area as directed Every Night., Disp: 10 mL, Rfl: 0  •  insulin lispro  (humaLOG) 100 UNIT/ML injection, Inject 0-9 Units under the skin into the appropriate area as directed 3 (Three) Times a Day Before Meals., Disp: 10 mL, Rfl: 0  •  predniSONE (DELTASONE) 10 MG tablet, 40mg x 5 days, 30mg x 5 days, 20mg x 5 days, then continue 10mg daily, Disp: 60 tablet, Rfl: 0  •  predniSONE (DELTASONE) 10 MG tablet, Take 4 tabs daily x 3 days, then take 3 tabs daily x 3 days, then take 2 tabs daily x 3 days, then take 1 tab daily x 3 days, Disp: 31 tablet, Rfl: 0  MEDICATION LIST AND ALLERGIES REVIEWED.    Social History     Tobacco Use   • Smoking status: Former Smoker     Packs/day: 1.50     Years: 25.00     Pack years: 37.50     Types: Cigarettes     Start date: 1/1/1981     Quit date: 2006     Years since quitting: 15.3   • Smokeless tobacco: Never Used   • Tobacco comment: quit 10 years ago   Vaping Use   • Vaping Use: Former   • Quit date: 1/1/2010   • Substances: Nicotine   • Passive vaping exposure Yes   Substance Use Topics   • Alcohol use: No     Comment: In recovery since 8/24/1994   • Drug use: Yes     Comment: In recovery since 8/24/1994       FAMILY AND SOCIAL HISTORY REVIEWED.    Review of Systems   Constitutional: Positive for fatigue. Negative for activity change, appetite change, fever and unexpected weight change.   HENT: Negative for congestion, postnasal drip, rhinorrhea, sinus pressure, sore throat and voice change.    Eyes: Negative for visual disturbance.   Respiratory: Positive for shortness of breath. Negative for cough, chest tightness and wheezing.    Cardiovascular: Negative for chest pain, palpitations and leg swelling.   Gastrointestinal: Negative for abdominal distention, abdominal pain, nausea and vomiting.   Endocrine: Negative for cold intolerance and heat intolerance.   Genitourinary: Negative for difficulty urinating and urgency.   Musculoskeletal: Negative for arthralgias, back pain and neck pain.   Skin: Negative for color change and pallor.    Allergic/Immunologic: Negative for environmental allergies and food allergies.   Neurological: Negative for dizziness, syncope, weakness and light-headedness.   Hematological: Negative for adenopathy. Does not bruise/bleed easily.   Psychiatric/Behavioral: Negative for agitation and behavioral problems.   .    Wt 57.2 kg (126 lb)   BMI 19.73 kg/m²     Immunization History   Administered Date(s) Administered   • COVID-19 (PFIZER) 03/04/2021   • Flu Vaccine Quad PF >36MO 11/15/2019, 12/01/2020   • Flulaval/Fluarix/Fluzone Quad 11/18/2020   • Influenza, Unspecified 11/18/2019   • Pneumococcal Conjugate 13-Valent (PCV13) 12/10/2015, 11/18/2019   • flucelvax quad pfs =>4 YRS 11/18/2019       Unable to do physical exam due to telemedicine visit, patient was able to communicate without any respiratory distress at the entire visit.    RESULTS    PROBLEM LIST    Problem List Items Addressed This Visit        Pulmonary and Pneumonias    Alpha-1-antitrypsin deficiency (on replacement) - Primary (Chronic)    Overview     A.  Labs of 11/20/2014 reports an alpha 1 antitrypsin deficiency of 4.7 with a phenotype with Z bands detected and genotyping revealing the presence of most common deficiency allele type Z and an inferred non-expressing null allele.    B.  On Zemaira         Dependence on supplemental oxygen (3L NC)  (Chronic)    Stage IV emphysema s/p EBV placement X3 w/ subsequent extraction (Chronic)            DISCUSSION    Mr. Willams was here for follow-up of his emphysema.  He seems to be doing fairly well.  He has put on some weight and is doing well.    He does follow-up with [facility] transplant team next week and is hopeful to be placed on the transplant list in the near future.    He will continue Pulmicort and Brovana nebulizers twice a day.  Did encourage him to continue using his Combivent as needed for shortness of breath.    He will continue Ativan and liquid morphine for his anxiety.  He will also continue  prednisone 10 mg daily.    He will follow-up in 2 to 3 months or sooner if his symptoms worsen.  He will call with any additional concerns or questions.    Level of service justified based on 20 minutes spent in patient care on this date of service including, but not limited to: preparing to see the patient, obtaining and/or reviewing history, performing medically appropriate examination, ordering tests/medicine/procedures, independently interpreting results, documenting clinical information in EHR, and counseling/education of patient/family/caregiver. (Level 4 30-39 minutes; Level 5 40-54 minutes)      Neris Cárdenas, JESSICA  05/19/202114:37 EDT  Electronically signed     Please note that portions of this note were completed with a voice recognition program. Efforts were made to edit the dictations, but occasionally words are mistranscribed.      CC: Dorinda Garcia MD

## 2021-05-20 RX ORDER — MORPHINE SULFATE 10 MG/5ML
7.5 SOLUTION ORAL EVERY 4 HOURS PRN
Qty: 500 ML | Refills: 0 | Status: SHIPPED | OUTPATIENT
Start: 2021-05-20 | End: 2021-07-06 | Stop reason: SDUPTHER

## 2021-06-01 DIAGNOSIS — J44.9 COPD MIXED TYPE (HCC): ICD-10-CM

## 2021-06-01 RX ORDER — PREDNISONE 10 MG/1
TABLET ORAL
Qty: 31 TABLET | Refills: 0 | Status: SHIPPED | OUTPATIENT
Start: 2021-06-01 | End: 2021-07-15 | Stop reason: SDUPTHER

## 2021-06-01 RX ORDER — DOXYCYCLINE HYCLATE 100 MG
100 TABLET ORAL 2 TIMES DAILY
Qty: 20 TABLET | Refills: 0 | Status: SHIPPED | OUTPATIENT
Start: 2021-06-01 | End: 2021-07-15 | Stop reason: SDUPTHER

## 2021-06-08 DIAGNOSIS — E88.01 ALPHA-1-ANTITRYPSIN DEFICIENCY (HCC): ICD-10-CM

## 2021-06-08 DIAGNOSIS — J44.9 COPD MIXED TYPE (HCC): ICD-10-CM

## 2021-06-08 RX ORDER — LORAZEPAM 1 MG/1
1 TABLET ORAL EVERY 8 HOURS SCHEDULED
Qty: 90 TABLET | Refills: 0 | Status: CANCELLED | OUTPATIENT
Start: 2021-06-08

## 2021-06-08 RX ORDER — LORAZEPAM 1 MG/1
1 TABLET ORAL EVERY 8 HOURS SCHEDULED
Qty: 90 TABLET | Refills: 0 | Status: SHIPPED | OUTPATIENT
Start: 2021-06-08 | End: 2021-07-07 | Stop reason: SDUPTHER

## 2021-07-06 DIAGNOSIS — E88.01 ALPHA-1-ANTITRYPSIN DEFICIENCY (HCC): ICD-10-CM

## 2021-07-06 DIAGNOSIS — J44.9 COPD MIXED TYPE (HCC): ICD-10-CM

## 2021-07-07 DIAGNOSIS — J44.9 COPD MIXED TYPE (HCC): ICD-10-CM

## 2021-07-07 DIAGNOSIS — E88.01 ALPHA-1-ANTITRYPSIN DEFICIENCY (HCC): ICD-10-CM

## 2021-07-07 RX ORDER — MORPHINE SULFATE 10 MG/5ML
7.5 SOLUTION ORAL EVERY 4 HOURS PRN
Qty: 500 ML | Refills: 0 | Status: SHIPPED | OUTPATIENT
Start: 2021-07-07 | End: 2021-08-04 | Stop reason: SDUPTHER

## 2021-07-07 RX ORDER — LORAZEPAM 1 MG/1
1 TABLET ORAL EVERY 8 HOURS SCHEDULED
Qty: 90 TABLET | Refills: 0 | Status: CANCELLED | OUTPATIENT
Start: 2021-07-07

## 2021-07-07 RX ORDER — LORAZEPAM 1 MG/1
1 TABLET ORAL EVERY 8 HOURS SCHEDULED
Qty: 90 TABLET | Refills: 0 | Status: SHIPPED | OUTPATIENT
Start: 2021-07-07 | End: 2021-08-04 | Stop reason: SDUPTHER

## 2021-07-15 DIAGNOSIS — J44.9 COPD MIXED TYPE (HCC): ICD-10-CM

## 2021-07-16 ENCOUNTER — OFFICE VISIT (OUTPATIENT)
Dept: PULMONOLOGY | Facility: CLINIC | Age: 56
End: 2021-07-16

## 2021-07-16 VITALS
OXYGEN SATURATION: 97 % | TEMPERATURE: 98.4 F | HEIGHT: 67 IN | WEIGHT: 131 LBS | BODY MASS INDEX: 20.56 KG/M2 | RESPIRATION RATE: 18 BRPM | DIASTOLIC BLOOD PRESSURE: 70 MMHG | HEART RATE: 87 BPM | SYSTOLIC BLOOD PRESSURE: 98 MMHG

## 2021-07-16 DIAGNOSIS — J44.9 COPD MIXED TYPE (HCC): Chronic | ICD-10-CM

## 2021-07-16 DIAGNOSIS — E88.01 ALPHA-1-ANTITRYPSIN DEFICIENCY (HCC): Primary | Chronic | ICD-10-CM

## 2021-07-16 PROCEDURE — 99214 OFFICE O/P EST MOD 30 MIN: CPT | Performed by: NURSE PRACTITIONER

## 2021-07-16 RX ORDER — ALPHA-1-PROTEINASE INHIBITOR (HUMAN) 1000 MG
KIT INTRAVENOUS
COMMUNITY
Start: 2021-03-28

## 2021-07-16 RX ORDER — IBUPROFEN 800 MG/1
TABLET ORAL
COMMUNITY

## 2021-07-16 RX ORDER — DOXYCYCLINE HYCLATE 100 MG
100 TABLET ORAL 2 TIMES DAILY
Qty: 20 TABLET | Refills: 0 | Status: SHIPPED | OUTPATIENT
Start: 2021-07-16 | End: 2021-08-04 | Stop reason: SDUPTHER

## 2021-07-16 RX ORDER — ACETAMINOPHEN 500 MG
TABLET ORAL
COMMUNITY

## 2021-07-16 RX ORDER — ALPHA1-PROTEINASE INHIBITOR (HUMAN) 1000 MG/20ML
60 INJECTION, SOLUTION INTRAVENOUS WEEKLY
Start: 2021-07-16

## 2021-07-16 RX ORDER — PREDNISONE 10 MG/1
TABLET ORAL
Qty: 31 TABLET | Refills: 0 | Status: SHIPPED | OUTPATIENT
Start: 2021-07-16 | End: 2021-09-17 | Stop reason: SDUPTHER

## 2021-07-16 RX ORDER — INSULIN GLARGINE 100 [IU]/ML
INJECTION, SOLUTION SUBCUTANEOUS
COMMUNITY

## 2021-07-16 NOTE — PROGRESS NOTES
[facility] Pulmonary Follow up    CHIEF COMPLAINT    Severe emphysema/alpha-1 deficiency    HISTORY OF PRESENT ILLNESS    Balwinder Willams is a 55 y.o.male here today for follow-up of his emphysema and alpha-1 deficiency.  He was last seen as a telemedicine visit in May by me.  He denies any respiratory illnesses or exacerbation since his last appointment.    He has been followed by  transplant team and has been placed on the lung transplant list.  He is hopeful to get a lung transplant in the near future.  He has been doing pulmonary rehabilitation at  pulmonary rehabilitation program.  He has noticed improvement in his ADLs since starting this program.    He does continue to have severe dyspnea with exertion and has to take several breaks to complete tasks.    He remains on 3 L continuously.  He has been taking 10 mg of prednisone since his last appointment he has gained some weight.  He continues to receive Prolastin infusions weekly.     He has been using Ativan 1 mg and liquid morphine occasionally during the day for his anxiety.  He states that these have kept him more calm and less anxious.     He denies of fever, chills, sputum production, hemoptysis, night sweats, weight loss, chest pain or palpitations.  He denies any lower extremity edema or calf tenderness.  He denies any sinus or allergy symptoms.  He denies reflux symptoms.     He is up-to-date on his current vaccinations.    Patient Active Problem List   Diagnosis   • Alpha-1-antitrypsin deficiency (on replacement)   • Chronic cough   • Dependence on supplemental oxygen (3L NC)    • Stage IV emphysema s/p EBV placement X3 w/ subsequent extraction   • Former smoker (Resolved 2006)   • Postprocedural pneumothorax   • Persistent air leak   • Severe malnutrition (CMS/HCC)   • Steroid-induced hyperglycemia   • Recurrent L PTX s/p multiple SBCTs    • Recent PSA PNA   • MAC (smear -)    • Leukocytosis   • COPD exacerbation (CMS/HCC)       Allergies    Allergen Reactions   • Ketamine Headache     Pt. Would prefer not to receive ketamine in future.   • Other Other (See Comments)     Opioid Analgesics (recovering addict)       Current Outpatient Medications:   •  acetaminophen (TYLENOL) 500 MG tablet, 2 tab(s) orally every 8 hours, As Needed for pain/headache, Disp: , Rfl:   •  alpha1-proteinase inhibitor (Prolastin-C) 1000 MG injection, 1 each intravenous every 7 days[1000 mg/20 mL concentration], Disp: , Rfl:   •  arformoterol (BROVANA) 15 MCG/2ML nebulizer solution, Take 2 mL by nebulization 2 (Two) Times a Day., Disp: 120 mL, Rfl: 0  •  budesonide (PULMICORT) 0.5 MG/2ML nebulizer solution, Take 2 mL by nebulization 2 (Two) Times a Day., Disp: 120 mL, Rfl: 0  •  doxycycline (VIBRAMYICN) 100 MG tablet, Take 1 tablet by mouth 2 (Two) Times a Day., Disp: 20 tablet, Rfl: 0  •  escitalopram (LEXAPRO) 10 MG tablet, Take 10 mg by mouth Daily., Disp: , Rfl:   •  fluticasone (Flonase) 50 MCG/ACT nasal spray, 2 sprays into the nostril(s) as directed by provider Daily. (Patient taking differently: 2 sprays into the nostril(s) as directed by provider Daily As Needed.), Disp: 9.9 mL, Rfl: 6  •  guaiFENesin ER 1200 MG tablet sustained-release 12 hour, Take 1 tablet by mouth Daily., Disp: 60 tablet, Rfl: 6  •  ibuprofen (ADVIL,MOTRIN) 800 MG tablet, 1 tab(s) orally every 6 hours, As Needed for mild pain, Disp: , Rfl:   •  insulin detemir (LEVEMIR) 100 UNIT/ML injection, Inject 5 Units under the skin into the appropriate area as directed Every Night., Disp: 10 mL, Rfl: 0  •  Insulin Glargine (Lantus SoloStar) 100 UNIT/ML injection pen, Lantus Solostar U-100 Insulin 100 unit/mL (3 mL) subcutaneous pen  5 unite Sq QPM, Disp: , Rfl:   •  insulin lispro (humaLOG) 100 UNIT/ML injection, Inject 0-9 Units under the skin into the appropriate area as directed 3 (Three) Times a Day Before Meals., Disp: 10 mL, Rfl: 0  •  ipratropium-albuterol (COMBIVENT RESPIMAT)  MCG/ACT inhaler,  Inhale 1 puff 4 (Four) Times a Day As Needed for Wheezing., Disp: 2 each, Rfl: 6  •  ipratropium-albuterol (DUO-NEB) 0.5-2.5 mg/3 ml nebulizer, Take 3 mL by nebulization 4 (Four) Times a Day., Disp: 360 mL, Rfl: 11  •  LORazepam (ATIVAN) 1 MG tablet, Take 1 tablet by mouth Every 8 (Eight) Hours., Disp: 90 tablet, Rfl: 0  •  Morphine 10 MG/5ML solution, Take 3.8 mL by mouth Every 4 (Four) Hours As Needed for Severe Pain  (air hunger)., Disp: 500 mL, Rfl: 0  •  predniSONE (DELTASONE) 10 MG tablet, Take 1 tablet by mouth Daily., Disp: 90 tablet, Rfl: 0  •  predniSONE (DELTASONE) 10 MG tablet, Take 4 tabs daily x 3 days, then take 3 tabs daily x 3 days, then take 2 tabs daily x 3 days, then take 1 tab daily x 3 days, Disp: 31 tablet, Rfl: 0  •  Prolastin-C 1000 MG/20ML IV solution, Infuse 78.12 mL into a venous catheter 1 (One) Time Per Week., Disp: , Rfl:   MEDICATION LIST AND ALLERGIES REVIEWED.    Social History     Tobacco Use   • Smoking status: Former Smoker     Packs/day: 1.50     Years: 25.00     Pack years: 37.50     Types: Cigarettes     Start date: 1/1/1981     Quit date: 2006     Years since quitting: 15.5   • Smokeless tobacco: Never Used   • Tobacco comment: quit 10 years ago   Vaping Use   • Vaping Use: Former   • Quit date: 1/1/2010   • Substances: Nicotine   • Passive vaping exposure Yes   Substance Use Topics   • Alcohol use: No     Comment: In recovery since 8/24/1994   • Drug use: Yes     Comment: In recovery since 8/24/1994       FAMILY AND SOCIAL HISTORY REVIEWED.    Review of Systems   Constitutional: Negative for activity change, appetite change, fatigue, fever and unexpected weight change.   HENT: Negative for congestion, postnasal drip, rhinorrhea, sinus pressure, sore throat and voice change.    Eyes: Negative for visual disturbance.   Respiratory: Positive for shortness of breath. Negative for cough, chest tightness and wheezing.    Cardiovascular: Negative for chest pain, palpitations and  "leg swelling.   Gastrointestinal: Negative for abdominal distention, abdominal pain, nausea and vomiting.   Endocrine: Negative for cold intolerance and heat intolerance.   Genitourinary: Negative for difficulty urinating and urgency.   Musculoskeletal: Negative for arthralgias, back pain and neck pain.   Skin: Negative for color change and pallor.   Allergic/Immunologic: Negative for environmental allergies and food allergies.   Neurological: Negative for dizziness, syncope, weakness and light-headedness.   Hematological: Negative for adenopathy. Does not bruise/bleed easily.   Psychiatric/Behavioral: Negative for agitation and behavioral problems.   .    BP 98/70 (BP Location: Left arm, Patient Position: Sitting, Cuff Size: Adult)   Pulse 87   Temp 98.4 °F (36.9 °C)   Resp 18   Ht 170.2 cm (67\")   Wt 59.4 kg (131 lb)   SpO2 97%   PF (!) 3 L/min Comment: Continious at resting  BMI 20.52 kg/m²     Immunization History   Administered Date(s) Administered   • COVID-19 (PFIZER) 03/04/2021   • Flu Vaccine Quad PF >36MO 11/15/2019, 12/01/2020   • Flulaval/Fluarix/Fluzone Quad 11/18/2020   • Influenza, Unspecified 11/18/2019   • Pneumococcal Conjugate 13-Valent (PCV13) 12/10/2015, 11/18/2019   • flucelvax quad pfs =>4 YRS 11/18/2019       Physical Exam  Vitals reviewed.   Constitutional:       Appearance: He is well-developed.   HENT:      Head: Normocephalic and atraumatic.   Eyes:      Pupils: Pupils are equal, round, and reactive to light.   Cardiovascular:      Rate and Rhythm: Normal rate and regular rhythm.      Heart sounds: Normal heart sounds.   Pulmonary:      Effort: Pulmonary effort is normal.      Breath sounds: Normal breath sounds. No wheezing or rales.   Chest:      Chest wall: No tenderness.   Abdominal:      General: Bowel sounds are normal.      Palpations: Abdomen is soft.   Musculoskeletal:         General: No swelling. Normal range of motion.      Cervical back: Normal range of motion and " neck supple.   Skin:     General: Skin is warm and dry.      Capillary Refill: Capillary refill takes less than 2 seconds.   Neurological:      Mental Status: He is alert and oriented to person, place, and time.   Psychiatric:         Mood and Affect: Mood normal.         Behavior: Behavior normal.           RESULTS      PROBLEM LIST    Problem List Items Addressed This Visit        Pulmonary and Pneumonias    Alpha-1-antitrypsin deficiency (on replacement) - Primary (Chronic)    Overview     A.  Labs of 11/20/2014 reports an alpha 1 antitrypsin deficiency of 4.7 with a phenotype with Z bands detected and genotyping revealing the presence of most common deficiency allele type Z and an inferred non-expressing null allele.    B.  On Zemaira         Relevant Medications    alpha1-proteinase inhibitor (Prolastin-C) 1000 MG injection    Prolastin-C 1000 MG/20ML IV solution    Stage IV emphysema s/p EBV placement X3 w/ subsequent extraction (Chronic)    Relevant Medications    alpha1-proteinase inhibitor (Prolastin-C) 1000 MG injection    Prolastin-C 1000 MG/20ML IV solution            DISCUSSION    Mr. Willams is here for follow-up of his emphysema and alpha-1 antitrypsin deficiency.  He has done well with the 10 mg of prednisone daily since his last appointment.  He does feel that he is starting to have a exacerbation coming in I did call in antibiotics and a prednisone taper for him to have on hand this weekend.    He will continue his Brovana, Pulmicort and Combivent for his emphysema.    Hopefully he will get approved for a lung transplant in the near future.  He has been placed on the list and I am hopeful that he will get a lung transplant soon.    He will continue Prolastin infusions weekly for his alpha-1 antitrypsin deficiency.    He will continue Ativan and liquid morphine for his anxiety.  These have been refilled previously.  He is aware of the addiction risk.    He will follow-up in October or sooner if his  symptoms worsen.  He will call with any additional concerns or questions.    Level of service justified based on 35 minutes spent in patient care on this date of service including, but not limited to: preparing to see the patient, obtaining and/or reviewing history, performing medically appropriate examination, ordering tests/medicine/procedures, independently interpreting results, documenting clinical information in EHR, and counseling/education of patient/family/caregiver. (Level 4 30-39 minutes; Level 5 40-54 minutes)      Neris Cárdenas, APRN  07/16/202109:58 EDT  Electronically signed     Please note that portions of this note were completed with a voice recognition program. Efforts were made to edit the dictations, but occasionally words are mistranscribed.      CC: Dorinda Garcia MD

## 2021-08-04 DIAGNOSIS — E88.01 ALPHA-1-ANTITRYPSIN DEFICIENCY (HCC): ICD-10-CM

## 2021-08-04 DIAGNOSIS — J44.9 COPD MIXED TYPE (HCC): ICD-10-CM

## 2021-08-04 RX ORDER — MORPHINE SULFATE 10 MG/5ML
7.5 SOLUTION ORAL EVERY 4 HOURS PRN
Qty: 500 ML | Refills: 0 | Status: CANCELLED | OUTPATIENT
Start: 2021-08-04

## 2021-08-04 RX ORDER — DOXYCYCLINE HYCLATE 100 MG
100 TABLET ORAL 2 TIMES DAILY
Qty: 20 TABLET | Refills: 0 | Status: SHIPPED | OUTPATIENT
Start: 2021-08-04 | End: 2021-09-17 | Stop reason: SDUPTHER

## 2021-08-04 RX ORDER — PREDNISONE 10 MG/1
10 TABLET ORAL DAILY
Qty: 90 TABLET | Refills: 0 | Status: SHIPPED | OUTPATIENT
Start: 2021-08-04

## 2021-08-04 RX ORDER — LORAZEPAM 1 MG/1
1 TABLET ORAL EVERY 8 HOURS SCHEDULED
Qty: 90 TABLET | Refills: 0 | Status: SHIPPED | OUTPATIENT
Start: 2021-08-04 | End: 2021-09-07 | Stop reason: SDUPTHER

## 2021-08-04 RX ORDER — MORPHINE SULFATE 10 MG/5ML
7.5 SOLUTION ORAL EVERY 4 HOURS PRN
Qty: 500 ML | Refills: 0 | Status: SHIPPED | OUTPATIENT
Start: 2021-08-04

## 2021-09-07 DIAGNOSIS — J44.9 COPD MIXED TYPE (HCC): ICD-10-CM

## 2021-09-07 DIAGNOSIS — E88.01 ALPHA-1-ANTITRYPSIN DEFICIENCY (HCC): ICD-10-CM

## 2021-09-07 RX ORDER — LORAZEPAM 1 MG/1
1 TABLET ORAL EVERY 8 HOURS SCHEDULED
Qty: 90 TABLET | Refills: 0 | Status: SHIPPED | OUTPATIENT
Start: 2021-09-07 | End: 2021-10-06 | Stop reason: SDUPTHER

## 2021-09-17 DIAGNOSIS — J44.9 COPD MIXED TYPE (HCC): ICD-10-CM

## 2021-09-20 RX ORDER — DOXYCYCLINE HYCLATE 100 MG
100 TABLET ORAL 2 TIMES DAILY
Qty: 20 TABLET | Refills: 0 | Status: SHIPPED | OUTPATIENT
Start: 2021-09-20

## 2021-09-20 RX ORDER — PREDNISONE 10 MG/1
TABLET ORAL
Qty: 31 TABLET | Refills: 0 | Status: SHIPPED | OUTPATIENT
Start: 2021-09-20

## 2021-10-06 DIAGNOSIS — E88.01 ALPHA-1-ANTITRYPSIN DEFICIENCY (HCC): ICD-10-CM

## 2021-10-06 DIAGNOSIS — J44.9 COPD MIXED TYPE (HCC): ICD-10-CM

## 2021-10-06 RX ORDER — LORAZEPAM 1 MG/1
1 TABLET ORAL EVERY 8 HOURS SCHEDULED
Qty: 90 TABLET | Refills: 0 | Status: SHIPPED | OUTPATIENT
Start: 2021-10-06

## 2021-12-16 ENCOUNTER — TELEMEDICINE (OUTPATIENT)
Dept: PULMONOLOGY | Facility: CLINIC | Age: 56
End: 2021-12-16

## 2021-12-16 DIAGNOSIS — E88.01 ALPHA-1-ANTITRYPSIN DEFICIENCY (HCC): Primary | Chronic | ICD-10-CM

## 2021-12-16 PROCEDURE — 99214 OFFICE O/P EST MOD 30 MIN: CPT | Performed by: NURSE PRACTITIONER

## 2021-12-17 PROBLEM — J44.9 COPD MIXED TYPE (HCC): Chronic | Status: RESOLVED | Noted: 2019-10-18 | Resolved: 2021-12-17

## 2021-12-17 PROBLEM — J44.1 COPD EXACERBATION (HCC): Status: RESOLVED | Noted: 2021-02-03 | Resolved: 2021-12-17

## 2021-12-17 PROBLEM — J93.9 RECURRENT PNEUMOTHORAX: Status: RESOLVED | Noted: 2020-12-05 | Resolved: 2021-12-17

## 2021-12-17 NOTE — PROGRESS NOTES
[facility], [place] Pulmonary Follow up    CHIEF COMPLAINT    Lung transplant follow up    HISTORY OF PRESENT ILLNESS    Balwinder Willams is a 56 y.o.male here today for a telemedicine visit. He was last seen in the office in July. 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 He states he has had a fairly decent recovery.    He is currently on no inhaler therapy. He has also been weaned off of oxygen for the last 4-1/2 weeks. He is also been off pain medication for 3 weeks.    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    He is yet to start his Prolastin back for his alpha-1 deficiency. He has been cleared by the transplant team to resume this if it is okay from our standpoint.    He denies fever, chills, sputum production, hemoptysis, night sweats, weight loss, chest pain or palpitations. He denies any lower extremity edema or calf tenderness. He denies any sinus or allergy symptoms. He denies reflux symptoms.    He is up-to-date on his current vaccinations.    Patient Active Problem List   Diagnosis   • Alpha-1-antitrypsin deficiency (on replacement)   • Chronic cough   • Former smoker (Resolved 2006)   • Postprocedural pneumothorax   • Persistent air leak   • Severe malnutrition (HCC)   • Steroid-induced hyperglycemia   • Recent PSA PNA   • MAC (smear -)    • Leukocytosis       Allergies   Allergen Reactions   • Ketamine Headache     Pt. Would prefer not to receive ketamine in future.   • Other Other (See Comments)     Opioid Analgesics (recovering addict)       Current Outpatient Medications:   •  acetaminophen (TYLENOL) 500 MG tablet, 2 tab(s) orally every 8 hours, As Needed for pain/headache, Disp: , Rfl:   •  alpha1-proteinase inhibitor (Prolastin-C) 1000 MG injection, 1 each intravenous every 7 days[1000 mg/20 mL concentration], Disp: , Rfl:   •  fluticasone (Flonase) 50 MCG/ACT nasal  spray, 2 sprays into the nostril(s) as directed by provider Daily. (Patient taking differently: 2 sprays into the nostril(s) as directed by provider Daily As Needed.), Disp: 9.9 mL, Rfl: 6  •  Insulin Glargine (Lantus SoloStar) 100 UNIT/ML injection pen, Lantus Solostar U-100 Insulin 100 unit/mL (3 mL) subcutaneous pen  5 unite Sq QPM, Disp: , Rfl:   •  insulin lispro (humaLOG) 100 UNIT/ML injection, Inject 0-9 Units under the skin into the appropriate area as directed 3 (Three) Times a Day Before Meals., Disp: 10 mL, Rfl: 0  •  predniSONE (DELTASONE) 10 MG tablet, Take 1 tablet by mouth Daily. (Patient taking differently: Take 20 mg by mouth Daily.), Disp: 90 tablet, Rfl: 0  •  Prolastin-C 1000 MG/20ML IV solution, Infuse 78.12 mL into a venous catheter 1 (One) Time Per Week., Disp: , Rfl:   •  arformoterol (BROVANA) 15 MCG/2ML nebulizer solution, Take 2 mL by nebulization 2 (Two) Times a Day., Disp: 120 mL, Rfl: 0  •  budesonide (PULMICORT) 0.5 MG/2ML nebulizer solution, Take 2 mL by nebulization 2 (Two) Times a Day., Disp: 120 mL, Rfl: 0  •  doxycycline (VIBRAMYICN) 100 MG tablet, Take 1 tablet by mouth 2 (Two) Times a Day., Disp: 20 tablet, Rfl: 0  •  escitalopram (LEXAPRO) 10 MG tablet, Take 10 mg by mouth Daily., Disp: , Rfl:   •  guaiFENesin ER 1200 MG tablet sustained-release 12 hour, Take 1 tablet by mouth Daily., Disp: 60 tablet, Rfl: 6  •  ibuprofen (ADVIL,MOTRIN) 800 MG tablet, 1 tab(s) orally every 6 hours, As Needed for mild pain, Disp: , Rfl:   •  insulin detemir (LEVEMIR) 100 UNIT/ML injection, Inject 5 Units under the skin into the appropriate area as directed Every Night., Disp: 10 mL, Rfl: 0  •  ipratropium-albuterol (COMBIVENT RESPIMAT)  MCG/ACT inhaler, Inhale 1 puff 4 (Four) Times a Day As Needed for Wheezing., Disp: 2 each, Rfl: 6  •  ipratropium-albuterol (DUO-NEB) 0.5-2.5 mg/3 ml nebulizer, Take 3 mL by nebulization 4 (Four) Times a Day., Disp: 360 mL, Rfl: 11  •  LORazepam (ATIVAN) 1  MG tablet, Take 1 tablet by mouth Every 8 (Eight) Hours., Disp: 90 tablet, Rfl: 0  •  Morphine 10 MG/5ML solution, Take 3.8 mL by mouth Every 4 (Four) Hours As Needed for Severe Pain  (air hunger)., Disp: 500 mL, Rfl: 0  •  predniSONE (DELTASONE) 10 MG tablet, Take 4 tabs daily x 3 days, then take 3 tabs daily x 3 days, then take 2 tabs daily x 3 days, then take 1 tab daily x 3 days, Disp: 31 tablet, Rfl: 0  MEDICATION LIST AND ALLERGIES REVIEWED.    Social History     Tobacco Use   • Smoking status: Former Smoker     Packs/day: 1.50     Years: 25.00     Pack years: 37.50     Types: Cigarettes     Start date: 1/1/1981     Quit date: 2006     Years since quitting: 15.9   • Smokeless tobacco: Never Used   • Tobacco comment: quit 10 years ago   Vaping Use   • Vaping Use: Former   • Quit date: 1/1/2010   • Substances: Nicotine   • Passive vaping exposure: Yes   Substance Use Topics   • Alcohol use: No     Comment: In recovery since 8/24/1994   • Drug use: Yes     Comment: In recovery since 8/24/1994       FAMILY AND SOCIAL HISTORY REVIEWED.    Review of Systems   Constitutional: Positive for fatigue. Negative for activity change, appetite change, fever and unexpected weight change.   HENT: Negative for congestion, postnasal drip, rhinorrhea, sinus pressure, sore throat and voice change.    Eyes: Negative for visual disturbance.   Respiratory: Negative for cough, chest tightness, shortness of breath and wheezing.    Cardiovascular: Negative for chest pain, palpitations and leg swelling.   Gastrointestinal: Negative for abdominal distention, abdominal pain, nausea and vomiting.   Endocrine: Negative for cold intolerance and heat intolerance.   Genitourinary: Negative for difficulty urinating and urgency.   Musculoskeletal: Negative for arthralgias, back pain and neck pain.   Skin: Negative for color change and pallor.   Allergic/Immunologic: Negative for environmental allergies and food allergies.   Neurological: Negative  for dizziness, syncope, weakness and light-headedness.   Hematological: Negative for adenopathy. Does not bruise/bleed easily.   Psychiatric/Behavioral: Negative for agitation and behavioral problems.   .    There were no vitals taken for this visit.    Immunization History   Administered Date(s) Administered   • COVID-19 (PFIZER) 03/04/2021, 03/31/2021, 08/24/2021   • Flu Vaccine Quad PF >36MO 11/15/2019, 12/01/2020   • FluLaval/Fluarix/Fluzone >6 11/18/2020   • Influenza, Unspecified 11/18/2019   • Pneumococcal Conjugate 13-Valent (PCV13) 12/10/2015, 11/18/2019   • flucelvax quad pfs =>4 YRS 11/18/2019       Physical Exam    Unable to do physical exam due to telemedicine visit, patient was in no respiratory distress throughout the entire visit.    RESULTS      PROBLEM LIST    Problem List Items Addressed This Visit        Pulmonary and Pneumonias    Alpha-1-antitrypsin deficiency (on replacement) - Primary (Chronic)    Overview     A.  Labs of 11/20/2014 reports an alpha 1 antitrypsin deficiency of 4.7 with a phenotype with Z bands detected and genotyping revealing the presence of most common deficiency allele type Z and an inferred non-expressing null allele.    B.  On Zemaira                 DISCUSSION  You have chosen to receive care through a telehealth visit.  Do you consent to use a video/audio connection for your medical care today? Yes    Mr. Willams was here for a telemedicine visit. He seems to be recovering very well from his double lung transplant. He does remain weak from his surgery but continues to do physical therapy with home health at home.    He can resume his Prolastin from our standpoint. He is going to contact his nurse and get these infusions set up soon. I did discuss this with Dr. Castellano and he is agreeable as well.    He has no longer on any inhaler therapy or oxygen.    He will follow-up in 3 months with Dr. Castellano or myself or sooner if he     Level of service justified based on 30  minutes spent in patient care on this date of service including, but not limited to: preparing to see the patient, obtaining and/or reviewing history, performing medically appropriate examination, ordering tests/medicine/procedures, independently interpreting results, documenting clinical information in EHR, and counseling/education of patient/family/caregiver. (Level 4 30-39 minutes; Level 5 40-54 minutes)      JESSICA Hyatt  12/16/202108:21 EST  Electronically signed     Please note that portions of this note were completed with a voice recognition program.        CC: Dorinda Garcia MD

## 2022-11-17 NOTE — CONSULTS
Pt seen by lmt for massage therapy, see flowsheet.   Tazorac Counseling:  Patient advised that medication is irritating and drying.  Patient may need to apply sparingly and wash off after an hour before eventually leaving it on overnight.  The patient verbalized understanding of the proper use and possible adverse effects of tazorac.  All of the patient's questions and concerns were addressed. Benzoyl Peroxide Counseling: Patient counseled that medicine may cause skin irritation and bleach clothing.  In the event of skin irritation, the patient was advised to reduce the amount of the drug applied or use it less frequently.   The patient verbalized understanding of the proper use and possible adverse effects of benzoyl peroxide.  All of the patient's questions and concerns were addressed. Erythromycin Pregnancy And Lactation Text: This medication is Pregnancy Category B and is considered safe during pregnancy. It is also excreted in breast milk. Topical Sulfur Applications Counseling: Topical Sulfur Counseling: Patient counseled that this medication may cause skin irritation or allergic reactions.  In the event of skin irritation, the patient was advised to reduce the amount of the drug applied or use it less frequently.   The patient verbalized understanding of the proper use and possible adverse effects of topical sulfur application.  All of the patient's questions and concerns were addressed. Bactrim Counseling:  I discussed with the patient the risks of sulfa antibiotics including but not limited to GI upset, allergic reaction, drug rash, diarrhea, dizziness, photosensitivity, and yeast infections.  Rarely, more serious reactions can occur including but not limited to aplastic anemia, agranulocytosis, methemoglobinemia, blood dyscrasias, liver or kidney failure, lung infiltrates or desquamative/blistering drug rashes. Dapsone Pregnancy And Lactation Text: This medication is Pregnancy Category C and is not considered safe during pregnancy or breast feeding. Spironolactone Counseling: Patient advised regarding risks of diarrhea, abdominal pain, hyperkalemia, birth defects (for female patients), liver toxicity and renal toxicity. The patient may need blood work to monitor liver and kidney function and potassium levels while on therapy. The patient verbalized understanding of the proper use and possible adverse effects of spironolactone.  All of the patient's questions and concerns were addressed. Minocycline Counseling: Patient advised regarding possible photosensitivity and discoloration of the teeth, skin, lips, tongue and gums.  Patient instructed to avoid sunlight, if possible.  When exposed to sunlight, patients should wear protective clothing, sunglasses, and sunscreen.  The patient was instructed to call the office immediately if the following severe adverse effects occur:  hearing changes, easy bruising/bleeding, severe headache, or vision changes.  The patient verbalized understanding of the proper use and possible adverse effects of minocycline.  All of the patient's questions and concerns were addressed. Isotretinoin Counseling: Patient should get monthly blood tests, not donate blood, not drive at night if vision affected, not share medication, and not undergo elective surgery for 6 months after tx completed. Side effects reviewed, pt to contact office should one occur. Winlevi Counseling:  I discussed with the patient the risks of topical clascoterone including but not limited to erythema, scaling, itching, and stinging. Patient voiced their understanding. Benzoyl Peroxide Pregnancy And Lactation Text: This medication is Pregnancy Category C. It is unknown if benzoyl peroxide is excreted in breast milk. Spironolactone Pregnancy And Lactation Text: This medication can cause feminization of the male fetus and should be avoided during pregnancy. The active metabolite is also found in breast milk. Bactrim Pregnancy And Lactation Text: This medication is Pregnancy Category D and is known to cause fetal risk.  It is also excreted in breast milk. Topical Retinoid Pregnancy And Lactation Text: This medication is Pregnancy Category C. It is unknown if this medication is excreted in breast milk. Doxycycline Counseling:  Patient counseled regarding possible photosensitivity and increased risk for sunburn.  Patient instructed to avoid sunlight, if possible.  When exposed to sunlight, patients should wear protective clothing, sunglasses, and sunscreen.  The patient was instructed to call the office immediately if the following severe adverse effects occur:  hearing changes, easy bruising/bleeding, severe headache, or vision changes.  The patient verbalized understanding of the proper use and possible adverse effects of doxycycline.  All of the patient's questions and concerns were addressed. Topical Clindamycin Pregnancy And Lactation Text: This medication is Pregnancy Category B and is considered safe during pregnancy. It is unknown if it is excreted in breast milk. Minocycline Pregnancy And Lactation Text: This medication is Pregnancy Category D and not consider safe during pregnancy. It is also excreted in breast milk. Use Enhanced Medication Counseling?: No Topical Retinoid counseling:  Patient advised to apply a pea-sized amount only at bedtime and wait 30 minutes after washing their face before applying.  If too drying, patient may add a non-comedogenic moisturizer. The patient verbalized understanding of the proper use and possible adverse effects of retinoids.  All of the patient's questions and concerns were addressed. Isotretinoin Pregnancy And Lactation Text: This medication is Pregnancy Category X and is considered extremely dangerous during pregnancy. It is unknown if it is excreted in breast milk. Azelaic Acid Pregnancy And Lactation Text: This medication is considered safe during pregnancy and breast feeding. Birth Control Pills Counseling: Birth Control Pill Counseling: I discussed with the patient the potential side effects of OCPs including but not limited to increased risk of stroke, heart attack, thrombophlebitis, deep venous thrombosis, hepatic adenomas, breast changes, GI upset, headaches, and depression.  The patient verbalized understanding of the proper use and possible adverse effects of OCPs. All of the patient's questions and concerns were addressed. Tetracycline Counseling: Patient counseled regarding possible photosensitivity and increased risk for sunburn.  Patient instructed to avoid sunlight, if possible.  When exposed to sunlight, patients should wear protective clothing, sunglasses, and sunscreen.  The patient was instructed to call the office immediately if the following severe adverse effects occur:  hearing changes, easy bruising/bleeding, severe headache, or vision changes.  The patient verbalized understanding of the proper use and possible adverse effects of tetracycline.  All of the patient's questions and concerns were addressed. Patient understands to avoid pregnancy while on therapy due to potential birth defects. Sarecycline Counseling: Patient advised regarding possible photosensitivity and discoloration of the teeth, skin, lips, tongue and gums.  Patient instructed to avoid sunlight, if possible.  When exposed to sunlight, patients should wear protective clothing, sunglasses, and sunscreen.  The patient was instructed to call the office immediately if the following severe adverse effects occur:  hearing changes, easy bruising/bleeding, severe headache, or vision changes.  The patient verbalized understanding of the proper use and possible adverse effects of sarecycline.  All of the patient's questions and concerns were addressed. Azithromycin Counseling:  I discussed with the patient the risks of azithromycin including but not limited to GI upset, allergic reaction, drug rash, diarrhea, and yeast infections. Topical Clindamycin Counseling: Patient counseled that this medication may cause skin irritation or allergic reactions.  In the event of skin irritation, the patient was advised to reduce the amount of the drug applied or use it less frequently.   The patient verbalized understanding of the proper use and possible adverse effects of clindamycin.  All of the patient's questions and concerns were addressed. Doxycycline Pregnancy And Lactation Text: This medication is Pregnancy Category D and not consider safe during pregnancy. It is also excreted in breast milk but is considered safe for shorter treatment courses. Azelaic Acid Counseling: Patient counseled that medicine may cause skin irritation and to avoid applying near the eyes.  In the event of skin irritation, the patient was advised to reduce the amount of the drug applied or use it less frequently.   The patient verbalized understanding of the proper use and possible adverse effects of azelaic acid.  All of the patient's questions and concerns were addressed. High Dose Vitamin A Counseling: Side effects reviewed, pt to contact office should one occur. Birth Control Pills Pregnancy And Lactation Text: This medication should be avoided if pregnant and for the first 30 days post-partum. Detail Level: Zone Tazorac Pregnancy And Lactation Text: This medication is not safe during pregnancy. It is unknown if this medication is excreted in breast milk. Erythromycin Counseling:  I discussed with the patient the risks of erythromycin including but not limited to GI upset, allergic reaction, drug rash, diarrhea, increase in liver enzymes, and yeast infections. Azithromycin Pregnancy And Lactation Text: This medication is considered safe during pregnancy and is also secreted in breast milk. Topical Sulfur Applications Pregnancy And Lactation Text: This medication is Pregnancy Category C and has an unknown safety profile during pregnancy. It is unknown if this topical medication is excreted in breast milk. Dapsone Counseling: I discussed with the patient the risks of dapsone including but not limited to hemolytic anemia, agranulocytosis, rashes, methemoglobinemia, kidney failure, peripheral neuropathy, headaches, GI upset, and liver toxicity.  Patients who start dapsone require monitoring including baseline LFTs and weekly CBCs for the first month, then every month thereafter.  The patient verbalized understanding of the proper use and possible adverse effects of dapsone.  All of the patient's questions and concerns were addressed. Winlevi Pregnancy And Lactation Text: This medication is considered safe during pregnancy and breastfeeding. High Dose Vitamin A Pregnancy And Lactation Text: High dose vitamin A therapy is contraindicated during pregnancy and breast feeding. Aklief counseling:  Patient advised to apply a pea-sized amount only at bedtime and wait 30 minutes after washing their face before applying.  If too drying, patient may add a non-comedogenic moisturizer.  The most commonly reported side effects including irritation, redness, scaling, dryness, stinging, burning, itching, and increased risk of sunburn.  The patient verbalized understanding of the proper use and possible adverse effects of retinoids.  All of the patient's questions and concerns were addressed. Aklief Pregnancy And Lactation Text: It is unknown if this medication is safe to use during pregnancy.  It is unknown if this medication is excreted in breast milk.  Breastfeeding women should use the topical cream on the smallest area of the skin for the shortest time needed while breastfeeding.  Do not apply to nipple and areola.

## 2023-06-19 NOTE — PLAN OF CARE
Problem: Patient Care Overview  Goal: Plan of Care Review  Outcome: Ongoing (interventions implemented as appropriate)   11/02/19 1703 11/03/19 3196   Coping/Psychosocial   Plan of Care Reviewed With --  patient   Plan of Care Review   Progress --  no change   OTHER   Outcome Summary VSS. NSR on the monitor. 3L NC. Pt has had some incisional pain at the chest tube site and received tylenol and toradol PRN. Chest tube still to wall suction at -20. Will continue to monitor.  --           Yes

## 2023-09-27 NOTE — ED PROVIDER NOTES
Subjective   Balwinder Willams is a 54 y.o.male who presents to the emergency department with complaints of flank pain. The patient reports right sided flank pain with an onset of yesterday. Since onset, his pain has progressively worsened and drastically worsened this morning. His pain is described as a stabbing sensation, and it radiates into his right rib cage area. Aggravating factors include movement and deep breathing, and he denies any alleviating factors. In an effort to improve his pain, he took Ibuprofen and an Albuterol nebulizer treatment, both of which were ineffective. Associated symptoms include difficulty breathing, shortness of breath, and an increased productive cough. Typically, this productive cough is his baseline due to his medical history of emphysema, however since the onset of his flank pain it has occurred more frequently. He denies fever, abdominal pain, melena, hematochezia, increased urinary frequency, dysuria, and urgency. The patient has a medical history of COPD, emphysema, asthma, and alpha-1-antitrypsin deficiency, but denies history of blood clots. He is taking Symbicort, an Albuterol inhaler, and uses 3 L of oxygen every night. His surgical history includes multiple prior bronchoscopies. He quit smoking 14 years ago, and has a history of alcohol and drug abuse, however, he has not used drugs or alcohol since 1994, 26 years ago. There are no other acute complaints at this time.      History provided by:  Patient  Flank Pain   Pain location:  R flank  Pain quality: stabbing    Pain radiates to:  Chest  Pain severity:  Moderate  Onset quality:  Sudden  Duration:  12 hours  Timing:  Constant  Progression:  Worsening  Chronicity:  New  Relieved by:  Nothing  Worsened by:  Deep breathing, movement and position changes  Ineffective treatments:  NSAIDs (albuterol nebulizer treatment)  Associated symptoms: cough and shortness of breath    Associated symptoms: no dysuria, no fever, no  "hematochezia and no melena    Risk factors: alcohol abuse        Review of Systems   Constitutional: Negative for fever.   Respiratory: Positive for cough and shortness of breath.    Gastrointestinal: Negative for abdominal pain, blood in stool, hematochezia and melena.   Genitourinary: Positive for flank pain. Negative for difficulty urinating, dysuria, frequency and urgency.   All other systems reviewed and are negative.      Past Medical History:   Diagnosis Date   • Alpha-1-antitrypsin deficiency (CMS/HCC)    • Asthma, extrinsic smoke, pollen   • Chronic bronchitis (CMS/HCC)    • COPD (chronic obstructive pulmonary disease) (CMS/HCC)    • Cough    • Emphysema of lung (CMS/HCC) COPD diagnosed approximately 14 years ago   • Lung nodule I dont know    I dont know what this is   • Wears glasses        Allergies   Allergen Reactions   • Other      Opioid Analgesics   • Roflumilast Nausea Only       Past Surgical History:   Procedure Laterality Date   • BRONCHOSCOPY N/A 10/30/2019    Procedure: BRONCHOSCOPY WITH ENDOBRONHCIAL VALVE PLACEMENT;  Surgeon: Phan Castellano MD;  Location:  [facility];  Service: Pulmonary   • BRONCHOSCOPY N/A 10/24/2019    Procedure: BRONCHOSCOPY WITH ENDOBRONCHIAL VALVE PLACEMENT;  Surgeon: Devaughn Pressley MD;  Location:  [facility];  Service: Pulmonary   • BRONCHOSCOPY  10/24/19 & 10/29/19?       Family History   Problem Relation Age of Onset   • Heart disease Mother    • Diabetes Mother    • Alpha-1 antitrypsin deficiency Brother    • Emphysema Brother         also diagnosed with Alpha 1 antitripsan deficiency   • Lung cancer Other    • Emphysema Other    • Emphysema Paternal Grandmother         prognosis in 1975 was \"lung Cancer\" , however, it is suspect that she suffered from the degeneration of her lungs due to Alpha 1 Antitripsan       Social History     Socioeconomic History   • Marital status: Single     Spouse name: Not on file   • Number of children: Not on " file   • Years of education: Not on file   • Highest education level: Not on file   Tobacco Use   • Smoking status: Former Smoker     Packs/day: 1.50     Years: 25.00     Pack years: 37.50     Types: Cigarettes     Start date: 1981     Last attempt to quit: 2006     Years since quittin.1   • Smokeless tobacco: Never Used   Substance and Sexual Activity   • Alcohol use: No     Comment: In recovery since 1994   • Drug use: Yes     Comment: In recovery since 1994   • Sexual activity: Yes     Partners: Female     Birth control/protection: IUD, Rhythm         Objective   Physical Exam   Constitutional: He is oriented to person, place, and time. He appears well-developed and well-nourished.  Non-toxic appearance. No distress.   Appears uncomfortable, but nontoxic appearing and in no acute distress.   HENT:   Head: Normocephalic and atraumatic.   Eyes: Conjunctivae are normal. No scleral icterus.   Neck: Normal range of motion. Neck supple.   Cardiovascular: Normal rate, regular rhythm and normal heart sounds.   Pulmonary/Chest: Tachypnea noted. No respiratory distress. He has wheezes. He exhibits tenderness. He exhibits no crepitus and no swelling.   Tachypneic, splinting breathing. Some mild expiratory wheezing diffusely. Tenderness to palpation over right posterior thoracic cage. No rash, redness, swelling, or crepitus.     Abdominal: Soft. There is no tenderness.   Musculoskeletal: Normal range of motion. He exhibits tenderness.   Neurological: He is alert and oriented to person, place, and time.   Skin: Skin is warm and dry. No rash noted. No erythema.   Psychiatric: He has a normal mood and affect. His behavior is normal.   Nursing note and vitals reviewed.      Procedures         ED Course  ED Course as of  1631   Mon 2020   1220 Dr. Rodríguez reevaulated the patient and informed him of lab and imaging results. He discussed his plan to discharge, and the patient felt comfortable with  this plan.     [BM]      ED Course User Index  [BM] Asmita Wilson     Recent Results (from the past 24 hour(s))   Comprehensive Metabolic Panel    Collection Time: 02/17/20  8:13 AM   Result Value Ref Range    Glucose 120 (H) 65 - 99 mg/dL    BUN 14 6 - 20 mg/dL    Creatinine 1.00 0.76 - 1.27 mg/dL    Sodium 137 136 - 145 mmol/L    Potassium 4.1 3.5 - 5.2 mmol/L    Chloride 100 98 - 107 mmol/L    CO2 25.0 22.0 - 29.0 mmol/L    Calcium 9.1 8.6 - 10.5 mg/dL    Total Protein 8.4 6.0 - 8.5 g/dL    Albumin 4.40 3.50 - 5.20 g/dL    ALT (SGPT) 14 1 - 41 U/L    AST (SGOT) 14 1 - 40 U/L    Alkaline Phosphatase 83 39 - 117 U/L    Total Bilirubin 1.1 0.2 - 1.2 mg/dL    eGFR Non African Amer 78 >60 mL/min/1.73    Globulin 4.0 gm/dL    A/G Ratio 1.1 g/dL    BUN/Creatinine Ratio 14.0 7.0 - 25.0    Anion Gap 12.0 5.0 - 15.0 mmol/L   BNP    Collection Time: 02/17/20  8:13 AM   Result Value Ref Range    proBNP 111.3 5.0 - 900.0 pg/mL   Troponin    Collection Time: 02/17/20  8:13 AM   Result Value Ref Range    Troponin T <0.010 0.000 - 0.030 ng/mL   Light Blue Top    Collection Time: 02/17/20  8:13 AM   Result Value Ref Range    Extra Tube hold for add-on    Green Top (Gel)    Collection Time: 02/17/20  8:13 AM   Result Value Ref Range    Extra Tube Hold for add-ons.    Lavender Top    Collection Time: 02/17/20  8:13 AM   Result Value Ref Range    Extra Tube hold for add-on    Gold Top - SST    Collection Time: 02/17/20  8:13 AM   Result Value Ref Range    Extra Tube Hold for add-ons.    CBC Auto Differential    Collection Time: 02/17/20  8:13 AM   Result Value Ref Range    WBC 18.51 (H) 3.40 - 10.80 10*3/mm3    RBC 5.55 4.14 - 5.80 10*6/mm3    Hemoglobin 16.9 13.0 - 17.7 g/dL    Hematocrit 51.1 (H) 37.5 - 51.0 %    MCV 92.1 79.0 - 97.0 fL    MCH 30.5 26.6 - 33.0 pg    MCHC 33.1 31.5 - 35.7 g/dL    RDW 13.1 12.3 - 15.4 %    RDW-SD 44.3 37.0 - 54.0 fl    MPV 8.9 6.0 - 12.0 fL    Platelets 256 140 - 450 10*3/mm3    Neutrophil %  80.4 (H) 42.7 - 76.0 %    Lymphocyte % 5.5 (L) 19.6 - 45.3 %    Monocyte % 13.0 (H) 5.0 - 12.0 %    Eosinophil % 0.2 (L) 0.3 - 6.2 %    Basophil % 0.3 0.0 - 1.5 %    Immature Grans % 0.6 (H) 0.0 - 0.5 %    Neutrophils, Absolute 14.90 (H) 1.70 - 7.00 10*3/mm3    Lymphocytes, Absolute 1.02 0.70 - 3.10 10*3/mm3    Monocytes, Absolute 2.40 (H) 0.10 - 0.90 10*3/mm3    Eosinophils, Absolute 0.03 0.00 - 0.40 10*3/mm3    Basophils, Absolute 0.05 0.00 - 0.20 10*3/mm3    Immature Grans, Absolute 0.11 (H) 0.00 - 0.05 10*3/mm3    nRBC 0.0 0.0 - 0.2 /100 WBC     Note: In addition to lab results from this visit, the labs listed above may include labs taken at another facility or during a different encounter within the last 24 hours. Please correlate lab times with ED admission and discharge times for further clarification of the services performed during this visit.    XR Chest 1 View   Final Result   Stable chronic obstructive and postinflammatory pulmonary   change.       D:  02/17/2020   E:  02/17/2020       This report was finalized on 2/17/2020 9:58 AM by Dr. Pola Mike MD.            Vitals:    02/17/20 1200 02/17/20 1230 02/17/20 1239 02/17/20 1247   BP: 100/69 101/64     BP Location:       Patient Position:       Pulse: 112 97 97    Resp:    19   Temp:       TempSrc:       SpO2: 93% 93% 94%    Weight:       Height:         Medications   ketorolac (TORADOL) injection 15 mg (15 mg Intravenous Given 2/17/20 0857)   ipratropium-albuterol (DUO-NEB) nebulizer solution 3 mL (3 mL Nebulization Given 2/17/20 0912)   albuterol (PROVENTIL) nebulizer solution 0.083% 2.5 mg/3mL (10 mg Nebulization Given 2/17/20 0912)   methylPREDNISolone sodium succinate (SOLU-Medrol) injection 125 mg (125 mg Intravenous Given 2/17/20 0859)     ECG/EMG Results (last 24 hours)     Procedure Component Value Units Date/Time    ECG 12 Lead [[id number]] Collected:  02/17/20 0758     Updated:  02/17/20 0800        ECG 12 Lead                                                         MDM  Number of Diagnoses or Management Options  Acute chest wall pain: new and requires workup  COPD with acute exacerbation (CMS/HCC): new and requires workup  Diagnosis management comments: The patient presents with the complaint of cough and right lateral and posterior lower thoracic cage pain.  The pain is exacerbated with breathing, cough, movement, and there is reproducible tenderness to palpation over the right posterior lower thoracic cage.    There is also mild expiratory wheezing on auscultation of the lungs.    No significant abnormalities identified on chest x-ray.  Labs show leukocytosis, but no infectious process on imaging.    Ultimately patient does report feeling better after receiving Toradol, and neb treatment.    Patient will be discharged with oral to take as needed to help with chest wall pain.  He is advised to apply heat and perform regular stretching.    Discharged home appearing well.       Amount and/or Complexity of Data Reviewed  Clinical lab tests: ordered and reviewed  Tests in the radiology section of CPT®: ordered and reviewed  Decide to obtain previous medical records or to obtain history from someone other than the patient: yes  Obtain history from someone other than the patient: yes  Review and summarize past medical records: yes  Independent visualization of images, tracings, or specimens: yes        Final diagnoses:   Acute chest wall pain   COPD with acute exacerbation (CMS/HCC)       Documentation assistance provided by marlin Wilson.  Information recorded by the marlin was done at my direction and has been verified and validated by me.     Asmita Wilson  02/17/20 0914       Sharlene Rodríguez MD  02/17/20 4993       Sharlene Rodríguez MD  02/17/20 4086     abrasion left knee

## 2023-12-20 ENCOUNTER — TELEPHONE (OUTPATIENT)
Dept: PULMONOLOGY | Facility: CLINIC | Age: 58
End: 2023-12-20
Payer: MEDICARE

## 2023-12-20 NOTE — TELEPHONE ENCOUNTER
Carol from [facility] pharmacy called in regards to pt's Prolastin prescription. Was told you handle this medication and to send you a message by [facility]. (It's not urgent, so don't rush.) Callback number provided was [phone].

## 2023-12-26 NOTE — TELEPHONE ENCOUNTER
Spoke with Suyapa at [facility] @ [phone] and notified he is seeing Pulmonary at [facility] and they are taking care of this medication now

## 2024-01-24 NOTE — PLAN OF CARE
Problem: Adult Inpatient Plan of Care  Goal: Plan of Care Review  Outcome: Ongoing, Progressing  Flowsheets (Taken 2/4/2021 1842)  Progress: no change  Plan of Care Reviewed With: patient  Outcome Summary: VSS. On 5 liters O2. SOA on exertion. Ambulated in room some today. No complaints. Will monitor.   Goal Outcome Evaluation:  Plan of Care Reviewed With: patient  Progress: no change  Outcome Summary: VSS. On 5 liters O2. SOA on exertion. Ambulated in room some today. No complaints. Will monitor.   Pt arrives to the ED with complaints of left knee pain. Pt reports hearing a \"pop\" after a mechanical fall.

## (undated) DEVICE — KT SIZING AIRWY SPIRATION VLV SYS

## (undated) DEVICE — SOL IRR NACL 0.9PCT BT 1000ML

## (undated) DEVICE — CONN STD FOR/O2 TBG

## (undated) DEVICE — TUBING,OXYGEN,CRUSH RES,7',CLEAR,UC: Brand: MEDLINE INDUSTRIES, INC.

## (undated) DEVICE — SENSR O2 OXIMAX FNGR A/ 18IN NONSTR

## (undated) DEVICE — ST EXT MICROBORE FIX M LL 38IN

## (undated) DEVICE — SINGLE USE BIOPSY VALVE MAJ-210: Brand: SINGLE USE BIOPSY VALVE (STERILE)

## (undated) DEVICE — ST NDL BRONCH ASP VIZISHOT 2 FLX 19GA

## (undated) DEVICE — SIDESTREAM™ HIGH-EFFICIENCY NEBULIZER: Brand: AIRLIFE™

## (undated) DEVICE — SYR SLP TP 10ML DISP

## (undated) DEVICE — ADAPT SWVL FIBROPTIC BRONCH

## (undated) DEVICE — SYR LUER SLPTP 50ML

## (undated) DEVICE — CATH DEPLOY SPIRATION W/LOADER 2.6MMPLS 1050MM

## (undated) DEVICE — BOWL UTIL STRL 32OZ

## (undated) DEVICE — SYR SLPTP 20CC

## (undated) DEVICE — FRCP BX RADJAW4 PULM WO NDL STD1.8X2 100

## (undated) DEVICE — SPEC CONT WIDE MOUTH 3OZ 400/CS 20 CS/SK: Brand: MEDEGEN MEDICAL PRODUCTS, LLC

## (undated) DEVICE — ELECTRODE, ECG, FOAM, 3PK: Brand: MEDLINE

## (undated) DEVICE — CONNECTOR,STRAIGHT,F/02 SUPPLY TUBING: Brand: MEDLINE

## (undated) DEVICE — SINGLE USE SUCTION VALVE MAJ-209: Brand: SINGLE USE SUCTION VALVE (STERILE)

## (undated) DEVICE — TRAP,MUCUS SPECIMEN,40CC: Brand: MEDLINE

## (undated) DEVICE — SYR 10ML

## (undated) DEVICE — Device: Brand: BALLOON

## (undated) DEVICE — APPL COTN TP PLSTC 6IN STRL LF PK/2

## (undated) DEVICE — SOL LR 1000ML

## (undated) DEVICE — DISPOSABLE BALLOON CATHETER: Brand: DISPOSABLE BALLOON CATHETER

## (undated) DEVICE — Device

## (undated) DEVICE — MSK ENDO PORT O2 POM ELITE CURAPLEX A/

## (undated) DEVICE — LUER-LOK 360°: Brand: CONNECTA, LUER-LOK

## (undated) DEVICE — Device: Brand: SINGLE USE ASPIRATION NEEDLE NA-U401SX